# Patient Record
Sex: FEMALE | Race: BLACK OR AFRICAN AMERICAN | Employment: OTHER | URBAN - METROPOLITAN AREA
[De-identification: names, ages, dates, MRNs, and addresses within clinical notes are randomized per-mention and may not be internally consistent; named-entity substitution may affect disease eponyms.]

---

## 2017-04-07 ENCOUNTER — APPOINTMENT (OUTPATIENT)
Dept: GENERAL RADIOLOGY | Age: 72
End: 2017-04-07
Attending: PHYSICIAN ASSISTANT
Payer: MEDICARE

## 2017-04-07 ENCOUNTER — APPOINTMENT (OUTPATIENT)
Dept: CT IMAGING | Age: 72
End: 2017-04-07
Attending: PHYSICIAN ASSISTANT
Payer: MEDICARE

## 2017-04-07 ENCOUNTER — HOSPITAL ENCOUNTER (EMERGENCY)
Age: 72
Discharge: HOME | End: 2017-04-07
Attending: EMERGENCY MEDICINE
Payer: MEDICARE

## 2017-04-07 VITALS
BODY MASS INDEX: 29.99 KG/M2 | TEMPERATURE: 98.7 F | DIASTOLIC BLOOD PRESSURE: 97 MMHG | SYSTOLIC BLOOD PRESSURE: 186 MMHG | RESPIRATION RATE: 16 BRPM | OXYGEN SATURATION: 100 % | WEIGHT: 180 LBS | HEART RATE: 73 BPM | HEIGHT: 65 IN

## 2017-04-07 DIAGNOSIS — K59.00 CONSTIPATION, UNSPECIFIED CONSTIPATION TYPE: Primary | ICD-10-CM

## 2017-04-07 LAB
ALBUMIN SERPL BCP-MCNC: 3.9 G/DL (ref 3.4–5)
ALBUMIN/GLOB SERPL: 1 {RATIO} (ref 0.8–1.7)
ALP SERPL-CCNC: 153 U/L (ref 45–117)
ALT SERPL-CCNC: 16 U/L (ref 13–56)
ANION GAP BLD CALC-SCNC: 10 MMOL/L (ref 3–18)
APPEARANCE UR: CLEAR
AST SERPL W P-5'-P-CCNC: 55 U/L (ref 15–37)
BACTERIA URNS QL MICRO: ABNORMAL /HPF
BASOPHILS # BLD AUTO: 0 K/UL (ref 0–0.06)
BASOPHILS # BLD: 1 % (ref 0–2)
BILIRUB SERPL-MCNC: 0.5 MG/DL (ref 0.2–1)
BILIRUB UR QL: NEGATIVE
BUN SERPL-MCNC: 13 MG/DL (ref 7–18)
BUN/CREAT SERPL: 16 (ref 12–20)
CALCIUM SERPL-MCNC: 9.6 MG/DL (ref 8.5–10.1)
CHLORIDE SERPL-SCNC: 101 MMOL/L (ref 100–108)
CO2 SERPL-SCNC: 25 MMOL/L (ref 21–32)
COLOR UR: YELLOW
CREAT SERPL-MCNC: 0.81 MG/DL (ref 0.6–1.3)
DIFFERENTIAL METHOD BLD: ABNORMAL
EOSINOPHIL # BLD: 0 K/UL (ref 0–0.4)
EOSINOPHIL NFR BLD: 0 % (ref 0–5)
EPITH CASTS URNS QL MICRO: ABNORMAL /LPF (ref 0–5)
ERYTHROCYTE [DISTWIDTH] IN BLOOD BY AUTOMATED COUNT: 14 % (ref 11.6–14.5)
GLOBULIN SER CALC-MCNC: 4.1 G/DL (ref 2–4)
GLUCOSE SERPL-MCNC: 144 MG/DL (ref 74–99)
GLUCOSE UR STRIP.AUTO-MCNC: NEGATIVE MG/DL
HCT VFR BLD AUTO: 35.2 % (ref 35–45)
HGB BLD-MCNC: 11.7 G/DL (ref 12–16)
HGB UR QL STRIP: NEGATIVE
HYALINE CASTS URNS QL MICRO: ABNORMAL /LPF (ref 0–2)
KETONES UR QL STRIP.AUTO: NEGATIVE MG/DL
LEUKOCYTE ESTERASE UR QL STRIP.AUTO: ABNORMAL
LIPASE SERPL-CCNC: 29 U/L (ref 73–393)
LYMPHOCYTES # BLD AUTO: 21 % (ref 21–52)
LYMPHOCYTES # BLD: 1.8 K/UL (ref 0.9–3.6)
MCH RBC QN AUTO: 28.1 PG (ref 24–34)
MCHC RBC AUTO-ENTMCNC: 33.2 G/DL (ref 31–37)
MCV RBC AUTO: 84.6 FL (ref 74–97)
MONOCYTES # BLD: 0.5 K/UL (ref 0.05–1.2)
MONOCYTES NFR BLD AUTO: 6 % (ref 3–10)
MUCOUS THREADS URNS QL MICRO: ABNORMAL /LPF
NEUTS SEG # BLD: 6.2 K/UL (ref 1.8–8)
NEUTS SEG NFR BLD AUTO: 72 % (ref 40–73)
NITRITE UR QL STRIP.AUTO: NEGATIVE
PH UR STRIP: 5.5 [PH] (ref 5–8)
PLATELET # BLD AUTO: 240 K/UL (ref 135–420)
PMV BLD AUTO: 11.8 FL (ref 9.2–11.8)
POTASSIUM SERPL-SCNC: 5.2 MMOL/L (ref 3.5–5.5)
PROT SERPL-MCNC: 8 G/DL (ref 6.4–8.2)
PROT UR STRIP-MCNC: NEGATIVE MG/DL
RBC # BLD AUTO: 4.16 M/UL (ref 4.2–5.3)
RBC #/AREA URNS HPF: ABNORMAL /HPF (ref 0–5)
SODIUM SERPL-SCNC: 136 MMOL/L (ref 136–145)
SP GR UR REFRACTOMETRY: 1.02 (ref 1–1.03)
UROBILINOGEN UR QL STRIP.AUTO: 0.2 EU/DL (ref 0.2–1)
WBC # BLD AUTO: 8.6 K/UL (ref 4.6–13.2)
WBC URNS QL MICRO: ABNORMAL /HPF (ref 0–4)

## 2017-04-07 PROCEDURE — 74176 CT ABD & PELVIS W/O CONTRAST: CPT

## 2017-04-07 PROCEDURE — 83690 ASSAY OF LIPASE: CPT | Performed by: PHYSICIAN ASSISTANT

## 2017-04-07 PROCEDURE — 85025 COMPLETE CBC W/AUTO DIFF WBC: CPT | Performed by: PHYSICIAN ASSISTANT

## 2017-04-07 PROCEDURE — 81001 URINALYSIS AUTO W/SCOPE: CPT | Performed by: PHYSICIAN ASSISTANT

## 2017-04-07 PROCEDURE — 74011250637 HC RX REV CODE- 250/637: Performed by: PHYSICIAN ASSISTANT

## 2017-04-07 PROCEDURE — 99284 EMERGENCY DEPT VISIT MOD MDM: CPT

## 2017-04-07 PROCEDURE — 74020 XR ABD FLAT/ ERECT: CPT

## 2017-04-07 PROCEDURE — 80053 COMPREHEN METABOLIC PANEL: CPT | Performed by: PHYSICIAN ASSISTANT

## 2017-04-07 RX ORDER — DIPHENHYDRAMINE HCL 25 MG
25 CAPSULE ORAL
Status: COMPLETED | OUTPATIENT
Start: 2017-04-07 | End: 2017-04-07

## 2017-04-07 RX ORDER — ADHESIVE BANDAGE
30 BANDAGE TOPICAL DAILY
Qty: 118 ML | Refills: 0 | Status: SHIPPED | OUTPATIENT
Start: 2017-04-07 | End: 2018-07-09

## 2017-04-07 RX ADMIN — DIPHENHYDRAMINE HYDROCHLORIDE 25 MG: 25 CAPSULE ORAL at 21:08

## 2017-04-07 NOTE — ED NOTES
Assumed care of patient from Calli Obrien PA-C at shift change 1800. Patient does not complain of abdominal pain but has generalized tenderness to palpation, worse at the lower quadrants. KUB does not show evidence of obstruction but due to history CT was ordered (dry- allergy to dye). She is passing gas today. 2134: No signs of obstruction on CT or KUB. Will treat constipation with milk of mag, f/u with PCP or return if no improvement. Discussed treatment plan, return precautions, symptomatic relief, and expected time to improvement. All questions answered. Patient is stable for discharge and outpatient management.       Micheal Narvaez PA-C

## 2017-04-07 NOTE — ED TRIAGE NOTES
Per EMS, pt has been constipated the past 3 days. Usually takes oxycodone and colace at home but is reportedly out of both.

## 2017-04-07 NOTE — ED PROVIDER NOTES
Patient is a 74 y.o. female presenting with constipation. The history is provided by the patient. Constipation    Associated symptoms include constipation. Dinah Nina is a 66 y.o. female presents with c/o constipation for the past three days. States that she is concerned that she has another obstruction. States feels like she has a bitter taste in her throat that it is coming up. States that she has nausea but no vomiting. States that's he is passing gas. Has eaten some fruit today. Denies fever. Past Medical History:   Diagnosis Date    Anxiety     Depression     Diabetes ([address]     Diverticulosis     Dysphagia     Early satiety     Fatty liver     Gastric ulcer     Gastroparesis     Heart disease     Hypercholesteremia     Hypertension     Scoliosis     Stenosis of lumbosacral spine     Tardive dyskinesia        Past Surgical History:   Procedure Laterality Date    HX APPENDECTOMY      HX BREAST LUMPECTOMY      HX CHOLECYSTECTOMY  1980    HX COLECTOMY  2012    HX HERNIA REPAIR      HX HYSTERECTOMY  1970    HX KNEE ARTHROSCOPY      HX KNEE REPLACEMENT  1991    left knee    HX LUMBAR FUSION      x 3         Family History:   Problem Relation Age of Onset    Heart Disease Mother     Diabetes Mother     Heart Attack Mother     Cancer Father      lung CA    Cancer Brother        Social History     Social History    Marital status:      Spouse name: N/A    Number of children: N/A    Years of education: N/A     Occupational History    Not on file. Social History Main Topics    Smoking status: Never Smoker    Smokeless tobacco: Never Used    Alcohol use No    Drug use: No    Sexual activity: No     Other Topics Concern    Not on file     Social History Narrative         ALLERGIES: Codeine; Iodine; Morphine; Pcn [penicillins]; and Sulfa (sulfonamide antibiotics)    Review of Systems   Gastrointestinal: Positive for constipation.    Constitutional:  Denies malaise, fever, chills. Head:  Denies injury. Face:  Denies injury or pain. ENMT:  Denies sore throat. Neck:  Denies injury or pain. Chest:  Denies injury. Cardiac:  Denies chest pain or palpitations. Respiratory:  Denies cough, wheezing, difficulty breathing, shortness of breath. GI/ABD:  Constipation , pain Denies injury, distention, nausea, vomiting, diarrhea. :  Denies injury, pain, dysuria or urgency. Back:  Denies injury or pain. Pelvis:  Denies injury or pain. Extremity/MS:  Denies injury or pain. Neuro:  Denies headache, LOC, dizziness, neurologic symptoms/deficits/paresthesias. Skin: Denies injury, rash, itching or skin changes. Vitals:    04/07/17 1630 04/07/17 1635   BP: 180/74 180/74   Pulse:  73   Resp:  16   Temp:  98.7 °F (37.1 °C)   SpO2:  98%   Weight:  81.6 kg (180 lb)   Height:  5' 5\" (1.651 m)            Physical Exam   Nursing note and vitals reviewed. CONSTITUTIONAL: Alert, in no apparent distress; well-developed; well-nourished. HEAD:  Normocephalic, atraumatic. EYES: PERRL; EOM's intact. ENTM: Nose: No rhinorrhea; Throat: mucous membranes moist. Posterior pharynx-normal.  Neck:  No JVD, supple without lymphadenopathy. RESP: Chest clear, equal breath sounds. CV: S1 and S2 WNL; No murmurs, gallops or rubs. GI: Abdomen soft and minimal generalized  Tenderness,+BS, NG, NR . No masses or organomegaly. UPPER EXT:  Normal inspection. LOWER EXT: Normal inspection. NEURO: strength 5/5 and sym, sensation intact. SKIN: No rashes; Normal for age and stage. PSYCH:  Alert and oriented, normal affect.        MDM  Number of Diagnoses or Management Options  Diagnosis management comments: DDx: gastroenteritis, GERD, hernia, hepatitis, pancreatitis, gallbladder etiology, constipation, adhesions, UTI, pyelo, kidney stones, STD,  Xbcm-Ncfb-Egctdw syndrome, preg, ectopic, ovarian cyst, ovarian torsion, tubo-ovarian abscess, appendicitis, diverticulitis, SBO, GI bleed, mesenteric ischemia, AAA, cardiac etiology, musculoskeletal pain/spasm, malignancy         Amount and/or Complexity of Data Reviewed  Clinical lab tests: ordered and reviewed  Tests in the radiology section of CPT®: ordered and reviewed  Tests in the medicine section of CPT®: ordered and reviewed  Independent visualization of images, tracings, or specimens: yes      ED Course   Consulted with    concerning patient Alicia Marques, standard discussion of reason for visit, HPI, ROS, PE, and current results available. Report was given at this time and pt was turned over to the provider above, who will assume care of pt at this time and disposition. FERNANDA Foley       Procedures    Vitals:  Patient Vitals for the past 12 hrs:   Temp Pulse Resp BP SpO2   04/07/17 1635 98.7 °F (37.1 °C) 73 16 180/74 98 %   04/07/17 1630 - - - 180/74 -         Medications ordered:   Medications - No data to display      Lab findings:  No results found for this or any previous visit (from the past 12 hour(s)). EKG interpretation by ED Physician:      X-Ray, CT or other radiology findings or impressions:  XR ABD FLAT/ ERECT    (Results Pending)       Progress notes, Consult notes or additional Procedure notes:       Disposition:  Diagnosis: No diagnosis found. Disposition:   5:47 PM  Pt reevaluated at this time and is resting comfortably in NAD. Discussed results and findings, as well as, diagnosis and plan for discharge. Pt verbalizes understanding and agreement with plan. All questions addressed at this time. Follow-up Information     None           Patient's Medications   Start Taking    No medications on file   Continue Taking    AMLODIPINE (NORVASC) 10 MG TABLET    Take 10 mg by mouth daily. CHOLECALCIFEROL (VITAMIN D3) 1,000 UNIT CAP    Take 1,000 Units by mouth daily. CYANOCOBALAMIN (VITAMIN B-12) 1,000 MCG TABLET    Take 1,000 mcg by mouth daily.     DIAZEPAM (VALIUM) 2 MG TABLET    Take 1 Tab by mouth every six (6) hours as needed. Max Daily Amount: 8 mg. DICYCLOMINE (BENTYL) 20 MG TABLET    Take 20 mg by mouth as needed. DICYCLOMINE (BENTYL) 20 MG TABLET    Take 20 mg by mouth two (2) times a day. BID bowel camping    DIPHENHYDRAMINE (BENADRYL) 50 MG TABLET    Take 50 mg by mouth two (2) times daily as needed for Itching. DOCUSATE SODIUM (COLACE) 100 MG CAPSULE    Take 100 mg by mouth two (2) times a day. FUROSEMIDE (LASIX) 40 MG TABLET    Take 40 mg by mouth daily. GABAPENTIN (NEURONTIN) 100 MG CAPSULE    Take 300 mg by mouth three (3) times daily. GLIMEPIRIDE (AMARYL) 4 MG TABLET    Take  by mouth daily. HYDROCODONE-ACETAMINOPHEN (NORCO)  MG TABLET    Take 1 Tab by mouth every four (4) hours as needed. Max Daily Amount: 6 Tabs. HYDROCODONE-ACETAMINOPHEN (NORCO) 5-325 MG PER TABLET    Take 1 Tab by mouth every six (6) hours as needed. Max Daily Amount: 4 Tabs. HYDROCODONE-ACETAMINOPHEN (NORCO) 7.5-325 MG PER TABLET    Take 1 Tab by mouth every six (6) hours as needed for Pain. IBUPROFEN (MOTRIN) 200 MG TABLET    Take 400 mg by mouth every eight (8) hours as needed for Pain. LOSARTAN (COZAAR) 100 MG TABLET    Take 100 mg by mouth daily. METFORMIN (GLUCOPHAGE) 500 MG TABLET    Take 500 mg by mouth daily (with breakfast). METOPROLOL SUCCINATE (TOPROL-XL) 50 MG XL TABLET    Take 50 mg by mouth daily. NITROFURANTOIN, MACROCRYSTAL-MONOHYDRATE, (MACROBID) 100 MG CAPSULE    Take 100 mg by mouth two (2) times a day. OMEPRAZOLE (PRILOSEC) 40 MG CAPSULE    Take 40 mg by mouth daily. OXYCODONE-ACETAMINOPHEN (PERCOCET 10)  MG PER TABLET    Take 1 Tab by mouth every six (6) hours as needed for Pain. PHENYLEPH-MIN OIL-PETROLATUM (PREPARATION H) 0.25-14-74.9 % RECTAL OINTMENT    by PeriANAL route four (4) times daily as needed for Pain. POLYETHYLENE GLYCOL (MIRALAX) 17 GRAM PACKET    Take 17 g by mouth daily.     POTASSIUM CHLORIDE (K-DUR, KLOR-CON) 20 MEQ TABLET    Take 2 Tabs by mouth two (2) times a day. POTASSIUM CHLORIDE (KLOR-CON M10) 10 MEQ TABLET    Take 10 mEq by mouth daily. take 1 tablet by mouth every day as needed take with furosemide    PROMETHAZINE (PHENERGAN) 25 MG TABLET    Take 25 mg by mouth two (2) times daily as needed for Nausea. SUCRALFATE (CARAFATE) 1 GRAM TABLET    Take 1 g by mouth four (4) times daily. VENLAFAXINE-SR (EFFEXOR XR) 150 MG CAPSULE    Take 150 mg by mouth daily. ZOLPIDEM (AMBIEN) 10 MG TABLET    Take 10 mg by mouth nightly.    These Medications have changed    No medications on file   Stop Taking    No medications on file

## 2017-04-08 NOTE — DISCHARGE INSTRUCTIONS
To prevent constipation, eat a diet high in fiber and drink lots of water. Take a daily stool softener such as Colace or Dulcolax. Narcotics and other pain medications can worsen the severity of the constipation, so avoid these medications if possible. Return to ED for any severe abdominal pain, blood in stool, profuse vomiting, or if no bowel movement after 24 hours of prescribed treatments. Constipation: Care Instructions  Your Care Instructions  Constipation means that you have a hard time passing stools (bowel movements). People pass stools from 3 times a day to once every 3 days. What is normal for you may be different. Constipation may occur with pain in the rectum and cramping. The pain may get worse when you try to pass stools. Sometimes there are small amounts of bright red blood on toilet paper or the surface of stools. This is because of enlarged veins near the rectum (hemorrhoids). A few changes in your diet and lifestyle may help you avoid ongoing constipation. Your doctor may also prescribe medicine to help loosen your stool. Some medicines can cause constipation. These include pain medicines and antidepressants. Tell your doctor about all the medicines you take. Your doctor may want to make a medicine change to ease your symptoms. Follow-up care is a key part of your treatment and safety. Be sure to make and go to all appointments, and call your doctor if you are having problems. It's also a good idea to know your test results and keep a list of the medicines you take. How can you care for yourself at home? · Drink plenty of fluids, enough so that your urine is light yellow or clear like water. If you have kidney, heart, or liver disease and have to limit fluids, talk with your doctor before you increase the amount of fluids you drink. · Include high-fiber foods in your diet each day. These include fruits, vegetables, beans, and whole grains.   · Get at least 30 minutes of exercise on most days of the week. Walking is a good choice. You also may want to do other activities, such as running, swimming, cycling, or playing tennis or team sports. · Take a fiber supplement, such as Citrucel or Metamucil, every day. Read and follow all instructions on the label. · Schedule time each day for a bowel movement. A daily routine may help. Take your time having your bowel movement. · Support your feet with a small step stool when you sit on the toilet. This helps flex your hips and places your pelvis in a squatting position. · Your doctor may recommend an over-the-counter laxative to relieve your constipation. Examples are Milk of Magnesia and MiraLax. Read and follow all instructions on the label. Do not use laxatives on a long-term basis. When should you call for help? Call your doctor now or seek immediate medical care if:  · You have new or worse belly pain. · You have new or worse nausea or vomiting. · You have blood in your stools. Watch closely for changes in your health, and be sure to contact your doctor if:  · Your constipation is getting worse. · You do not get better as expected. Where can you learn more? Go to http://shamika-chinyere.info/. Enter 21 [phone] in the search box to learn more about \"Constipation: Care Instructions. \"  Current as of: May 27, 2016  Content Version: 11.2  © 4080-0838 [employer]. Care instructions adapted under license by [employer] (which disclaims liability or warranty for this information). If you have questions about a medical condition or this instruction, always ask your healthcare professional. [place] [address] any warranty or liability for your use of this information.

## 2017-04-20 ENCOUNTER — APPOINTMENT (OUTPATIENT)
Dept: CT IMAGING | Age: 72
End: 2017-04-20
Attending: EMERGENCY MEDICINE
Payer: MEDICARE

## 2017-04-20 ENCOUNTER — HOSPITAL ENCOUNTER (EMERGENCY)
Age: 72
Discharge: HOME | End: 2017-04-20
Attending: EMERGENCY MEDICINE
Payer: MEDICARE

## 2017-04-20 VITALS
HEART RATE: 58 BPM | DIASTOLIC BLOOD PRESSURE: 72 MMHG | OXYGEN SATURATION: 97 % | SYSTOLIC BLOOD PRESSURE: 171 MMHG | RESPIRATION RATE: 16 BRPM | TEMPERATURE: 98.6 F
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
## 2017-04-20 DIAGNOSIS — S05.12XA: Primary | ICD-10-CM

## 2017-04-20 DIAGNOSIS — S09.90XA HEAD INJURY, INITIAL ENCOUNTER: ICD-10-CM

## 2017-04-20 LAB — TROPONIN I BLD-MCNC: <0.04 NG/ML (ref 0–0.08)

## 2017-04-20 PROCEDURE — 84484 ASSAY OF TROPONIN QUANT: CPT

## 2017-04-20 PROCEDURE — 70450 CT HEAD/BRAIN W/O DYE: CPT

## 2017-04-20 PROCEDURE — 72125 CT NECK SPINE W/O DYE: CPT

## 2017-04-20 PROCEDURE — 93005 ELECTROCARDIOGRAM TRACING: CPT

## 2017-04-20 PROCEDURE — 99284 EMERGENCY DEPT VISIT MOD MDM: CPT

## 2017-04-20 RX ORDER — BACITRACIN 500 UNIT/G
1 PACKET (EA) TOPICAL ONCE
Status: DISCONTINUED | OUTPATIENT
Start: 2017-04-20 | End: 2017-04-20 | Stop reason: HOSPADM

## 2017-04-20 NOTE — ED PROVIDER NOTES
HPI Comments: 11:13 AM Dinah Nina is a 74 y.o. female with h/o HTN, DM, and heart disease who presents to ED accompanied by her daughter complaining of a headache following a fall that occurred 2 hours ago. The patient states her foot got tripped up and she fell, hitting concrete. She denies any LOC following the fall, but her daughter states she is excessively sleepy. The patient states \"since last night my head feels out of whack\". She also complains of neck pain, feeling hot and diaphoretic yesterday, and left sided weakness for 2-3 days. The patient denies taking Aspirin or Plavix. She denies alcohol and tobacco use. No other concerns or symptoms at this time. PCP: Robinson Knowles MD      The history is provided by the patient and a relative. Past Medical History:   Diagnosis Date    Anxiety     Depression     Diabetes ([facility] [address]     Diverticulosis     Dysphagia     Early satiety     Fatty liver     Gastric ulcer     Gastroparesis     Heart disease     Hypercholesteremia     Hypertension     Scoliosis     Stenosis of lumbosacral spine     Tardive dyskinesia        Past Surgical History:   Procedure Laterality Date    HX APPENDECTOMY      HX BREAST LUMPECTOMY      HX CHOLECYSTECTOMY  1980    HX COLECTOMY  2012    HX HERNIA REPAIR      HX HYSTERECTOMY  1970    HX KNEE ARTHROSCOPY      HX KNEE REPLACEMENT  1991    left knee    HX LUMBAR FUSION      x 3         Family History:   Problem Relation Age of Onset    Heart Disease Mother     Diabetes Mother     Heart Attack Mother     Cancer Father      lung CA    Cancer Brother        Social History     Social History    Marital status:      Spouse name: N/A    Number of children: N/A    Years of education: N/A     Occupational History    Not on file.      Social History Main Topics    Smoking status: Never Smoker    Smokeless tobacco: Never Used    Alcohol use No    Drug use: No    Sexual activity: No     Other Topics Concern    Not on file     Social History Narrative         ALLERGIES: Codeine; Iodine; Morphine; Pcn [penicillins]; and Sulfa (sulfonamide antibiotics)    Review of Systems   Constitutional: Positive for diaphoresis and fatigue. Negative for fever. HENT: Negative for congestion. Respiratory: Negative for cough and shortness of breath. Cardiovascular: Negative for chest pain. Gastrointestinal: Negative for abdominal pain and vomiting. Musculoskeletal: Positive for neck pain. Negative for back pain. Skin: Negative for rash. Neurological: Positive for weakness and headaches. Negative for light-headedness. All other systems reviewed and are negative. Vitals:    04/20/17 1059   BP: 171/72   Pulse: (!) 58   Resp: 16   Temp: 98.6 °F (37 °C)   SpO2: 97%            Physical Exam   Constitutional: She is oriented to person, place, and time. She appears well-developed and well-nourished. No distress. HENT:   Head: Normocephalic. Head is with contusion (left orbit). Mouth/Throat: Oropharynx is clear and moist.   Eyes: Conjunctivae and EOM are normal. Pupils are equal, round, and reactive to light. No scleral icterus. Neck: Normal range of motion. Neck supple. Cardiovascular: Normal rate, regular rhythm and normal heart sounds. No murmur heard. Pulmonary/Chest: Effort normal and breath sounds normal. No respiratory distress. Abdominal: Soft. Bowel sounds are normal. She exhibits no distension. There is no tenderness. Musculoskeletal: She exhibits no edema. Lymphadenopathy:     She has no cervical adenopathy. Neurological: She is alert and oriented to person, place, and time. Coordination normal. GCS eye subscore is 4. GCS verbal subscore is 5. GCS motor subscore is 6. Skin: Skin is warm and dry. No rash noted. Psychiatric: She has a normal mood and affect. Her behavior is normal.   Nursing note and vitals reviewed.        MDM  Number of Diagnoses or Management Options  Contusion of orbit, left, initial encounter:   Fall, initial encounter:   Head injury, initial encounter:   Diagnosis management comments: Mechanical fall hitting L orbit/zygoma no LOC    NEC trauma above clavicle and age    Ekg: sinus baltazar rate 53 no acute st changes     Ct reviewed will dc home            Amount and/or Complexity of Data Reviewed  Clinical lab tests: ordered and reviewed  Tests in the radiology section of CPT®: ordered and reviewed  Independent visualization of images, tracings, or specimens: yes    Risk of Complications, Morbidity, and/or Mortality  Presenting problems: high  Diagnostic procedures: moderate  Management options: moderate      ED Course       Procedures      Vitals:  Patient Vitals for the past 12 hrs:   Temp Pulse Resp BP SpO2   04/20/17 1059 98.6 °F (37 °C) (!) 58 16 171/72 97 %   SpO2 reviewed and within normal limits. Medications ordered:   Medications   bacitracin 500 unit/gram packet 1 Packet (not administered)         Lab findings:  Recent Results (from the past 12 hour(s))   POC TROPONIN-I    Collection Time: 04/20/17 11:30 AM   Result Value Ref Range    Troponin-I (POC) <0.04 0.00 - 0.08 ng/mL   EKG, 12 LEAD, INITIAL    Collection Time: 04/20/17 11:33 AM   Result Value Ref Range    Ventricular Rate 53 BPM    Atrial Rate 53 BPM    P-R Interval 202 ms    QRS Duration 70 ms    Q-T Interval 420 ms    QTC Calculation (Bezet) 394 ms    Calculated P Axis 40 degrees    Calculated R Axis 4 degrees    Calculated T Axis 25 degrees    Diagnosis       Sinus bradycardia  Possible Left atrial enlargement  Septal infarct , age undetermined  Abnormal ECG  When compared with ECG of 02-SEP-2016 10:00,  Septal infarct is now present         EKG interpretation by ED Physician:       X-Ray, CT or other radiology findings or impressions:  CT SPINE CERV WO CONT   Final Result   Impression:     1. No acute fracture or subluxation.    2. Multilevel degenerative changes and associated stenoses, as described. Interpreted by Radiologist.   Orbie Rolf CONT   Final Result   IMPRESSION:     Moderate left periorbital soft tissue contusion. No acute intracranial  abnormality related to recent fall. Interpreted by Radiologist.       Orders:  Orders Placed This Encounter    CT HEAD WO CONT     Standing Status:   Standing     Number of Occurrences:   1     Order Specific Question:   Transport     Answer:   Stretcher [5]     Order Specific Question:   Is Patient Allergic to Contrast Dye? Answer:   No    CT SPINE CERV WO CONT     Standing Status:   Standing     Number of Occurrences:   1     Order Specific Question:   Transport     Answer:   Stretcher [5]     Order Specific Question:   Is Patient Allergic to Contrast Dye? Answer:   No    POC TROPONIN-I     Standing Status:   Standing     Number of Occurrences:   1    POC TROPONIN-I     Standing Status:   Standing     Number of Occurrences:   1    EKG, 12 LEAD, INITIAL     Standing Status:   Standing     Number of Occurrences:   1     Order Specific Question:   Reason for Exam:     Answer:   Pain    bacitracin 500 unit/gram packet 1 Packet       Reevaluation, Progress notes, Consult notes, or additional Procedure notes:       Disposition:  Diagnosis:   1. Contusion of orbit, left, initial encounter    2. Fall, initial encounter    3. Head injury, initial encounter        Disposition:     Follow-up Information     Follow up With Details Comments Contact formerly [facility] EMERGENCY DEPT  As needed, If symptoms worsen [address] [place] [address]    Jeff Yoo MD Schedule an appointment as soon as possible for a visit call for ED follow up appointment  [facility]  [phone]             Patient's Medications   Start Taking    No medications on file   Continue Taking    AMLODIPINE (NORVASC) 10 MG TABLET    Take 10 mg by mouth daily.     CHOLECALCIFEROL (VITAMIN D3) 1,000 UNIT CAP    Take 1,000 Units by mouth daily.    CYANOCOBALAMIN (VITAMIN B-12) 1,000 MCG TABLET    Take 1,000 mcg by mouth daily. DIAZEPAM (VALIUM) 2 MG TABLET    Take 1 Tab by mouth every six (6) hours as needed. Max Daily Amount: 8 mg. DICYCLOMINE (BENTYL) 20 MG TABLET    Take 20 mg by mouth as needed. DICYCLOMINE (BENTYL) 20 MG TABLET    Take 20 mg by mouth two (2) times a day. BID bowel camping    DIPHENHYDRAMINE (BENADRYL) 50 MG TABLET    Take 50 mg by mouth two (2) times daily as needed for Itching. DOCUSATE SODIUM (COLACE) 100 MG CAPSULE    Take 100 mg by mouth two (2) times a day. FUROSEMIDE (LASIX) 40 MG TABLET    Take 40 mg by mouth daily. GABAPENTIN (NEURONTIN) 100 MG CAPSULE    Take 300 mg by mouth three (3) times daily. GLIMEPIRIDE (AMARYL) 4 MG TABLET    Take  by mouth daily. HYDROCODONE-ACETAMINOPHEN (NORCO)  MG TABLET    Take 1 Tab by mouth every four (4) hours as needed. Max Daily Amount: 6 Tabs. HYDROCODONE-ACETAMINOPHEN (NORCO) 5-325 MG PER TABLET    Take 1 Tab by mouth every six (6) hours as needed. Max Daily Amount: 4 Tabs. HYDROCODONE-ACETAMINOPHEN (NORCO) 7.5-325 MG PER TABLET    Take 1 Tab by mouth every six (6) hours as needed for Pain. IBUPROFEN (MOTRIN) 200 MG TABLET    Take 400 mg by mouth every eight (8) hours as needed for Pain. LOSARTAN (COZAAR) 100 MG TABLET    Take 100 mg by mouth daily. MAGNESIUM HYDROXIDE (MILK OF MAGNESIA) 400 MG/5 ML SUSPENSION    Take 30 mL by mouth daily. METFORMIN (GLUCOPHAGE) 500 MG TABLET    Take 500 mg by mouth daily (with breakfast). METOPROLOL SUCCINATE (TOPROL-XL) 50 MG XL TABLET    Take 50 mg by mouth daily. NITROFURANTOIN, MACROCRYSTAL-MONOHYDRATE, (MACROBID) 100 MG CAPSULE    Take 100 mg by mouth two (2) times a day. OMEPRAZOLE (PRILOSEC) 40 MG CAPSULE    Take 40 mg by mouth daily. OXYCODONE-ACETAMINOPHEN (PERCOCET 10)  MG PER TABLET    Take 1 Tab by mouth every six (6) hours as needed for Pain.     Symone Dia OIL-PETROLATUM (PREPARATION H) 0.25-14-74.9 % RECTAL OINTMENT    by PeriANAL route four (4) times daily as needed for Pain. POLYETHYLENE GLYCOL (MIRALAX) 17 GRAM PACKET    Take 17 g by mouth daily. POTASSIUM CHLORIDE (K-DUR, KLOR-CON) 20 MEQ TABLET    Take 2 Tabs by mouth two (2) times a day. POTASSIUM CHLORIDE (KLOR-CON M10) 10 MEQ TABLET    Take 10 mEq by mouth daily. take 1 tablet by mouth every day as needed take with furosemide    PROMETHAZINE (PHENERGAN) 25 MG TABLET    Take 25 mg by mouth two (2) times daily as needed for Nausea. SUCRALFATE (CARAFATE) 1 GRAM TABLET    Take 1 g by mouth four (4) times daily. VENLAFAXINE-SR (EFFEXOR XR) 150 MG CAPSULE    Take 150 mg by mouth daily. ZOLPIDEM (AMBIEN) 10 MG TABLET    Take 10 mg by mouth nightly. These Medications have changed    No medications on file   Stop Taking    No medications on file         [id number] [address] for and in the presence of Dagoberto Castaneda MD (04/20/17)  Signed by: Kmi Zuluaga, April 20, 2017 at 1:38 PM     Physician Attestation  I personally performed the services described in this documentation, reviewed and edited the documentation which was dictated to the scribe in my presence, and it accurately records my words and actions.     Dagoberto Castaneda MD (04/20/17)

## 2017-04-20 NOTE — DISCHARGE INSTRUCTIONS
Learning About a Closed Head Injury  What is a closed head injury? A closed head injury happens when your head gets hit hard. The strong force of the blow causes your brain to shake in your skull. This movement can cause the brain to bruise, swell, or tear. Sometimes nerves or blood vessels also get damaged. This can cause bleeding in or around the brain. A concussion is a type of closed head injury. What are the symptoms? If you have a mild concussion, you may have a mild headache or feel \"not quite right. \" These symptoms are common. They usually go away over a few days to 4 weeks. But sometimes after a concussion, you feel like you can't function as well as before the injury. And you have new symptoms. This is called postconcussive syndrome. You may:  · Find it harder to solve problems, think, concentrate, or remember. · Have headaches. · Have changes in your sleep patterns, such as not being able to sleep or sleeping all the time. · Have changes in your personality. · Not be interested in your usual activities. · Feel angry or anxious without a clear reason. · Lose your sense of taste or smell. · Be dizzy, lightheaded, or unsteady. It may be hard to stand or walk. How is a closed head injury treated? Any person who may have a concussion needs to see a doctor. Some people have to stay in the hospital to be watched. Others can go home safely. If you go home, follow your doctor's instructions. He or she will tell you if you need someone to watch you closely for the next 24 hours or longer. Rest is the best treatment. Get plenty of sleep at night. And try to rest during the day. · Avoid activities that are physically or mentally demanding. These include housework, exercise, and schoolwork. And don't play video games, send text messages, or use the computer. You may need to change your school or work schedule to be able to avoid these activities.   · Ask your doctor when it's okay to drive, ride a bike, or operate machinery. · Take an over-the-counter pain medicine, such as acetaminophen (Tylenol), ibuprofen (Advil, Motrin), or naproxen (Aleve). Be safe with medicines. Read and follow all instructions on the label. · Check with your doctor before you use any other medicines for pain. · Do not drink alcohol or use illegal drugs. They can slow recovery. They can also increase your risk of getting a second head injury. Follow-up care is a key part of your treatment and safety. Be sure to make and go to all appointments, and call your doctor if you are having problems. It's also a good idea to know your test results and keep a list of the medicines you take. Where can you learn more? Go to http://shamika-chinyere.info/. Enter E235 in the search box to learn more about \"Learning About a Closed Head Injury. \"  Current as of: October 14, 2016  Content Version: 11.2  © 1734-4597 [employer]. Care instructions adapted under license by [employer] (which disclaims liability or warranty for this information). If you have questions about a medical condition or this instruction, always ask your healthcare professional. [address] any warranty or liability for your use of this information. Preventing Falls: Care Instructions  Your Care Instructions  Getting around your home safely can be a challenge if you have injuries or health problems that make it easy for you to fall. Loose rugs and furniture in walkways are among the dangers for many older people who have problems walking or who have poor eyesight. People who have conditions such as arthritis, osteoporosis, or dementia also have to be careful not to fall. You can make your home safer with a few simple measures. Follow-up care is a key part of your treatment and safety. Be sure to make and go to all appointments, and call your doctor if you are having problems.  It's also a good idea to know your test results and keep a list of the medicines you take. How can you care for yourself at home? Taking care of yourself  · You may get dizzy if you do not drink enough water. To prevent dehydration, drink plenty of fluids, enough so that your urine is light yellow or clear like water. Choose water and other caffeine-free clear liquids. If you have kidney, heart, or liver disease and have to limit fluids, talk with your doctor before you increase the amount of fluids you drink. · Exercise regularly to improve your strength, muscle tone, and balance. Walk if you can. Swimming may be a good choice if you cannot walk easily. · Have your vision and hearing checked each year or any time you notice a change. If you have trouble seeing and hearing, you might not be able to avoid objects and could lose your balance. · Know the side effects of the medicines you take. Ask your doctor or pharmacist whether the medicines you take can affect your balance. Sleeping pills or sedatives can affect your balance. · Limit the amount of alcohol you drink. Alcohol can impair your balance and other senses. · Ask your doctor whether calluses or corns on your feet need to be removed. If you wear loose-fitting shoes because of calluses or corns, you can lose your balance and fall. · Talk to your doctor if you have numbness in your feet. Preventing falls at home  · Remove raised doorway thresholds, throw rugs, and clutter. Repair loose carpet or raised areas in the floor. · Move furniture and electrical cords to keep them out of walking paths. · Use nonskid floor wax, and wipe up spills right away, especially on ceramic tile floors. · If you use a walker or cane, put rubber tips on it. If you use crutches, clean the bottoms of them regularly with an abrasive pad, such as steel wool. · Keep your house well lit, especially [place], and outside walkways. Use night-lights in areas such as hallways and bathrooms. Add extra light switches or use remote switches (such as switches that go on or off when you clap your hands) to make it easier to turn lights on if you have to get up during the night. · Install sturdy handrails on stairways. · Move items in your cabinets so that the things you use a lot are on the lower shelves (about waist level). · Keep a cordless phone and a flashlight with new batteries by your bed. If possible, put a phone in each of the main rooms of your house, or carry a cell phone in case you fall and cannot reach a phone. Or, you can wear a device around your neck or wrist. You push a button that sends a signal for help. · Wear low-heeled shoes that fit well and give your feet good support. Use footwear with nonskid soles. Check the heels and soles of your shoes for wear. Repair or replace worn heels or soles. · Do not wear socks without shoes on wood floors. · Walk on the grass when the sidewalks are slippery. If you live in an area that gets snow and ice in the winter, sprinkle salt on slippery steps and sidewalks. Preventing falls in the bath  · Install grab bars and nonskid mats inside and outside your shower or tub and near the toilet and sinks. · Use shower chairs and bath benches. · Use a hand-held shower head that will allow you to sit while showering. · Get into a tub or shower by putting the weaker leg in first. Get out of a tub or shower with your strong side first.  · Repair loose toilet seats and consider installing a raised toilet seat to make getting on and off the toilet easier. · Keep your bathroom door unlocked while you are in the shower. Where can you learn more? Go to http://shamika-chinyere.info/. Enter 0476 [id number] 69 [id number] in the search box to learn more about \"Preventing Falls: Care Instructions. \"  Current as of: August 4, 2016  Content Version: 11.2  © 5602-7386 [employer].  Care instructions adapted under license by [employer] (which disclaims liability or warranty for this information). If you have questions about a medical condition or this instruction, always ask your healthcare professional. [address] any warranty or liability for your use of this information.

## 2017-04-21 ENCOUNTER — HOSPITAL ENCOUNTER (EMERGENCY)
Age: 72
Discharge: HOME | End: 2017-04-21
Attending: EMERGENCY MEDICINE
Payer: MEDICARE

## 2017-04-21 VITALS
HEART RATE: 80 BPM | TEMPERATURE: 97.9 F | RESPIRATION RATE: 16 BRPM | OXYGEN SATURATION: 100 % | DIASTOLIC BLOOD PRESSURE: 82 MMHG | SYSTOLIC BLOOD PRESSURE: 176 MMHG

## 2017-04-21 DIAGNOSIS — I15.9 SECONDARY HYPERTENSION: ICD-10-CM

## 2017-04-21 DIAGNOSIS — Z51.89 ENCOUNTER FOR WOUND RE-CHECK: Primary | ICD-10-CM

## 2017-04-21 DIAGNOSIS — H57.89 PERIORBITAL SWELLING: ICD-10-CM

## 2017-04-21 LAB
APPEARANCE UR: CLEAR
ATRIAL RATE: 53 BPM
BILIRUB UR QL: NEGATIVE
CALCULATED P AXIS, ECG09: 40 DEGREES
CALCULATED R AXIS, ECG10: 4 DEGREES
CALCULATED T AXIS, ECG11: 25 DEGREES
COLOR UR: YELLOW
DIAGNOSIS, 93000: NORMAL
GLUCOSE UR STRIP.AUTO-MCNC: NEGATIVE MG/DL
HGB UR QL STRIP: NEGATIVE
KETONES UR QL STRIP.AUTO: NEGATIVE MG/DL
LEUKOCYTE ESTERASE UR QL STRIP.AUTO: NEGATIVE
NITRITE UR QL STRIP.AUTO: NEGATIVE
P-R INTERVAL, ECG05: 202 MS
PH UR STRIP: 6.5 [PH] (ref 5–8)
PROT UR STRIP-MCNC: NEGATIVE MG/DL
Q-T INTERVAL, ECG07: 420 MS
QRS DURATION, ECG06: 70 MS
QTC CALCULATION (BEZET), ECG08: 394 MS
SP GR UR REFRACTOMETRY: 1.01 (ref 1–1.03)
UROBILINOGEN UR QL STRIP.AUTO: 0.2 EU/DL (ref 0.2–1)
VENTRICULAR RATE, ECG03: 53 BPM

## 2017-04-21 PROCEDURE — 99284 EMERGENCY DEPT VISIT MOD MDM: CPT

## 2017-04-21 PROCEDURE — 77030005563 HC CATH URETH INT MMGH -A

## 2017-04-21 PROCEDURE — 81003 URINALYSIS AUTO W/O SCOPE: CPT | Performed by: EMERGENCY MEDICINE

## 2017-04-21 PROCEDURE — 51701 INSERT BLADDER CATHETER: CPT

## 2017-04-21 PROCEDURE — 74011250637 HC RX REV CODE- 250/637: Performed by: EMERGENCY MEDICINE

## 2017-04-21 RX ORDER — LORAZEPAM 1 MG/1
1 TABLET ORAL
Status: DISCONTINUED | OUTPATIENT
Start: 2017-04-21 | End: 2017-04-21 | Stop reason: HOSPADM

## 2017-04-21 RX ORDER — HYDROCODONE BITARTRATE AND ACETAMINOPHEN 7.5; 325 MG/1; MG/1
1 TABLET ORAL
Status: COMPLETED | OUTPATIENT
Start: 2017-04-21 | End: 2017-04-21

## 2017-04-21 RX ADMIN — HYDROCODONE BITARTRATE AND ACETAMINOPHEN 1 TABLET: 7.5; 325 TABLET ORAL at 10:55

## 2017-04-21 NOTE — ED PROVIDER NOTES
HPI Comments: 10:38 AM Sascha Barlow is a 74 y.o. female with a history of HTN, DM,  who presents to the ED for evaluation of HA. The patient was seen here yesterday for a fall, had  CTs, and had no bleeding or fractures. Patient presents today for continued pain and because she states, \" I just don't know what to do,\" regarding her ability to take care of herself. She states that she has an aid come from 3:00 PM to 6:00 PM, and from 10:00 PM to 6:00 AM, so she is lacking help in the morning. She notes that she took some percocet today at 8:30 AM.             The history is provided by the patient. Past Medical History:   Diagnosis Date    Anxiety     Depression     Diabetes ([facility] [address]     Diverticulosis     Dysphagia     Early satiety     Fatty liver     Gastric ulcer     Gastroparesis     Heart disease     Hypercholesteremia     Hypertension     Scoliosis     Stenosis of lumbosacral spine     Tardive dyskinesia        Past Surgical History:   Procedure Laterality Date    HX APPENDECTOMY      HX BREAST LUMPECTOMY      HX CHOLECYSTECTOMY  1980    HX COLECTOMY  2012    HX HERNIA REPAIR      HX HYSTERECTOMY  1970    HX KNEE ARTHROSCOPY      HX KNEE REPLACEMENT  1991    left knee    HX LUMBAR FUSION      x 3         Family History:   Problem Relation Age of Onset    Heart Disease Mother     Diabetes Mother     Heart Attack Mother     Cancer Father      lung CA    Cancer Brother        Social History     Social History    Marital status:      Spouse name: N/A    Number of children: N/A    Years of education: N/A     Occupational History    Not on file. Social History Main Topics    Smoking status: Never Smoker    Smokeless tobacco: Never Used    Alcohol use No    Drug use: No    Sexual activity: No     Other Topics Concern    Not on file     Social History Narrative         ALLERGIES: Codeine;  Iodine; Morphine; Pcn [penicillins]; and Sulfa (sulfonamide antibiotics)    Review of Systems   Constitutional: Negative. Eyes: Negative. Respiratory: Negative. Cardiovascular: Negative. Gastrointestinal: Negative. Endocrine: Negative. Genitourinary: Negative. Musculoskeletal: Negative. Skin: Negative. Allergic/Immunologic: Negative. Neurological: Positive for headaches. Hematological: Negative. Psychiatric/Behavioral: Negative. All other systems reviewed and are negative. Vitals:    04/21/17 1100 04/21/17 1200 04/21/17 1208 04/21/17 1300   BP: 187/79 194/85  176/82   Pulse:    80   Resp:    16   Temp:       SpO2: 98% 100% 100% 100%   98% on RA, indicating adequate oxygenation. I have counseled the patient regarding their blood pressure, which was elevated today. I have instructed the patient to follow up with a primary care provider regarding this issue, and discussed the risks of prolonged, untreated high blood pressure, including heart attack, stroke, disability, and death. The patient has verbalized understanding. Physical Exam   Constitutional: She is oriented to person, place, and time. She appears well-developed and well-nourished. Patient is tearful. HENT:   Head: Normocephalic and atraumatic. Eyes: Conjunctivae and EOM are normal. Pupils are equal, round, and reactive to light. There is some swelling and ecchymosis surrounding the L eye. Neck: Normal range of motion. Neck supple. Cardiovascular: Normal rate, regular rhythm, normal heart sounds and intact distal pulses. Pulmonary/Chest: Effort normal and breath sounds normal.   Abdominal: Soft. Bowel sounds are normal.   Musculoskeletal: Normal range of motion. Trace pre-tibial swelling. Neurological: She is alert and oriented to person, place, and time. Skin: Skin is warm and dry. Psychiatric: Her behavior is normal. Judgment and thought content normal.   Nursing note and vitals reviewed.        MDM  ED Course Procedures          Lab findings:  Recent Results (from the past 12 hour(s))   URINALYSIS W/ RFLX MICROSCOPIC    Collection Time: 04/21/17 11:30 AM   Result Value Ref Range    Color YELLOW      Appearance CLEAR      Specific gravity 1.013 1.005 - 1.030      pH (UA) 6.5 5.0 - 8.0      Protein NEGATIVE  NEG mg/dL    Glucose NEGATIVE  NEG mg/dL    Ketone NEGATIVE  NEG mg/dL    Bilirubin NEGATIVE  NEG      Blood NEGATIVE  NEG      Urobilinogen 0.2 0.2 - 1.0 EU/dL    Nitrites NEGATIVE  NEG      Leukocyte Esterase NEGATIVE  NEG           Progress notes, Consult notes, additional Procedure notes, and/or Re-evaluation:   11:15 AM  has seen the patient. See her note. 11:51 AM I've rechecked the patient. I updated the patient on all current results, as well as our current treatment plan. The patient verbalized understanding and agreement. All questions were answered at this time. 11:51 AM I have assessed the patient, who will be discharged in stable condition. I've given the patient precautions to return to the emergency room if there are any new or worsening conditions. I've also instructed the patient to follow up regarding their visit today. Patient has verbalized understanding and agreement with treatment plan, plan to discharge, and follow-up. All questions were answered at this time. Disposition:  Diagnosis:   1. Encounter for wound re-check    2. Periorbital swelling    3. Secondary hypertension          Disposition: Discharge      Scribe Attestation:   Jeffery Silva acting as a scribe for and in the presence of Dr. Danitza Davis MD     Signed by: Kim Claudio, April 21, 2017 at 11:52 AM     Provider Attestation:   I personally performed the services described in the documentation, reviewed the documentation, as recorded by the scribe in my presence, and it accurately and completely records my words and actions.      Reviewed and signed by:  Dr. Daintza Davis MD

## 2017-04-21 NOTE — ED NOTES
MD went and spoke to pt informing her urine specimen had no signs of infection and pt was going to be discharged home. Pt then became tearful. RN explained results to pt and reason for discharge. Unable to calm pt down.

## 2017-04-21 NOTE — PROGRESS NOTES
Met with pt at bedside accompanied by General Champion, Partha Kern. Pt states that she is afraid of being alone at home. Pt informed me that she receives [address] via [facility]. Pt also receives meals on wheels and [employer] services. Pt has a rollator and a scooter in her residence. Pt resides alone at [facility] and states that her dtr comes over to assist her, however she \"said some mean things\" to her dtr yesterday and now she does not think her dtr will provide companionship. Pt also informed me that she has a medic alert bracelet that is not functioning. She states that the company can not replace her medic alert until Monday, 4/24/17. Confirmed with Director of Nursing, Fabiola with [facility] that pt receives personal care 7 days/ week, 10PM-6AM. Pt also states that \"someone comes in from [facility] 3PM-6PM\". I advised pt that I can assist her with long term care placement today. I notified the patient that her monthly income will go to whichever facility she is in. Pt declined stating that she has been in a facility before and does not want to go back to one. Pt expressed to me concerns of having a UTI, as per patient she gets agitated and says mean things to her dtr when ever she has a bladder infection. Discussed the above with pt's nurse, Marvie Dakins and Dr. Tangela Jauregui.           Heidi Riggins RN, BSN, [facility] Outcomes Manager  Thursdays & Fridays   [phone] (phone)  [phone] (pager)

## 2017-04-21 NOTE — ED TRIAGE NOTES
Pt arrived via rescue from home stating she fell yesterday and was seen and d/c from ER. Pt has no new complaints, but states she needs someone at home with her and worried to be by herself.

## 2017-04-21 NOTE — ED NOTES
Pt trying to climb off of stretcher. RN assisted pt back on stretcher. Attempted to calm pt down and helped pt reposition on stretcher.

## 2017-07-05 ENCOUNTER — APPOINTMENT (OUTPATIENT)
Dept: GENERAL RADIOLOGY | Age: 72
End: 2017-07-05
Attending: EMERGENCY MEDICINE
Payer: MEDICARE

## 2017-07-05 ENCOUNTER — APPOINTMENT (OUTPATIENT)
Dept: CT IMAGING | Age: 72
End: 2017-07-05
Attending: EMERGENCY MEDICINE
Payer: MEDICARE

## 2017-07-05 ENCOUNTER — HOSPITAL ENCOUNTER (EMERGENCY)
Age: 72
Discharge: HOME | End: 2017-07-05
Attending: EMERGENCY MEDICINE
Payer: MEDICARE

## 2017-07-05 VITALS
WEIGHT: 180 LBS | SYSTOLIC BLOOD PRESSURE: 139 MMHG | HEART RATE: 62 BPM | BODY MASS INDEX: 29.99 KG/M2 | HEIGHT: 65 IN | RESPIRATION RATE: 12 BRPM | TEMPERATURE: 98.3 F | DIASTOLIC BLOOD PRESSURE: 69 MMHG | OXYGEN SATURATION: 100 %

## 2017-07-05 DIAGNOSIS — R32 URINARY INCONTINENCE, UNSPECIFIED TYPE: ICD-10-CM

## 2017-07-05 DIAGNOSIS — R53.83 FATIGUE, UNSPECIFIED TYPE: Primary | ICD-10-CM

## 2017-07-05 LAB
ALBUMIN SERPL BCP-MCNC: 3.2 G/DL (ref 3.4–5)
ALBUMIN/GLOB SERPL: 0.8 {RATIO} (ref 0.8–1.7)
ALP SERPL-CCNC: 268 U/L (ref 45–117)
ALT SERPL-CCNC: 78 U/L (ref 13–56)
AMPHET UR QL SCN: NEGATIVE
ANION GAP BLD CALC-SCNC: 5 MMOL/L (ref 3–18)
APPEARANCE UR: CLEAR
AST SERPL W P-5'-P-CCNC: 87 U/L (ref 15–37)
ATRIAL RATE: 58 BPM
BARBITURATES UR QL SCN: NEGATIVE
BASOPHILS # BLD AUTO: 0 K/UL (ref 0–0.06)
BASOPHILS # BLD: 0 % (ref 0–2)
BENZODIAZ UR QL: POSITIVE
BILIRUB DIRECT SERPL-MCNC: 0.2 MG/DL (ref 0–0.2)
BILIRUB SERPL-MCNC: 0.5 MG/DL (ref 0.2–1)
BILIRUB UR QL: NEGATIVE
BUN SERPL-MCNC: 12 MG/DL (ref 7–18)
BUN/CREAT SERPL: 17 (ref 12–20)
CALCIUM SERPL-MCNC: 8.6 MG/DL (ref 8.5–10.1)
CALCULATED P AXIS, ECG09: 46 DEGREES
CALCULATED R AXIS, ECG10: 9 DEGREES
CALCULATED T AXIS, ECG11: 58 DEGREES
CANNABINOIDS UR QL SCN: NEGATIVE
CHLORIDE SERPL-SCNC: 106 MMOL/L (ref 100–108)
CK MB CFR SERPL CALC: NORMAL % (ref 0–4)
CK MB CFR SERPL CALC: NORMAL % (ref 0–4)
CK MB SERPL-MCNC: <1 NG/ML (ref 5–25)
CK MB SERPL-MCNC: <1 NG/ML (ref 5–25)
CK SERPL-CCNC: 106 U/L (ref 26–192)
CK SERPL-CCNC: 93 U/L (ref 26–192)
CO2 SERPL-SCNC: 30 MMOL/L (ref 21–32)
COCAINE UR QL SCN: NEGATIVE
COLOR UR: YELLOW
CREAT SERPL-MCNC: 0.7 MG/DL (ref 0.6–1.3)
DIAGNOSIS, 93000: NORMAL
DIFFERENTIAL METHOD BLD: ABNORMAL
EOSINOPHIL # BLD: 0.1 K/UL (ref 0–0.4)
EOSINOPHIL NFR BLD: 2 % (ref 0–5)
ERYTHROCYTE [DISTWIDTH] IN BLOOD BY AUTOMATED COUNT: 13.3 % (ref 11.6–14.5)
GLOBULIN SER CALC-MCNC: 3.8 G/DL (ref 2–4)
GLUCOSE SERPL-MCNC: 141 MG/DL (ref 74–99)
GLUCOSE UR STRIP.AUTO-MCNC: NEGATIVE MG/DL
HCT VFR BLD AUTO: 35.3 % (ref 35–45)
HDSCOM,HDSCOM: ABNORMAL
HGB BLD-MCNC: 11.6 G/DL (ref 12–16)
HGB UR QL STRIP: NEGATIVE
KETONES UR QL STRIP.AUTO: NEGATIVE MG/DL
LEUKOCYTE ESTERASE UR QL STRIP.AUTO: NEGATIVE
LYMPHOCYTES # BLD AUTO: 28 % (ref 21–52)
LYMPHOCYTES # BLD: 2.3 K/UL (ref 0.9–3.6)
MCH RBC QN AUTO: 29.3 PG (ref 24–34)
MCHC RBC AUTO-ENTMCNC: 32.9 G/DL (ref 31–37)
MCV RBC AUTO: 89.1 FL (ref 74–97)
METHADONE UR QL: NEGATIVE
MONOCYTES # BLD: 0.6 K/UL (ref 0.05–1.2)
MONOCYTES NFR BLD AUTO: 8 % (ref 3–10)
NEUTS SEG # BLD: 4.9 K/UL (ref 1.8–8)
NEUTS SEG NFR BLD AUTO: 62 % (ref 40–73)
NITRITE UR QL STRIP.AUTO: NEGATIVE
OPIATES UR QL: NEGATIVE
P-R INTERVAL, ECG05: 218 MS
PCP UR QL: NEGATIVE
PH UR STRIP: 7 [PH] (ref 5–8)
PLATELET # BLD AUTO: 240 K/UL (ref 135–420)
PMV BLD AUTO: 10.9 FL (ref 9.2–11.8)
POTASSIUM SERPL-SCNC: 3.8 MMOL/L (ref 3.5–5.5)
PROT SERPL-MCNC: 7 G/DL (ref 6.4–8.2)
PROT UR STRIP-MCNC: NEGATIVE MG/DL
Q-T INTERVAL, ECG07: 432 MS
QRS DURATION, ECG06: 72 MS
QTC CALCULATION (BEZET), ECG08: 424 MS
RBC # BLD AUTO: 3.96 M/UL (ref 4.2–5.3)
SODIUM SERPL-SCNC: 141 MMOL/L (ref 136–145)
SP GR UR REFRACTOMETRY: 1.01 (ref 1–1.03)
TROPONIN I SERPL-MCNC: <0.02 NG/ML (ref 0–0.04)
TROPONIN I SERPL-MCNC: <0.02 NG/ML (ref 0–0.04)
UROBILINOGEN UR QL STRIP.AUTO: 0.2 EU/DL (ref 0.2–1)
VENTRICULAR RATE, ECG03: 58 BPM
WBC # BLD AUTO: 8 K/UL (ref 4.6–13.2)

## 2017-07-05 PROCEDURE — 77030011943

## 2017-07-05 PROCEDURE — 51701 INSERT BLADDER CATHETER: CPT

## 2017-07-05 PROCEDURE — 85025 COMPLETE CBC W/AUTO DIFF WBC: CPT | Performed by: EMERGENCY MEDICINE

## 2017-07-05 PROCEDURE — 80048 BASIC METABOLIC PNL TOTAL CA: CPT | Performed by: EMERGENCY MEDICINE

## 2017-07-05 PROCEDURE — 80307 DRUG TEST PRSMV CHEM ANLYZR: CPT | Performed by: EMERGENCY MEDICINE

## 2017-07-05 PROCEDURE — 82550 ASSAY OF CK (CPK): CPT | Performed by: EMERGENCY MEDICINE

## 2017-07-05 PROCEDURE — 93005 ELECTROCARDIOGRAM TRACING: CPT

## 2017-07-05 PROCEDURE — 99285 EMERGENCY DEPT VISIT HI MDM: CPT

## 2017-07-05 PROCEDURE — 80076 HEPATIC FUNCTION PANEL: CPT | Performed by: EMERGENCY MEDICINE

## 2017-07-05 PROCEDURE — 71010 XR CHEST PORT: CPT

## 2017-07-05 PROCEDURE — 87086 URINE CULTURE/COLONY COUNT: CPT | Performed by: EMERGENCY MEDICINE

## 2017-07-05 PROCEDURE — 70450 CT HEAD/BRAIN W/O DYE: CPT

## 2017-07-05 PROCEDURE — 81003 URINALYSIS AUTO W/O SCOPE: CPT | Performed by: EMERGENCY MEDICINE

## 2017-07-05 NOTE — ED NOTES
Per EMS, \"shes had a caregiver all night but the nurse that came today to give her her medications told her to call 911 due to her being lethargic. \"

## 2017-07-05 NOTE — ED PROVIDER NOTES
Patient is a 74 y.o. female presenting with lethargy. The history is provided by the patient and the EMS personnel. Lethargy     caregiver from [facility] is new to patient and was surprised by pt's apparent lethargy today. Caregiver called regular nurse who called EMS as pt typically is strong, up and ambulatory. Pt reports weakness x 2-3d as well as increased urinary frequency. Denies n/v/d, fever, cough, rash, abd pain, CP, SOB. Wears Fentanyl patch for chronic neck and back pain w/h/o spinal stenosis. Denies ETOH, tobacco, illicits. Past Medical History:   Diagnosis Date    Anxiety     Depression     Diabetes ([address]     Diverticulosis     Dysphagia     Early satiety     Fatty liver     Gastric ulcer     Gastroparesis     Heart disease     Hypercholesteremia     Hypertension     Scoliosis     Stenosis of lumbosacral spine     Tardive dyskinesia        Past Surgical History:   Procedure Laterality Date    HX APPENDECTOMY      HX BREAST LUMPECTOMY      HX CHOLECYSTECTOMY  1980    HX COLECTOMY  2012    HX HERNIA REPAIR      HX HYSTERECTOMY  1970    HX KNEE ARTHROSCOPY      HX KNEE REPLACEMENT  1991    left knee    HX LUMBAR FUSION      x 3         Family History:   Problem Relation Age of Onset    Heart Disease Mother     Diabetes Mother     Heart Attack Mother     Cancer Father      lung CA    Cancer Brother        Social History     Social History    Marital status:      Spouse name: N/A    Number of children: N/A    Years of education: N/A     Occupational History    Not on file. Social History Main Topics    Smoking status: Never Smoker    Smokeless tobacco: Never Used    Alcohol use No    Drug use: No    Sexual activity: No     Other Topics Concern    Not on file     Social History Narrative         ALLERGIES: Codeine;  Iodine; Morphine; Pcn [penicillins]; and Sulfa (sulfonamide antibiotics)    Review of Systems   Genitourinary: Positive for flank pain. Vitals:    07/05/17 1230 07/05/17 1245 07/05/17 1300 07/05/17 1315   BP: 137/61 143/71 139/69    Pulse: 62 64 61 65   Resp: 12 14 13 16   Temp:       SpO2: 97% 100% 100% 99%   Weight:       Height:                Physical Exam     MDM  Number of Diagnoses or Management Options  Diagnosis management comments: Differential: UTI; cystitis; TIA; CVA; angina; NSTEMI; cardiomyopathy; electrolyte abnormalities; drug overdose      Caregiver now present and she thinks that perhaps pt took an extra dose of pain meds as pt appeared more sluggish to her this morning but now after hours here, feels pt is at her baseline. Pt ambulated w/two person assist (which caregiver says is typical). Will re-check CE and ifn ormal, d/c home.        Amount and/or Complexity of Data Reviewed  Clinical lab tests: ordered and reviewed  Tests in the radiology section of CPT®: ordered and reviewed      ED Course       Procedures    Recent Results (from the past 12 hour(s))   EKG, 12 LEAD, INITIAL    Collection Time: 07/05/17 10:22 AM   Result Value Ref Range    Ventricular Rate 58 BPM    Atrial Rate 58 BPM    P-R Interval 218 ms    QRS Duration 72 ms    Q-T Interval 432 ms    QTC Calculation (Bezet) 424 ms    Calculated P Axis 46 degrees    Calculated R Axis 9 degrees    Calculated T Axis 58 degrees    Diagnosis       Sinus bradycardia with 1st degree AV block  Nonspecific ST and T wave abnormality  Abnormal ECG  When compared with ECG of 20-APR-2017 11:33,  Criteria for Septal infarct are no longer present  Confirmed by Carlos Jimenez ([id number]) on 7/5/2017 11:55:59 AM     CBC WITH AUTOMATED DIFF    Collection Time: 07/05/17 11:15 AM   Result Value Ref Range    WBC 8.0 4.6 - 13.2 K/uL    RBC 3.96 (L) 4.20 - 5.30 M/uL    HGB 11.6 (L) 12.0 - 16.0 g/dL    HCT 35.3 35.0 - 45.0 %    MCV 89.1 74.0 - 97.0 FL    MCH 29.3 24.0 - 34.0 PG    MCHC 32.9 31.0 - 37.0 g/dL    RDW 13.3 11.6 - 14.5 %    PLATELET 564 344 - 097 K/uL    MPV 10.9 9.2 - 11.8 FL    NEUTROPHILS 62 40 - 73 %    LYMPHOCYTES 28 21 - 52 %    MONOCYTES 8 3 - 10 %    EOSINOPHILS 2 0 - 5 %    BASOPHILS 0 0 - 2 %    ABS. NEUTROPHILS 4.9 1.8 - 8.0 K/UL    ABS. LYMPHOCYTES 2.3 0.9 - 3.6 K/UL    ABS. MONOCYTES 0.6 0.05 - 1.2 K/UL    ABS. EOSINOPHILS 0.1 0.0 - 0.4 K/UL    ABS. BASOPHILS 0.0 0.0 - 0.06 K/UL    DF AUTOMATED     METABOLIC PANEL, BASIC    Collection Time: 07/05/17 11:15 AM   Result Value Ref Range    Sodium 141 136 - 145 mmol/L    Potassium 3.8 3.5 - 5.5 mmol/L    Chloride 106 100 - 108 mmol/L    CO2 30 21 - 32 mmol/L    Anion gap 5 3.0 - 18 mmol/L    Glucose 141 (H) 74 - 99 mg/dL    BUN 12 7.0 - 18 MG/DL    Creatinine 0.70 0.6 - 1.3 MG/DL    BUN/Creatinine ratio 17 12 - 20      GFR est AA >60 >60 ml/min/1.73m2    GFR est non-AA >60 >60 ml/min/1.73m2    Calcium 8.6 8.5 - 10.1 MG/DL   HEPATIC FUNCTION PANEL    Collection Time: 07/05/17 11:15 AM   Result Value Ref Range    Protein, total 7.0 6.4 - 8.2 g/dL    Albumin 3.2 (L) 3.4 - 5.0 g/dL    Globulin 3.8 2.0 - 4.0 g/dL    A-G Ratio 0.8 0.8 - 1.7      Bilirubin, total 0.5 0.2 - 1.0 MG/DL    Bilirubin, direct 0.2 0.0 - 0.2 MG/DL    Alk.  phosphatase 268 (H) 45 - 117 U/L    AST (SGOT) 87 (H) 15 - 37 U/L    ALT (SGPT) 78 (H) 13 - 56 U/L   CARDIAC PANEL,(CK, CKMB & TROPONIN)    Collection Time: 07/05/17 11:15 AM   Result Value Ref Range     26 - 192 U/L    CK - MB <1.0 <3.6 ng/ml    CK-MB Index  0.0 - 4.0 %     CALCULATION NOT PERFORMED WHEN RESULT IS BELOW LINEAR LIMIT    Troponin-I, Qt. <0.02 0.0 - 0.045 NG/ML   URINALYSIS W/ RFLX MICROSCOPIC    Collection Time: 07/05/17 11:50 AM   Result Value Ref Range    Color YELLOW      Appearance CLEAR      Specific gravity 1.007 1.005 - 1.030      pH (UA) 7.0 5.0 - 8.0      Protein NEGATIVE  NEG mg/dL    Glucose NEGATIVE  NEG mg/dL    Ketone NEGATIVE  NEG mg/dL    Bilirubin NEGATIVE  NEG      Blood NEGATIVE  NEG      Urobilinogen 0.2 0.2 - 1.0 EU/dL    Nitrites NEGATIVE  NEG Leukocyte Esterase NEGATIVE  NEG     DRUG SCREEN, URINE    Collection Time: 07/05/17 11:50 AM   Result Value Ref Range    BENZODIAZEPINE POSITIVE (A) NEG      BARBITURATES NEGATIVE  NEG      THC (TH-CANNABINOL) NEGATIVE  NEG      OPIATES NEGATIVE  NEG      PCP(PHENCYCLIDINE) NEGATIVE  NEG      COCAINE NEGATIVE  NEG      AMPHETAMINE NEGATIVE  NEG      METHADONE NEGATIVE       HDSCOM (NOTE)    CARDIAC PANEL,(CK, CKMB & TROPONIN)    Collection Time: 07/05/17  1:30 PM   Result Value Ref Range    CK 93 26 - 192 U/L    CK - MB <1.0 <3.6 ng/ml    CK-MB Index  0.0 - 4.0 %     CALCULATION NOT PERFORMED WHEN RESULT IS BELOW LINEAR LIMIT    Troponin-I, Qt. <0.02 0.0 - 0.045 NG/ML     3:14 PM  Diagnosis:   1. Fatigue, unspecified type    2. Urinary incontinence, unspecified type          Disposition: home    Follow-up Information     Follow up With Details Comments Juan Redding MD Schedule an appointment as soon as possible for a visit in 1 day  [address] EMERGENCY DEPT  If symptoms worsen return immediately [address] [place] [address]    Tyler Bergeron MD Schedule an appointment as soon as possible for a visit in 1 day  [address] [address] [phone]            Patient's Medications   Start Taking    No medications on file   Continue Taking    AMLODIPINE (NORVASC) 10 MG TABLET    Take 10 mg by mouth daily. CHOLECALCIFEROL (VITAMIN D3) 1,000 UNIT CAP    Take 1,000 Units by mouth daily. CYANOCOBALAMIN (VITAMIN B-12) 1,000 MCG TABLET    Take 1,000 mcg by mouth daily. DIAZEPAM (VALIUM) 2 MG TABLET    Take 1 Tab by mouth every six (6) hours as needed. Max Daily Amount: 8 mg. DICYCLOMINE (BENTYL) 20 MG TABLET    Take 20 mg by mouth as needed. DICYCLOMINE (BENTYL) 20 MG TABLET    Take 20 mg by mouth two (2) times a day.  BID bowel camping    DIPHENHYDRAMINE (BENADRYL) 50 MG TABLET    Take 50 mg by mouth two (2) times daily as needed for Itching. DOCUSATE SODIUM (COLACE) 100 MG CAPSULE    Take 100 mg by mouth two (2) times a day. FUROSEMIDE (LASIX) 40 MG TABLET    Take 40 mg by mouth daily. GABAPENTIN (NEURONTIN) 100 MG CAPSULE    Take 300 mg by mouth three (3) times daily. GLIMEPIRIDE (AMARYL) 4 MG TABLET    Take  by mouth daily. HYDROCODONE-ACETAMINOPHEN (NORCO)  MG TABLET    Take 1 Tab by mouth every four (4) hours as needed. Max Daily Amount: 6 Tabs. HYDROCODONE-ACETAMINOPHEN (NORCO) 5-325 MG PER TABLET    Take 1 Tab by mouth every six (6) hours as needed. Max Daily Amount: 4 Tabs. HYDROCODONE-ACETAMINOPHEN (NORCO) 7.5-325 MG PER TABLET    Take 1 Tab by mouth every six (6) hours as needed for Pain. IBUPROFEN (MOTRIN) 200 MG TABLET    Take 400 mg by mouth every eight (8) hours as needed for Pain. LOSARTAN (COZAAR) 100 MG TABLET    Take 100 mg by mouth daily. MAGNESIUM HYDROXIDE (MILK OF MAGNESIA) 400 MG/5 ML SUSPENSION    Take 30 mL by mouth daily. METFORMIN (GLUCOPHAGE) 500 MG TABLET    Take 500 mg by mouth daily (with breakfast). METOPROLOL SUCCINATE (TOPROL-XL) 50 MG XL TABLET    Take 50 mg by mouth daily. NITROFURANTOIN, MACROCRYSTAL-MONOHYDRATE, (MACROBID) 100 MG CAPSULE    Take 100 mg by mouth two (2) times a day. OMEPRAZOLE (PRILOSEC) 40 MG CAPSULE    Take 40 mg by mouth daily. OXYCODONE-ACETAMINOPHEN (PERCOCET 10)  MG PER TABLET    Take 1 Tab by mouth every six (6) hours as needed for Pain. PHENYLEPH-MIN OIL-PETROLATUM (PREPARATION H) 0.25-14-74.9 % RECTAL OINTMENT    by PeriANAL route four (4) times daily as needed for Pain. POLYETHYLENE GLYCOL (MIRALAX) 17 GRAM PACKET    Take 17 g by mouth daily. POTASSIUM CHLORIDE (K-DUR, KLOR-CON) 20 MEQ TABLET    Take 2 Tabs by mouth two (2) times a day. POTASSIUM CHLORIDE (KLOR-CON M10) 10 MEQ TABLET    Take 10 mEq by mouth daily.  take 1 tablet by mouth every day as needed take with furosemide    PROMETHAZINE (PHENERGAN) 25 MG TABLET    Take 25 mg by mouth two (2) times daily as needed for Nausea. SUCRALFATE (CARAFATE) 1 GRAM TABLET    Take 1 g by mouth four (4) times daily. VENLAFAXINE-SR (EFFEXOR XR) 150 MG CAPSULE    Take 150 mg by mouth daily. ZOLPIDEM (AMBIEN) 10 MG TABLET    Take 10 mg by mouth nightly.    These Medications have changed    No medications on file   Stop Taking    No medications on file

## 2017-07-05 NOTE — ED NOTES
I have reviewed discharge instructions with the patient. The patient verbalized understanding. Patient armband removed and given to patient to take home. Patient was informed of the privacy risks if armband lost or stolen.   Pt wheeled in wheelchair to lobby in NAD

## 2017-07-05 NOTE — ED NOTES
Ambulated patient, visitor at bedside states she is walking as she does at home. Pt normally walks with rolling walker.

## 2017-07-07 LAB
BACTERIA SPEC CULT: NORMAL
SERVICE CMNT-IMP: NORMAL

## 2017-08-10 ENCOUNTER — HOSPITAL ENCOUNTER (EMERGENCY)
Age: 72
Discharge: HOME | End: 2017-08-10
Attending: EMERGENCY MEDICINE
Payer: MEDICARE

## 2017-08-10 VITALS
TEMPERATURE: 98 F | SYSTOLIC BLOOD PRESSURE: 169 MMHG | RESPIRATION RATE: 16 BRPM | DIASTOLIC BLOOD PRESSURE: 90 MMHG | WEIGHT: 186 LBS | BODY MASS INDEX: 30.99 KG/M2 | HEART RATE: 67 BPM | HEIGHT: 65 IN | OXYGEN SATURATION: 100 %
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
## 2017-08-10 PROCEDURE — 74011250637 HC RX REV CODE- 250/637: Performed by: EMERGENCY MEDICINE

## 2017-08-10 PROCEDURE — 99283 EMERGENCY DEPT VISIT LOW MDM: CPT

## 2017-08-10 RX ORDER — DEXAMETHASONE SODIUM PHOSPHATE 4 MG/ML
10 INJECTION, SOLUTION INTRA-ARTICULAR; INTRALESIONAL; INTRAMUSCULAR; INTRAVENOUS; SOFT TISSUE
Status: COMPLETED | OUTPATIENT
Start: 2017-08-10 | End: 2017-08-10

## 2017-08-10 RX ADMIN — DEXAMETHASONE SODIUM PHOSPHATE 10 MG: 4 INJECTION, SOLUTION INTRAMUSCULAR; INTRAVENOUS at 19:09

## 2017-08-10 NOTE — DISCHARGE INSTRUCTIONS
SPECIFIC PATIENT INSTRUCTIONS FROM THE PHYSICIAN WHO TREATED YOU IN THE ER TODAY:  1. Return if worsening pain, redness, warmth, discharge. 2. Use over the counter benadryl topical cream for itching. 3. Follow up with your doctor in 2-4 days for reevaluation if symptoms are getting worse. MyChart Activation    Thank you for requesting access to [facility]. Please follow the instructions below to securely access and download your online medical record. [facility] allows you to send messages to your doctor, view your test results, renew your prescriptions, schedule appointments, and more. How Do I Sign Up? 1. In your internet browser, go to [email or web address]. 2. Click on the First Time User? Click Here link in the Sign In box. You will see the New Member Sign Up page. 3. Enter your [facility] Access Code exactly as it appears below. You will not need to use this code after youve completed the sign-up process. If you do not sign up before the expiration date, you must request a new code. [facility] Access Code: [email or web address]  Expires: 2017  6:57 PM (This is the date your [facility] access code will )    4. Enter the last four digits of your Social Security Number (xxxx) and Date of Birth (mm/dd/yyyy) as indicated and click Submit. You will be taken to the next sign-up page. 5. Create a [facility] ID. This will be your [facility] login ID and cannot be changed, so think of one that is secure and easy to remember. 6. Create a [facility] password. You can change your password at any time. 7. Enter your Password Reset Question and Answer. This can be used at a later time if you forget your password. 8. Enter your e-mail address. You will receive e-mail notification when new information is available in [address] 9. Click Sign Up. You can now view and download portions of your medical record.   10. Click the Download Summary menu link to download a portable copy of your medical information. Additional Information    If you have questions, please visit the Frequently Asked Questions section of the [employer] website at [email or web address] Remember, [facility] is NOT to be used for urgent needs. For medical emergencies, dial 911.

## 2017-08-10 NOTE — ED PROVIDER NOTES
[address] EMERGENCY DEPT      6:56 PM  The patient is a 74 y.o. female who presents with an insect bite on her right arm after being bit by a spider yesterday. She complains of pruritis at the site of her insect bite and admits to having very minor pain, 1/10, in that location. Pt states that her sx worsened last night and that she took a benadryl for sx relief. She states that benadryl intake alleviated her sx to some extent. No other complaints. Nursing nurses regarding the HPI and triage nursing notes were reviewed. Current Facility-Administered Medications   Medication Dose Route Frequency    dexamethasone (DECADRON) 4 mg/mL injection 10 mg  10 mg Oral NOW     Current Outpatient Prescriptions   Medication Sig    magnesium hydroxide (MILK OF MAGNESIA) 400 mg/5 mL suspension Take 30 mL by mouth daily.  ibuprofen (MOTRIN) 200 mg tablet Take 400 mg by mouth every eight (8) hours as needed for Pain.  nitrofurantoin, macrocrystal-monohydrate, (MACROBID) 100 mg capsule Take 100 mg by mouth two (2) times a day.  metFORMIN (GLUCOPHAGE) 500 mg tablet Take 500 mg by mouth daily (with breakfast).  diphenhydrAMINE (BENADRYL) 50 mg tablet Take 50 mg by mouth two (2) times daily as needed for Itching.  oxyCODONE-acetaminophen (PERCOCET 10)  mg per tablet Take 1 Tab by mouth every six (6) hours as needed for Pain.  gabapentin (NEURONTIN) 100 mg capsule Take 300 mg by mouth three (3) times daily.  furosemide (LASIX) 40 mg tablet Take 40 mg by mouth daily.  potassium chloride (KLOR-CON M10) 10 mEq tablet Take 10 mEq by mouth daily. take 1 tablet by mouth every day as needed take with furosemide    dicyclomine (BENTYL) 20 mg tablet Take 20 mg by mouth two (2) times a day. BID bowel camping    HYDROcodone-acetaminophen (NORCO) 7.5-325 mg per tablet Take 1 Tab by mouth every six (6) hours as needed for Pain.     diazepam (VALIUM) 2 mg tablet Take 1 Tab by mouth every six (6) hours as needed. Max Daily Amount: 8 mg.  HYDROcodone-acetaminophen (NORCO)  mg tablet Take 1 Tab by mouth every four (4) hours as needed. Max Daily Amount: 6 Tabs. (Patient taking differently: Take 1 Tab by mouth every four (4) hours as needed. Pt taking Norco 7.5 mg-325 mg 1 q 6 hours prn)    dicyclomine (BENTYL) 20 mg tablet Take 20 mg by mouth as needed.  metoprolol succinate (TOPROL-XL) 50 mg XL tablet Take 50 mg by mouth daily.  omeprazole (PRILOSEC) 40 mg capsule Take 40 mg by mouth daily.  promethazine (PHENERGAN) 25 mg tablet Take 25 mg by mouth two (2) times daily as needed for Nausea.  polyethylene glycol (MIRALAX) 17 gram packet Take 17 g by mouth daily.  cholecalciferol (VITAMIN D3) 1,000 unit cap Take 1,000 Units by mouth daily.  glimepiride (AMARYL) 4 mg tablet Take  by mouth daily.  losartan (COZAAR) 100 mg tablet Take 100 mg by mouth daily.  sucralfate (CARAFATE) 1 gram tablet Take 1 g by mouth four (4) times daily.  zolpidem (AMBIEN) 10 mg tablet Take 10 mg by mouth nightly.  venlafaxine-SR (EFFEXOR XR) 150 mg capsule Take 150 mg by mouth daily.  cyanocobalamin (VITAMIN B-12) 1,000 mcg tablet Take 1,000 mcg by mouth daily.  HYDROcodone-acetaminophen (NORCO) 5-325 mg per tablet Take 1 Tab by mouth every six (6) hours as needed. Max Daily Amount: 4 Tabs. (Patient taking differently: Take 1 Tab by mouth every six (6) hours as needed. Pt has Norco 7.5 mg-325 mg 1 q 6 hours prn in the home)    phenyleph-min oil-petrolatum (PREPARATION H) 0.25-14-74.9 % rectal ointment by PeriANAL route four (4) times daily as needed for Pain.  potassium chloride (K-DUR, KLOR-CON) 20 mEq tablet Take 2 Tabs by mouth two (2) times a day.  amLODIPine (NORVASC) 10 mg tablet Take 10 mg by mouth daily.  docusate sodium (COLACE) 100 mg capsule Take 100 mg by mouth two (2) times a day.        Past Medical History:   Diagnosis Date    Anxiety     Depression     [facility]  Diverticulosis     Dysphagia     Early satiety     Fatty liver     Gastric ulcer     Gastroparesis     Heart disease     Hypercholesteremia     Hypertension     Scoliosis     Stenosis of lumbosacral spine     Tardive dyskinesia        Past Surgical History:   Procedure Laterality Date    HX APPENDECTOMY      HX BREAST LUMPECTOMY      HX CHOLECYSTECTOMY  1980    HX COLECTOMY  2012    HX HERNIA REPAIR      HX HYSTERECTOMY  1970    HX KNEE ARTHROSCOPY      HX KNEE REPLACEMENT  1991    left knee    HX LUMBAR FUSION      x 3       Family History   Problem Relation Age of Onset    Heart Disease Mother     Diabetes Mother     Heart Attack Mother     Cancer Father      lung CA    Cancer Brother        Social History     Social History    Marital status:      Spouse name: N/A    Number of children: N/A    Years of education: N/A     Occupational History    Not on file. Social History Main Topics    Smoking status: Never Smoker    Smokeless tobacco: Never Used    Alcohol use No    Drug use: No    Sexual activity: No     Other Topics Concern    Not on file     Social History Narrative       Allergies   Allergen Reactions    Codeine Rash and Itching    Iodine Hives and Rash    Morphine Hives    Pcn [Penicillins] Other (comments)     Causes \"Heart swell\"    Sulfa (Sulfonamide Antibiotics) Nausea Only       Patient's primary care provider (as noted in EPIC):  Cheryl Wray MD    REVIEW OF SYSTEMS:    Constitutional:  Negative for diaphoresis. Eyes:  Negative for diploplia. HENT:  Negative for congestion. Respiratory:  Negative for stridor. Cardiovascular:  Negative for palpitations. Gastrointestinal:  Negative for diarrhea. Genitourinary:  Negative for flank pain. Musculoskeletal:  Negative for back pain. Skin:  Positive for erythema on RUE   Neurological:  Negative for weakness. Psychiatric:  Negative for hallucinations.     There were no vitals taken for this visit. PHYSICAL EXAM:    CONSTITUTIONAL:  Alert, in no apparent distress;  well developed;  well nourished. HEAD:  Normocephalic, atraumatic. EYES:  EOMI. Non-icteric sclera. Normal conjunctiva. ENTM:  Nose:  no rhinorrhea. Throat:  no erythema or exudate, mucous membranes moist.  NECK:  No JVD. Supple  RESPIRATORY:  Chest clear, equal breath sounds, good air movement. CARDIOVASCULAR:  Regular rate and rhythm. No murmurs, rubs, or gallops. GI:  Normal bowel sounds, abdomen soft and non-tender. No rebound or guarding. BACK:  Non-tender. UPPER EXT:  Normal inspection. LOWER EXT:  No edema, no calf tenderness. Distal pulses intact. NEURO:  Moves all four extremities, and grossly normal motor exam.  SKIN:  No rashes;  Normal for age. PSYCH:  Alert and normal affect. Site of insect bite:  Right mid anterior forearm has a circular area of mild redness c/w insect bite. No erythema. No cellulitis. No fluctuance. There is no fluctuance nor abscess. No cellulitis. DIFFERENTIAL DIAGNOSES/ MEDICAL DECISION MAKING:  Urticaria, viral rash, contact dermatitis, bacterial infection, fungal/ candidal rash, or symptom of underlying disease, connective tissue disorder, vasculitis (to include ITP and TTP), versus other etiologies or a combination of the above. Abnormal lab results from this emergency department encounter:  Labs Reviewed - No data to display    Lab values for this patient within approximately the last 12 hours:  No results found for this or any previous visit (from the past 12 hour(s)). Radiologist and cardiologist interpretations if available at time of this note:  No results found.     Medication(s) ordered for patient during this emergency visit encounter:  Medications   dexamethasone (DECADRON) 4 mg/mL injection 10 mg (not administered)       IMPRESSION AND MEDICAL DECISION MAKING:  Based upon the patient's presentation with noted HPI and PE, along with the work up done in the emergency department, I believe that the patient is noted insect bite. DIAGNOSIS:  1. Insect bite, right forearm. SPECIFIC PATIENT INSTRUCTIONS FROM THE PHYSICIAN WHO TREATED YOU IN THE ER TODAY:  1. Return if worsening pain, redness, warmth, discharge. 2. Use over the counter benadryl topical cream for itching. 3. Follow up with your doctor in 2-4 days for reevaluation if symptoms are getting worse. Amy Bragg M.D. Provider Attestation:  If a scribe was utilized in generation of this patient record, I personally performed the services described in the documentation, reviewed the documentation, as recorded by the scribe in my presence, and it accurately records the patient's history of presenting illness, review of systems, patient physical examination, and procedures performed by me as the attending physician. Amy Bragg M.D. [facility] Board Certified Emergency Physician  8/10/2017.  6:53 PM  Scribe Attestation      Keara Acevedo acting as a scribe for and in the presence of Niranjan Gibson MD      August 10, 2017 at 7:05 PM       Provider Attestation:      I personally performed the services described in the documentation, reviewed the documentation, as recorded by the scribe in my presence, and it accurately and completely records my words and actions.  August 10, 2017 at 7:05 PM - Niranjan Gibson MD

## 2017-11-05 ENCOUNTER — HOSPITAL ENCOUNTER (EMERGENCY)
Age: 72
Discharge: HOME | End: 2017-11-05
Attending: EMERGENCY MEDICINE
Payer: MEDICARE

## 2017-11-05 VITALS
DIASTOLIC BLOOD PRESSURE: 76 MMHG | OXYGEN SATURATION: 100 % | TEMPERATURE: 98.3 F | SYSTOLIC BLOOD PRESSURE: 165 MMHG | HEART RATE: 87 BPM | RESPIRATION RATE: 19 BRPM

## 2017-11-05 DIAGNOSIS — B35.6 TINEA CRURIS: ICD-10-CM

## 2017-11-05 DIAGNOSIS — R03.0 ELEVATED BLOOD PRESSURE READING: ICD-10-CM

## 2017-11-05 DIAGNOSIS — F41.9 ANXIOUSNESS: Primary | ICD-10-CM

## 2017-11-05 LAB
ALBUMIN SERPL-MCNC: 3.5 G/DL (ref 3.4–5)
ALBUMIN/GLOB SERPL: 1 {RATIO} (ref 0.8–1.7)
ALP SERPL-CCNC: 159 U/L (ref 45–117)
ALT SERPL-CCNC: 18 U/L (ref 13–56)
ANION GAP SERPL CALC-SCNC: 12 MMOL/L (ref 3–18)
APPEARANCE UR: CLEAR
AST SERPL-CCNC: 14 U/L (ref 15–37)
BACTERIA URNS QL MICRO: NEGATIVE /HPF
BASOPHILS # BLD: 0 K/UL (ref 0–0.06)
BASOPHILS NFR BLD: 0 % (ref 0–2)
BILIRUB SERPL-MCNC: 0.4 MG/DL (ref 0.2–1)
BILIRUB UR QL: NEGATIVE
BUN SERPL-MCNC: 12 MG/DL (ref 7–18)
BUN/CREAT SERPL: 16 (ref 12–20)
CALCIUM SERPL-MCNC: 9.6 MG/DL (ref 8.5–10.1)
CHLORIDE SERPL-SCNC: 107 MMOL/L (ref 100–108)
CO2 SERPL-SCNC: 25 MMOL/L (ref 21–32)
COLOR UR: YELLOW
CREAT SERPL-MCNC: 0.75 MG/DL (ref 0.6–1.3)
DIFFERENTIAL METHOD BLD: ABNORMAL
EOSINOPHIL # BLD: 0.1 K/UL (ref 0–0.4)
EOSINOPHIL NFR BLD: 1 % (ref 0–5)
EPITH CASTS URNS QL MICRO: NORMAL /LPF (ref 0–5)
ERYTHROCYTE [DISTWIDTH] IN BLOOD BY AUTOMATED COUNT: 12.9 % (ref 11.6–14.5)
GLOBULIN SER CALC-MCNC: 3.6 G/DL (ref 2–4)
GLUCOSE SERPL-MCNC: 158 MG/DL (ref 74–99)
GLUCOSE UR STRIP.AUTO-MCNC: NEGATIVE MG/DL
HCT VFR BLD AUTO: 30.4 % (ref 35–45)
HGB BLD-MCNC: 9.9 G/DL (ref 12–16)
HGB UR QL STRIP: NEGATIVE
KETONES UR QL STRIP.AUTO: ABNORMAL MG/DL
LEUKOCYTE ESTERASE UR QL STRIP.AUTO: NEGATIVE
LIPASE SERPL-CCNC: 54 U/L (ref 73–393)
LYMPHOCYTES # BLD: 2.6 K/UL (ref 0.9–3.6)
LYMPHOCYTES NFR BLD: 27 % (ref 21–52)
MCH RBC QN AUTO: 29.3 PG (ref 24–34)
MCHC RBC AUTO-ENTMCNC: 32.6 G/DL (ref 31–37)
MCV RBC AUTO: 89.9 FL (ref 74–97)
MONOCYTES # BLD: 0.8 K/UL (ref 0.05–1.2)
MONOCYTES NFR BLD: 9 % (ref 3–10)
NEUTS SEG # BLD: 6 K/UL (ref 1.8–8)
NEUTS SEG NFR BLD: 63 % (ref 40–73)
NITRITE UR QL STRIP.AUTO: NEGATIVE
PH UR STRIP: 5.5 [PH] (ref 5–8)
PLATELET # BLD AUTO: 307 K/UL (ref 135–420)
PMV BLD AUTO: 11.3 FL (ref 9.2–11.8)
POTASSIUM SERPL-SCNC: 3.8 MMOL/L (ref 3.5–5.5)
PROT SERPL-MCNC: 7.1 G/DL (ref 6.4–8.2)
PROT UR STRIP-MCNC: ABNORMAL MG/DL
RBC # BLD AUTO: 3.38 M/UL (ref 4.2–5.3)
RBC #/AREA URNS HPF: 0 /HPF (ref 0–5)
SODIUM SERPL-SCNC: 144 MMOL/L (ref 136–145)
SP GR UR REFRACTOMETRY: 1.02 (ref 1–1.03)
UROBILINOGEN UR QL STRIP.AUTO: 1 EU/DL (ref 0.2–1)
WBC # BLD AUTO: 9.6 K/UL (ref 4.6–13.2)
WBC URNS QL MICRO: NORMAL /HPF (ref 0–4)

## 2017-11-05 PROCEDURE — 81001 URINALYSIS AUTO W/SCOPE: CPT | Performed by: PHYSICIAN ASSISTANT

## 2017-11-05 PROCEDURE — 85025 COMPLETE CBC W/AUTO DIFF WBC: CPT | Performed by: PHYSICIAN ASSISTANT

## 2017-11-05 PROCEDURE — 74011250636 HC RX REV CODE- 250/636: Performed by: PHYSICIAN ASSISTANT

## 2017-11-05 PROCEDURE — 96374 THER/PROPH/DIAG INJ IV PUSH: CPT

## 2017-11-05 PROCEDURE — 80053 COMPREHEN METABOLIC PANEL: CPT | Performed by: PHYSICIAN ASSISTANT

## 2017-11-05 PROCEDURE — 83690 ASSAY OF LIPASE: CPT | Performed by: PHYSICIAN ASSISTANT

## 2017-11-05 PROCEDURE — 77030005563 HC CATH URETH INT MMGH -A

## 2017-11-05 PROCEDURE — 87086 URINE CULTURE/COLONY COUNT: CPT | Performed by: PHYSICIAN ASSISTANT

## 2017-11-05 PROCEDURE — 51701 INSERT BLADDER CATHETER: CPT

## 2017-11-05 PROCEDURE — 99284 EMERGENCY DEPT VISIT MOD MDM: CPT

## 2017-11-05 RX ORDER — LORAZEPAM 2 MG/ML
0.5 INJECTION INTRAMUSCULAR
Status: COMPLETED | OUTPATIENT
Start: 2017-11-05 | End: 2017-11-05

## 2017-11-05 RX ORDER — CHLORPHENIRAMINE MALEATE 4 MG
TABLET ORAL 2 TIMES DAILY
Qty: 1 TUBE | Refills: 0 | Status: SHIPPED | OUTPATIENT
Start: 2017-11-05 | End: 2017-11-20

## 2017-11-05 RX ORDER — ALPRAZOLAM 1 MG/1
1 TABLET ORAL
Qty: 12 TAB | Refills: 0 | Status: SHIPPED | OUTPATIENT
Start: 2017-11-05 | End: 2017-12-06

## 2017-11-05 RX ADMIN — LORAZEPAM 0.5 MG: 2 INJECTION, SOLUTION INTRAMUSCULAR; INTRAVENOUS at 18:03

## 2017-11-05 NOTE — ED TRIAGE NOTES
EMS states patient called because [facility] would not bring out her medications. Patient has not had her medications today.

## 2017-11-05 NOTE — ED PROVIDER NOTES
Patient is a 74 y.o. female presenting with anxiety. The history is provided by the patient. Anxiety      Carter Francois is a 74 y.o. female with history of Anxiety, diabetes, HTN, UTI, multiple other complaints presents with c/o vaginal itching for the past two days, and not having her medication for her anxiety and UTI.  has been out of her medication for the past two days.  has had a lot of stress the past two days with er daughter and can't take any more.  broke down crying at home today. Denies suicidal or Homicidal thoughts.  could not get her medication from [facility] today. Denies fever or n/v.    Past Medical History:   Diagnosis Date    Anxiety     Depression     Diabetes ([facility] [address]     Diverticulosis     Dysphagia     Early satiety     Fatty liver     Gastric ulcer     Gastroparesis     Heart disease     Hypercholesteremia     Hypertension     Scoliosis     Stenosis of lumbosacral spine     Tardive dyskinesia        Past Surgical History:   Procedure Laterality Date    HX APPENDECTOMY      HX BREAST LUMPECTOMY      HX CHOLECYSTECTOMY  1980    HX COLECTOMY  2012    HX HERNIA REPAIR      HX HYSTERECTOMY  1970    HX KNEE ARTHROSCOPY      HX KNEE REPLACEMENT  1991    left knee    HX LUMBAR FUSION      x 3         Family History:   Problem Relation Age of Onset    Heart Disease Mother     Diabetes Mother     Heart Attack Mother     Cancer Father      lung CA    Cancer Brother        Social History     Social History    Marital status:      Spouse name: N/A    Number of children: N/A    Years of education: N/A     Occupational History    Not on file. Social History Main Topics    Smoking status: Never Smoker    Smokeless tobacco: Never Used    Alcohol use No    Drug use: No    Sexual activity: No     Other Topics Concern    Not on file     Social History Narrative         ALLERGIES: Codeine;  Iodine; Morphine; Pcn [penicillins]; and Sulfa (sulfonamide antibiotics)    Review of Systems  Constitutional:  Denies malaise, fever, chills. Head:  Denies injury. Face:  Denies injury or pain. ENMT:  Denies sore throat. Neck:  Denies injury or pain. Chest:  Denies injury. Cardiac:  Denies chest pain or palpitations. Respiratory:  Denies cough, wheezing, difficulty breathing, shortness of breath. GI/ABD:  Denies injury, pain, distention, nausea, vomiting, diarrhea. :  Vaginal itching, burning with urination Denies injury, pain, dysuria or urgency. Back:  Denies injury or pain. Pelvis:  Denies injury or pain. Extremity/MS:  Denies injury or pain. Neuro:  Denies headache, LOC, dizziness, neurologic symptoms/deficits/paresthesias. Skin: Denies injury, rash, itching or skin changes. There were no vitals filed for this visit. Physical Exam   Nursing note and vitals reviewed. CONSTITUTIONAL: Alert, anxious; well-developed; well-nourished. HEAD:  Normocephalic, atraumatic. EYES: PERRL; EOM's intact. ENTM: Nose: No rhinorrhea; Throat: mucous membranes moist. Posterior pharynx-normal.  Neck:  No JVD, supple without lymphadenopathy. RESP: Chest clear, equal breath sounds. CV: S1 and S2 WNL; No murmurs, gallops or rubs. GI: Abdomen soft and non-tender. No masses or organomegaly. : Groin with annular erythematous lesion, labia minimal erythema, no discharge  UPPER EXT:  Normal inspection. LOWER EXT: Bilateral 1+ pitting edema, good pedal pulse, good cap refill  NEURO: strength 5/5 and sym, sensation intact. SKIN: No rashes; Normal for age and stage. PSYCH:  Alert and oriented, normal affect. MDM  Number of Diagnoses or Management Options  Anxiousness:   Elevated blood pressure reading:   Tinea cruris:   Diagnosis management comments: PROGRESS NOTE:    There is no evidece of advancing UTI. Will dispo to home for f/u with  PCP.    Dennis Vitale NP  8:06 PM           Amount and/or Complexity of Data Reviewed  Clinical lab tests: ordered and reviewed    Risk of Complications, Morbidity, and/or Mortality  Presenting problems: low  Diagnostic procedures: low  Management options: low    Patient Progress  Patient progress: stable    ED Course     Consulted with DAIANA Moon  concerning patient Leticia Arias, standard discussion of reason for visit, HPI, ROS, PE, and current results available. Report was given at this time and pt was turned over to the provider above, who will assume care of pt at this time and disposition. FERNANDA Gu     Procedures    PROGRESS NOTE:  Assumed care of the Pt, pending UA results. Santiago Chavis has advised the Pt has tinea cruris, and will need ointment at time of DC. Will dispo accordingly. Maura Minaya NP  8:05 PM    PROGRESS NOTE:    One or more blood pressure readings were noted elevated during the Pt's presentation in the emergency department this date. This abnormal reading has been cited in the Pt's diagnosis, and they have been encouraged to follow up with their primary care physician, or referred to a consultant for further evaluation and treatment. Maura Minaya NP   8:07 PM    Diagnosis:   1. Anxiousness    2. Tinea cruris    3. Elevated blood pressure reading          Disposition:   Discharged to Home. Follow-up Information     Follow up With Details Comments Contact Nishant Cedillo MD Call in the morning to arrange a follow up appointment. [facility]  [phone]            Patient's Medications   Start Taking    ALPRAZOLAM (XANAX) 1 MG TABLET    Take 1 Tab by mouth every eight (8) hours as needed for Anxiety. Max Daily Amount: 3 mg. CLOTRIMAZOLE (LOTRIMIN) 1 % TOPICAL CREAM    Apply  to affected area two (2) times a day for 15 days. Apply twice a day for 2   Continue Taking    CHOLECALCIFEROL (VITAMIN D3) 1,000 UNIT CAP    Take 1,000 Units by mouth daily.     CYANOCOBALAMIN (VITAMIN B-12) 1,000 MCG TABLET    Take 1,000 mcg by mouth daily. DIAZEPAM (VALIUM) 2 MG TABLET    Take 1 Tab by mouth every six (6) hours as needed. Max Daily Amount: 8 mg. DICYCLOMINE (BENTYL) 20 MG TABLET    Take 20 mg by mouth as needed. DICYCLOMINE (BENTYL) 20 MG TABLET    Take 20 mg by mouth two (2) times a day. BID bowel camping    DIPHENHYDRAMINE (BENADRYL) 50 MG TABLET    Take 50 mg by mouth two (2) times daily as needed for Itching. DOCUSATE SODIUM (COLACE) 100 MG CAPSULE    Take 100 mg by mouth two (2) times a day. FUROSEMIDE (LASIX) 40 MG TABLET    Take 40 mg by mouth daily. GABAPENTIN (NEURONTIN) 100 MG CAPSULE    Take 300 mg by mouth three (3) times daily. GLIMEPIRIDE (AMARYL) 4 MG TABLET    Take  by mouth daily. IBUPROFEN (MOTRIN) 200 MG TABLET    Take 400 mg by mouth every eight (8) hours as needed for Pain. LOSARTAN (COZAAR) 100 MG TABLET    Take 100 mg by mouth daily. MAGNESIUM HYDROXIDE (MILK OF MAGNESIA) 400 MG/5 ML SUSPENSION    Take 30 mL by mouth daily. METOPROLOL SUCCINATE (TOPROL-XL) 50 MG XL TABLET    Take 50 mg by mouth daily. OMEPRAZOLE (PRILOSEC) 40 MG CAPSULE    Take 40 mg by mouth daily. OXYCODONE-ACETAMINOPHEN (PERCOCET 10)  MG PER TABLET    Take 1 Tab by mouth every six (6) hours as needed for Pain. PHENYLEPH-MIN OIL-PETROLATUM (PREPARATION H) 0.25-14-74.9 % RECTAL OINTMENT    by PeriANAL route four (4) times daily as needed for Pain. POLYETHYLENE GLYCOL (MIRALAX) 17 GRAM PACKET    Take 17 g by mouth daily. POTASSIUM CHLORIDE (K-DUR, KLOR-CON) 20 MEQ TABLET    Take 2 Tabs by mouth two (2) times a day. POTASSIUM CHLORIDE (KLOR-CON M10) 10 MEQ TABLET    Take 10 mEq by mouth daily. take 1 tablet by mouth every day as needed take with furosemide    PROMETHAZINE (PHENERGAN) 25 MG TABLET    Take 25 mg by mouth two (2) times daily as needed for Nausea. SUCRALFATE (CARAFATE) 1 GRAM TABLET    Take 1 g by mouth four (4) times daily.     VENLAFAXINE-SR (EFFEXOR XR) 150 MG CAPSULE    Take 150 mg by mouth daily. ZOLPIDEM (AMBIEN) 10 MG TABLET    Take 10 mg by mouth nightly.    These Medications have changed    No medications on file   Stop Taking    No medications on file

## 2017-11-06 NOTE — ED NOTES
Pt assisted into clean dry brief and clothes then to wheelchair. Pt assisted to waiting room with walker and belongings to await medicaid cab. I have reviewed discharge instructions with the patient. The patient verbalized understanding.   Patient armband removed and shredded

## 2017-11-06 NOTE — ED NOTES
Patient still moaning and asking for pain medications. Generalized pain.   Informed Conchita Garber NP.

## 2017-11-06 NOTE — DISCHARGE INSTRUCTIONS
Anxiety Disorder: Care Instructions  Your Care Instructions    Anxiety is a normal reaction to stress. Difficult situations can cause you to have symptoms such as sweaty palms and a nervous feeling. In an anxiety disorder, the symptoms are far more severe. Constant worry, muscle tension, trouble sleeping, nausea and diarrhea, and other symptoms can make normal daily activities difficult or impossible. These symptoms may occur for no reason, and they can affect your work, school, or social life. Medicines, counseling, and self-care can all help. Follow-up care is a key part of your treatment and safety. Be sure to make and go to all appointments, and call your doctor if you are having problems. It's also a good idea to know your test results and keep a list of the medicines you take. How can you care for yourself at home? · Take medicines exactly as directed. Call your doctor if you think you are having a problem with your medicine. · Go to your counseling sessions and follow-up appointments. · Recognize and accept your anxiety. Then, when you are in a situation that makes you anxious, say to yourself, \"This is not an emergency. I feel uncomfortable, but I am not in danger. I can keep going even if I feel anxious. \"  · Be kind to your body:  ¨ Relieve tension with exercise or a massage. ¨ Get enough rest.  ¨ Avoid alcohol, caffeine, nicotine, and illegal drugs. They can increase your anxiety level and cause sleep problems. ¨ Learn and do relaxation techniques. See below for more about these techniques. · Engage your mind. Get out and do something you enjoy. Go to a funny movie, or take a walk or hike. Plan your day. Having too much or too little to do can make you anxious. · Keep a record of your symptoms. Discuss your fears with a good friend or family member, or join a support group for people with similar problems. Talking to others sometimes relieves stress.   · Get involved in social groups, or volunteer to help others. Being alone sometimes makes things seem worse than they are. · Get at least 30 minutes of exercise on most days of the week to relieve stress. Walking is a good choice. You also may want to do other activities, such as running, swimming, cycling, or playing tennis or team sports. Relaxation techniques  Do relaxation exercises 10 to 20 minutes a day. You can play soothing, relaxing music while you do them, if you wish. · Tell others in your house that you are going to do your relaxation exercises. Ask them not to disturb you. · Find a comfortable place, away from all distractions and noise. · Lie down on your back, or sit with your back straight. · Focus on your breathing. Make it slow and steady. · Breathe in through your nose. Breathe out through either your nose or mouth. · Breathe deeply, filling up the area between your navel and your rib cage. Breathe so that your belly goes up and down. · Do not hold your breath. · Breathe like this for 5 to 10 minutes. Notice the feeling of calmness throughout your whole body. As you continue to breathe slowly and deeply, relax by doing the following for another 5 to 10 minutes:  · Tighten and relax each muscle group in your body. You can begin at your toes and work your way up to your head. · Imagine your muscle groups relaxing and becoming heavy. · Empty your mind of all thoughts. · Let yourself relax more and more deeply. · Become aware of the state of calmness that surrounds you. · When your relaxation time is over, you can bring yourself back to alertness by moving your fingers and toes and then your hands and feet and then stretching and moving your entire body. Sometimes people fall asleep during relaxation, but they usually wake up shortly afterward. · Always give yourself time to return to full alertness before you drive a car or do anything that might cause an accident if you are not fully alert.  Never play a relaxation tape while you drive a car. When should you call for help? Call 911 anytime you think you may need emergency care. For example, call if:  ? · You feel you cannot stop from hurting yourself or someone else. ? Keep the numbers for these national suicide hotlines: 7-718-988-TALK (4-194.262.8433) and 4-816-EWDSTBB (0-355.321.4443). If you or someone you know talks about suicide or feeling hopeless, get help right away. ? Watch closely for changes in your health, and be sure to contact your doctor if:  ? · You have anxiety or fear that affects your life. ? · You have symptoms of anxiety that are new or different from those you had before. Where can you learn more? Go to http://shamika-chinyere.info/. Enter P754 in the search box to learn more about \"Anxiety Disorder: Care Instructions. \"  Current as of: May 12, 2017  Content Version: 11.4  © 1771-8615 [employer]. Care instructions adapted under license by [employer] (which disclaims liability or warranty for this information). If you have questions about a medical condition or this instruction, always ask your healthcare professional. [address] any warranty or liability for your use of this information. Elevated Blood Pressure: Care Instructions  Your Care Instructions    Blood pressure is a measure of how hard the blood pushes against the walls of your arteries. It's normal for blood pressure to go up and down throughout the day. But if it stays up over time, you have high blood pressure. Two numbers tell you your blood pressure. The first number is the systolic pressure. It shows how hard the blood pushes when your heart is pumping. The second number is the diastolic pressure. It shows how hard the blood pushes between heartbeats, when your heart is relaxed and filling with blood. An ideal blood pressure in adults is less than 120/80 (say \"120 over 80\").  High blood pressure is 140/90 or higher. You have high blood pressure if your top number is 140 or higher or your bottom number is 90 or higher, or both. The main test for high blood pressure is simple, fast, and painless. To diagnose high blood pressure, your doctor will test your blood pressure at different times. After testing your blood pressure, your doctor may ask you to test it again when you are home. If you are diagnosed with high blood pressure, you can work with your doctor to make a long-term plan to manage it. Follow-up care is a key part of your treatment and safety. Be sure to make and go to all appointments, and call your doctor if you are having problems. It's also a good idea to know your test results and keep a list of the medicines you take. How can you care for yourself at home? · Do not smoke. Smoking increases your risk for heart attack and stroke. If you need help quitting, talk to your doctor about stop-smoking programs and medicines. These can increase your chances of quitting for good. · Stay at a healthy weight. · Try to limit how much sodium you eat to less than 2,300 milligrams (mg) a day. Your doctor may ask you to try to eat less than 1,500 mg a day. · Be physically active. Get at least 30 minutes of exercise on most days of the week. Walking is a good choice. You also may want to do other activities, such as running, swimming, cycling, or playing tennis or team sports. · Avoid or limit alcohol. Talk to your doctor about whether you can drink any alcohol. · Eat plenty of fruits, vegetables, and low-fat dairy products. Eat less saturated and total fats. · Learn how to check your blood pressure at home. When should you call for help? Call your doctor now or seek immediate medical care if:  ? · Your blood pressure is much higher than normal (such as 180/110 or higher). ? · You think high blood pressure is causing symptoms such as:  ¨ Severe headache. ¨ Blurry vision. ? Watch closely for changes in your health, and be sure to contact your doctor if:  ? · You do not get better as expected. Where can you learn more? Go to http://shamika-chinyree.info/. Enter G857 in the search box to learn more about \"Elevated Blood Pressure: Care Instructions. \"  Current as of: 2016  Content Version: 11.4  © 2657-9860 [employer]. Care instructions adapted under license by [employer] (which disclaims liability or warranty for this information). If you have questions about a medical condition or this instruction, always ask your healthcare professional. [address] any warranty or liability for your use of this information. [employer] Activation    Thank you for requesting access to [employer]. Please follow the instructions below to securely access and download your online medical record. [employer] allows you to send messages to your doctor, view your test results, renew your prescriptions, schedule appointments, and more. How Do I Sign Up? 1. In your internet browser, go to [email or web address]  2. Click on the First Time User? Click Here link in the Sign In box. You will be redirect to the New Member Sign Up page. 3. Enter your [employer] Access Code exactly as it appears below. You will not need to use this code after youve completed the sign-up process. If you do not sign up before the expiration date, you must request a new code. [employer] Access Code: [email or web address]  Expires: 2017  5:57 PM (This is the date your [employer] access code will )    4. Enter the last four digits of your Social Security Number (xxxx) and Date of Birth (mm/dd/yyyy) as indicated and click Submit. You will be taken to the next sign-up page. 5. Create a [employer] ID. This will be your [employer] login ID and cannot be changed, so think of one that is secure and easy to remember. 6. Create a [employer] password. You can change your password at any time.   7. Enter your Password Reset Question and Answer. This can be used at a later time if you forget your password. 8. Enter your e-mail address. You will receive e-mail notification when new information is available in [address] 9. Click Sign Up. You can now view and download portions of your medical record. 10. Click the Download Summary menu link to download a portable copy of your medical information. Additional Information    If you have questions, please visit the Frequently Asked Questions section of the [employer] website at [email or web address] Remember, [employer] is NOT to be used for urgent needs. For medical emergencies, dial 911. Follow up with your primary care provider by calling in the morning for an appointment.

## 2017-11-07 LAB
BACTERIA SPEC CULT: NORMAL
SERVICE CMNT-IMP: NORMAL

## 2017-12-06 PROBLEM — R39.15 URGENCY OF URINATION: Status: ACTIVE | Noted: 2017-12-06

## 2018-02-13 ENCOUNTER — HOSPITAL ENCOUNTER (EMERGENCY)
Age: 73
Discharge: HOME | End: 2018-02-13
Attending: EMERGENCY MEDICINE
Payer: MEDICARE

## 2018-02-13 VITALS
SYSTOLIC BLOOD PRESSURE: 164 MMHG | HEIGHT: 65 IN | RESPIRATION RATE: 19 BRPM | HEART RATE: 66 BPM | OXYGEN SATURATION: 100 % | DIASTOLIC BLOOD PRESSURE: 83 MMHG | WEIGHT: 190 LBS | BODY MASS INDEX: 31.65 KG/M2 | TEMPERATURE: 98.3 F

## 2018-02-13 DIAGNOSIS — R60.9 PERIPHERAL EDEMA: ICD-10-CM

## 2018-02-13 DIAGNOSIS — N39.0 URINARY TRACT INFECTION WITHOUT HEMATURIA, SITE UNSPECIFIED: Primary | ICD-10-CM

## 2018-02-13 LAB
ALBUMIN SERPL-MCNC: 3.3 G/DL (ref 3.4–5)
ALBUMIN/GLOB SERPL: 0.8 {RATIO} (ref 0.8–1.7)
ALP SERPL-CCNC: 168 U/L (ref 45–117)
ALT SERPL-CCNC: 13 U/L (ref 13–56)
ANION GAP SERPL CALC-SCNC: 11 MMOL/L (ref 3–18)
APPEARANCE UR: ABNORMAL
AST SERPL-CCNC: 15 U/L (ref 15–37)
BACTERIA URNS QL MICRO: ABNORMAL /HPF
BASOPHILS # BLD: 0 K/UL (ref 0–0.06)
BASOPHILS NFR BLD: 0 % (ref 0–2)
BILIRUB SERPL-MCNC: 0.3 MG/DL (ref 0.2–1)
BILIRUB UR QL: NEGATIVE
BNP SERPL-MCNC: 760 PG/ML (ref 0–900)
BUN SERPL-MCNC: 9 MG/DL (ref 7–18)
BUN/CREAT SERPL: 10 (ref 12–20)
CALCIUM SERPL-MCNC: 8.7 MG/DL (ref 8.5–10.1)
CHLORIDE SERPL-SCNC: 105 MMOL/L (ref 100–108)
CK MB CFR SERPL CALC: NORMAL % (ref 0–4)
CK MB SERPL-MCNC: <1 NG/ML (ref 5–25)
CK SERPL-CCNC: 59 U/L (ref 26–192)
CO2 SERPL-SCNC: 26 MMOL/L (ref 21–32)
COLOR UR: YELLOW
CREAT SERPL-MCNC: 0.86 MG/DL (ref 0.6–1.3)
DIFFERENTIAL METHOD BLD: ABNORMAL
EOSINOPHIL # BLD: 0.1 K/UL (ref 0–0.4)
EOSINOPHIL NFR BLD: 1 % (ref 0–5)
EPITH CASTS URNS QL MICRO: ABNORMAL /LPF (ref 0–5)
ERYTHROCYTE [DISTWIDTH] IN BLOOD BY AUTOMATED COUNT: 13.1 % (ref 11.6–14.5)
GLOBULIN SER CALC-MCNC: 3.9 G/DL (ref 2–4)
GLUCOSE SERPL-MCNC: 240 MG/DL (ref 74–99)
GLUCOSE UR STRIP.AUTO-MCNC: NEGATIVE MG/DL
HCT VFR BLD AUTO: 32.2 % (ref 35–45)
HGB BLD-MCNC: 10.4 G/DL (ref 12–16)
HGB UR QL STRIP: ABNORMAL
KETONES UR QL STRIP.AUTO: NEGATIVE MG/DL
LEUKOCYTE ESTERASE UR QL STRIP.AUTO: ABNORMAL
LYMPHOCYTES # BLD: 2.1 K/UL (ref 0.9–3.6)
LYMPHOCYTES NFR BLD: 26 % (ref 21–52)
MCH RBC QN AUTO: 28.6 PG (ref 24–34)
MCHC RBC AUTO-ENTMCNC: 32.3 G/DL (ref 31–37)
MCV RBC AUTO: 88.5 FL (ref 74–97)
MONOCYTES # BLD: 0.7 K/UL (ref 0.05–1.2)
MONOCYTES NFR BLD: 9 % (ref 3–10)
NEUTS SEG # BLD: 5 K/UL (ref 1.8–8)
NEUTS SEG NFR BLD: 64 % (ref 40–73)
NITRITE UR QL STRIP.AUTO: NEGATIVE
PH UR STRIP: 6 [PH] (ref 5–8)
PLATELET # BLD AUTO: 251 K/UL (ref 135–420)
PMV BLD AUTO: 11.6 FL (ref 9.2–11.8)
POTASSIUM SERPL-SCNC: 3.6 MMOL/L (ref 3.5–5.5)
PROT SERPL-MCNC: 7.2 G/DL (ref 6.4–8.2)
PROT UR STRIP-MCNC: NEGATIVE MG/DL
RBC # BLD AUTO: 3.64 M/UL (ref 4.2–5.3)
RBC #/AREA URNS HPF: ABNORMAL /HPF (ref 0–5)
SODIUM SERPL-SCNC: 142 MMOL/L (ref 136–145)
SP GR UR REFRACTOMETRY: 1.01 (ref 1–1.03)
TROPONIN I SERPL-MCNC: <0.02 NG/ML (ref 0–0.04)
UROBILINOGEN UR QL STRIP.AUTO: 0.2 EU/DL (ref 0.2–1)
WBC # BLD AUTO: 8 K/UL (ref 4.6–13.2)
WBC URNS QL MICRO: ABNORMAL /HPF (ref 0–4)

## 2018-02-13 PROCEDURE — 82550 ASSAY OF CK (CPK): CPT | Performed by: EMERGENCY MEDICINE

## 2018-02-13 PROCEDURE — 74011250637 HC RX REV CODE- 250/637: Performed by: EMERGENCY MEDICINE

## 2018-02-13 PROCEDURE — 99284 EMERGENCY DEPT VISIT MOD MDM: CPT

## 2018-02-13 PROCEDURE — 87077 CULTURE AEROBIC IDENTIFY: CPT | Performed by: EMERGENCY MEDICINE

## 2018-02-13 PROCEDURE — 93005 ELECTROCARDIOGRAM TRACING: CPT

## 2018-02-13 PROCEDURE — 87186 SC STD MICRODIL/AGAR DIL: CPT | Performed by: EMERGENCY MEDICINE

## 2018-02-13 PROCEDURE — 83880 ASSAY OF NATRIURETIC PEPTIDE: CPT | Performed by: EMERGENCY MEDICINE

## 2018-02-13 PROCEDURE — 81001 URINALYSIS AUTO W/SCOPE: CPT | Performed by: EMERGENCY MEDICINE

## 2018-02-13 PROCEDURE — 85025 COMPLETE CBC W/AUTO DIFF WBC: CPT | Performed by: EMERGENCY MEDICINE

## 2018-02-13 PROCEDURE — 96374 THER/PROPH/DIAG INJ IV PUSH: CPT

## 2018-02-13 PROCEDURE — 87086 URINE CULTURE/COLONY COUNT: CPT | Performed by: EMERGENCY MEDICINE

## 2018-02-13 PROCEDURE — 80053 COMPREHEN METABOLIC PANEL: CPT | Performed by: EMERGENCY MEDICINE

## 2018-02-13 PROCEDURE — 93970 EXTREMITY STUDY: CPT

## 2018-02-13 PROCEDURE — 74011250636 HC RX REV CODE- 250/636: Performed by: EMERGENCY MEDICINE

## 2018-02-13 RX ORDER — GLIMEPIRIDE 1 MG/1
4 TABLET ORAL 2 TIMES DAILY
COMMUNITY
End: 2020-02-06

## 2018-02-13 RX ORDER — FUROSEMIDE 10 MG/ML
40 INJECTION INTRAMUSCULAR; INTRAVENOUS
Status: COMPLETED | OUTPATIENT
Start: 2018-02-13 | End: 2018-02-13

## 2018-02-13 RX ORDER — LEVOTHYROXINE SODIUM 50 UG/1
50 TABLET ORAL
COMMUNITY
End: 2020-02-06

## 2018-02-13 RX ORDER — DICYCLOMINE HYDROCHLORIDE 20 MG/1
20 TABLET ORAL EVERY 6 HOURS
COMMUNITY
End: 2018-03-24

## 2018-02-13 RX ORDER — FENTANYL 25 UG/1
1 PATCH TRANSDERMAL
COMMUNITY
End: 2020-02-06

## 2018-02-13 RX ORDER — NITROFURANTOIN 25; 75 MG/1; MG/1
100 CAPSULE ORAL 2 TIMES DAILY
Qty: 20 CAP | Refills: 0 | Status: SHIPPED | OUTPATIENT
Start: 2018-02-13 | End: 2018-02-23

## 2018-02-13 RX ORDER — NITROFURANTOIN MACROCRYSTALS 50 MG/1
100 CAPSULE ORAL
Status: DISCONTINUED | OUTPATIENT
Start: 2018-02-13 | End: 2018-02-13 | Stop reason: CLARIF

## 2018-02-13 RX ORDER — CYCLOBENZAPRINE HCL 10 MG
10 TABLET ORAL
COMMUNITY
End: 2018-03-24

## 2018-02-13 RX ORDER — CHLORPHENIRAMINE MALEATE 4 MG
TABLET ORAL 2 TIMES DAILY
COMMUNITY
End: 2018-07-09

## 2018-02-13 RX ORDER — HYDROCODONE BITARTRATE AND ACETAMINOPHEN 10; 325 MG/1; MG/1
1 TABLET ORAL
COMMUNITY
End: 2018-02-21

## 2018-02-13 RX ORDER — NITROFURANTOIN 25; 75 MG/1; MG/1
100 CAPSULE ORAL ONCE
Status: COMPLETED | OUTPATIENT
Start: 2018-02-13 | End: 2018-02-13

## 2018-02-13 RX ORDER — MIRTAZAPINE 15 MG/1
15 TABLET, FILM COATED ORAL
COMMUNITY
End: 2020-02-24

## 2018-02-13 RX ADMIN — NITROFURANTOIN (MONOHYDRATE/MACROCRYSTALS) 100 MG: 75; 25 CAPSULE ORAL at 18:02

## 2018-02-13 RX ADMIN — FUROSEMIDE 40 MG: 10 INJECTION, SOLUTION INTRAMUSCULAR; INTRAVENOUS at 15:57

## 2018-02-13 NOTE — ED PROVIDER NOTES
EMERGENCY DEPARTMENT HISTORY AND PHYSICAL EXAM    3:39 PM      Date: 2/13/2018  Patient Name: Rere Grigsby    History of Presenting Illness     Chief Complaint   Patient presents with    Ankle swelling    Hand Swelling         History Provided By: Patient    Chief Complaint: Swelling  Duration: Couple Weeks  Timing:  Progressive  Location: Hands, Arms, and Legs  Quality: N/A  Severity: Moderate  Modifying Factors: No relief from lasix  Associated Symptoms: dysuria, abd distension      Additional History (Context): 3:40 PM Rere Grigsby is a 74 y.o. female with h/o HTN, DM, Scoliosis, Diverticulosis who presents to ED complaining of progressive moderate swelling in the arms, hands, and legs associated with dysuria onset a couple weeks ago. Patient states that her swelling started I her hands then her feet and legs began to swell. States she has had excess fluid before but not like this. Patients  is at bedside and states that her office handles her medication. She saw her PCP a couple weeks ago for the swelling and was instructed to double up on her lasix for 2 days then proceed as normal. She had no relief of sx from the course of action. States that there is mild dysuria after urination and that she has abd discomfort as it feels full. She still has BMs. No other concerns or symptoms at this time. PCP: Asif Mckeon MD      Current Outpatient Prescriptions   Medication Sig Dispense Refill    mirtazapine (REMERON) 15 mg tablet Take 15 mg by mouth nightly.  glimepiride (AMARYL) 1 mg tablet Take 1 mg by mouth Daily (before breakfast).  levothyroxine (SYNTHROID) 50 mcg tablet Take 50 mcg by mouth Daily (before breakfast).  dicyclomine (BENTYL) 20 mg tablet Take 20 mg by mouth every six (6) hours.  cyclobenzaprine (FLEXERIL) 10 mg tablet Take 10 mg by mouth three (3) times daily as needed for Muscle Spasm(s).       HYDROcodone-acetaminophen (NORCO)  mg tablet Take 1 Tab by mouth.      fentaNYL (DURAGESIC) 25 mcg/hr PATCH 1 Patch by TransDERmal route every seventy-two (72) hours.  clotrimazole (LOTRIMIN) 1 % topical cream Apply  to affected area two (2) times a day.  insulin detemir (LEVEMIR) 100 unit/mL injection 5 Units by SubCUTAneous route nightly.  amLODIPine (NORVASC) 10 mg tablet 10 mg.      ziprasidone (GEODON) 20 mg capsule 20 mg.      magnesium hydroxide (MILK OF MAGNESIA) 400 mg/5 mL suspension Take 30 mL by mouth daily. 118 mL 0    ibuprofen (MOTRIN) 200 mg tablet Take 400 mg by mouth every eight (8) hours as needed for Pain.  diphenhydrAMINE (BENADRYL) 50 mg tablet Take 50 mg by mouth two (2) times daily as needed for Itching.  oxyCODONE-acetaminophen (PERCOCET 10)  mg per tablet Take 1 Tab by mouth every six (6) hours as needed for Pain.  gabapentin (NEURONTIN) 100 mg capsule Take 300 mg by mouth three (3) times daily.  furosemide (LASIX) 40 mg tablet Take 40 mg by mouth daily.  potassium chloride (KLOR-CON M10) 10 mEq tablet Take 10 mEq by mouth daily. take 1 tablet by mouth every day as needed take with furosemide      diazepam (VALIUM) 2 mg tablet Take 1 Tab by mouth every six (6) hours as needed. Max Daily Amount: 8 mg. 50 Tab none    metoprolol succinate (TOPROL-XL) 50 mg XL tablet Take 50 mg by mouth daily.  omeprazole (PRILOSEC) 40 mg capsule Take 40 mg by mouth daily.  promethazine (PHENERGAN) 25 mg tablet Take 25 mg by mouth two (2) times daily as needed for Nausea.  polyethylene glycol (MIRALAX) 17 gram packet Take 17 g by mouth daily.  cholecalciferol (VITAMIN D3) 1,000 unit cap Take 1,000 Units by mouth daily.  losartan (COZAAR) 100 mg tablet Take 100 mg by mouth daily.  sucralfate (CARAFATE) 1 gram tablet Take 1 g by mouth four (4) times daily.  venlafaxine-SR (EFFEXOR XR) 150 mg capsule Take 150 mg by mouth daily.       cyanocobalamin (VITAMIN B-12) 1,000 mcg tablet Take 1,000 mcg by mouth daily.  phenyleph-min oil-petrolatum (PREPARATION H) 0.25-14-74.9 % rectal ointment by PeriANAL route four (4) times daily as needed for Pain. 1 Tube 0    potassium chloride (K-DUR, KLOR-CON) 20 mEq tablet Take 2 Tabs by mouth two (2) times a day. 30 Tab 0    docusate sodium (COLACE) 100 mg capsule Take 100 mg by mouth two (2) times a day. Past History     Past Medical History:  Past Medical History:   Diagnosis Date    Anxiety     Depression     Diabetes ([address]     Diverticulosis     Dysphagia     Early satiety     Fatty liver     Gastric ulcer     Gastroparesis     Heart disease     Hypercholesteremia     Hypertension     Scoliosis     Stenosis of lumbosacral spine     Tardive dyskinesia        Past Surgical History:  Past Surgical History:   Procedure Laterality Date    HX APPENDECTOMY      HX BREAST LUMPECTOMY      HX CHOLECYSTECTOMY  1980    HX COLECTOMY  2012    HX HERNIA REPAIR      HX HYSTERECTOMY  1970    HX KNEE ARTHROSCOPY      HX KNEE REPLACEMENT  1991    left knee    HX LUMBAR FUSION      x 3       Family History:  Family History   Problem Relation Age of Onset    Heart Disease Mother     Diabetes Mother     Heart Attack Mother     Cancer Father      lung CA    Cancer Brother        Social History:  Social History   Substance Use Topics    Smoking status: Never Smoker    Smokeless tobacco: Never Used    Alcohol use No       Allergies: Allergies   Allergen Reactions    Codeine Rash and Itching    Iodine Hives and Rash    Morphine Hives    Pcn [Penicillins] Other (comments)     Causes \"Heart swell\"    Sulfa (Sulfonamide Antibiotics) Nausea Only         Review of Systems       Review of Systems   Constitutional: Negative for activity change, fatigue and fever. HENT: Negative for congestion and rhinorrhea. Eyes: Negative for visual disturbance. Respiratory: Negative for shortness of breath.     Cardiovascular: Positive for leg swelling. Negative for chest pain and palpitations. Gastrointestinal: Positive for abdominal distention. Negative for abdominal pain, diarrhea, nausea and vomiting. Genitourinary: Positive for dysuria. Negative for hematuria. Musculoskeletal: Negative for back pain. Edema hands and arms   Skin: Negative for rash. Neurological: Negative for dizziness, weakness and light-headedness. Psychiatric/Behavioral: Negative for agitation. All other systems reviewed and are negative. Physical Exam     Visit Vitals    /83 (BP 1 Location: Right arm, BP Patient Position: At rest)    Pulse 81    Temp 98.3 °F (36.8 °C)    Resp 15    Ht 5' 5\" (1.651 m)    Wt 86.2 kg (190 lb)    SpO2 100%    BMI 31.62 kg/m2         Physical Exam   Constitutional: She appears well-developed and well-nourished. No distress. HENT:   Head: Normocephalic and atraumatic. Right Ear: External ear normal.   Left Ear: External ear normal.   Nose: Nose normal.   Mouth/Throat: Oropharynx is clear and moist.   Eyes: Conjunctivae and EOM are normal. Pupils are equal, round, and reactive to light. No scleral icterus. Neck: Normal range of motion. Neck supple. JVD present. No tracheal deviation present. No thyromegaly present. Cardiovascular: Normal rate and regular rhythm. Exam reveals no friction rub. No murmur heard. Pulmonary/Chest: Effort normal and breath sounds normal. No stridor. She exhibits no tenderness. Abdominal: Soft. Bowel sounds are normal. She exhibits no distension and no mass. There is no tenderness. There is no rebound and no guarding. No hernia. No edema in abd wall   Musculoskeletal: Normal range of motion. She exhibits edema. She exhibits no tenderness. Pitting edema BLE   Lymphadenopathy:     She has no cervical adenopathy. Neurological: She is alert. No cranial nerve deficit. Coordination normal.   Skin: Skin is warm and dry. Psychiatric: She has a normal mood and affect.  Her behavior is normal. Judgment and thought content normal.   Nursing note and vitals reviewed. Diagnostic Study Results     Labs -  Recent Results (from the past 12 hour(s))   CBC WITH AUTOMATED DIFF    Collection Time: 02/13/18  3:48 PM   Result Value Ref Range    WBC 8.0 4.6 - 13.2 K/uL    RBC 3.64 (L) 4.20 - 5.30 M/uL    HGB 10.4 (L) 12.0 - 16.0 g/dL    HCT 32.2 (L) 35.0 - 45.0 %    MCV 88.5 74.0 - 97.0 FL    MCH 28.6 24.0 - 34.0 PG    MCHC 32.3 31.0 - 37.0 g/dL    RDW 13.1 11.6 - 14.5 %    PLATELET 320 802 - 199 K/uL    MPV 11.6 9.2 - 11.8 FL    NEUTROPHILS 64 40 - 73 %    LYMPHOCYTES 26 21 - 52 %    MONOCYTES 9 3 - 10 %    EOSINOPHILS 1 0 - 5 %    BASOPHILS 0 0 - 2 %    ABS. NEUTROPHILS 5.0 1.8 - 8.0 K/UL    ABS. LYMPHOCYTES 2.1 0.9 - 3.6 K/UL    ABS. MONOCYTES 0.7 0.05 - 1.2 K/UL    ABS. EOSINOPHILS 0.1 0.0 - 0.4 K/UL    ABS. BASOPHILS 0.0 0.0 - 0.06 K/UL    DF AUTOMATED     METABOLIC PANEL, COMPREHENSIVE    Collection Time: 02/13/18  3:48 PM   Result Value Ref Range    Sodium 142 136 - 145 mmol/L    Potassium 3.6 3.5 - 5.5 mmol/L    Chloride 105 100 - 108 mmol/L    CO2 26 21 - 32 mmol/L    Anion gap 11 3.0 - 18 mmol/L    Glucose 240 (H) 74 - 99 mg/dL    BUN 9 7.0 - 18 MG/DL    Creatinine 0.86 0.6 - 1.3 MG/DL    BUN/Creatinine ratio 10 (L) 12 - 20      GFR est AA >60 >60 ml/min/1.73m2    GFR est non-AA >60 >60 ml/min/1.73m2    Calcium 8.7 8.5 - 10.1 MG/DL    Bilirubin, total 0.3 0.2 - 1.0 MG/DL    ALT (SGPT) 13 13 - 56 U/L    AST (SGOT) 15 15 - 37 U/L    Alk.  phosphatase 168 (H) 45 - 117 U/L    Protein, total 7.2 6.4 - 8.2 g/dL    Albumin 3.3 (L) 3.4 - 5.0 g/dL    Globulin 3.9 2.0 - 4.0 g/dL    A-G Ratio 0.8 0.8 - 1.7     NT-PRO BNP    Collection Time: 02/13/18  3:48 PM   Result Value Ref Range    NT pro- 0 - 900 PG/ML   CARDIAC PANEL,(CK, CKMB & TROPONIN)    Collection Time: 02/13/18  3:48 PM   Result Value Ref Range    CK 59 26 - 192 U/L    CK - MB <1.0 <3.6 ng/ml    CK-MB Index  0.0 - 4.0 % CALCULATION NOT PERFORMED WHEN RESULT IS BELOW LINEAR LIMIT    Troponin-I, Qt. <0.02 0.0 - 0.045 NG/ML   EKG, 12 LEAD, INITIAL    Collection Time: 02/13/18  3:58 PM   Result Value Ref Range    Ventricular Rate 72 BPM    Atrial Rate 72 BPM    P-R Interval 170 ms    QRS Duration 68 ms    Q-T Interval 392 ms    QTC Calculation (Bezet) 429 ms    Calculated P Axis 28 degrees    Calculated R Axis 14 degrees    Calculated T Axis 105 degrees    Diagnosis       Normal sinus rhythm  ST & T wave abnormality, consider lateral ischemia  Abnormal ECG  When compared with ECG of 05-JUL-2017 10:22,  [place] interval has decreased  Inverted T waves have replaced nonspecific T wave abnormality in Lateral   leads     URINALYSIS W/ RFLX MICROSCOPIC    Collection Time: 02/13/18  4:00 PM   Result Value Ref Range    Color YELLOW      Appearance CLOUDY      Specific gravity 1.009 1.005 - 1.030      pH (UA) 6.0 5.0 - 8.0      Protein NEGATIVE  NEG mg/dL    Glucose NEGATIVE  NEG mg/dL    Ketone NEGATIVE  NEG mg/dL    Bilirubin NEGATIVE  NEG      Blood TRACE (A) NEG      Urobilinogen 0.2 0.2 - 1.0 EU/dL    Nitrites NEGATIVE  NEG      Leukocyte Esterase LARGE (A) NEG     URINE MICROSCOPIC ONLY    Collection Time: 02/13/18  4:00 PM   Result Value Ref Range    WBC TOO NUMEROUS TO COUNT 0 - 4 /hpf    RBC 0 to 2 0 - 5 /hpf    Epithelial cells FEW 0 - 5 /lpf    Bacteria 3+ (A) NEG /hpf       Radiologic Studies -   DUPLEX LOWER EXT VENOUS BILAT      Right leg :  1. Deep vein(s) visualized include the common femoral, proximal  femoral, mid femoral, distal femoral, popliteal(above knee),  popliteal(fossa), popliteal(below knee), posterior tibial and peroneal   veins. 2. No evidence of deep venous thrombosis detected in the veins  visualized. 3. No evidence of superficial thrombosis detected. Left leg :  1.  Deep vein(s) visualized include the common femoral, proximal  femoral, mid femoral, distal femoral, popliteal(above knee),  popliteal(fossa), popliteal(below knee), posterior tibial and peroneal   veins. 2. No evidence of deep venous thrombosis detected in the veins  visualized. 3. No evidence of superficial thrombosis detected.            Medical Decision Making   I am the first provider for this patient. I reviewed the vital signs, available nursing notes, past medical history, past surgical history, family history and social history. Vital Signs-Reviewed the patient's vital signs. Pulse Oximetry Analysis -  100% on room air , Normal    EKG: Interpreted by the EP. Time Interpreted: 16:04   Rate: 72   Rhythm: Normal Sinus Rhythm    Interpretation: Twave inversion in lead v2, v3, v4. Nml intervals and axis    Records Reviewed: Nursing Notes and Old Medical Records (Time of Review: 3:39 PM)    Provider Notes (Medical Decision Making):   Patient presents with bilateral lower and upper extremity edema and abd sweling; sx for past few weeks. Simialr in past. She is on lasix. Check total protein, liver function, UA for urinary sx per patient. R/o cardaic disease and CHF.   6:20 PM Consult: I discussed care with Dr. Josy Sanders (PCP). It was a standard discussion including patient history, chief complaint, available diagnostic results, and predicted treatment course. Agrees with tx plan and Macrobid as patient has multiple allergies. States that she will follow up with patient. Diagnosis     Clinical Impression:   1. Urinary tract infection without hematuria, site unspecified    2. Peripheral edema        Disposition: DC    Follow-up Information     None           Patient's Medications   Start Taking    No medications on file   Continue Taking    AMLODIPINE (NORVASC) 10 MG TABLET    10 mg. CHOLECALCIFEROL (VITAMIN D3) 1,000 UNIT CAP    Take 1,000 Units by mouth daily. CLOTRIMAZOLE (LOTRIMIN) 1 % TOPICAL CREAM    Apply  to affected area two (2) times a day. CYANOCOBALAMIN (VITAMIN B-12) 1,000 MCG TABLET    Take 1,000 mcg by mouth daily. CYCLOBENZAPRINE (FLEXERIL) 10 MG TABLET    Take 10 mg by mouth three (3) times daily as needed for Muscle Spasm(s). DIAZEPAM (VALIUM) 2 MG TABLET    Take 1 Tab by mouth every six (6) hours as needed. Max Daily Amount: 8 mg. DICYCLOMINE (BENTYL) 20 MG TABLET    Take 20 mg by mouth every six (6) hours. DIPHENHYDRAMINE (BENADRYL) 50 MG TABLET    Take 50 mg by mouth two (2) times daily as needed for Itching. DOCUSATE SODIUM (COLACE) 100 MG CAPSULE    Take 100 mg by mouth two (2) times a day. FENTANYL (DURAGESIC) 25 MCG/HR PATCH    1 Patch by TransDERmal route every seventy-two (72) hours. FUROSEMIDE (LASIX) 40 MG TABLET    Take 40 mg by mouth daily. GABAPENTIN (NEURONTIN) 100 MG CAPSULE    Take 300 mg by mouth three (3) times daily. GLIMEPIRIDE (AMARYL) 1 MG TABLET    Take 1 mg by mouth Daily (before breakfast). HYDROCODONE-ACETAMINOPHEN (NORCO)  MG TABLET    Take 1 Tab by mouth. IBUPROFEN (MOTRIN) 200 MG TABLET    Take 400 mg by mouth every eight (8) hours as needed for Pain. INSULIN DETEMIR (LEVEMIR) 100 UNIT/ML INJECTION    5 Units by SubCUTAneous route nightly. LEVOTHYROXINE (SYNTHROID) 50 MCG TABLET    Take 50 mcg by mouth Daily (before breakfast). LOSARTAN (COZAAR) 100 MG TABLET    Take 100 mg by mouth daily. MAGNESIUM HYDROXIDE (MILK OF MAGNESIA) 400 MG/5 ML SUSPENSION    Take 30 mL by mouth daily. METOPROLOL SUCCINATE (TOPROL-XL) 50 MG XL TABLET    Take 50 mg by mouth daily. MIRTAZAPINE (REMERON) 15 MG TABLET    Take 15 mg by mouth nightly. OMEPRAZOLE (PRILOSEC) 40 MG CAPSULE    Take 40 mg by mouth daily. OXYCODONE-ACETAMINOPHEN (PERCOCET 10)  MG PER TABLET    Take 1 Tab by mouth every six (6) hours as needed for Pain. PHENYLEPH-MIN OIL-PETROLATUM (PREPARATION H) 0.25-14-74.9 % RECTAL OINTMENT    by PeriANAL route four (4) times daily as needed for Pain. POLYETHYLENE GLYCOL (MIRALAX) 17 GRAM PACKET    Take 17 g by mouth daily. POTASSIUM CHLORIDE (K-DUR, KLOR-CON) 20 MEQ TABLET    Take 2 Tabs by mouth two (2) times a day. POTASSIUM CHLORIDE (KLOR-CON M10) 10 MEQ TABLET    Take 10 mEq by mouth daily. take 1 tablet by mouth every day as needed take with furosemide    PROMETHAZINE (PHENERGAN) 25 MG TABLET    Take 25 mg by mouth two (2) times daily as needed for Nausea. SUCRALFATE (CARAFATE) 1 GRAM TABLET    Take 1 g by mouth four (4) times daily. VENLAFAXINE-SR (EFFEXOR XR) 150 MG CAPSULE    Take 150 mg by mouth daily. ZIPRASIDONE (GEODON) 20 MG CAPSULE    20 mg. These Medications have changed    No medications on file   Stop Taking    No medications on file     _______________________________    Attestations:  Deshaun Dodd acting as a scribe for and in the presence of Zoe Rodriguez MD      February 13, 2018 at 3:39 PM       Provider Attestation:      I personally performed the services described in the documentation, reviewed the documentation, as recorded by the scribe in my presence, and it accurately and completely records my words and actions.  February 13, 2018 at 3:39 PM - Zoe Rodriguez MD    _______________________________

## 2018-02-13 NOTE — ED TRIAGE NOTES
Tried to get into PCP. Unable to. Swelling of all extremities for weeks. Has lasix. Lower abdomen hurts when voiding.

## 2018-02-13 NOTE — PROCEDURES
[facility]/[address]  *** FINAL REPORT ***    Name: Denisa Crow  MRN: [id number]    Outpatient  : 10 Apr 1945  HIS Order #: [id number]  [id number] [facility] Visit #: [id number]  Date: 2018    TYPE OF TEST: Peripheral Venous Testing    REASON FOR TEST  Pain in limb, Limb swelling    Right Leg:-  Deep venous thrombosis:           No  Superficial venous thrombosis:    No  Deep venous insufficiency:        Not examined  Superficial venous insufficiency: Not examined    Left Leg:-  Deep venous thrombosis:           No  Superficial venous thrombosis:    No  Deep venous insufficiency:        Not examined  Superficial venous insufficiency: Not examined      INTERPRETATION/FINDINGS  Duplex images were obtained using 2-D gray scale, color flow, and  spectral Doppler analysis. Right leg :  1. Deep vein(s) visualized include the common femoral, proximal  femoral, mid femoral, distal femoral, popliteal(above knee),  popliteal(fossa), popliteal(below knee), posterior tibial and peroneal   veins. 2. No evidence of deep venous thrombosis detected in the veins  visualized. 3. No evidence of superficial thrombosis detected. Left leg :  1. Deep vein(s) visualized include the common femoral, proximal  femoral, mid femoral, distal femoral, popliteal(above knee),  popliteal(fossa), popliteal(below knee), posterior tibial and peroneal   veins. 2. No evidence of deep venous thrombosis detected in the veins  visualized. 3. No evidence of superficial thrombosis detected. ADDITIONAL COMMENTS  No change since previous examination done on 16    I have personally reviewed the data relevant to the interpretation of  this  study. TECHNOLOGIST: Evert Brewer.  Gisela Pablo  Signed: 2018 06:03 PM    PHYSICIAN: Kacie Young MD  Signed: 2018 07:24 AM

## 2018-02-14 LAB
ATRIAL RATE: 72 BPM
CALCULATED P AXIS, ECG09: 28 DEGREES
CALCULATED R AXIS, ECG10: 14 DEGREES
CALCULATED T AXIS, ECG11: 105 DEGREES
DIAGNOSIS, 93000: NORMAL
P-R INTERVAL, ECG05: 170 MS
Q-T INTERVAL, ECG07: 392 MS
QRS DURATION, ECG06: 68 MS
QTC CALCULATION (BEZET), ECG08: 429 MS
VENTRICULAR RATE, ECG03: 72 BPM

## 2018-02-15 LAB
BACTERIA SPEC CULT: ABNORMAL
SERVICE CMNT-IMP: ABNORMAL

## 2018-02-21 PROBLEM — N31.9 NEUROGENIC BLADDER: Status: ACTIVE | Noted: 2018-02-21

## 2018-03-02 ENCOUNTER — APPOINTMENT (OUTPATIENT)
Dept: CT IMAGING | Age: 73
End: 2018-03-02
Attending: EMERGENCY MEDICINE
Payer: MEDICARE

## 2018-03-02 ENCOUNTER — HOSPITAL ENCOUNTER (EMERGENCY)
Age: 73
Discharge: HOME | End: 2018-03-02
Attending: EMERGENCY MEDICINE
Payer: MEDICARE

## 2018-03-02 ENCOUNTER — APPOINTMENT (OUTPATIENT)
Dept: GENERAL RADIOLOGY | Age: 73
End: 2018-03-02
Attending: EMERGENCY MEDICINE
Payer: MEDICARE

## 2018-03-02 VITALS
HEART RATE: 78 BPM | RESPIRATION RATE: 18 BRPM | HEIGHT: 65 IN | DIASTOLIC BLOOD PRESSURE: 49 MMHG | WEIGHT: 200 LBS | OXYGEN SATURATION: 98 % | BODY MASS INDEX: 33.32 KG/M2 | TEMPERATURE: 98.5 F | SYSTOLIC BLOOD PRESSURE: 123 MMHG

## 2018-03-02 DIAGNOSIS — N39.0 URINARY TRACT INFECTION WITHOUT HEMATURIA, SITE UNSPECIFIED: Primary | ICD-10-CM
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
## 2018-03-02 LAB
ALBUMIN SERPL-MCNC: 3.1 G/DL (ref 3.4–5)
ALBUMIN/GLOB SERPL: 0.9 {RATIO} (ref 0.8–1.7)
ALP SERPL-CCNC: 149 U/L (ref 45–117)
ALT SERPL-CCNC: 12 U/L (ref 13–56)
ANION GAP SERPL CALC-SCNC: 6 MMOL/L (ref 3–18)
APPEARANCE UR: CLEAR
AST SERPL-CCNC: 15 U/L (ref 15–37)
BACTERIA URNS QL MICRO: ABNORMAL /HPF
BASOPHILS # BLD: 0 K/UL (ref 0–0.06)
BASOPHILS NFR BLD: 0 % (ref 0–2)
BILIRUB SERPL-MCNC: 0.4 MG/DL (ref 0.2–1)
BILIRUB UR QL: NEGATIVE
BUN SERPL-MCNC: 9 MG/DL (ref 7–18)
BUN/CREAT SERPL: 11 (ref 12–20)
CALCIUM SERPL-MCNC: 8.4 MG/DL (ref 8.5–10.1)
CHLORIDE SERPL-SCNC: 108 MMOL/L (ref 100–108)
CO2 SERPL-SCNC: 28 MMOL/L (ref 21–32)
COLOR UR: YELLOW
CREAT SERPL-MCNC: 0.84 MG/DL (ref 0.6–1.3)
DIFFERENTIAL METHOD BLD: ABNORMAL
EOSINOPHIL # BLD: 0.3 K/UL (ref 0–0.4)
EOSINOPHIL NFR BLD: 5 % (ref 0–5)
EPITH CASTS URNS QL MICRO: ABNORMAL /LPF (ref 0–5)
ERYTHROCYTE [DISTWIDTH] IN BLOOD BY AUTOMATED COUNT: 13.1 % (ref 11.6–14.5)
GLOBULIN SER CALC-MCNC: 3.4 G/DL (ref 2–4)
GLUCOSE SERPL-MCNC: 201 MG/DL (ref 74–99)
GLUCOSE UR STRIP.AUTO-MCNC: NEGATIVE MG/DL
HCT VFR BLD AUTO: 32.1 % (ref 35–45)
HGB BLD-MCNC: 10.2 G/DL (ref 12–16)
HGB UR QL STRIP: NEGATIVE
KETONES UR QL STRIP.AUTO: NEGATIVE MG/DL
LEUKOCYTE ESTERASE UR QL STRIP.AUTO: ABNORMAL
LYMPHOCYTES # BLD: 2.7 K/UL (ref 0.9–3.6)
LYMPHOCYTES NFR BLD: 40 % (ref 21–52)
MCH RBC QN AUTO: 28.8 PG (ref 24–34)
MCHC RBC AUTO-ENTMCNC: 31.8 G/DL (ref 31–37)
MCV RBC AUTO: 90.7 FL (ref 74–97)
MONOCYTES # BLD: 0.6 K/UL (ref 0.05–1.2)
MONOCYTES NFR BLD: 9 % (ref 3–10)
NEUTS SEG # BLD: 3.2 K/UL (ref 1.8–8)
NEUTS SEG NFR BLD: 46 % (ref 40–73)
NITRITE UR QL STRIP.AUTO: NEGATIVE
PH UR STRIP: 6 [PH] (ref 5–8)
PLATELET # BLD AUTO: 261 K/UL (ref 135–420)
PMV BLD AUTO: 12 FL (ref 9.2–11.8)
POTASSIUM SERPL-SCNC: 3.6 MMOL/L (ref 3.5–5.5)
PROT SERPL-MCNC: 6.5 G/DL (ref 6.4–8.2)
PROT UR STRIP-MCNC: NEGATIVE MG/DL
RBC # BLD AUTO: 3.54 M/UL (ref 4.2–5.3)
RBC #/AREA URNS HPF: 0 /HPF (ref 0–5)
SODIUM SERPL-SCNC: 142 MMOL/L (ref 136–145)
SP GR UR REFRACTOMETRY: 1.01 (ref 1–1.03)
TROPONIN I SERPL-MCNC: <0.02 NG/ML (ref 0–0.04)
UROBILINOGEN UR QL STRIP.AUTO: 0.2 EU/DL (ref 0.2–1)
WBC # BLD AUTO: 6.8 K/UL (ref 4.6–13.2)
WBC URNS QL MICRO: ABNORMAL /HPF (ref 0–4)

## 2018-03-02 PROCEDURE — 85025 COMPLETE CBC W/AUTO DIFF WBC: CPT | Performed by: EMERGENCY MEDICINE

## 2018-03-02 PROCEDURE — 80053 COMPREHEN METABOLIC PANEL: CPT | Performed by: EMERGENCY MEDICINE

## 2018-03-02 PROCEDURE — 81001 URINALYSIS AUTO W/SCOPE: CPT | Performed by: EMERGENCY MEDICINE

## 2018-03-02 PROCEDURE — 99284 EMERGENCY DEPT VISIT MOD MDM: CPT

## 2018-03-02 PROCEDURE — 70450 CT HEAD/BRAIN W/O DYE: CPT

## 2018-03-02 PROCEDURE — 84484 ASSAY OF TROPONIN QUANT: CPT | Performed by: EMERGENCY MEDICINE

## 2018-03-02 PROCEDURE — 93005 ELECTROCARDIOGRAM TRACING: CPT

## 2018-03-02 PROCEDURE — 71045 X-RAY EXAM CHEST 1 VIEW: CPT

## 2018-03-02 RX ORDER — NITROFURANTOIN 25; 75 MG/1; MG/1
100 CAPSULE ORAL 2 TIMES DAILY
Qty: 6 CAP | Refills: 0 | Status: SHIPPED | OUTPATIENT
Start: 2018-03-02 | End: 2018-03-05

## 2018-03-02 NOTE — ED PROVIDER NOTES
EMERGENCY DEPARTMENT HISTORY AND PHYSICAL EXAM    10:40 AM      Date: 3/2/2018  Patient Name: Noah Blanchard    History of Presenting Illness     Chief Complaint   Patient presents with    Fall         History Provided By: Patient    Chief Complaint: Headache  Duration:  4 days  Timing:  Gradual worsening  Location: Right side  Quality: Ache  Severity: 4/10  Modifying Factors: None  Associated Symptoms: Fall      Additional History (Context): 10:47 AM Noah Blanchard is a 74 y.o. female with h/o HTN, DM, Scoliosis, Hypercholesteremia, and CAD who presents to ED complaining of gradually worsening right sided 4/10 HA onset 4 days ago. Patient states that she fell 4 days ago and hit her head on her dresser. She has been falling everyday since, stating that she felt dizzy and was seeing double. She has not been dizzy today. No other concerns or symptoms at this time. PCP: Kat Fitzpatrick MD      Current Outpatient Prescriptions   Medication Sig Dispense Refill    nitrofurantoin, macrocrystal-monohydrate, (MACROBID) 100 mg capsule Take 1 Cap by mouth two (2) times a day for 3 days. 6 Cap 0    mirtazapine (REMERON) 15 mg tablet Take 15 mg by mouth nightly.  glimepiride (AMARYL) 1 mg tablet Take 1 mg by mouth Daily (before breakfast).  levothyroxine (SYNTHROID) 50 mcg tablet Take 50 mcg by mouth Daily (before breakfast).  dicyclomine (BENTYL) 20 mg tablet Take 20 mg by mouth every six (6) hours.  cyclobenzaprine (FLEXERIL) 10 mg tablet Take 10 mg by mouth three (3) times daily as needed for Muscle Spasm(s).  fentaNYL (DURAGESIC) 25 mcg/hr PATCH 1 Patch by TransDERmal route every seventy-two (72) hours.  clotrimazole (LOTRIMIN) 1 % topical cream Apply  to affected area two (2) times a day.  insulin detemir (LEVEMIR) 100 unit/mL injection 5 Units by SubCUTAneous route nightly.       amLODIPine (NORVASC) 10 mg tablet 10 mg.      ziprasidone (GEODON) 20 mg capsule 20 mg.      magnesium hydroxide (MILK OF MAGNESIA) 400 mg/5 mL suspension Take 30 mL by mouth daily. 118 mL 0    ibuprofen (MOTRIN) 200 mg tablet Take 400 mg by mouth every eight (8) hours as needed for Pain.  diphenhydrAMINE (BENADRYL) 50 mg tablet Take 50 mg by mouth two (2) times daily as needed for Itching.  oxyCODONE-acetaminophen (PERCOCET 10)  mg per tablet Take 1 Tab by mouth every six (6) hours as needed for Pain.  gabapentin (NEURONTIN) 100 mg capsule Take 300 mg by mouth three (3) times daily.  furosemide (LASIX) 40 mg tablet Take 40 mg by mouth daily.  potassium chloride (KLOR-CON M10) 10 mEq tablet Take 10 mEq by mouth daily. take 1 tablet by mouth every day as needed take with furosemide      diazepam (VALIUM) 2 mg tablet Take 1 Tab by mouth every six (6) hours as needed. Max Daily Amount: 8 mg. 50 Tab none    metoprolol succinate (TOPROL-XL) 50 mg XL tablet Take 50 mg by mouth daily.  omeprazole (PRILOSEC) 40 mg capsule Take 40 mg by mouth daily.  promethazine (PHENERGAN) 25 mg tablet Take 25 mg by mouth two (2) times daily as needed for Nausea.  polyethylene glycol (MIRALAX) 17 gram packet Take 17 g by mouth daily.  cholecalciferol (VITAMIN D3) 1,000 unit cap Take 1,000 Units by mouth daily.  losartan (COZAAR) 100 mg tablet Take 100 mg by mouth daily.  sucralfate (CARAFATE) 1 gram tablet Take 1 g by mouth four (4) times daily.  venlafaxine-SR (EFFEXOR XR) 150 mg capsule Take 150 mg by mouth daily.  cyanocobalamin (VITAMIN B-12) 1,000 mcg tablet Take 1,000 mcg by mouth daily.  phenyleph-min oil-petrolatum (PREPARATION H) 0.25-14-74.9 % rectal ointment by PeriANAL route four (4) times daily as needed for Pain. 1 Tube 0    potassium chloride (K-DUR, KLOR-CON) 20 mEq tablet Take 2 Tabs by mouth two (2) times a day. 30 Tab 0    docusate sodium (COLACE) 100 mg capsule Take 100 mg by mouth two (2) times a day.          Past History     Past Medical History:  Past Medical History:   Diagnosis Date    Anxiety     Depression     Diabetes ([facility] [address]     Diverticulosis     Dysphagia     Early satiety     Fatty liver     Gastric ulcer     Gastroparesis     Heart disease     Hypercholesteremia     Hypertension     Neurogenic bladder     Scoliosis     Stenosis of lumbosacral spine     Tardive dyskinesia     Urgency of urination        Past Surgical History:  Past Surgical History:   Procedure Laterality Date    HX APPENDECTOMY      HX BREAST LUMPECTOMY      HX CHOLECYSTECTOMY  1980    HX COLECTOMY  2012    HX HERNIA REPAIR      HX HYSTERECTOMY  1970    HX KNEE ARTHROSCOPY      HX KNEE REPLACEMENT  1991    left knee    HX LUMBAR FUSION      x 3       Family History:  Family History   Problem Relation Age of Onset    Heart Disease Mother     Diabetes Mother     Heart Attack Mother     Cancer Father      lung CA    Cancer Brother     Cancer Sister        Social History:  Social History   Substance Use Topics    Smoking status: Never Smoker    Smokeless tobacco: Never Used    Alcohol use No       Allergies: Allergies   Allergen Reactions    Codeine Rash and Itching    Iodine Hives and Rash    Morphine Hives    Pcn [Penicillins] Other (comments)     Causes \"Heart swell\"    Sulfa (Sulfonamide Antibiotics) Nausea Only         Review of Systems     Review of Systems   Constitutional: Negative for diaphoresis and fever. HENT: Negative for congestion and sore throat. Eyes: Negative for pain and itching. Respiratory: Negative for cough and shortness of breath. Cardiovascular: Negative for chest pain and palpitations. Gastrointestinal: Negative for abdominal pain and diarrhea. Endocrine: Negative for polydipsia and polyuria. Genitourinary: Negative for dysuria and hematuria. Musculoskeletal: Negative for arthralgias and myalgias. Skin: Negative for rash and wound. Neurological: Positive for headaches.  Negative for seizures and syncope. Hematological: Does not bruise/bleed easily. Psychiatric/Behavioral: Negative for agitation and hallucinations. Physical Exam     Visit Vitals    /58    Pulse 73    Temp 98.5 °F (36.9 °C)    Resp 18    Ht 5' 5\" (1.651 m)    Wt 90.7 kg (200 lb)    SpO2 100%    BMI 33.28 kg/m2       Physical Exam   Constitutional: She appears well-developed and well-nourished. HENT:   Head: Normocephalic and atraumatic. Tenderness to right temple. No sign of trauma. Eyes: Conjunctivae are normal. No scleral icterus. Neck: Normal range of motion. Neck supple. No JVD present. Cardiovascular: Normal rate, regular rhythm and normal heart sounds. 4 intact extremity pulses   Pulmonary/Chest: Effort normal and breath sounds normal.   Abdominal: Soft. She exhibits no mass. There is no tenderness. Musculoskeletal: Normal range of motion. Lymphadenopathy:     She has no cervical adenopathy. Neurological: She is alert. She has normal strength. No cranial nerve deficit or sensory deficit. Skin: Skin is warm and dry. Nursing note and vitals reviewed.         Diagnostic Study Results     Labs -  Recent Results (from the past 12 hour(s))   EKG, 12 LEAD, INITIAL    Collection Time: 03/02/18 10:09 AM   Result Value Ref Range    Ventricular Rate 78 BPM    Atrial Rate 78 BPM    P-R Interval 208 ms    QRS Duration 74 ms    Q-T Interval 384 ms    QTC Calculation (Bezet) 437 ms    Calculated P Axis 59 degrees    Calculated R Axis 15 degrees    Calculated T Axis 44 degrees    Diagnosis       Normal sinus rhythm  Nonspecific T wave abnormality  Abnormal ECG  When compared with ECG of 13-FEB-2018 15:58,  No significant change was found     CBC WITH AUTOMATED DIFF    Collection Time: 03/02/18 10:32 AM   Result Value Ref Range    WBC 6.8 4.6 - 13.2 K/uL    RBC 3.54 (L) 4.20 - 5.30 M/uL    HGB 10.2 (L) 12.0 - 16.0 g/dL    HCT 32.1 (L) 35.0 - 45.0 %    MCV 90.7 74.0 - 97.0 FL    MCH 28.8 24.0 - 34.0 PG MCHC 31.8 31.0 - 37.0 g/dL    RDW 13.1 11.6 - 14.5 %    PLATELET 882 873 - 285 K/uL    MPV 12.0 (H) 9.2 - 11.8 FL    NEUTROPHILS 46 40 - 73 %    LYMPHOCYTES 40 21 - 52 %    MONOCYTES 9 3 - 10 %    EOSINOPHILS 5 0 - 5 %    BASOPHILS 0 0 - 2 %    ABS. NEUTROPHILS 3.2 1.8 - 8.0 K/UL    ABS. LYMPHOCYTES 2.7 0.9 - 3.6 K/UL    ABS. MONOCYTES 0.6 0.05 - 1.2 K/UL    ABS. EOSINOPHILS 0.3 0.0 - 0.4 K/UL    ABS. BASOPHILS 0.0 0.0 - 0.06 K/UL    DF AUTOMATED     METABOLIC PANEL, COMPREHENSIVE    Collection Time: 03/02/18 10:32 AM   Result Value Ref Range    Sodium 142 136 - 145 mmol/L    Potassium 3.6 3.5 - 5.5 mmol/L    Chloride 108 100 - 108 mmol/L    CO2 28 21 - 32 mmol/L    Anion gap 6 3.0 - 18 mmol/L    Glucose 201 (H) 74 - 99 mg/dL    BUN 9 7.0 - 18 MG/DL    Creatinine 0.84 0.6 - 1.3 MG/DL    BUN/Creatinine ratio 11 (L) 12 - 20      GFR est AA >60 >60 ml/min/1.73m2    GFR est non-AA >60 >60 ml/min/1.73m2    Calcium 8.4 (L) 8.5 - 10.1 MG/DL    Bilirubin, total 0.4 0.2 - 1.0 MG/DL    ALT (SGPT) 12 (L) 13 - 56 U/L    AST (SGOT) 15 15 - 37 U/L    Alk.  phosphatase 149 (H) 45 - 117 U/L    Protein, total 6.5 6.4 - 8.2 g/dL    Albumin 3.1 (L) 3.4 - 5.0 g/dL    Globulin 3.4 2.0 - 4.0 g/dL    A-G Ratio 0.9 0.8 - 1.7     TROPONIN I    Collection Time: 03/02/18 10:32 AM   Result Value Ref Range    Troponin-I, Qt. <0.02 0.0 - 0.045 NG/ML   URINALYSIS W/ RFLX MICROSCOPIC    Collection Time: 03/02/18 11:24 AM   Result Value Ref Range    Color YELLOW      Appearance CLEAR      Specific gravity 1.011 1.005 - 1.030      pH (UA) 6.0 5.0 - 8.0      Protein NEGATIVE  NEG mg/dL    Glucose NEGATIVE  NEG mg/dL    Ketone NEGATIVE  NEG mg/dL    Bilirubin NEGATIVE  NEG      Blood NEGATIVE  NEG      Urobilinogen 0.2 0.2 - 1.0 EU/dL    Nitrites NEGATIVE  NEG      Leukocyte Esterase MODERATE (A) NEG     URINE MICROSCOPIC ONLY    Collection Time: 03/02/18 11:24 AM   Result Value Ref Range    WBC 11 to 20 0 - 4 /hpf    RBC 0 0 - 5 /hpf    Epithelial cells 1+ 0 - 5 /lpf    Bacteria 4+ (A) NEG /hpf       Radiologic Studies -   CT HEAD WO CONT     IMPRESSION  IMPRESSION:        1. No acute intracranial abnormality. Stable exam. Of note, noncontrast head CT  can be normal in the context of early acute stroke. 2. Mild periventricular white matter hypoattenuation; unchanged and nonspecific,  likely reflective of chronic ischemic microvascular change. XR CHEST PORT     IMPRESSION  Impression:  Rotated projection of the chest without superimposed acute radiographic  abnormality. Medical Decision Making   Initial Medical Decision Making and DDx:  Dehydration, near syncope, peripheral or central vertigo, not vertiginous now so don't suspect stroke at this time. ED Course: Progress Notes, Reevaluation, and Consults:  12:43 PM Patient has UTI. Will give RX for Macrobid. No intracranial injury, electrolyte abnormality. Renal failure, or anemia. Vertigo has not reoccurred. Do not suspect stroke. FU with PCP. Patient instructed to return for emergencies. Questions answered and she is happy with plan. I am the first provider for this patient. I reviewed the vital signs, available nursing notes, past medical history, past surgical history, family history and social history. Vital Signs-Reviewed the patient's vital signs. Pulse Oximetry Analysis - 100% in room air    EKG: Interpreted by the EP. Time Interpreted: 10:09AM   Rate: 78 bpm   Rhythm: Sinus Rhythm   Interpretation: Non specific T wave abnormality. No acute process. Records Reviewed: Nursing Notes and Old Medical Records (Time of Review: 10:40 AM)    Diagnosis     Clinical Impression:   1. Urinary tract infection without hematuria, site unspecified    2.  Fall, initial encounter        Disposition: DC    Follow-up Information     Follow up With Details Shandra Knight MD In 2 days  [facility]  [phone]             Patient's Medications Start Taking    NITROFURANTOIN, MACROCRYSTAL-MONOHYDRATE, (MACROBID) 100 MG CAPSULE    Take 1 Cap by mouth two (2) times a day for 3 days. Continue Taking    AMLODIPINE (NORVASC) 10 MG TABLET    10 mg. CHOLECALCIFEROL (VITAMIN D3) 1,000 UNIT CAP    Take 1,000 Units by mouth daily. CLOTRIMAZOLE (LOTRIMIN) 1 % TOPICAL CREAM    Apply  to affected area two (2) times a day. CYANOCOBALAMIN (VITAMIN B-12) 1,000 MCG TABLET    Take 1,000 mcg by mouth daily. CYCLOBENZAPRINE (FLEXERIL) 10 MG TABLET    Take 10 mg by mouth three (3) times daily as needed for Muscle Spasm(s). DIAZEPAM (VALIUM) 2 MG TABLET    Take 1 Tab by mouth every six (6) hours as needed. Max Daily Amount: 8 mg. DICYCLOMINE (BENTYL) 20 MG TABLET    Take 20 mg by mouth every six (6) hours. DIPHENHYDRAMINE (BENADRYL) 50 MG TABLET    Take 50 mg by mouth two (2) times daily as needed for Itching. DOCUSATE SODIUM (COLACE) 100 MG CAPSULE    Take 100 mg by mouth two (2) times a day. FENTANYL (DURAGESIC) 25 MCG/HR PATCH    1 Patch by TransDERmal route every seventy-two (72) hours. FUROSEMIDE (LASIX) 40 MG TABLET    Take 40 mg by mouth daily. GABAPENTIN (NEURONTIN) 100 MG CAPSULE    Take 300 mg by mouth three (3) times daily. GLIMEPIRIDE (AMARYL) 1 MG TABLET    Take 1 mg by mouth Daily (before breakfast). IBUPROFEN (MOTRIN) 200 MG TABLET    Take 400 mg by mouth every eight (8) hours as needed for Pain. INSULIN DETEMIR (LEVEMIR) 100 UNIT/ML INJECTION    5 Units by SubCUTAneous route nightly. LEVOTHYROXINE (SYNTHROID) 50 MCG TABLET    Take 50 mcg by mouth Daily (before breakfast). LOSARTAN (COZAAR) 100 MG TABLET    Take 100 mg by mouth daily. MAGNESIUM HYDROXIDE (MILK OF MAGNESIA) 400 MG/5 ML SUSPENSION    Take 30 mL by mouth daily. METOPROLOL SUCCINATE (TOPROL-XL) 50 MG XL TABLET    Take 50 mg by mouth daily. MIRTAZAPINE (REMERON) 15 MG TABLET    Take 15 mg by mouth nightly.     OMEPRAZOLE (PRILOSEC) 40 MG CAPSULE    Take 40 mg by mouth daily. OXYCODONE-ACETAMINOPHEN (PERCOCET 10)  MG PER TABLET    Take 1 Tab by mouth every six (6) hours as needed for Pain. PHENYLEPH-MIN OIL-PETROLATUM (PREPARATION H) 0.25-14-74.9 % RECTAL OINTMENT    by PeriANAL route four (4) times daily as needed for Pain. POLYETHYLENE GLYCOL (MIRALAX) 17 GRAM PACKET    Take 17 g by mouth daily. POTASSIUM CHLORIDE (K-DUR, KLOR-CON) 20 MEQ TABLET    Take 2 Tabs by mouth two (2) times a day. POTASSIUM CHLORIDE (KLOR-CON M10) 10 MEQ TABLET    Take 10 mEq by mouth daily. take 1 tablet by mouth every day as needed take with furosemide    PROMETHAZINE (PHENERGAN) 25 MG TABLET    Take 25 mg by mouth two (2) times daily as needed for Nausea. SUCRALFATE (CARAFATE) 1 GRAM TABLET    Take 1 g by mouth four (4) times daily. VENLAFAXINE-SR (EFFEXOR XR) 150 MG CAPSULE    Take 150 mg by mouth daily. ZIPRASIDONE (GEODON) 20 MG CAPSULE    20 mg. These Medications have changed    No medications on file   Stop Taking    No medications on file     _______________________________    Attestations:  Deshaun Dodd acting as a scribe for and in the presence of Juan Son MD      March 02, 2018 at 12:45 PM       Provider Attestation:      I personally performed the services described in the documentation, reviewed the documentation, as recorded by the scribe in my presence, and it accurately and completely records my words and actions.  March 02, 2018 at 12:45 PM - Juan Son MD    _______________________________

## 2018-03-02 NOTE — DISCHARGE INSTRUCTIONS

## 2018-03-02 NOTE — ED TRIAGE NOTES
Per home RN, per ems, pt reportedly falilng every day for the past several days, and home RN wants \"her head checked. \" pt c/o head ache.

## 2018-03-02 NOTE — ED NOTES
Pt ambulated approx 8-10 ft, with assist x2 and utilizing wheelchair as a walker, md updated, transport being [facility]. d

## 2018-03-02 NOTE — ED NOTES
[facility] contacted for transport home. Cayden Harris, patient's  contacted to discuss plan of care. Tariq Farmer will follow up with patient after transported home and fill prescriptions as instructed. To follow up with pmd and return to the ED for worsening of symptoms.

## 2018-03-03 LAB
ATRIAL RATE: 78 BPM
CALCULATED P AXIS, ECG09: 59 DEGREES
CALCULATED R AXIS, ECG10: 15 DEGREES
CALCULATED T AXIS, ECG11: 44 DEGREES
DIAGNOSIS, 93000: NORMAL
P-R INTERVAL, ECG05: 208 MS
Q-T INTERVAL, ECG07: 384 MS
QRS DURATION, ECG06: 74 MS
QTC CALCULATION (BEZET), ECG08: 437 MS
VENTRICULAR RATE, ECG03: 78 BPM

## 2018-03-21 ENCOUNTER — HOSPITAL ENCOUNTER (INPATIENT)
Age: 73
LOS: 3 days | Discharge: NURSING, REHAB OR LONG-TERM CARE | DRG: 069 | End: 2018-03-24
Attending: EMERGENCY MEDICINE | Admitting: HOSPITALIST
Payer: MEDICARE

## 2018-03-21 ENCOUNTER — APPOINTMENT (OUTPATIENT)
Dept: CT IMAGING | Age: 73
DRG: 069 | End: 2018-03-21
Attending: EMERGENCY MEDICINE
Payer: MEDICARE

## 2018-03-21 ENCOUNTER — APPOINTMENT (OUTPATIENT)
Dept: GENERAL RADIOLOGY | Age: 73
DRG: 069 | End: 2018-03-21
Attending: EMERGENCY MEDICINE
Payer: MEDICARE

## 2018-03-21 DIAGNOSIS — R29.898 WEAKNESS OF LEFT LOWER EXTREMITY: ICD-10-CM

## 2018-03-21 DIAGNOSIS — R47.81 SLURRED SPEECH: Primary | ICD-10-CM

## 2018-03-21 DIAGNOSIS — I63.9 ACUTE CVA (CEREBROVASCULAR ACCIDENT) (HCC): ICD-10-CM

## 2018-03-21 DIAGNOSIS — R29.6 UNWITNESSED FALL: ICD-10-CM

## 2018-03-21 PROBLEM — Z86.73 HISTORY OF STROKE: Status: ACTIVE | Noted: 2018-03-21

## 2018-03-21 LAB
ABO + RH BLD: NORMAL
ALBUMIN SERPL-MCNC: 3.4 G/DL (ref 3.4–5)
ALBUMIN/GLOB SERPL: 0.9 {RATIO} (ref 0.8–1.7)
ALP SERPL-CCNC: 125 U/L (ref 45–117)
ALT SERPL-CCNC: 12 U/L (ref 13–56)
AMPHET UR QL SCN: NEGATIVE
ANION GAP SERPL CALC-SCNC: 5 MMOL/L (ref 3–18)
APPEARANCE UR: CLEAR
ASPIRIN TEST, ASPIRN: 532 ARU (ref 620–672)
AST SERPL-CCNC: 12 U/L (ref 15–37)
BACTERIA URNS QL MICRO: NEGATIVE /HPF
BARBITURATES UR QL SCN: NEGATIVE
BENZODIAZ UR QL: POSITIVE
BILIRUB SERPL-MCNC: 0.3 MG/DL (ref 0.2–1)
BILIRUB UR QL: NEGATIVE
BLOOD GROUP ANTIBODIES SERPL: NORMAL
BUN SERPL-MCNC: 18 MG/DL (ref 7–18)
BUN/CREAT SERPL: 20 (ref 12–20)
CALCIUM SERPL-MCNC: 9 MG/DL (ref 8.5–10.1)
CANNABINOIDS UR QL SCN: NEGATIVE
CHLORIDE SERPL-SCNC: 108 MMOL/L (ref 100–108)
CHOLEST SERPL-MCNC: 223 MG/DL
CO2 SERPL-SCNC: 29 MMOL/L (ref 21–32)
COCAINE UR QL SCN: NEGATIVE
COLOR UR: YELLOW
CREAT SERPL-MCNC: 0.89 MG/DL (ref 0.6–1.3)
EPITH CASTS URNS QL MICRO: NORMAL /LPF (ref 0–5)
ERYTHROCYTE [DISTWIDTH] IN BLOOD BY AUTOMATED COUNT: 12.7 % (ref 11.6–14.5)
ERYTHROCYTE [SEDIMENTATION RATE] IN BLOOD: 44 MM/HR (ref 0–30)
EST. AVERAGE GLUCOSE BLD GHB EST-MCNC: 194 MG/DL
FIBRINOGEN PPP-MCNC: 484 MG/DL (ref 210–451)
GLOBULIN SER CALC-MCNC: 3.6 G/DL (ref 2–4)
GLUCOSE BLD STRIP.AUTO-MCNC: 196 MG/DL (ref 70–110)
GLUCOSE BLD STRIP.AUTO-MCNC: 297 MG/DL (ref 70–110)
GLUCOSE BLD STRIP.AUTO-MCNC: 310 MG/DL (ref 70–110)
GLUCOSE SERPL-MCNC: 215 MG/DL (ref 74–99)
GLUCOSE UR STRIP.AUTO-MCNC: NEGATIVE MG/DL
HBA1C MFR BLD: 8.4 % (ref 4.2–5.6)
HCT VFR BLD AUTO: 34.3 % (ref 35–45)
HDLC SERPL-MCNC: 72 MG/DL (ref 40–60)
HDLC SERPL: 3.1 {RATIO} (ref 0–5)
HDSCOM,HDSCOM: ABNORMAL
HGB BLD-MCNC: 10.9 G/DL (ref 12–16)
HGB UR QL STRIP: ABNORMAL
INR PPP: 1 (ref 0.8–1.2)
KETONES UR QL STRIP.AUTO: NEGATIVE MG/DL
LDLC SERPL CALC-MCNC: 136.4 MG/DL (ref 0–100)
LEUKOCYTE ESTERASE UR QL STRIP.AUTO: NEGATIVE
LIPID PROFILE,FLP: ABNORMAL
MCH RBC QN AUTO: 28.7 PG (ref 24–34)
MCHC RBC AUTO-ENTMCNC: 31.8 G/DL (ref 31–37)
MCV RBC AUTO: 90.3 FL (ref 74–97)
METHADONE UR QL: NEGATIVE
NITRITE UR QL STRIP.AUTO: NEGATIVE
OPIATES UR QL: NEGATIVE
PCP UR QL: NEGATIVE
PH UR STRIP: 6.5 [PH] (ref 5–8)
PLATELET # BLD AUTO: 246 K/UL (ref 135–420)
PMV BLD AUTO: 12 FL (ref 9.2–11.8)
POTASSIUM SERPL-SCNC: 4 MMOL/L (ref 3.5–5.5)
PROT SERPL-MCNC: 7 G/DL (ref 6.4–8.2)
PROT UR STRIP-MCNC: NEGATIVE MG/DL
PROTHROMBIN TIME: 12.9 SEC (ref 11.5–15.2)
RBC # BLD AUTO: 3.8 M/UL (ref 4.2–5.3)
RBC #/AREA URNS HPF: NORMAL /HPF (ref 0–5)
SODIUM SERPL-SCNC: 142 MMOL/L (ref 136–145)
SP GR UR REFRACTOMETRY: 1.02 (ref 1–1.03)
SPECIMEN EXP DATE BLD: NORMAL
T3FREE SERPL-MCNC: 2 PG/ML (ref 2.3–4.2)
T4 FREE SERPL-MCNC: 1.1 NG/DL (ref 0.7–1.5)
THROMBIN TIME: 16.6 SECS (ref 13.8–18.2)
TRIGL SERPL-MCNC: 73 MG/DL (ref ?–150)
TSH SERPL DL<=0.05 MIU/L-ACNC: 0.53 UIU/ML (ref 0.36–3.74)
UROBILINOGEN UR QL STRIP.AUTO: 0.2 EU/DL (ref 0.2–1)
VLDLC SERPL CALC-MCNC: 14.6 MG/DL
WBC # BLD AUTO: 8.9 K/UL (ref 4.6–13.2)
WBC URNS QL MICRO: NORMAL /HPF (ref 0–4)

## 2018-03-21 PROCEDURE — 74011250637 HC RX REV CODE- 250/637: Performed by: NURSE PRACTITIONER

## 2018-03-21 PROCEDURE — 81001 URINALYSIS AUTO W/SCOPE: CPT

## 2018-03-21 PROCEDURE — 74011636637 HC RX REV CODE- 636/637: Performed by: NURSE PRACTITIONER

## 2018-03-21 PROCEDURE — 82962 GLUCOSE BLOOD TEST: CPT

## 2018-03-21 PROCEDURE — 84439 ASSAY OF FREE THYROXINE: CPT | Performed by: NURSE PRACTITIONER

## 2018-03-21 PROCEDURE — 74011636637 HC RX REV CODE- 636/637: Performed by: HOSPITALIST

## 2018-03-21 PROCEDURE — 80053 COMPREHEN METABOLIC PANEL: CPT

## 2018-03-21 PROCEDURE — 74011000258 HC RX REV CODE- 258: Performed by: EMERGENCY MEDICINE

## 2018-03-21 PROCEDURE — 74011250636 HC RX REV CODE- 250/636: Performed by: EMERGENCY MEDICINE

## 2018-03-21 PROCEDURE — 74011250636 HC RX REV CODE- 250/636: Performed by: NURSE PRACTITIONER

## 2018-03-21 PROCEDURE — 70498 CT ANGIOGRAPHY NECK: CPT

## 2018-03-21 PROCEDURE — 96375 TX/PRO/DX INJ NEW DRUG ADDON: CPT

## 2018-03-21 PROCEDURE — 86901 BLOOD TYPING SEROLOGIC RH(D): CPT

## 2018-03-21 PROCEDURE — 71045 X-RAY EXAM CHEST 1 VIEW: CPT

## 2018-03-21 PROCEDURE — 83036 HEMOGLOBIN GLYCOSYLATED A1C: CPT | Performed by: NURSE PRACTITIONER

## 2018-03-21 PROCEDURE — 74011000258 HC RX REV CODE- 258: Performed by: NURSE PRACTITIONER

## 2018-03-21 PROCEDURE — 65660000000 HC RM CCU STEPDOWN

## 2018-03-21 PROCEDURE — 96374 THER/PROPH/DIAG INJ IV PUSH: CPT

## 2018-03-21 PROCEDURE — 99285 EMERGENCY DEPT VISIT HI MDM: CPT

## 2018-03-21 PROCEDURE — 74011000250 HC RX REV CODE- 250: Performed by: NURSE PRACTITIONER

## 2018-03-21 PROCEDURE — 84481 FREE ASSAY (FT-3): CPT | Performed by: NURSE PRACTITIONER

## 2018-03-21 PROCEDURE — 74011636320 HC RX REV CODE- 636/320: Performed by: EMERGENCY MEDICINE

## 2018-03-21 PROCEDURE — 80061 LIPID PANEL: CPT | Performed by: NURSE PRACTITIONER

## 2018-03-21 PROCEDURE — 80307 DRUG TEST PRSMV CHEM ANLYZR: CPT

## 2018-03-21 PROCEDURE — 36415 COLL VENOUS BLD VENIPUNCTURE: CPT | Performed by: NURSE PRACTITIONER

## 2018-03-21 PROCEDURE — 86141 C-REACTIVE PROTEIN HS: CPT | Performed by: NURSE PRACTITIONER

## 2018-03-21 PROCEDURE — 85652 RBC SED RATE AUTOMATED: CPT | Performed by: NURSE PRACTITIONER

## 2018-03-21 PROCEDURE — 84443 ASSAY THYROID STIM HORMONE: CPT | Performed by: NURSE PRACTITIONER

## 2018-03-21 PROCEDURE — 85670 THROMBIN TIME PLASMA: CPT

## 2018-03-21 PROCEDURE — 70450 CT HEAD/BRAIN W/O DYE: CPT

## 2018-03-21 PROCEDURE — 93005 ELECTROCARDIOGRAM TRACING: CPT

## 2018-03-21 PROCEDURE — 85576 BLOOD PLATELET AGGREGATION: CPT

## 2018-03-21 PROCEDURE — 85610 PROTHROMBIN TIME: CPT

## 2018-03-21 PROCEDURE — 85384 FIBRINOGEN ACTIVITY: CPT

## 2018-03-21 PROCEDURE — 74011250637 HC RX REV CODE- 250/637: Performed by: EMERGENCY MEDICINE

## 2018-03-21 PROCEDURE — 85027 COMPLETE CBC AUTOMATED: CPT

## 2018-03-21 RX ORDER — SODIUM CHLORIDE 9 MG/ML
100 INJECTION, SOLUTION INTRAVENOUS ONCE
Status: COMPLETED | OUTPATIENT
Start: 2018-03-21 | End: 2018-03-22

## 2018-03-21 RX ORDER — ASPIRIN 300 MG/1
300 SUPPOSITORY RECTAL ONCE
Status: COMPLETED | OUTPATIENT
Start: 2018-03-21 | End: 2018-03-21

## 2018-03-21 RX ORDER — ONDANSETRON 2 MG/ML
2 INJECTION INTRAMUSCULAR; INTRAVENOUS
Status: DISCONTINUED | OUTPATIENT
Start: 2018-03-21 | End: 2018-03-24 | Stop reason: HOSPADM

## 2018-03-21 RX ORDER — MIRTAZAPINE 15 MG/1
15 TABLET, FILM COATED ORAL
Status: DISCONTINUED | OUTPATIENT
Start: 2018-03-21 | End: 2018-03-24 | Stop reason: HOSPADM

## 2018-03-21 RX ORDER — ASPIRIN 325 MG
325 TABLET ORAL DAILY
Status: DISCONTINUED | OUTPATIENT
Start: 2018-03-22 | End: 2018-03-24 | Stop reason: HOSPADM

## 2018-03-21 RX ORDER — ALBUTEROL SULFATE 0.83 MG/ML
2.5 SOLUTION RESPIRATORY (INHALATION)
Status: DISCONTINUED | OUTPATIENT
Start: 2018-03-21 | End: 2018-03-24 | Stop reason: HOSPADM

## 2018-03-21 RX ORDER — VENLAFAXINE HYDROCHLORIDE 75 MG/1
150 CAPSULE, EXTENDED RELEASE ORAL DAILY
Status: DISCONTINUED | OUTPATIENT
Start: 2018-03-22 | End: 2018-03-24 | Stop reason: HOSPADM

## 2018-03-21 RX ORDER — SODIUM CHLORIDE 9 MG/ML
100 INJECTION, SOLUTION INTRAVENOUS ONCE
Status: DISPENSED | OUTPATIENT
Start: 2018-03-21 | End: 2018-03-22

## 2018-03-21 RX ORDER — INSULIN GLARGINE 100 [IU]/ML
10 INJECTION, SOLUTION SUBCUTANEOUS
Status: DISCONTINUED | OUTPATIENT
Start: 2018-03-21 | End: 2018-03-22

## 2018-03-21 RX ORDER — INSULIN LISPRO 100 [IU]/ML
INJECTION, SOLUTION INTRAVENOUS; SUBCUTANEOUS
Status: DISCONTINUED | OUTPATIENT
Start: 2018-03-21 | End: 2018-03-22

## 2018-03-21 RX ORDER — OXYCODONE AND ACETAMINOPHEN 10; 325 MG/1; MG/1
1 TABLET ORAL
Status: DISCONTINUED | OUTPATIENT
Start: 2018-03-21 | End: 2018-03-24 | Stop reason: HOSPADM

## 2018-03-21 RX ORDER — DIPHENHYDRAMINE HYDROCHLORIDE 50 MG/ML
50 INJECTION, SOLUTION INTRAMUSCULAR; INTRAVENOUS ONCE
Status: COMPLETED | OUTPATIENT
Start: 2018-03-21 | End: 2018-03-21

## 2018-03-21 RX ORDER — ACETAMINOPHEN 650 MG/1
650 SUPPOSITORY RECTAL
Status: DISCONTINUED | OUTPATIENT
Start: 2018-03-21 | End: 2018-03-24 | Stop reason: HOSPADM

## 2018-03-21 RX ORDER — HEPARIN SODIUM 5000 [USP'U]/ML
5000 INJECTION, SOLUTION INTRAVENOUS; SUBCUTANEOUS EVERY 8 HOURS
Status: DISCONTINUED | OUTPATIENT
Start: 2018-03-21 | End: 2018-03-24 | Stop reason: HOSPADM

## 2018-03-21 RX ORDER — ACETAMINOPHEN 325 MG/1
650 TABLET ORAL
Status: DISCONTINUED | OUTPATIENT
Start: 2018-03-21 | End: 2018-03-24 | Stop reason: HOSPADM

## 2018-03-21 RX ORDER — ATORVASTATIN CALCIUM 40 MG/1
80 TABLET, FILM COATED ORAL
Status: DISCONTINUED | OUTPATIENT
Start: 2018-03-21 | End: 2018-03-24 | Stop reason: HOSPADM

## 2018-03-21 RX ORDER — INSULIN LISPRO 100 [IU]/ML
INJECTION, SOLUTION INTRAVENOUS; SUBCUTANEOUS EVERY 6 HOURS
Status: DISCONTINUED | OUTPATIENT
Start: 2018-03-21 | End: 2018-03-21

## 2018-03-21 RX ORDER — DEXTROSE MONOHYDRATE 25 G/50ML
25-50 INJECTION, SOLUTION INTRAVENOUS AS NEEDED
Status: DISCONTINUED | OUTPATIENT
Start: 2018-03-21 | End: 2018-03-24 | Stop reason: HOSPADM

## 2018-03-21 RX ORDER — AMOXICILLIN 250 MG
2 CAPSULE ORAL
Status: DISCONTINUED | OUTPATIENT
Start: 2018-03-21 | End: 2018-03-24 | Stop reason: HOSPADM

## 2018-03-21 RX ORDER — MAGNESIUM SULFATE 100 %
4 CRYSTALS MISCELLANEOUS AS NEEDED
Status: DISCONTINUED | OUTPATIENT
Start: 2018-03-21 | End: 2018-03-22 | Stop reason: SDUPTHER

## 2018-03-21 RX ADMIN — ATORVASTATIN CALCIUM 80 MG: 40 TABLET, FILM COATED ORAL at 17:53

## 2018-03-21 RX ADMIN — SODIUM CHLORIDE 99 ML/HR: 900 INJECTION, SOLUTION INTRAVENOUS at 13:10

## 2018-03-21 RX ADMIN — OXYCODONE HYDROCHLORIDE AND ACETAMINOPHEN 1 TABLET: 10; 325 TABLET ORAL at 17:53

## 2018-03-21 RX ADMIN — INSULIN LISPRO 4 UNITS: 100 INJECTION, SOLUTION INTRAVENOUS; SUBCUTANEOUS at 22:19

## 2018-03-21 RX ADMIN — METHYLPREDNISOLONE SODIUM SUCCINATE 40 MG: 40 INJECTION, POWDER, FOR SOLUTION INTRAMUSCULAR; INTRAVENOUS at 12:45

## 2018-03-21 RX ADMIN — HEPARIN SODIUM 5000 UNITS: 5000 INJECTION, SOLUTION INTRAVENOUS; SUBCUTANEOUS at 17:57

## 2018-03-21 RX ADMIN — DIPHENHYDRAMINE HYDROCHLORIDE 50 MG: 50 INJECTION, SOLUTION INTRAMUSCULAR; INTRAVENOUS at 12:45

## 2018-03-21 RX ADMIN — DOCUSATE SODIUM AND SENNOSIDES 2 TABLET: 8.6; 5 TABLET, FILM COATED ORAL at 22:19

## 2018-03-21 RX ADMIN — INSULIN LISPRO 6 UNITS: 100 INJECTION, SOLUTION INTRAVENOUS; SUBCUTANEOUS at 17:53

## 2018-03-21 RX ADMIN — FOLIC ACID: 5 INJECTION, SOLUTION INTRAMUSCULAR; INTRAVENOUS; SUBCUTANEOUS at 19:20

## 2018-03-21 RX ADMIN — INSULIN GLARGINE 10 UNITS: 100 INJECTION, SOLUTION SUBCUTANEOUS at 22:19

## 2018-03-21 RX ADMIN — MIRTAZAPINE 15 MG: 15 TABLET, FILM COATED ORAL at 22:19

## 2018-03-21 RX ADMIN — IOPAMIDOL 68 ML: 755 INJECTION, SOLUTION INTRAVENOUS at 13:10

## 2018-03-21 RX ADMIN — ASPIRIN 300 MG: 300 SUPPOSITORY RECTAL at 14:03

## 2018-03-21 NOTE — ED PROVIDER NOTES
EMERGENCY DEPARTMENT HISTORY AND PHYSICAL EXAM    11:00 AM      Date: 3/21/2018  Patient Name: Wilton Lugo    History of Presenting Illness     Chief Complaint   Patient presents with    Dysarthria    Extremity Weakness         History Provided By: EMS, caregiver; hx limited by pt's condition: dysarthria, residual deficit     Chief Complaint: Dysarthria   Duration: 3 Hours  Timing:  Acute  Location: Mouth  Quality: N/A  Severity: N/A  Modifying Factors: N/A  Associated Symptoms: N/A      Additional History (Context): Wilton Lugo is a 74 y.o. female with diabetes, hypertension, hyperlipidemia and stroke who presents per EMS with dysarthria. Pt has baseline slurred speech since last CVA, per home health nurse. Earlier this morning pt fell then took a nap, after which her slurred speech increased per cargeiver, together with a \"burning\" sensation on L side of face. Last normal 0800. Caregiver arrived at 1000, when pt told her she didn't feel well and was talking differently. Caregiver is unsure of the extent of pt's residual deficit from CVA but notes she ambulates with a walker. PCP: Melina Cohen MD    Current Facility-Administered Medications   Medication Dose Route Frequency Provider Last Rate Last Dose    0.9% sodium chloride infusion 100 mL  100 mL IntraVENous ONCE Devin Reg Giles MD         Current Outpatient Prescriptions   Medication Sig Dispense Refill    ciprofloxacin HCl (CIPRO) 500 mg tablet Take  by mouth two (2) times a day.  mirtazapine (REMERON) 15 mg tablet Take 15 mg by mouth nightly.  glimepiride (AMARYL) 1 mg tablet Take 1 mg by mouth Daily (before breakfast).  levothyroxine (SYNTHROID) 50 mcg tablet Take 50 mcg by mouth Daily (before breakfast).  dicyclomine (BENTYL) 20 mg tablet Take 20 mg by mouth every six (6) hours.  cyclobenzaprine (FLEXERIL) 10 mg tablet Take 10 mg by mouth three (3) times daily as needed for Muscle Spasm(s).       fentaNYL (DURAGESIC) 25 mcg/hr PATCH 1 Patch by TransDERmal route every seventy-two (72) hours.  clotrimazole (LOTRIMIN) 1 % topical cream Apply  to affected area two (2) times a day.  insulin detemir (LEVEMIR) 100 unit/mL injection 5 Units by SubCUTAneous route nightly.  amLODIPine (NORVASC) 10 mg tablet 10 mg.      ziprasidone (GEODON) 20 mg capsule 20 mg.      magnesium hydroxide (MILK OF MAGNESIA) 400 mg/5 mL suspension Take 30 mL by mouth daily. 118 mL 0    ibuprofen (MOTRIN) 200 mg tablet Take 400 mg by mouth every eight (8) hours as needed for Pain.  diphenhydrAMINE (BENADRYL) 50 mg tablet Take 50 mg by mouth two (2) times daily as needed for Itching.  oxyCODONE-acetaminophen (PERCOCET 10)  mg per tablet Take 1 Tab by mouth every six (6) hours as needed for Pain.  gabapentin (NEURONTIN) 100 mg capsule Take 300 mg by mouth three (3) times daily.  furosemide (LASIX) 40 mg tablet Take 40 mg by mouth daily.  potassium chloride (KLOR-CON M10) 10 mEq tablet Take 10 mEq by mouth daily. take 1 tablet by mouth every day as needed take with furosemide      diazepam (VALIUM) 2 mg tablet Take 1 Tab by mouth every six (6) hours as needed. Max Daily Amount: 8 mg. 50 Tab none    metoprolol succinate (TOPROL-XL) 50 mg XL tablet Take 50 mg by mouth daily.  omeprazole (PRILOSEC) 40 mg capsule Take 40 mg by mouth daily.  promethazine (PHENERGAN) 25 mg tablet Take 25 mg by mouth two (2) times daily as needed for Nausea.  polyethylene glycol (MIRALAX) 17 gram packet Take 17 g by mouth daily.  cholecalciferol (VITAMIN D3) 1,000 unit cap Take 1,000 Units by mouth daily.  losartan (COZAAR) 100 mg tablet Take 100 mg by mouth daily.  sucralfate (CARAFATE) 1 gram tablet Take 1 g by mouth four (4) times daily.  venlafaxine-SR (EFFEXOR XR) 150 mg capsule Take 150 mg by mouth daily.  cyanocobalamin (VITAMIN B-12) 1,000 mcg tablet Take 1,000 mcg by mouth daily.       phenyleph-min oil-petrolatum (PREPARATION H) 0.25-14-74.9 % rectal ointment by PeriANAL route four (4) times daily as needed for Pain. 1 Tube 0    potassium chloride (K-DUR, KLOR-CON) 20 mEq tablet Take 2 Tabs by mouth two (2) times a day. 30 Tab 0    docusate sodium (COLACE) 100 mg capsule Take 100 mg by mouth two (2) times a day. Past History     Past Medical History:  Past Medical History:   Diagnosis Date    Anxiety     Depression     Diabetes ([address]     Diverticulosis     Dysphagia     Early satiety     Fatty liver     Gastric ulcer     Gastroparesis     Heart disease     Hypercholesteremia     Hypertension     Neurogenic bladder     Scoliosis     Stenosis of lumbosacral spine     Tardive dyskinesia     Urgency of urination        Past Surgical History:  Past Surgical History:   Procedure Laterality Date    HX APPENDECTOMY      HX BREAST LUMPECTOMY      HX CHOLECYSTECTOMY  1980    HX COLECTOMY  2012    HX HERNIA REPAIR      HX HYSTERECTOMY  1970    HX KNEE ARTHROSCOPY      HX KNEE REPLACEMENT  1991    left knee    HX LUMBAR FUSION      x 3       Family History:  Family History   Problem Relation Age of Onset    Heart Disease Mother     Diabetes Mother     Heart Attack Mother     Cancer Father      lung CA    Cancer Brother     Cancer Sister        Social History:  Social History   Substance Use Topics    Smoking status: Never Smoker    Smokeless tobacco: Never Used    Alcohol use No       Allergies: Allergies   Allergen Reactions    Codeine Rash and Itching    Iodine Hives and Rash    Morphine Hives    Pcn [Penicillins] Other (comments)     Causes \"Heart swell\"    Sulfa (Sulfonamide Antibiotics) Nausea Only         Review of Systems       Review of Systems   Constitutional: Negative for fever. Respiratory: Negative for cough and shortness of breath. Cardiovascular: Negative for chest pain. Gastrointestinal: Negative for abdominal pain.    Genitourinary: Negative for dysuria. Musculoskeletal: Positive for gait problem (chronic). Negative for neck pain. Neurological: Positive for speech difficulty and weakness. All other systems reviewed and are negative. Limited by pt's condition: dysarthria       Physical Exam     Visit Vitals    /73    Pulse 66    Temp 97.7 °F (36.5 °C)    Resp 24    Ht 5' 5\" (1.651 m)    Wt 90.7 kg (200 lb)    SpO2 97%    BMI 33.28 kg/m2         Physical Exam  General Exam: Patient is well developed and well nourished in no distress. Patient does not appear acutely ill or toxic. Pt elderly and frail. Eye Exam: Lids and conjunctiva are normal. PERRL, EOMI.   ENT Exam: The general head and facial exam is normal.  The neck is supple without meningeal signs. No significant adenopathy. Edentulous. Pulmonary Exam: No respiratory distress. The respiratory rate is normal.  No stridor. The breath sounds are equal bilaterally. There are no wheezes, rales, or rhonchi noted. Cardiac Exam: The cardiac rate and rhythm are normal.  No significant murmurs, rubs, or gallops. The peripheral pulses are normal.  Abdominal Exam: Abdomen is soft and non-distended. No pulsatile masses. There is no local tenderness. There is no rebound or guarding noted. Skin and Soft Tissue: The skin is warm and dry, without significant abnormality. Good color. Musculoskeletal Exam: There is no clubbing or cyanosis. There is no peripheral edema. The musculoskeletal exam of the lower extremities is normal without significant local tenderness or spasm. Neurologic: Pt is alert and appropriate. Abnormal speech, numbness L lower face, remainder of CN 2-12 intact. No pronator drift with UE, weakness to LLE, gait untested  Psychiatric: Normal adult with appropriate demeanor and interpersonal interaction. Is oriented to person, place, and time.          Diagnostic Study Results     Labs -  Recent Results (from the past 12 hour(s))   GLUCOSE, POC Collection Time: 03/21/18 11:24 AM   Result Value Ref Range    Glucose (POC) 196 (H) 70 - 110 mg/dL   CBC W/O DIFF    Collection Time: 03/21/18 11:30 AM   Result Value Ref Range    WBC 8.9 4.6 - 13.2 K/uL    RBC 3.80 (L) 4.20 - 5.30 M/uL    HGB 10.9 (L) 12.0 - 16.0 g/dL    HCT 34.3 (L) 35.0 - 45.0 %    MCV 90.3 74.0 - 97.0 FL    MCH 28.7 24.0 - 34.0 PG    MCHC 31.8 31.0 - 37.0 g/dL    RDW 12.7 11.6 - 14.5 %    PLATELET 000 748 - 988 K/uL    MPV 12.0 (H) 9.2 - 78.7 FL   METABOLIC PANEL, COMPREHENSIVE    Collection Time: 03/21/18 11:30 AM   Result Value Ref Range    Sodium 142 136 - 145 mmol/L    Potassium 4.0 3.5 - 5.5 mmol/L    Chloride 108 100 - 108 mmol/L    CO2 29 21 - 32 mmol/L    Anion gap 5 3.0 - 18 mmol/L    Glucose 215 (H) 74 - 99 mg/dL    BUN 18 7.0 - 18 MG/DL    Creatinine 0.89 0.6 - 1.3 MG/DL    BUN/Creatinine ratio 20 12 - 20      GFR est AA >60 >60 ml/min/1.73m2    GFR est non-AA >60 >60 ml/min/1.73m2    Calcium 9.0 8.5 - 10.1 MG/DL    Bilirubin, total 0.3 0.2 - 1.0 MG/DL    ALT (SGPT) 12 (L) 13 - 56 U/L    AST (SGOT) 12 (L) 15 - 37 U/L    Alk.  phosphatase 125 (H) 45 - 117 U/L    Protein, total 7.0 6.4 - 8.2 g/dL    Albumin 3.4 3.4 - 5.0 g/dL    Globulin 3.6 2.0 - 4.0 g/dL    A-G Ratio 0.9 0.8 - 1.7     PROTHROMBIN TIME + INR    Collection Time: 03/21/18 11:30 AM   Result Value Ref Range    Prothrombin time 12.9 11.5 - 15.2 sec    INR 1.0 0.8 - 1.2     THROMBIN TIME    Collection Time: 03/21/18 11:30 AM   Result Value Ref Range    Thrombin time 16.6 13.8 - 18.2 SECS   FIBRINOGEN    Collection Time: 03/21/18 11:30 AM   Result Value Ref Range    Fibrinogen 484 (H) 210 - 451 mg/dL   TYPE & SCREEN    Collection Time: 03/21/18 11:30 AM   Result Value Ref Range    Crossmatch Expiration 03/24/2018     ABO/Rh(D) Florent[place] POSITIVE     Antibody screen NEG    ASPIRIN TEST    Collection Time: 03/21/18 11:30 AM   Result Value Ref Range    Aspirin test 532 (L) 620 - 672 ARU   EKG, 12 LEAD, INITIAL    Collection Time: 03/21/18 11:52 AM   Result Value Ref Range    Ventricular Rate 65 BPM    Atrial Rate 65 BPM    P-R Interval 166 ms    QRS Duration 72 ms    Q-T Interval 440 ms    QTC Calculation (Bezet) 457 ms    Calculated P Axis 39 degrees    Calculated R Axis 11 degrees    Calculated T Axis 35 degrees    Diagnosis       Normal sinus rhythm  T wave abnormality, consider anterior ischemia  Abnormal ECG  When compared with ECG of 02-MAR-2018 10:09,  Nonspecific T wave abnormality, improved in Inferior leads     URINALYSIS W/ RFLX MICROSCOPIC    Collection Time: 03/21/18  1:45 PM   Result Value Ref Range    Color YELLOW      Appearance CLEAR      Specific gravity 1.023 1.005 - 1.030      pH (UA) 6.5 5.0 - 8.0      Protein NEGATIVE  NEG mg/dL    Glucose NEGATIVE  NEG mg/dL    Ketone NEGATIVE  NEG mg/dL    Bilirubin NEGATIVE  NEG      Blood TRACE (A) NEG      Urobilinogen 0.2 0.2 - 1.0 EU/dL    Nitrites NEGATIVE  NEG      Leukocyte Esterase NEGATIVE  NEG     DRUG SCREEN, URINE    Collection Time: 03/21/18  1:45 PM   Result Value Ref Range    BENZODIAZEPINES POSITIVE (A) NEG      BARBITURATES NEGATIVE  NEG      THC (TH-CANNABINOL) NEGATIVE  NEG      OPIATES NEGATIVE  NEG      PCP(PHENCYCLIDINE) NEGATIVE  NEG      COCAINE NEGATIVE  NEG      AMPHETAMINES NEGATIVE  NEG      METHADONE NEGATIVE  NEG      HDSCOM (NOTE)    URINE MICROSCOPIC ONLY    Collection Time: 03/21/18  1:45 PM   Result Value Ref Range    WBC 0 to 3 0 - 4 /hpf    RBC 2 to 4 0 - 5 /hpf    Epithelial cells FEW 0 - 5 /lpf    Bacteria NEGATIVE  NEG /hpf       Radiologic Studies -   CT HEAD WO CONT   Final Result   As read by RAD:    IMPRESSION:     Considerably motion degraded study.     1. No acute intracranial hemorrhage, mass effect, midline shift, or herniation. No definite CT evidence of acute cortical infarct is seen.   Please note that  noncontrast head CT may be normal in early acute infarct.      2. Stable, mild nonspecific white matter disease likely representing chronic  small vessel changes. CTA HEAD NECK W CONT   Final Result   IMPRESSION:      1. No significant carotid or vertebral stenosis. 2. Short segments of incidental FMD involving bilateral cervical ICAs. 3. Suggestion of segmental multifocal areas of stenosis involving left sylvian   fissure M3 branch, with overall diffuse somewhat artifactual appearing cerebral   arteries as above. No definite area of thromboembolism. 4. Several 3 mm apical nodules nonspecific. In absence of any known primary   neoplasm, follow-up recommended according to guidelines below. Fleischner Society (Chest Radiology) Pulmonary Nodule Guidelines:      1. Low smoking or other exposure risk:      < or =4 mm: No follow up necessary   2. High smoking or other exposure risk:      < or =4 mm: Follow up CT at 12 m; if stable, no further follow up. XR CHEST PORT   Final Result   As read by RAD:    IMPRESSION:   Mildly underexpanded lungs without superimposed acute radiographic abnormality           Medical Decision Making   I am the first provider for this patient. I reviewed the vital signs, available nursing notes, past medical history, past surgical history, family history and social history. Vital Signs-Reviewed the patient's vital signs. Pulse Oximetry Analysis -  100% on room air (Interpretation) nonhypoxic     Cardiac Monitor:  Rate: 80    EKG: Interpreted by the EP. Time Interpreted: 1152   Rate: 65   Rhythm: Normal Sinus Rhythm    Interpretation: no STEMI, motion artifact affects interpretation       Records Reviewed: Nursing Notes (Time of Review: 11:00 AM)    ED Course: Progress Notes, Reevaluation, and Consults:    1121: Consult:  Discussed care with Dr. Shahana Woo (Teleneurology). Standard discussion; including history of patients chief complaint, available diagnostic results, and treatment course. Will evaluate the pt.     1145: Discussed with Dr. Shahana Woo; pt declined tPa.  She does recommend urgent vessel imaging with CTA or MRI and admission. Also recommends aspirin. 1415: Consult:  Discussed care with Dr. Bryanna Sun (Hospitalist). Standard discussion; including history of patients chief complaint, available diagnostic results, and treatment course. Accepts pt for admission. Provider Notes (Medical Decision Making): Pt with previous stroke with worsening of slurred speech and fall this morning. Pt and home health aid are poor historians regarding her baseline. Code S initiated. Tele neuro felt pt is not a TPA candidate. No large vessel occlusion on CTAs. Pt to be admitted for further CVA workup and management. Core Measures:     Code S Level 1 called at 1050, upgraded to level 2. Critical Care Time:     CRITICAL CARE:  11:57  I have spent 40 minutes of critical care time involved in lab review, consultations with specialist, family decision-making, and documentation. During this entire length of time I was immediately available to the patient. Critical Care: The reason for providing this level of medical care for this critically ill patient was due a critical illness that impaired one or more vital organ systems such that there was a high probability of imminent or life threatening deterioration in the patients condition. This care involved high complexity decision making to assess, manipulate, and support vital system functions, to treat this degreee vital organ system failure and to prevent further life threatening deterioration of the patients condition. For Hospitalized Patients:    1. Hospitalization Decision Time:  The decision to hospitalize the patient was made by Dr. Cedrick Hernandez and Dr. López at 6535 on 4/65/5731    2. Aspirin: Aspirin was given    Diagnosis     Clinical Impression:   1. Slurred speech    2. Acute CVA (cerebrovascular accident) ([facility] [address]    3. Weakness of left lower extremity    4.  Unwitnessed fall        Disposition: Admit      Patient's Medications   Start Taking No medications on file   Continue Taking    AMLODIPINE (NORVASC) 10 MG TABLET    10 mg. CHOLECALCIFEROL (VITAMIN D3) 1,000 UNIT CAP    Take 1,000 Units by mouth daily. CIPROFLOXACIN HCL (CIPRO) 500 MG TABLET    Take  by mouth two (2) times a day. CLOTRIMAZOLE (LOTRIMIN) 1 % TOPICAL CREAM    Apply  to affected area two (2) times a day. CYANOCOBALAMIN (VITAMIN B-12) 1,000 MCG TABLET    Take 1,000 mcg by mouth daily. CYCLOBENZAPRINE (FLEXERIL) 10 MG TABLET    Take 10 mg by mouth three (3) times daily as needed for Muscle Spasm(s). DIAZEPAM (VALIUM) 2 MG TABLET    Take 1 Tab by mouth every six (6) hours as needed. Max Daily Amount: 8 mg. DICYCLOMINE (BENTYL) 20 MG TABLET    Take 20 mg by mouth every six (6) hours. DIPHENHYDRAMINE (BENADRYL) 50 MG TABLET    Take 50 mg by mouth two (2) times daily as needed for Itching. DOCUSATE SODIUM (COLACE) 100 MG CAPSULE    Take 100 mg by mouth two (2) times a day. FENTANYL (DURAGESIC) 25 MCG/HR PATCH    1 Patch by TransDERmal route every seventy-two (72) hours. FUROSEMIDE (LASIX) 40 MG TABLET    Take 40 mg by mouth daily. GABAPENTIN (NEURONTIN) 100 MG CAPSULE    Take 300 mg by mouth three (3) times daily. GLIMEPIRIDE (AMARYL) 1 MG TABLET    Take 1 mg by mouth Daily (before breakfast). IBUPROFEN (MOTRIN) 200 MG TABLET    Take 400 mg by mouth every eight (8) hours as needed for Pain. INSULIN DETEMIR (LEVEMIR) 100 UNIT/ML INJECTION    5 Units by SubCUTAneous route nightly. LEVOTHYROXINE (SYNTHROID) 50 MCG TABLET    Take 50 mcg by mouth Daily (before breakfast). LOSARTAN (COZAAR) 100 MG TABLET    Take 100 mg by mouth daily. MAGNESIUM HYDROXIDE (MILK OF MAGNESIA) 400 MG/5 ML SUSPENSION    Take 30 mL by mouth daily. METOPROLOL SUCCINATE (TOPROL-XL) 50 MG XL TABLET    Take 50 mg by mouth daily. MIRTAZAPINE (REMERON) 15 MG TABLET    Take 15 mg by mouth nightly. OMEPRAZOLE (PRILOSEC) 40 MG CAPSULE    Take 40 mg by mouth daily. OXYCODONE-ACETAMINOPHEN (PERCOCET 10)  MG PER TABLET    Take 1 Tab by mouth every six (6) hours as needed for Pain. PHENYLEPH-MIN OIL-PETROLATUM (PREPARATION H) 0.25-14-74.9 % RECTAL OINTMENT    by PeriANAL route four (4) times daily as needed for Pain. POLYETHYLENE GLYCOL (MIRALAX) 17 GRAM PACKET    Take 17 g by mouth daily. POTASSIUM CHLORIDE (K-DUR, KLOR-CON) 20 MEQ TABLET    Take 2 Tabs by mouth two (2) times a day. POTASSIUM CHLORIDE (KLOR-CON M10) 10 MEQ TABLET    Take 10 mEq by mouth daily. take 1 tablet by mouth every day as needed take with furosemide    PROMETHAZINE (PHENERGAN) 25 MG TABLET    Take 25 mg by mouth two (2) times daily as needed for Nausea. SUCRALFATE (CARAFATE) 1 GRAM TABLET    Take 1 g by mouth four (4) times daily. VENLAFAXINE-SR (EFFEXOR XR) 150 MG CAPSULE    Take 150 mg by mouth daily. ZIPRASIDONE (GEODON) 20 MG CAPSULE    20 mg. These Medications have changed    No medications on file   Stop Taking    No medications on file     _______________________________    Attestations:  [address] [place] [address] acting as a scribe for and in the presence of Zenaida Zepeda MD      March 21, 2018 at 2:52 PM       Provider Attestation:      I personally performed the services described in the documentation, reviewed the documentation, as recorded by the scribe in my presence, and it accurately and completely records my words and actions.  March 21, 2018 at 2:52 PM - Zenaida Zepeda MD    _______________________________

## 2018-03-21 NOTE — IP AVS SNAPSHOT
Bri veronica 
 
 
 [address] [place] [address] 
[phone] Patient: Bear Colbert MRN: [id number] CLV:[id number] A check miguel angel indicates which time of day the medication should be taken. My Medications START taking these medications Instructions Each Dose to Equal  
 Morning Noon Evening Bedtime  
 aspirin 325 mg tablet Commonly known as:  ASPIRIN Your last dose was: Your next dose is: Take 1 Tab by mouth daily. 325 mg  
    
   
   
   
  
 atorvastatin 80 mg tablet Commonly known as:  LIPITOR Your last dose was: Your next dose is: Take 1 Tab by mouth nightly. 80 mg  
    
   
   
   
  
 folic acid 1 mg tablet Commonly known as:  Google Your last dose was: Your next dose is: Take 1 Tab by mouth daily. 1 mg Thiamine Mononitrate 100 mg tablet Commonly known as:  B-1 Your last dose was: Your next dose is: Take 1 Tab by mouth daily. 100 mg CONTINUE taking these medications Instructions Each Dose to Equal  
 Morning Noon Evening Bedtime  
 amLODIPine 10 mg tablet Commonly known as:  Donna Roland Your last dose was: Your next dose is:    
   
   
 10 mg.  
 10 mg  
    
   
   
   
  
 CARAFATE 1 gram tablet Generic drug:  sucralfate Your last dose was: Your next dose is: Take 1 g by mouth four (4) times daily. 1 g  
    
   
   
   
  
 clotrimazole 1 % topical cream  
Commonly known as:  Irma Cantor Your last dose was: Your next dose is:    
   
   
 Apply  to affected area two (2) times a day. COLACE 100 mg capsule Generic drug:  docusate sodium Your last dose was: Your next dose is: Take 100 mg by mouth two (2) times a day.   
 100 mg  
    
   
   
   
  
 EFFEXOR  mg capsule Generic drug:  venlafaxine-SR Your last dose was: Your next dose is: Take 150 mg by mouth daily. 150 mg  
    
   
   
   
  
 fentaNYL 25 mcg/hr PATCH Commonly known as:  Andres Garza Your last dose was: Your next dose is:    
   
   
 1 Patch by TransDERmal route every seventy-two (72) hours. 1 Patch  
    
   
   
   
  
 gabapentin 100 mg capsule Commonly known as:  NEURONTIN Your last dose was: Your next dose is: Take 300 mg by mouth three (3) times daily. 300 mg  
    
   
   
   
  
 glimepiride 1 mg tablet Commonly known as:  AMARYL Your last dose was: Your next dose is: Take 1 mg by mouth Daily (before breakfast). 1 mg  
    
   
   
   
  
 ibuprofen 200 mg tablet Commonly known as:  MOTRIN Your last dose was: Your next dose is: Take 400 mg by mouth every eight (8) hours as needed for Pain. 400 mg LEVEMIR U-100 INSULIN 100 unit/mL injection Generic drug:  insulin detemir U-100 Your last dose was: Your next dose is:    
   
   
 5 Units by SubCUTAneous route nightly. 5 Units  
    
   
   
   
  
 levothyroxine 50 mcg tablet Commonly known as:  SYNTHROID Your last dose was: Your next dose is: Take 50 mcg by mouth Daily (before breakfast). 50 mcg  
    
   
   
   
  
 losartan 100 mg tablet Commonly known as:  COZAAR Your last dose was: Your next dose is: Take 100 mg by mouth daily. 100 mg  
    
   
   
   
  
 magnesium hydroxide 400 mg/5 mL suspension Commonly known as:  MILK OF MAGNESIA Your last dose was: Your next dose is: Take 30 mL by mouth daily. 30 mL  
    
   
   
   
  
 metoprolol succinate 50 mg XL tablet Commonly known as:  TOPROL-XL Your last dose was: Your next dose is: Take 50 mg by mouth daily. 50 mg MIRALAX 17 gram packet Generic drug:  polyethylene glycol Your last dose was: Your next dose is: Take 17 g by mouth daily. 17 g  
    
   
   
   
  
 mirtazapine 15 mg tablet Commonly known as:  Vidhya Altman Your last dose was: Your next dose is: Take 15 mg by mouth nightly. 15 mg  
    
   
   
   
  
 omeprazole 40 mg capsule Commonly known as:  PRILOSEC Your last dose was: Your next dose is: Take 40 mg by mouth daily. 40 mg  
    
   
   
   
  
 oxyCODONE-acetaminophen  mg per tablet Commonly known as:  PERCOCET 10 Your last dose was: Your next dose is: Take 1 Tab by mouth every six (6) hours as needed for Pain. 1 Tab  
    
   
   
   
  
 phenyleph-min oil-petrolatum 0.25-14-74.9 % rectal ointment Commonly known as:  PREPARATION H Your last dose was: Your next dose is:    
   
   
 by PeriANAL route four (4) times daily as needed for Pain. VITAMIN B-12 1,000 mcg tablet Generic drug:  cyanocobalamin Your last dose was: Your next dose is: Take 1,000 mcg by mouth daily. 1000 mcg VITAMIN D3 1,000 unit Cap Generic drug:  cholecalciferol Your last dose was: Your next dose is: Take 1,000 Units by mouth daily. 1000 Units  
    
   
   
   
  
 ziprasidone 20 mg capsule Commonly known as:  Ashton Canavan Your last dose was: Your next dose is:    
   
   
 20 mg.  
 20 mg  
    
   
   
   
  
  
STOP taking these medications CIPRO 500 mg tablet Generic drug:  ciprofloxacin HCl  
   
  
 cyclobenzaprine 10 mg tablet Commonly known as:  FLEXERIL  
   
  
 diazePAM 2 mg tablet Commonly known as:  VALIUM  
   
  
 dicyclomine 20 mg tablet Commonly known as:  BENTYL  
   
  
 diphenhydrAMINE 50 mg tablet Commonly known as:  BENADRYL  
   
  
 furosemide 40 mg tablet Commonly known as:  LASIX KLOR-CON M10 10 mEq tablet Generic drug:  potassium chloride  
   
  
 potassium chloride 20 mEq tablet Commonly known as:  K-DUR, KLOR-CON  
   
  
 promethazine 25 mg tablet Commonly known as:  PHENERGAN Where to Get Your Medications Information on where to get these meds will be given to you by the nurse or doctor. ! Ask your nurse or doctor about these medications  
  aspirin 325 mg tablet  
 atorvastatin 80 mg tablet  
 folic acid 1 mg tablet Thiamine Mononitrate 100 mg tablet

## 2018-03-21 NOTE — H&P
GENERAL GENERIC H&P/CONSULT    Noah Blanchard is a 74 y.o. female with past medical hx of diabetes, hypertension, hyperlipidemia and stroke (residual defecits slurred speech) who presents per EMS with dysarthria. Per Home health nurse patient apparently fell this morning and then took a nap. Caregiver noted increased slurred speech from baseline with complaints of burning sensation to left side of face. Patient was last seen normal at 8am. Hx is limited 2/2 patient dysarthria and caregive unable to give good details on patient baseline. However notes patient does walk with a walker. Upon arrival Code S was called. Patient denies any sob, cp, n/v, fever, chill, blurred vision or any other acute complaitns at this time. Patient notes symptoms though are improving overall. Past Medical History:   Diagnosis Date    Anxiety     Depression     Diabetes ([facility]     Diverticulosis     Dysphagia     Early satiety     Fatty liver     Gastric ulcer     Gastroparesis     Heart disease     Hypercholesteremia     Hypertension     Neurogenic bladder     Scoliosis     Stenosis of lumbosacral spine     Tardive dyskinesia     Urgency of urination       Past Surgical History:   Procedure Laterality Date    HX APPENDECTOMY      HX BREAST LUMPECTOMY      HX CHOLECYSTECTOMY  1980    HX COLECTOMY  2012    HX HERNIA REPAIR      HX HYSTERECTOMY  1970    HX KNEE ARTHROSCOPY      HX KNEE REPLACEMENT  1991    left knee    HX LUMBAR FUSION      x 3      Prior to Admission medications    Medication Sig Start Date End Date Taking? Authorizing Provider   ciprofloxacin HCl (CIPRO) 500 mg tablet Take  by mouth two (2) times a day. Historical Provider   mirtazapine (REMERON) 15 mg tablet Take 15 mg by mouth nightly. Richard Mitchell MD   glimepiride (AMARYL) 1 mg tablet Take 1 mg by mouth Daily (before breakfast). Richard Mitchell MD   levothyroxine (SYNTHROID) 50 mcg tablet Take 50 mcg by mouth Daily (before breakfast). Richard Mitchell MD   dicyclomine (BENTYL) 20 mg tablet Take 20 mg by mouth every six (6) hours. Richard Mitchell MD   cyclobenzaprine (FLEXERIL) 10 mg tablet Take 10 mg by mouth three (3) times daily as needed for Muscle Spasm(s). Richard Mitchell MD   fentaNYL (DURAGESIC) 25 mcg/hr PATCH 1 Patch by TransDERmal route every seventy-two (72) hours. Richard Mitchell MD   clotrimazole (LOTRIMIN) 1 % topical cream Apply  to affected area two (2) times a day. Richard Mitchell MD   insulin detemir (LEVEMIR) 100 unit/mL injection 5 Units by SubCUTAneous route nightly. Richard Mitchell MD   amLODIPine (NORVASC) 10 mg tablet 10 mg. Historical Provider   ziprasidone (GEODON) 20 mg capsule 20 mg. Historical Provider   magnesium hydroxide (MILK OF MAGNESIA) 400 mg/5 mL suspension Take 30 mL by mouth daily. 4/7/17   Micheal Narvaez PA-C   ibuprofen (MOTRIN) 200 mg tablet Take 400 mg by mouth every eight (8) hours as needed for Pain. Historical Provider   diphenhydrAMINE (BENADRYL) 50 mg tablet Take 50 mg by mouth two (2) times daily as needed for Itching. Historical Provider   oxyCODONE-acetaminophen (PERCOCET 10)  mg per tablet Take 1 Tab by mouth every six (6) hours as needed for Pain. Historical Provider   gabapentin (NEURONTIN) 100 mg capsule Take 300 mg by mouth three (3) times daily. Historical Provider   furosemide (LASIX) 40 mg tablet Take 40 mg by mouth daily. 11/9/16   Nnamdi Navarro MD   potassium chloride (KLOR-CON M10) 10 mEq tablet Take 10 mEq by mouth daily. take 1 tablet by mouth every day as needed take with furosemide 11/9/16   Nnamdi Navarro MD   diazepam (VALIUM) 2 mg tablet Take 1 Tab by mouth every six (6) hours as needed. Max Daily Amount: 8 mg. 9/23/16   Galina Bojorquez MD   metoprolol succinate (TOPROL-XL) 50 mg XL tablet Take 50 mg by mouth daily. Historical Provider   omeprazole (PRILOSEC) 40 mg capsule Take 40 mg by mouth daily.     Historical Provider   promethazine (PHENERGAN) 25 mg tablet Take 25 mg by mouth two (2) times daily as needed for Nausea. Historical Provider   polyethylene glycol (MIRALAX) 17 gram packet Take 17 g by mouth daily. Historical Provider   cholecalciferol (VITAMIN D3) 1,000 unit cap Take 1,000 Units by mouth daily. Historical Provider   losartan (COZAAR) 100 mg tablet Take 100 mg by mouth daily. Historical Provider   sucralfate (CARAFATE) 1 gram tablet Take 1 g by mouth four (4) times daily. Historical Provider   venlafaxine-SR (EFFEXOR XR) 150 mg capsule Take 150 mg by mouth daily. Historical Provider   cyanocobalamin (VITAMIN B-12) 1,000 mcg tablet Take 1,000 mcg by mouth daily. Historical Provider   phenyleph-min oil-petrolatum (PREPARATION H) 0.25-14-74.9 % rectal ointment by PeriANAL route four (4) times daily as needed for Pain. 6/9/16   Yun Juarez MD   potassium chloride (K-DUR, KLOR-CON) 20 mEq tablet Take 2 Tabs by mouth two (2) times a day. 6/9/16   Yun Juarez MD   docusate sodium (COLACE) 100 mg capsule Take 100 mg by mouth two (2) times a day. Historical Provider     Allergies   Allergen Reactions    Codeine Rash and Itching    Iodine Hives and Rash    Morphine Hives    Pcn [Penicillins] Other (comments)     Causes \"Heart swell\"    Sulfa (Sulfonamide Antibiotics) Nausea Only      Social History   Substance Use Topics    Smoking status: Never Smoker    Smokeless tobacco: Never Used    Alcohol use No      Family History   Problem Relation Age of Onset    Heart Disease Mother     Diabetes Mother     Heart Attack Mother     Cancer Father      lung CA    Cancer Brother     Cancer Sister       Review of Systems   Unable to perform ROS: Other   Constitutional: Negative. HENT: Negative. Neurological: Positive for facial asymmetry and speech difficulty.        Objective:          Patient Vitals for the past 8 hrs:   BP Temp Pulse Resp SpO2 Height Weight   03/21/18 1450 167/58 - 69 16 97 % - -   03/21/18 1440 184/77 - 66 12 98 % - -   03/21/18 1430 183/76 - 67 14 97 % - -   03/21/18 1420 182/80 - 68 15 98 % - -   03/21/18 1410 180/73 - 66 24 97 % - -   03/21/18 1400 185/75 - 67 14 98 % - -   03/21/18 1350 187/81 - 71 22 100 % - -   03/21/18 1340 182/82 - 67 16 100 % - -   03/21/18 1330 (!) 170/136 - 69 18 98 % - -   03/21/18 1320 178/68 - 70 22 98 % - -   03/21/18 1310 161/67 - - - - - -   03/21/18 1250 182/78 - 66 - 99 % - -   03/21/18 1240 179/67 - 62 19 99 % - -   03/21/18 1230 180/64 - 68 20 100 % - -   03/21/18 1221 185/74 - 65 13 99 % - -   03/21/18 1210 167/71 - 67 19 99 % - -   03/21/18 1200 (!) 139/93 - 67 18 99 % - -   03/21/18 1140 183/81 - 77 21 99 % - -   03/21/18 1130 193/71 - 71 16 97 % - -   03/21/18 1115 - - 80 17 100 % - -   03/21/18 1112 155/73 97.7 °F (36.5 °C) - - - 5' 5\" (1.651 m) 90.7 kg (200 lb)     Physical Exam   Constitutional: She appears cachectic. No distress. HENT:   Head: Normocephalic. Eyes: Pupils are equal, round, and reactive to light. Neck: Normal range of motion. Cardiovascular: Normal rate. Pulmonary/Chest: Breath sounds normal.   Abdominal: Soft. Bowel sounds are normal.   Musculoskeletal:   LLE weakness     Neurological:   dysarthryria   Skin: Skin is warm and dry. She is not diaphoretic.         Labs:    Recent Results (from the past 24 hour(s))   GLUCOSE, POC    Collection Time: 03/21/18 11:24 AM   Result Value Ref Range    Glucose (POC) 196 (H) 70 - 110 mg/dL   CBC W/O DIFF    Collection Time: 03/21/18 11:30 AM   Result Value Ref Range    WBC 8.9 4.6 - 13.2 K/uL    RBC 3.80 (L) 4.20 - 5.30 M/uL    HGB 10.9 (L) 12.0 - 16.0 g/dL    HCT 34.3 (L) 35.0 - 45.0 %    MCV 90.3 74.0 - 97.0 FL    MCH 28.7 24.0 - 34.0 PG    MCHC 31.8 31.0 - 37.0 g/dL    RDW 12.7 11.6 - 14.5 %    PLATELET 135 208 - 108 K/uL    MPV 12.0 (H) 9.2 - 53.7 FL   METABOLIC PANEL, COMPREHENSIVE    Collection Time: 03/21/18 11:30 AM   Result Value Ref Range    Sodium 142 136 - 145 mmol/L    Potassium 4.0 3.5 - 5.5 mmol/L    Chloride 108 100 - 108 mmol/L    CO2 29 21 - 32 mmol/L    Anion gap 5 3.0 - 18 mmol/L    Glucose 215 (H) 74 - 99 mg/dL    BUN 18 7.0 - 18 MG/DL    Creatinine 0.89 0.6 - 1.3 MG/DL    BUN/Creatinine ratio 20 12 - 20      GFR est AA >60 >60 ml/min/1.73m2    GFR est non-AA >60 >60 ml/min/1.73m2    Calcium 9.0 8.5 - 10.1 MG/DL    Bilirubin, total 0.3 0.2 - 1.0 MG/DL    ALT (SGPT) 12 (L) 13 - 56 U/L    AST (SGOT) 12 (L) 15 - 37 U/L    Alk.  phosphatase 125 (H) 45 - 117 U/L    Protein, total 7.0 6.4 - 8.2 g/dL    Albumin 3.4 3.4 - 5.0 g/dL    Globulin 3.6 2.0 - 4.0 g/dL    A-G Ratio 0.9 0.8 - 1.7     PROTHROMBIN TIME + INR    Collection Time: 03/21/18 11:30 AM   Result Value Ref Range    Prothrombin time 12.9 11.5 - 15.2 sec    INR 1.0 0.8 - 1.2     THROMBIN TIME    Collection Time: 03/21/18 11:30 AM   Result Value Ref Range    Thrombin time 16.6 13.8 - 18.2 SECS   FIBRINOGEN    Collection Time: 03/21/18 11:30 AM   Result Value Ref Range    Fibrinogen 484 (H) 210 - 451 mg/dL   TYPE & SCREEN    Collection Time: 03/21/18 11:30 AM   Result Value Ref Range    Crossmatch Expiration 03/24/2018     ABO/Rh(D) [place] [place] POSITIVE     Antibody screen NEG    ASPIRIN TEST    Collection Time: 03/21/18 11:30 AM   Result Value Ref Range    Aspirin test 532 (L) 620 - 672 ARU   EKG, 12 LEAD, INITIAL    Collection Time: 03/21/18 11:52 AM   Result Value Ref Range    Ventricular Rate 65 BPM    Atrial Rate 65 BPM    P-R Interval 166 ms    QRS Duration 72 ms    Q-T Interval 440 ms    QTC Calculation (Bezet) 457 ms    Calculated P Axis 39 degrees    Calculated R Axis 11 degrees    Calculated T Axis 35 degrees    Diagnosis       Normal sinus rhythm  T wave abnormality, consider anterior ischemia  Abnormal ECG  When compared with ECG of 02-MAR-2018 10:09,  Nonspecific T wave abnormality, improved in Inferior leads     URINALYSIS W/ RFLX MICROSCOPIC    Collection Time: 03/21/18  1:45 PM   Result Value Ref Range    Color YELLOW      Appearance CLEAR Specific gravity 1.023 1.005 - 1.030      pH (UA) 6.5 5.0 - 8.0      Protein NEGATIVE  NEG mg/dL    Glucose NEGATIVE  NEG mg/dL    Ketone NEGATIVE  NEG mg/dL    Bilirubin NEGATIVE  NEG      Blood TRACE (A) NEG      Urobilinogen 0.2 0.2 - 1.0 EU/dL    Nitrites NEGATIVE  NEG      Leukocyte Esterase NEGATIVE  NEG     DRUG SCREEN, URINE    Collection Time: 03/21/18  1:45 PM   Result Value Ref Range    BENZODIAZEPINES POSITIVE (A) NEG      BARBITURATES NEGATIVE  NEG      THC (TH-CANNABINOL) NEGATIVE  NEG      OPIATES NEGATIVE  NEG      PCP(PHENCYCLIDINE) NEGATIVE  NEG      COCAINE NEGATIVE  NEG      AMPHETAMINES NEGATIVE  NEG      METHADONE NEGATIVE  NEG      HDSCOM (NOTE)    URINE MICROSCOPIC ONLY    Collection Time: 03/21/18  1:45 PM   Result Value Ref Range    WBC 0 to 3 0 - 4 /hpf    RBC 2 to 4 0 - 5 /hpf    Epithelial cells FEW 0 - 5 /lpf    Bacteria NEGATIVE  NEG /hpf           Assessment:  Principal Problem:    Stroke ([facility] (3/21/2018)    Active Problems:    HTN (hypertension) (4/8/2013)      DMII (diabetes mellitus, type 2) ([facility] (4/8/2013)      Depression ()      Anxiety ()      History of stroke (3/21/2018)        Plan:    Acute Metabolic Encephalopathy/CVA?    -not TPA candidate per Tele Neuro  -Stroke workup  -ECHO  -CT head negative  -CTA head/neck pending do not think MRI/MRA is warranted as tele desired either/or  -pt/ot  -speech  -Neuro consult  -permisstive HTN  -asa, statin  -neuro checks per protocol  -labs essential urnemarkble    DM II  -corrective insulin  -basal dose    Hx of Stroke  -noted residual defcits include slurred speech    Anxiety  -monitor    Essential HTN  -hold anti-htn for today    DVT prophylaxis  -scd/teds  -heparin sq    Signed:  Marli Vargas, DAIANA 3/21/2018

## 2018-03-21 NOTE — PROGRESS NOTES
Patient received to unit via stretcher from ED accompanied by RN to room 2107. Patient is alert and oriented x 4, with no signs of distress or discomfort. No complaints at this time. Patient oriented to room and call bell use for assistance. Patient voiced understanding. Bed locked and in lowest position with call bell in reach. Dual NIH completed. Nico Anthony with Dr. Janella Leventhal. Orders received for Cardiac Monitoring and Diabetic Diet for patient. RBV. 1930- Bedside and Verbal shift change report given to Raheem Kirkpatrick RN   (oncoming nurse) by Vonnie Rubinstein (offgoing nurse). Report included the following information SBAR, Kardex, Intake/Output, MAR and Recent Results. Dual NIH completed.

## 2018-03-21 NOTE — IP AVS SNAPSHOT
[address] [address] 
[phone] Patient: Jojo Lopes MRN: [id number] [facility]:2/69/[id number] About your hospitalization You were admitted on:  March 21, 2018 You last received care in the:  [address] NEURO [facility] You were discharged on:  March 24, 2018 Why you were hospitalized Your primary diagnosis was:  Tia (Transient Ischemic Attack) Your diagnoses also included:  Depression, Dmii (Diabetes Mellitus, Type 2) (Hcc), Htn (Hypertension), Anxiety, History Of Stroke Follow-up Information Follow up With Details Comments Contact Info Gary Medina MD   [address] [place] [address] 
[phone] Your Scheduled Appointments Wednesday March 28, 2018  4:30 PM EDT ULTRASOUND with [facility] ULTRASOUND [facility]. [address] ([facility]) [address] [place] [id number]  
[phone] Wednesday April 04, 2018 10:30 AM EDT PROCEDURE with Charol Leventhal, MD  
[facility] of [facility]. [address] [facility]) [address] [address] [phone] Discharge Orders None A check miguel angel indicates which time of day the medication should be taken. My Medications START taking these medications Instructions Each Dose to Equal  
 Morning Noon Evening Bedtime  
 aspirin 325 mg tablet Commonly known as:  ASPIRIN Your last dose was: Your next dose is: Take 1 Tab by mouth daily. 325 mg  
    
   
   
   
  
 atorvastatin 80 mg tablet Commonly known as:  LIPITOR Your last dose was: Your next dose is: Take 1 Tab by mouth nightly. 80 mg  
    
   
   
   
  
 folic acid 1 mg tablet Commonly known as:  Google Your last dose was: Your next dose is: Take 1 Tab by mouth daily. 1 mg Thiamine Mononitrate 100 mg tablet Commonly known as:  B-1 Your last dose was: Your next dose is: Take 1 Tab by mouth daily. 100 mg CONTINUE taking these medications Instructions Each Dose to Equal  
 Morning Noon Evening Bedtime  
 amLODIPine 10 mg tablet Commonly known as:  Saintclair Rickers Your last dose was: Your next dose is:    
   
   
 10 mg.  
 10 mg  
    
   
   
   
  
 CARAFATE 1 gram tablet Generic drug:  sucralfate Your last dose was: Your next dose is: Take 1 g by mouth four (4) times daily. 1 g  
    
   
   
   
  
 clotrimazole 1 % topical cream  
Commonly known as:  Jasmin Potash Your last dose was: Your next dose is:    
   
   
 Apply  to affected area two (2) times a day. COLACE 100 mg capsule Generic drug:  docusate sodium Your last dose was: Your next dose is: Take 100 mg by mouth two (2) times a day. 100 mg  
    
   
   
   
  
 EFFEXOR  mg capsule Generic drug:  venlafaxine-SR Your last dose was: Your next dose is: Take 150 mg by mouth daily. 150 mg  
    
   
   
   
  
 fentaNYL 25 mcg/hr PATCH Commonly known as:  Bend Sat Your last dose was: Your next dose is:    
   
   
 1 Patch by TransDERmal route every seventy-two (72) hours. 1 Patch  
    
   
   
   
  
 gabapentin 100 mg capsule Commonly known as:  NEURONTIN Your last dose was: Your next dose is: Take 300 mg by mouth three (3) times daily. 300 mg  
    
   
   
   
  
 glimepiride 1 mg tablet Commonly known as:  AMARYL Your last dose was: Your next dose is: Take 1 mg by mouth Daily (before breakfast). 1 mg  
    
   
   
   
  
 ibuprofen 200 mg tablet Commonly known as:  MOTRIN Your last dose was: Your next dose is: Take 400 mg by mouth every eight (8) hours as needed for Pain. 400 mg LEVEMIR U-100 INSULIN 100 unit/mL injection Generic drug:  insulin detemir U-100 Your last dose was: Your next dose is:    
   
   
 5 Units by SubCUTAneous route nightly. 5 Units  
    
   
   
   
  
 levothyroxine 50 mcg tablet Commonly known as:  SYNTHROID Your last dose was: Your next dose is: Take 50 mcg by mouth Daily (before breakfast). 50 mcg  
    
   
   
   
  
 losartan 100 mg tablet Commonly known as:  COZAAR Your last dose was: Your next dose is: Take 100 mg by mouth daily. 100 mg  
    
   
   
   
  
 magnesium hydroxide 400 mg/5 mL suspension Commonly known as:  MILK OF MAGNESIA Your last dose was: Your next dose is: Take 30 mL by mouth daily. 30 mL  
    
   
   
   
  
 metoprolol succinate 50 mg XL tablet Commonly known as:  TOPROL-XL Your last dose was: Your next dose is: Take 50 mg by mouth daily. 50 mg MIRALAX 17 gram packet Generic drug:  polyethylene glycol Your last dose was: Your next dose is: Take 17 g by mouth daily. 17 g  
    
   
   
   
  
 mirtazapine 15 mg tablet Commonly known as:  Shiv Drown Your last dose was: Your next dose is: Take 15 mg by mouth nightly. 15 mg  
    
   
   
   
  
 omeprazole 40 mg capsule Commonly known as:  PRILOSEC Your last dose was: Your next dose is: Take 40 mg by mouth daily. 40 mg  
    
   
   
   
  
 oxyCODONE-acetaminophen  mg per tablet Commonly known as:  PERCOCET 10 Your last dose was: Your next dose is: Take 1 Tab by mouth every six (6) hours as needed for Pain. 1 Tab phenyleph-min oil-petrolatum 0.25-14-74.9 % rectal ointment Commonly known as:  PREPARATION H Your last dose was: Your next dose is:    
   
   
 by PeriANAL route four (4) times daily as needed for Pain. VITAMIN B-12 1,000 mcg tablet Generic drug:  cyanocobalamin Your last dose was: Your next dose is: Take 1,000 mcg by mouth daily. 1000 mcg VITAMIN D3 1,000 unit Cap Generic drug:  cholecalciferol Your last dose was: Your next dose is: Take 1,000 Units by mouth daily. 1000 Units  
    
   
   
   
  
 ziprasidone 20 mg capsule Commonly known as:  Adrián Hines Your last dose was: Your next dose is:    
   
   
 20 mg.  
 20 mg  
    
   
   
   
  
  
STOP taking these medications CIPRO 500 mg tablet Generic drug:  ciprofloxacin HCl  
   
  
 cyclobenzaprine 10 mg tablet Commonly known as:  FLEXERIL  
   
  
 diazePAM 2 mg tablet Commonly known as:  VALIUM  
   
  
 dicyclomine 20 mg tablet Commonly known as:  BENTYL  
   
  
 diphenhydrAMINE 50 mg tablet Commonly known as:  BENADRYL  
   
  
 furosemide 40 mg tablet Commonly known as:  LASIX KLOR-CON M10 10 mEq tablet Generic drug:  potassium chloride  
   
  
 potassium chloride 20 mEq tablet Commonly known as:  K-DUR, KLOR-CON  
   
  
 promethazine 25 mg tablet Commonly known as:  PHENERGAN Where to Get Your Medications Information on where to get these meds will be given to you by the nurse or doctor. ! Ask your nurse or doctor about these medications  
  aspirin 325 mg tablet  
 atorvastatin 80 mg tablet  
 folic acid 1 mg tablet Thiamine Mononitrate 100 mg tablet Discharge Instructions Stroke: Care Instructions Your Care Instructions You have had a stroke.  This means that the blood flow to a part of your brain was blocked for some time, which damages the nerve cells in that part of the brain. The part of your body controlled by that part of your brain may not function properly now. The brain is an amazing organ that can heal itself to some degree. The stroke you had damaged part of your brain. But other parts of your brain may take over in some way for the damaged areas. You have already started this process. Your doctor will talk with you about what you can do to prevent another stroke. High blood pressure, high cholesterol, and diabetes are all risk factors for stroke. If you have any of these conditions, work with your doctor to make sure they are under control. Other risk factors for stroke include being overweight, smoking, and not getting regular exercise. Going home may be hard for you and your family. The more you can try to do for yourself, the better. Remember to take each day one at a time. Follow-up care is a key part of your treatment and safety. Be sure to make and go to all appointments, and call your doctor if you are having problems. It's also a good idea to know your test results and keep a list of the medicines you take. How can you care for yourself at home? ? · Enter a stroke rehabilitation (rehab) program, if your doctor recommends it. Physical, speech, and occupational therapies can help you manage bathing, dressing, eating, and other basics of daily living. ? · Do not drive until your doctor says it is okay. ? · It is normal to feel sad or depressed after a stroke. If these feelings last, talk to your doctor. ? · If you are having problems with urine leakage, go to the bathroom at regular times, including when you first wake up and at bedtime. Also, limit fluids after dinner. ? · If you are constipated, drink plenty of fluids, enough so that your urine is light yellow or clear like water.  If you have kidney, heart, or liver disease and have to limit fluids, talk with your doctor before you increase the amount of fluids you drink. Set up a regular time for using the toilet. If you continue to have constipation, your doctor may suggest using a bulking agent, such as Metamucil, or a stool softener, laxative, or enema. Medicines ? · Take your medicines exactly as prescribed. Call your doctor if you think you are having a problem with your medicine. You may be taking several medicines. ACE (angiotensin-converting enzyme) inhibitors, angiotensin II receptor blockers (ARBs), beta-blockers, diuretics (water pills), and calcium channel blockers control your blood pressure. Statins help lower cholesterol. Your doctor may also prescribe medicines for depression, pain, sleep problems, anxiety, or agitation. ? · If your doctor has given you a blood thinner to prevent another stroke, be sure you get instructions about how to take your medicine safely. Blood thinners can cause serious bleeding problems. ? · Do not take any over-the-counter medicines or herbal products without talking to your doctor first.  
? · If you take birth control pills or hormone therapy, talk to your doctor about whether they are right for you. ? For family members and caregivers ? · Make the home safe. Set up a room so that your loved one does not have to climb stairs. Be sure the bathroom is on the same floor. Move throw rugs and furniture that could cause falls. Make sure that the lighting is good. Put grab bars and seats in tubs and showers. ? · Find out what your loved one can do and what he or she needs help with. Try not to do things for your loved one that your loved one can do on his or her own. Help him or her learn and practice new skills. ? · Visit and talk with your loved one often. Try doing activities together that you both enjoy, such as playing cards or board games.  Keep in touch with your loved one's friends as much as you can. Encourage them to visit. ? · Take care of yourself. Do not try to do everything yourself. Ask other family members to help. Eat well, get enough rest, and take time to do things that you enjoy. Keep up with your own doctor visits, and make sure to take your medicines regularly. Get out of the house as much as you can. Join a local support group. Find out if you qualify for home health care visits to help with rehab or for adult day care. When should you call for help? Call 911 anytime you think you may need emergency care. For example, call if: 
? · You have signs of another stroke. These may include: 
¨ Sudden numbness, tingling, weakness, or loss of movement in your face, arm, or leg, especially on only one side of your body. ¨ Sudden vision changes. ¨ Sudden trouble speaking. ¨ Sudden confusion or trouble understanding simple statements. ¨ Sudden problems with walking or balance. ¨ A sudden, severe headache that is different from past headaches. Call 911 even if these symptoms go away in a few minutes. ?Call your doctor now or seek immediate medical care if: 
? · You have new symptoms that may be related to your stroke, such as falls or trouble swallowing. ? Watch closely for changes in your health, and be sure to contact your doctor if you have any problems. Where can you learn more? Go to http://shamika-chinyere.info/. Enter U572 in the search box to learn more about \"Stroke: Care Instructions. \" Current as of: March 20, 2017 Content Version: 11.4 © 7324-4867 [employer]. Care instructions adapted under license by [employer] (which disclaims liability or warranty for this information). If you have questions about a medical condition or this instruction, always ask your healthcare professional. [address] any warranty or liability for your use of this information. Learning About High Blood Pressure What is high blood pressure? Blood pressure is a measure of how hard the blood pushes against the walls of your arteries. It's normal for blood pressure to go up and down throughout the day, but if it stays up, you have high blood pressure. Another name for high blood pressure is hypertension. Two numbers tell you your blood pressure. The first number is the systolic pressure. It shows how hard the blood pushes when your heart is pumping. The second number is the diastolic pressure. It shows how hard the blood pushes between heartbeats, when your heart is relaxed and filling with blood. A blood pressure of less than 120/80 (say \"120 over 80\") is ideal for an adult. High blood pressure is 140/90 or higher. You have high blood pressure if your top number is 140 or higher or your bottom number is 90 or higher, or both. Many people fall into the category in between, called prehypertension. People with prehypertension need to make lifestyle changes to bring their blood pressure down and help prevent or delay high blood pressure. What happens when you have high blood pressure? · Blood flows through your arteries with too much force. Over time, this damages the walls of your arteries. But you can't feel it. High blood pressure usually doesn't cause symptoms. · Fat and calcium start to build up in your arteries. This buildup is called plaque. Plaque makes your arteries narrower and stiffer. Blood can't flow through them as easily. · This lack of good blood flow starts to damage some of the organs in your body. This can lead to problems such as coronary artery disease and heart attack, heart failure, stroke, kidney failure, and eye damage. How can you prevent high blood pressure? · Stay at a healthy weight. · Try to limit how much sodium you eat to less than 2,300 milligrams (mg) a day. If you limit your sodium to 1,500 mg a day, you can lower your blood pressure even more. ¨ Buy foods that are labeled \"unsalted,\" \"sodium-free,\" or \"low-sodium. \" Foods labeled \"reduced-sodium\" and \"light sodium\" may still have too much sodium. ¨ Flavor your food with garlic, lemon juice, onion, vinegar, herbs, and spices instead of salt. Do not use soy sauce, steak sauce, onion salt, garlic salt, mustard, or ketchup on your food. ¨ Use less salt (or none) when recipes call for it. You can often use half the salt a recipe calls for without losing flavor. · Be physically active. Get at least 30 minutes of exercise on most days of the week. Walking is a good choice. You also may want to do other activities, such as running, swimming, cycling, or playing tennis or team sports. · Limit alcohol to 2 drinks a day for men and 1 drink a day for women. · Eat plenty of fruits, vegetables, and low-fat dairy products. Eat less saturated and total fats. How is high blood pressure treated? · Your doctor will suggest making lifestyle changes. For example, your doctor may ask you to eat healthy foods, quit smoking, lose extra weight, and be more active. · If lifestyle changes don't help enough or your blood pressure is very high, you will have to take medicine every day. Follow-up care is a key part of your treatment and safety. Be sure to make and go to all appointments, and call your doctor if you are having problems. It's also a good idea to know your test results and keep a list of the medicines you take. Where can you learn more? Go to http://shamika-chinyere.info/. Enter P501 in the search box to learn more about \"Learning About High Blood Pressure. \" Current as of: September 21, 2016 Content Version: 11.4 © 7497-3635 [employer]. Care instructions adapted under license by [employer] (which disclaims liability or warranty for this information).  If you have questions about a medical condition or this instruction, always ask your healthcare professional. [address] [address] any warranty or liability for your use of this information. DISCHARGE SUMMARY from Nurse PATIENT INSTRUCTIONS: 
 
After general anesthesia or intravenous sedation, for 24 hours or while taking prescription Narcotics: · Limit your activities · Do not drive and operate hazardous machinery · Do not make important personal or business decisions · Do  not drink alcoholic beverages · If you have not urinated within 8 hours after discharge, please contact your surgeon on call. Report the following to your surgeon: 
· Excessive pain, swelling, redness or odor of or around the surgical area · Temperature over 100.5 · Nausea and vomiting lasting longer than 4 hours or if unable to take medications · Any signs of decreased circulation or nerve impairment to extremity: change in color, persistent  numbness, tingling, coldness or increase pain · Any questions What to do at Home: 
Recommended activity: Activity as tolerated, or as physician advised If you experience any of the following symptoms of a Stroke, Warning Signs of a Heart Attack and When to Call for Help as listed under discharge teachings , please follow up with your primary care physician or call 911. *  Please give a list of your current medications to your Primary Care Provider. *  Please update this list whenever your medications are discontinued, doses are 
    changed, or new medications (including over-the-counter products) are added. *  Please carry medication information at all times in case of emergency situations. These are general instructions for a healthy lifestyle: No smoking/ No tobacco products/ Avoid exposure to second hand smoke Surgeon General's Warning:  Quitting smoking now greatly reduces serious risk to your health. Obesity, smoking, and sedentary lifestyle greatly increases your risk for illness A healthy diet, regular physical exercise & weight monitoring are important for maintaining a healthy lifestyle You may be retaining fluid if you have a history of heart failure or if you experience any of the following symptoms:  Weight gain of 3 pounds or more overnight or 5 pounds in a week, increased swelling in our hands or feet or shortness of breath while lying flat in bed. Please call your doctor as soon as you notice any of these symptoms; do not wait until your next office visit. Recognize signs and symptoms of STROKE: 
 
F-face looks uneven A-arms unable to move or move unevenly S-speech slurred or non-existent T-time-call 911 as soon as signs and symptoms begin-DO NOT go Back to bed or wait to see if you get better-TIME IS BRAIN. Warning Signs of HEART ATTACK Call 911 if you have these symptoms: 
? Chest discomfort. Most heart attacks involve discomfort in the center of the chest that lasts more than a few minutes, or that goes away and comes back. It can feel like uncomfortable pressure, squeezing, fullness, or pain. ? Discomfort in other areas of the upper body. Symptoms can include pain or discomfort in one or both arms, the back, neck, jaw, or stomach. ? Shortness of breath with or without chest discomfort. ? Other signs may include breaking out in a cold sweat, nausea, or lightheadedness. Don't wait more than five minutes to call [address]! Fast action can save your life. Calling 911 is almost always the fastest way to get lifesaving treatment. Emergency Medical Services staff can begin treatment when they arrive  up to an hour sooner than if someone gets to the hospital by car. The discharge information has been reviewed with the patient. The patient verbalized understanding. Discharge medications reviewed with the patient and appropriate educational materials and side effects teaching were provided. ___________________________________________________________________________________________________________________________________ MyChart Announcement We are excited to announce that we are making your provider's discharge notes available to you in Colppy. You will see these notes when they are completed and signed by the physician that discharged you from your recent hospital stay. If you have any questions or concerns about any information you see in [facility], please call the Health Information Department where you were seen or reach out to your Primary Care Provider for more information about your plan of care. Introducing [facility]! Romina Horton introduces Colppy patient portal. Now you can access parts of your medical record, email your doctor's office, and request medication refills online. 1. In your internet browser, go to [email or web address] 2. Click on the First Time User? Click Here link in the Sign In box. You will see the New Member Sign Up page. 3. Enter your Tiltt Access Code exactly as it appears below. You will not need to use this code after youve completed the sign-up process. If you do not sign up before the expiration date, you must request a new code. · Colppy Access Code: [email or web address] Expires: 6/6/2018  5:09 PM 
 
4. Enter the last four digits of your Social Security Number (xxxx) and Date of Birth (mm/dd/yyyy) as indicated and click Submit. You will be taken to the next sign-up page. 5. Create a Tiltt ID. This will be your Colppy login ID and cannot be changed, so think of one that is secure and easy to remember. 6. Create a Tiltt password. You can change your password at any time. 7. Enter your Password Reset Question and Answer. This can be used at a later time if you forget your password. 8. Enter your e-mail address. You will receive e-mail notification when new information is available in [address] 9. Click Sign Up. You can now view and download portions of your medical record. 10. Click the Download Summary menu link to download a portable copy of your medical information. If you have questions, please visit the Frequently Asked Questions section of the [employer] website. Remember, [employer] is NOT to be used for urgent needs. For medical emergencies, dial 911. Now available from your iPhone and Android! Providers Seen During Your Hospitalization Provider Specialty Primary office phone Azalia Eli MD Emergency Medicine [phone] Farzaneh Selby MD Internal Medicine [phone] Mere Fleming MD Internal Medicine [phone] Adelia Gabriel DO Internal Medicine [phone] Your Primary Care Physician (PCP) Primary Care Physician Office Phone Office Fax Erasmo Fisher, [address] [place] [address] Street [phone] You are allergic to the following Allergen Reactions Codeine Rash Itching Iodine Hives Rash Morphine Hives Pcn (Penicillins) Other (comments) Causes \"Heart swell\" Sulfa (Sulfonamide Antibiotics) Nausea Only Recent Documentation Height Weight Breastfeeding? BMI OB Status Smoking Status 1.651 m 90.7 kg No 33.28 kg/m2 Hysterectomy Never Smoker Emergency Contacts Name Discharge Info Relation Home Work Mobile Abimbola Greer DISCHARGE CAREGIVER [3] Daughter [21] [phone] CarlosFritzkatherynjoshua DISCHARGE CAREGIVER [3] Daughter [21] [phone] Gianna Gonzalez  Child [2] [phone] Patient Belongings The following personal items are in your possession at time of discharge: 
  Dental Appliances: None  Visual Aid: None      Home Medications: None   Jewelry: Ring, With patient  Clothing: Pajamas    Other Valuables: Cell Phone, Purse, With patient, Money (comment)  Personal Items Sent to Safe: none Please provide this summary of care documentation to your next provider. Signatures-by signing, you are acknowledging that this After Visit Summary has been reviewed with you and you have received a copy. Patient Signature:  ____________________________________________________________ Date:  ____________________________________________________________  
  
Marlyse Leaks Provider Signature:  ____________________________________________________________ Date:  ____________________________________________________________

## 2018-03-21 NOTE — ED NOTES
Assumed care of Pt from [facility].  No questions or concerns after report

## 2018-03-21 NOTE — ED NOTES
Per EMS patient fell around 0800 this morning and called [phone] for assistance off the floor. Pt last normal time was around 0800. Pt called again because caregivers report increased slurred speech and patient reports \"burning\" to the left lower side of her face and trouble keeping the left eye open.

## 2018-03-22 ENCOUNTER — APPOINTMENT (OUTPATIENT)
Dept: MRI IMAGING | Age: 73
DRG: 069 | End: 2018-03-22
Attending: HOSPITALIST
Payer: MEDICARE

## 2018-03-22 LAB
ATRIAL RATE: 65 BPM
CALCULATED P AXIS, ECG09: 39 DEGREES
CALCULATED R AXIS, ECG10: 11 DEGREES
CALCULATED T AXIS, ECG11: 35 DEGREES
CRP SERPL HS-MCNC: 5.09 MG/L (ref 0–3)
DIAGNOSIS, 93000: NORMAL
GLUCOSE BLD STRIP.AUTO-MCNC: 183 MG/DL (ref 70–110)
GLUCOSE BLD STRIP.AUTO-MCNC: 204 MG/DL (ref 70–110)
GLUCOSE BLD STRIP.AUTO-MCNC: 205 MG/DL (ref 70–110)
GLUCOSE BLD STRIP.AUTO-MCNC: 237 MG/DL (ref 70–110)
P-R INTERVAL, ECG05: 166 MS
Q-T INTERVAL, ECG07: 440 MS
QRS DURATION, ECG06: 72 MS
QTC CALCULATION (BEZET), ECG08: 457 MS
VENTRICULAR RATE, ECG03: 65 BPM

## 2018-03-22 PROCEDURE — 92526 ORAL FUNCTION THERAPY: CPT

## 2018-03-22 PROCEDURE — 74011250636 HC RX REV CODE- 250/636: Performed by: NURSE PRACTITIONER

## 2018-03-22 PROCEDURE — 74011250637 HC RX REV CODE- 250/637: Performed by: NURSE PRACTITIONER

## 2018-03-22 PROCEDURE — 97162 PT EVAL MOD COMPLEX 30 MIN: CPT

## 2018-03-22 PROCEDURE — 74011636637 HC RX REV CODE- 636/637: Performed by: HOSPITALIST

## 2018-03-22 PROCEDURE — 82962 GLUCOSE BLOOD TEST: CPT

## 2018-03-22 PROCEDURE — 70551 MRI BRAIN STEM W/O DYE: CPT

## 2018-03-22 PROCEDURE — 65660000000 HC RM CCU STEPDOWN

## 2018-03-22 PROCEDURE — 92610 EVALUATE SWALLOWING FUNCTION: CPT

## 2018-03-22 PROCEDURE — 97530 THERAPEUTIC ACTIVITIES: CPT

## 2018-03-22 PROCEDURE — 74011250637 HC RX REV CODE- 250/637: Performed by: HOSPITALIST

## 2018-03-22 PROCEDURE — 77030032490 HC SLV COMPR SCD KNE COVD -B

## 2018-03-22 PROCEDURE — 93306 TTE W/DOPPLER COMPLETE: CPT

## 2018-03-22 RX ORDER — MAGNESIUM SULFATE 100 %
16 CRYSTALS MISCELLANEOUS AS NEEDED
Status: DISCONTINUED | OUTPATIENT
Start: 2018-03-22 | End: 2018-03-24 | Stop reason: HOSPADM

## 2018-03-22 RX ORDER — LORAZEPAM 1 MG/1
1 TABLET ORAL ONCE
Status: COMPLETED | OUTPATIENT
Start: 2018-03-22 | End: 2018-03-22

## 2018-03-22 RX ORDER — DEXTROSE MONOHYDRATE 25 G/50ML
25-50 INJECTION, SOLUTION INTRAVENOUS AS NEEDED
Status: DISCONTINUED | OUTPATIENT
Start: 2018-03-22 | End: 2018-03-22 | Stop reason: SDUPTHER

## 2018-03-22 RX ORDER — INSULIN GLARGINE 100 [IU]/ML
15 INJECTION, SOLUTION SUBCUTANEOUS
Status: DISCONTINUED | OUTPATIENT
Start: 2018-03-22 | End: 2018-03-24 | Stop reason: HOSPADM

## 2018-03-22 RX ORDER — INSULIN LISPRO 100 [IU]/ML
INJECTION, SOLUTION INTRAVENOUS; SUBCUTANEOUS
Status: DISCONTINUED | OUTPATIENT
Start: 2018-03-22 | End: 2018-03-24 | Stop reason: HOSPADM

## 2018-03-22 RX ORDER — LORAZEPAM 2 MG/ML
2 INJECTION INTRAMUSCULAR ONCE
Status: COMPLETED | OUTPATIENT
Start: 2018-03-22 | End: 2018-03-22

## 2018-03-22 RX ORDER — ASPIRIN 325 MG/1
100 TABLET, FILM COATED ORAL DAILY
Status: DISCONTINUED | OUTPATIENT
Start: 2018-03-22 | End: 2018-03-24 | Stop reason: HOSPADM

## 2018-03-22 RX ORDER — FOLIC ACID 1 MG/1
1 TABLET ORAL DAILY
Status: DISCONTINUED | OUTPATIENT
Start: 2018-03-22 | End: 2018-03-24 | Stop reason: HOSPADM

## 2018-03-22 RX ORDER — LORAZEPAM 1 MG/1
2 TABLET ORAL ONCE
Status: DISCONTINUED | OUTPATIENT
Start: 2018-03-22 | End: 2018-03-22

## 2018-03-22 RX ADMIN — INSULIN GLARGINE 15 UNITS: 100 INJECTION, SOLUTION SUBCUTANEOUS at 16:30

## 2018-03-22 RX ADMIN — ATORVASTATIN CALCIUM 80 MG: 40 TABLET, FILM COATED ORAL at 22:17

## 2018-03-22 RX ADMIN — OXYCODONE HYDROCHLORIDE AND ACETAMINOPHEN 1 TABLET: 10; 325 TABLET ORAL at 14:28

## 2018-03-22 RX ADMIN — INSULIN LISPRO 6 UNITS: 100 INJECTION, SOLUTION INTRAVENOUS; SUBCUTANEOUS at 22:17

## 2018-03-22 RX ADMIN — LORAZEPAM 1 MG: 1 TABLET ORAL at 10:04

## 2018-03-22 RX ADMIN — FOLIC ACID 1 MG: 1 TABLET ORAL at 11:55

## 2018-03-22 RX ADMIN — MIRTAZAPINE 15 MG: 15 TABLET, FILM COATED ORAL at 22:17

## 2018-03-22 RX ADMIN — INSULIN LISPRO 6 UNITS: 100 INJECTION, SOLUTION INTRAVENOUS; SUBCUTANEOUS at 09:46

## 2018-03-22 RX ADMIN — LORAZEPAM 2 MG: 2 INJECTION INTRAMUSCULAR; INTRAVENOUS at 17:09

## 2018-03-22 RX ADMIN — HEPARIN SODIUM 5000 UNITS: 5000 INJECTION, SOLUTION INTRAVENOUS; SUBCUTANEOUS at 18:55

## 2018-03-22 RX ADMIN — VENLAFAXINE HYDROCHLORIDE 150 MG: 75 CAPSULE, EXTENDED RELEASE ORAL at 09:44

## 2018-03-22 RX ADMIN — Medication 100 MG: at 11:55

## 2018-03-22 RX ADMIN — OXYCODONE HYDROCHLORIDE AND ACETAMINOPHEN 1 TABLET: 10; 325 TABLET ORAL at 03:38

## 2018-03-22 RX ADMIN — DOCUSATE SODIUM AND SENNOSIDES 2 TABLET: 8.6; 5 TABLET, FILM COATED ORAL at 22:17

## 2018-03-22 RX ADMIN — INSULIN LISPRO 6 UNITS: 100 INJECTION, SOLUTION INTRAVENOUS; SUBCUTANEOUS at 18:54

## 2018-03-22 RX ADMIN — ASPIRIN 325 MG ORAL TABLET 325 MG: 325 PILL ORAL at 09:44

## 2018-03-22 RX ADMIN — HEPARIN SODIUM 5000 UNITS: 5000 INJECTION, SOLUTION INTRAVENOUS; SUBCUTANEOUS at 01:30

## 2018-03-22 RX ADMIN — OXYCODONE HYDROCHLORIDE AND ACETAMINOPHEN 1 TABLET: 10; 325 TABLET ORAL at 22:18

## 2018-03-22 RX ADMIN — INSULIN LISPRO 3 UNITS: 100 INJECTION, SOLUTION INTRAVENOUS; SUBCUTANEOUS at 13:38

## 2018-03-22 RX ADMIN — HEPARIN SODIUM 5000 UNITS: 5000 INJECTION, SOLUTION INTRAVENOUS; SUBCUTANEOUS at 09:47

## 2018-03-22 NOTE — PROGRESS NOTES
OT order received and chart reviewed. Multiple attempts for OT evaluation. 1027: 1st attempt; pt off floor for MRI. 1137: 2nd attempt; harpist present. 1400: NP present    Will follow up.     Buddy Tilley MS OTR/L  Office Ext: [phone]  Pager: [phone]

## 2018-03-22 NOTE — PROGRESS NOTES
Problem: Dysphagia (Adult)  Goal: *Acute Goals and Plan of Care (Insert Text)  Recommendations:  Diet: soft/thin  Meds: one at a time  Aspiration Precautions  Oral Care TID    Goals:  Patient will:  1. Tolerate PO trials with 0 s/s overt distress in 4/5 trials  2. Utilize compensatory swallow strategies/maneuvers (decrease bite/sip, size/rate, alt. liq/sol) with min cues in 4/5 trials  3. Complete an objective swallow study (i.e., MBSS) to assess swallow integrity, r/o aspiration, and determine of safest LRD, min A      Outcome: Progressing Towards Goal    Speech LAnguage Pathology bedside swallow   evaluation & TREATMENT     Patient: Zachary Khan (1 y.o. female)  Date: 3/22/2018  Primary Diagnosis: Stroke [facility])        Precautions: aspiration  Skin  PLOF: lives alone; dtr nearby for assistance with daily activities. ASSESSMENT :  Based on the objective data described below, the patient presents with mild oropharyngeal dysphagia in the setting of stroke. Dtr endorsing regular diet at home. Pt A&Ox4; mild dysarthria noted. Oral-motor exam revealed pt edentulous; however, all other structures grossly intact for mastication and deglutition. Accepted SLP-fed thin liquids + straw without aspiration s/s. Pudding and cracker without aspiration; however, with with significantly delayed oral bolus prep and transit. Lingual residue noted after swallow. Decreased hyolaryngeal excursion to palpation; although airway protection fair. At this time, pt safest for soft diet/thin liquids. SLP will follow 1-2 visits for diet tolerance and advancement as indicated. Skilled therapy initiated with cues for increasing rotary manipulation and liquid wash to clear residue. Educated pt/dtr on aspiration precautions and importance of compensatory swallow techniques to decrease aspiration risk (decrease rate of intake & sip/bite size, upright @HOB for all po intake and ~30 minutes after po); verbalized comprehension.     Patient will benefit from skilled intervention to address the above impairments. Patients rehabilitation potential is considered to be Fair  Factors which may influence rehabilitation potential include:   []            None noted  []            Mental ability/status  [x]            Medical condition  []            Home/family situation and support systems  []            Safety awareness  []            Pain tolerance/management  []            Other:      PLAN :  Recommendations and Planned Interventions:   soft diet/thin liquids  Frequency/Duration: Patient will be followed by speech-language pathology 1-2 visits to address goals. Discharge Recommendations: To Be Determined     SUBJECTIVE:   Patient stated Samantha Slimmer.     OBJECTIVE:     Past Medical History:   Diagnosis Date    Anxiety     Depression     Diabetes ([facility] [address]     Diverticulosis     Dysphagia     Early satiety     Fatty liver     Gastric ulcer     Gastroparesis     Heart disease     Hypercholesteremia     Hypertension     Neurogenic bladder     Scoliosis     Stenosis of lumbosacral spine     Tardive dyskinesia     Urgency of urination      Past Surgical History:   Procedure Laterality Date    HX APPENDECTOMY      HX BREAST LUMPECTOMY      HX CHOLECYSTECTOMY  1980    HX COLECTOMY  2012    HX HERNIA REPAIR      HX HYSTERECTOMY  1970    HX KNEE ARTHROSCOPY      HX KNEE REPLACEMENT  1991    left knee    HX LUMBAR FUSION      x 3     Prior Level of Function/Home Situation: lives alone  Home Situation  Home Environment: Private residence  Wheelchair Ramp: Yes  One/Two Story Residence: One story  Living Alone: Yes  Support Systems: Home care staff, Family member(s)  Patient Expects to be Discharged to[de-identified] Private residence  Current DME Used/Available at Home: Walker, rolling, Wheelchair, Commode, bedside  Diet prior to admission: regular/thin  Current Diet:  Soft/thin   Cognitive and Communication Status:  Neurologic State: Alert  Orientation Level: Oriented X4  Cognition: Follows commands  Perception: Appears intact  Perseveration: No perseveration noted  Safety/Judgement: Fall prevention  Oral Assessment:  Oral Assessment  Labial: Decreased rate  Dentition: Edentulous  Oral Hygiene: fair  Lingual: Decreased rate  Velum: No impairment  Mandible: No impairment  P.O. Trials:  Patient Position: HOB 45  Vocal quality prior to P.O.: Low volume  Consistency Presented: Thin liquid;Pudding; Solid  How Presented: SLP-fed/presented;Straw;Successive swallows     Bolus Acceptance: No impairment  Bolus Formation/Control: Impaired  Type of Impairment: Delayed;Mastication  Propulsion: Delayed (# of seconds)  Oral Residue: Less than 10% of bolus; Lingual  Initiation of Swallow: Delayed (# of seconds)  Laryngeal Elevation: Functional  Aspiration Signs/Symptoms: None  Pharyngeal Phase Characteristics: Poor endurance  Effective Modifications: Small sips and bites  Cues for Modifications: Minimal-moderate       Oral Phase Severity: Mild  Pharyngeal Phase Severity : Mild    GCODESwallowing:  Swallow Current Status CJ= 20-39%   Swallow Goal Status CI= 1-19%    The severity rating is based on the following outcomes:  MIREILLE Noms Swallow Level 5    Clinical Judgement    PAIN:  Start of Eval/Tx: 0  End of Eval/Tx: 0     After treatment:   []            Patient left in no apparent distress sitting up in chair  [x]            Patient left in no apparent distress in bed  [x]            Call bell left within reach  [x]            Nursing notified  [x]            Family present  []            Caregiver present  []            Bed alarm activated    COMMUNICATION/EDUCATION:   [x]            Aspiration precautions; swallow safety; compensatory techniques. [x]            Patient/family have participated as able in goal setting and plan of care. [x]            Patient/family agree to work toward stated goals and plan of care.   []            Patient understands intent and goals of therapy; neutral about participation. []            Patient unable to participate in goal setting/plan of care; educ ongoing with interdisciplinary staff  []         Posted safety precautions in patient's room.     Thank you for this referral.  ROSSY Tian  Time Calculation: 20 mins  Evaluation Time: 12 minutes   Treatment Time: 8 minutes

## 2018-03-22 NOTE — PROGRESS NOTES
Assumed care of patient from Katina Baker RN (offgoing nurse). Patient is awake in bed, a&o x4, with no complaints at this time. Dual NIH completed. Bed locked and in lowest position with call bell in reach. Patient encouraged to call for assistance. 1115- Patient returned from MRI and was unable to complete d/t anxiety. Samantha Mcginnis NP paged to make aware. 1700- Received call from MRI they will send for patient. Will premedicate patient with Ativan 2 mg IV. 4908- Received report from Radiologist that patient had no acute stroke on MRI. Dr. Anais Sanchez paged. 1930- Bedside and Verbal shift change report given to Drew Encarnacion RN  (oncoming nurse) by Mo Aguilar (offgoing nurse). Report included the following information SBAR, Kardex, Intake/Output, MAR and Recent Results. 2025- Dr. Shaquille Gray paged to inform of MRI results. Returned page immediately and informed of results. No orders received. RBV.

## 2018-03-22 NOTE — PROGRESS NOTES
Patient has designated _____________dtr___________ to participate in his/her discharge plan and to receive any needed information.      Name: Brittani Conn  Address:  Phone number:[phone]

## 2018-03-22 NOTE — ROUTINE PROCESS
Plan:  To promote spirituality. Implementation:  Provided live bedside harp music, hymns per patient request.  Evaluation:  Patient smiling during music, states \"I knew them all\".

## 2018-03-22 NOTE — PROGRESS NOTES
[place]   Discharge Planning/ Assessment    Reasons for Intervention: Spoke with pt, lives alone. Has personal care from [employer]. has 4 hrs in am and 4 hrs in pm. dtr also assists. Demographics correct. pcp dr Augusto Knott. [facility] insurance. Gets help with adls from personal care. Has had hh in past but not now. Has been to [facility] for snf last yr. Agrees to snf there if needed. Posted in Gust. Plan snf.     High Risk Criteria  [x] Yes  []No   Physician Referral  [] Yes  []No        Date    Nursing Referral  [] Yes  []No        Date    Patient/Family Request  [] Yes  []No        Date       Resources:    Medicare  [x] Yes  []No   Medicaid  [x] Yes  []No   No Resources  [] Yes  []No   Private Insurance  [] Yes  []No    Name/Phone Number    Other  [] Yes  []No        (i.e. Workman's Comp)         Prior Services:    Prior Services  [x] Yes  []No   Home Health  [x] Yes  []No   [address]  [x] Yes  []No        Number of [address]  [] Yes  []No       Meals on Wheels  [] Yes  []No   Office on Aging  [] Yes  []No   Transportation Services  [x] Yes  []No   Nursing Home  [] Yes  []No        Nursing Home Name    [address]  [x] Yes  []No        [address] Name    Other       Information Source:      Information obtained from  [x] Patient  [] Parent   [] Guardian  [x] Child  [] Spouse   [] Significant Other/Partner   [] Friend      [] EMS    [] Nursing Home Chart          [] Other:   Chart Review  [x] Yes  []No     Family/Support System:    Patient lives with  [x] Alone    [] Spouse   [] Significant Other  [] Children  [] Caretaker   [] Parent  [] Sibling     [] Other       Other Support System:    Is the patient responsible for care of others  [] Yes  [x]No   Information of person caring for patient on  discharge    Managers financial affairs independently  [x] Yes  []No   If no, explain:      Status Prior to Admission:    Mental Status  [] Awake  [] Alert  [x] Oriented  [] Quiet/Calm [] Lethargic/Sedated   [] Disoriented  [] Restless/Anxious  [] Combative   Personal Care  [] Dependent  [] Independent Personal Care  [x] Requires Assistance   Meal Preparation Ability  [] Independent   [] Standby Assistance   [] Minimal Assistance   [] Moderate Assistance  [] Maximum Assistance     [x] Total Assistance   Chores  [] Independent with Chores   [] N/A Nursing Home Resident   [x] Requires Assistance   Bowel/Bladder  [] Continent  [] Catheter  [] Incontinent  [] Ostomy Self-Care    [] Urine Diversion Self-Care  [] Maximum Assistance     [] Total Assistance   Number of Persons needed for assistance    DME at home  [] [address], [place]  [] [address], Straight   [] Commode    [] Bathroom/Grab Bars  [] Hospital Bed  [] Nebulizer  [] Oxygen           [] Raised Toilet Seat  [] Shower Chair  [] Side Rails for Bed   [] Tub Transfer Bench   [] Palma Lacy  [] Ana Saab, Standard      [] Other:   Vendor      Treatment Presently Receiving:    Current Treatments  [] Chemotherapy  [] Dialysis  [] Insulin  [] IVAB [x] IVF   [x] O2  [] PCA   [] PT   [] RT   [] Tube Feedings   [] Wound Care     Psychosocial Evaluation:    Verbalized Knowledge of Disease Process  [x] Patient  [x]Family   Coping with Disease Process  [] Patient  []Family   Requires Further Counseling Coping with Disease Process  [] Patient  []Family     Identified Projected Needs:    Home Health Aid  [] Yes  []No   Transportation  [x] Yes  []No   Education  [] Yes  []No        Specific Education     Financial Counseling  [] Yes  []No   Inability to Care for Self/Will Require 24 hour care  [x] Yes  []No   Pain Management  [] Yes  []No   Home Infusion Therapy  [] Yes  []No   Oxygen Therapy  [] Yes  []No   DME  [] Yes  []No   Long Term Care Placement  [] Yes  []No   Rehab  [x] Yes  []No   Physical Therapy  [] Yes  []No   Needs Anticipated At This Time  [x] Yes  []No     Intra-Hospital Referral:    [facility]  [] Yes  [x]No     [] Yes  [x]No   Patient Representative  [] Yes  [x]No   Staff for Teaching Needs  [] Yes  [x]No   Specialty Teaching Needs     Diabetic Educator  [] Yes  [x]No   Referral for Diabetic Educator Needed  [] Yes  [x]No  If Yes, place order for Nutritionist or Diabetic Consult     Tentative Discharge Plan:    Home with No Services  [] Yes  []No   Home with [address]  [] Yes  []No        If Yes, specify type    Home Care Program  [] Yes  []No        If Yes, specify type    Meals on Wheels  [] Yes  []No   Office of Aging  [] Yes  []No   NHP  [] Yes  []No   Return to the Nursing Home  [] Yes  []No   Rehab Therapy  [] Yes  []No   Acute Rehab  [] Yes  []No   Subacute Rehab  [] Yes  []No   Private Care  [] Yes  []No   Substance Abuse Referral  [] Yes  []No   Transportation  [] Yes  []No   Chore Service  [] Yes  []No   Inpatient Hospice  [] Yes  []No   OP RT  [] Yes  [] No   OP Hemo  [] Yes  [] No   OP PT  [] Yes  []No   Support Group  [] Yes  []No   Reach to Recovery  [] Yes  []No   OP Oncology Clinic  [] Yes  []No   Clinic Appointment  [] Yes  []No   DME  [] Yes  []No   Comments    Name of D/C Planner or  Given to Patient or Family Desire sam rn cm   Phone Number [phone]        Extension    Date 3/22/18   Time    If you are discharged home, whom do you designate to participate in your discharge plan and receive any information needed?      Enter name of designee         Phone # of designee         Address of designee         Updated         Patient refused to designate any           individual

## 2018-03-22 NOTE — DIABETES MGMT
NUTRITIONAL ASSESSMENT GLYCEMIC CONTROL/ PLAN OF CARE     Shashi Brothers           74 y.o.           3/21/2018                 1. Slurred speech    2. Acute CVA (cerebrovascular accident) ([address]    3. Weakness of left lower extremity    4. Unwitnessed fall         INTERVENTIONS/PLAN:   1. Suggest increasing Levemir to 15 units/day  2. Will add No Added Sugar [place] Instant Breakfast TID (vanilla or stw)  3. Implement preferences  4. Monitor labs, po intake, weights. ASSESSMENT:   Nutritional Status: pt is 160% ideal wt; BMI (calculated): 33.3 kg/m2 (obese weight classification). Pt appears well nourished. Sub optimal po intake at this time per dtr. Nutrition Diagnoses:   Inadequate oral food and beverage intake due to poor appetite as evidenced by daughter reporting pt took <50% lunch 3/22/18. Altered nutrition related labs due to diabetes as evidenced by A1C of 8.4%. Difficulty swallowing due to dysphagia is setting of stroke as evidenced by SLP assessment-diet order including soft solids. Pt and dtr report pt has a personal care person who comes to pt's home daily for 4 hours in the AM and 4 hours later in the day. She has meals delivered by a new company that is [employer] [facility] [employer] Pt presented to the ED via EMS on 3/21/2018 with dysarthria and has history of CVA (residual deficits slurred speech), DM, HTN and HLD. Diabetes Management:   Pt with elevated BG readings and may benefit from increase of basal insulin. Pt states she checks her own BG at home and manages and takes her medications on her own.     Recent blood glucose:     3/22/18:  237, 183  3/21/18:   196, 297, 310 - pt received 20 units insulin (10 units Lantus and 10 units corrective lispro)  Within target range (non-ICU: <140; ICU<180): [] Yes   []  No    Current Insulin regimen:   Lantus 10 units/day  Corrective lispro very insulin resistant dosing ACHS  Home medication/insulin regimen: Amaryl 1 mg every breakfast  HbA1c:  8.4% - ave BG has been ~ 194 mg/dL over past 3 months. Adequate glycemic control PTA:  [x] Yes, considering pt's age and co-morbidities  [] No       SUBJECTIVE/OBJECTIVE:   Information obtained from: pt, dtr, chart review  Pt reports her appetite is \"not so good\". She denies N/V. States she is allergic to tomatoes but her dtr states she can eat them along with other tomato based products. Pt states her weight was stable PTA. Pt admitted with     Diet: consistent CHO, soft solids    No data found. Medications: [x]                Reviewed   Pertinent:  Lipitor 80 mg/d, Thiamine, Folvite    Most Recent POC Glucose:   Recent Labs      03/21/18   1130   GLU  215*         Labs:   Lab Results   Component Value Date/Time    Hemoglobin A1c 8.4 (H) 03/21/2018 04:41 PM     Lab Results   Component Value Date/Time    Sodium 142 03/21/2018 11:30 AM    Potassium 4.0 03/21/2018 11:30 AM    Chloride 108 03/21/2018 11:30 AM    CO2 29 03/21/2018 11:30 AM    Anion gap 5 03/21/2018 11:30 AM    Glucose 215 (H) 03/21/2018 11:30 AM    BUN 18 03/21/2018 11:30 AM    Creatinine 0.89 03/21/2018 11:30 AM    Calcium 9.0 03/21/2018 11:30 AM    Magnesium 1.8 09/16/2016 10:34 AM    Phosphorus 2.9 07/27/2011 04:26 AM    Albumin 3.4 03/21/2018 11:30 AM       Anthropometrics: IBW : 56.7 kg (125 lb), % IBW (Calculated): 160 %, BMI (calculated): 33.3  Wt Readings from Last 1 Encounters:   03/21/18 90.7 kg (200 lb)      Ht Readings from Last 1 Encounters:   03/21/18 5' 5\" (1.651 m)       Estimated Nutrition Needs:  1688 Kcals/day, Protein (g): 74 g Fluid (ml): 1700 ml  Based on:   [x]          Actual BW    []          ABW   []            Adjusted BW        Nutrition Interventions:  No Added Sugar [place] Instant Breakfast TID- vanilla or strawberry  Implement preferences  Goal:   Blood glucose will be within target range of  mg/dL by 3/225/18. Patient will consume >75% meals by 3/27/18.         Nutrition Monitoring and Evaluation      [x]     Monitor po intake on meal rounds  [x]     Continue inpatient monitoring and intervention  []     Other:      Nutrition Risk:  []   High     [x]  Moderate    []  Minimal/Uncompromised    Ho Tubbs RD, CDE   Office:  [address] [place] [address] Pager:  [phone]

## 2018-03-22 NOTE — PROGRESS NOTES
Problem: Falls - Risk of  Goal: *Absence of Falls  Document Liz Fall Risk and appropriate interventions in the flowsheet.    Outcome: Progressing Towards Goal  Fall Risk Interventions:            Medication Interventions: Evaluate medications/consider consulting pharmacy, Patient to call before getting OOB    Elimination Interventions: Call light in reach, Patient to call for help with toileting needs

## 2018-03-22 NOTE — PROGRESS NOTES
Assumed pt care from [facility]. Pt in bed awake resting family at bedside. Pt denies pain at the moment . Assessment completed, dual NIH done no changes noted. Plan of care for the shift explained pt verbalizes understanding. HOB elevated, bed low and in locked position call light within reach. Will continue with the care. 4283- Pt in bed sleeping, family at bedside sleeping no concerns voiced. 0730- Bedside and Verbal shift change report given to Alee Johnson RN (oncoming nurse) by Margo Bocanegra RN (offgoing nurse). Report included the following information SBAR, Kardex, Intake/Output, MAR and Recent Results.

## 2018-03-22 NOTE — PROGRESS NOTES
[facility]  Hospitalist Division        Inpatient Daily Progress Note    Daily progress Note    Patient: Sebastian Waite MRN: [id number]  CSN: [id number]    YOB: 1945  Age: 74 y.o. Sex: female    DOA: 3/21/2018 LOS:  LOS: 1 day                    Chief Complaint:  Stroke    Interval History: 73 y/o [place] American female with hx of CVA (residual deficits slurred speech), DM, HTN and HLD presented to the ED via EMS on 3/21/2018 with dysarthria. Pt's home health nurse stated pt fell earlier that morning and then took a nap. Caregiver noticed increased slurred speech from baseline with complaints of burning sensation to the left side of the face. Pt uses a walker to assist with ambulation at home. Code S activated in the ED. Pt reports improvement of symptoms while in the ED. Labs revealed Sed rate of 44, negative UA, A1C of 8.4%, UDS + benzodiazepines, negative CXR, CT head negative for acute intracranial hemorrhage, mass effect, midline shift or herniation, note chronic small vessel changes. Tele neurology consulted in the ED- pt declined tPA. CTA head/neck without significant carotid or vertebral stenosis, short segments of incidental FMD involving bilateral cervical ICAs, suggestion of segmental multifocal areas of stenosis involving left sylvian fissure M3 branch, with overall diffuse somewhat artifactual appearing cerebral arteries as above, no definite area of thromboembolism, several 3 mm apical nodules nonspecific.         Assessment/Plan:     Patient Active Problem List   Diagnosis Code    Small bowel obstruction K56.609    HTN (hypertension) I10    DMII (diabetes mellitus, type 2) (HCC) E11.9    UTI (lower urinary tract infection) N39.0    Depression F32.9    Scoliosis M41.9    Stenosis of lumbosacral spine M48.07    Gastroparesis K31.84    Gastric ulcer K25.9    Tardive dyskinesia G24.01    Hypercholesteremia E78.00    Diverticulosis K57.90    Early satiety R68.81    Dysphagia R13.10    Heart disease I51.9    Anxiety F41.9    Fatty liver K76.0    SBO (small bowel obstruction) K56.609    Acute abdominal pain R10.9    Sepsis (HCC) A41.9    Lumbar radiculopathy M54.16    Urgency of urination R39.15    Neurogenic bladder N31.9    Stroke (HCC) I63.9    History of stroke Z86.73       A/P:  Dysarthria / CVA?  - hx of CVA with slurred speech  - Code S activated in the ED, tele-neurology consulted- pt declined tPA  - CT head 3/21/2018 negative for acute intracranial hemorrhage, mass effect, midline shift or herniation, note chronic small vessel changes  - CTA head/neck 3/21/2018 without significant carotid or vertebral stenosis, short segments of incidental FMD involving bilateral cervical ICAs, suggestion of segmental multifocal areas of stenosis involving left sylvian fissure M3 branch, with overall diffuse somewhat artifactual appearing cerebral arteries as above, no definite area of thromboembolism, several 3 mm apical nodules nonspecific  - Neuro consult  - MRI brain pending- pt has hx of claustrophobia- received Ativan 1 mg but still unable to complete MRI  - Echo pending  - Neuro checks, NIH  - Folic Acid, Thiamine   -  mg daily, Lipitor 80 mg nightly  - PT/OT, speech    Hypertension  - permissible HTN for 24-48 hours  - monitor    Diabetes  - A1C 8.4% this admission  - monitor accuchecks, correctional SSI, lantus 10 units nightly    PT/OT following  - recommend rehab, pt already has rolling walker    DVT Prophylaxis  - Heparin subcutaneously TID  - SCD's BLE        MIRZA Harmon  [facility]  Pager:  [phone]  Office:  [phone]        Subjective:      No events overnight. Daughter at bedside. Pt feels that her speech is improving but not back to baseline. Denies headache, vision changes, weakness. Worked with PT and speech, OT eval pending.     Objective:      Visit Vitals    /79 (BP 1 Location: Left arm, BP Patient Position: At rest)    Pulse 69    Temp 98.2 °F (36.8 °C)    Resp 15    Ht 5' 5\" (1.651 m)    Wt 90.7 kg (200 lb)    SpO2 95%    Breastfeeding No    BMI 33.28 kg/m2           Physical Exam:  General appearance: alert, cooperative, no distress, appears stated age  Head: Normocephalic, without obvious abnormality, atraumatic  Lungs: clear to auscultation bilaterally  Heart: regular rate and rhythm, S1, S2 normal, no murmur, click, rub or gallop  Abdomen: soft, non tender, non distended. Normoactive bowel sounds.    Extremities: extremities normal, atraumatic, no cyanosis or edema  Skin: Skin color, texture, turgor normal. No rashes or lesions  Neurologic: hx of CVA, A/O x 3, follows commands  PSY: Mood and affect normal, appropriately behaved        Intake and Output:  Current Shift:     Last three shifts:       Recent Results (from the past 24 hour(s))   GLUCOSE, POC    Collection Time: 03/21/18 11:24 AM   Result Value Ref Range    Glucose (POC) 196 (H) 70 - 110 mg/dL   CBC W/O DIFF    Collection Time: 03/21/18 11:30 AM   Result Value Ref Range    WBC 8.9 4.6 - 13.2 K/uL    RBC 3.80 (L) 4.20 - 5.30 M/uL    HGB 10.9 (L) 12.0 - 16.0 g/dL    HCT 34.3 (L) 35.0 - 45.0 %    MCV 90.3 74.0 - 97.0 FL    MCH 28.7 24.0 - 34.0 PG    MCHC 31.8 31.0 - 37.0 g/dL    RDW 12.7 11.6 - 14.5 %    PLATELET 372 928 - 293 K/uL    MPV 12.0 (H) 9.2 - 97.5 FL   METABOLIC PANEL, COMPREHENSIVE    Collection Time: 03/21/18 11:30 AM   Result Value Ref Range    Sodium 142 136 - 145 mmol/L    Potassium 4.0 3.5 - 5.5 mmol/L    Chloride 108 100 - 108 mmol/L    CO2 29 21 - 32 mmol/L    Anion gap 5 3.0 - 18 mmol/L    Glucose 215 (H) 74 - 99 mg/dL    BUN 18 7.0 - 18 MG/DL    Creatinine 0.89 0.6 - 1.3 MG/DL    BUN/Creatinine ratio 20 12 - 20      GFR est AA >60 >60 ml/min/1.73m2    GFR est non-AA >60 >60 ml/min/1.73m2    Calcium 9.0 8.5 - 10.1 MG/DL    Bilirubin, total 0.3 0.2 - 1.0 MG/DL ALT (SGPT) 12 (L) 13 - 56 U/L    AST (SGOT) 12 (L) 15 - 37 U/L    Alk.  phosphatase 125 (H) 45 - 117 U/L    Protein, total 7.0 6.4 - 8.2 g/dL    Albumin 3.4 3.4 - 5.0 g/dL    Globulin 3.6 2.0 - 4.0 g/dL    A-G Ratio 0.9 0.8 - 1.7     PROTHROMBIN TIME + INR    Collection Time: 03/21/18 11:30 AM   Result Value Ref Range    Prothrombin time 12.9 11.5 - 15.2 sec    INR 1.0 0.8 - 1.2     THROMBIN TIME    Collection Time: 03/21/18 11:30 AM   Result Value Ref Range    Thrombin time 16.6 13.8 - 18.2 SECS   FIBRINOGEN    Collection Time: 03/21/18 11:30 AM   Result Value Ref Range    Fibrinogen 484 (H) 210 - 451 mg/dL   TYPE & SCREEN    Collection Time: 03/21/18 11:30 AM   Result Value Ref Range    Crossmatch Expiration 03/24/2018     ABO/Rh(D) Andria Hurl POSITIVE     Antibody screen NEG    ASPIRIN TEST    Collection Time: 03/21/18 11:30 AM   Result Value Ref Range    Aspirin test 532 (L) 620 - 672 ARU   EKG, 12 LEAD, INITIAL    Collection Time: 03/21/18 11:52 AM   Result Value Ref Range    Ventricular Rate 65 BPM    Atrial Rate 65 BPM    P-R Interval 166 ms    QRS Duration 72 ms    Q-T Interval 440 ms    QTC Calculation (Bezet) 457 ms    Calculated P Axis 39 degrees    Calculated R Axis 11 degrees    Calculated T Axis 35 degrees    Diagnosis       Normal sinus rhythm  T wave abnormality, consider anterior ischemia  Abnormal ECG  When compared with ECG of 02-MAR-2018 10:09,  Nonspecific T wave abnormality, improved in Inferior leads     URINALYSIS W/ RFLX MICROSCOPIC    Collection Time: 03/21/18  1:45 PM   Result Value Ref Range    Color YELLOW      Appearance CLEAR      Specific gravity 1.023 1.005 - 1.030      pH (UA) 6.5 5.0 - 8.0      Protein NEGATIVE  NEG mg/dL    Glucose NEGATIVE  NEG mg/dL    Ketone NEGATIVE  NEG mg/dL    Bilirubin NEGATIVE  NEG      Blood TRACE (A) NEG      Urobilinogen 0.2 0.2 - 1.0 EU/dL    Nitrites NEGATIVE  NEG      Leukocyte Esterase NEGATIVE  NEG     DRUG SCREEN, URINE    Collection Time: 03/21/18  1:45 PM   Result Value Ref Range    BENZODIAZEPINES POSITIVE (A) NEG      BARBITURATES NEGATIVE  NEG      THC (TH-CANNABINOL) NEGATIVE  NEG      OPIATES NEGATIVE  NEG      PCP(PHENCYCLIDINE) NEGATIVE  NEG      COCAINE NEGATIVE  NEG      AMPHETAMINES NEGATIVE  NEG      METHADONE NEGATIVE  NEG      HDSCOM (NOTE)    URINE MICROSCOPIC ONLY    Collection Time: 03/21/18  1:45 PM   Result Value Ref Range    WBC 0 to 3 0 - 4 /hpf    RBC 2 to 4 0 - 5 /hpf    Epithelial cells FEW 0 - 5 /lpf    Bacteria NEGATIVE  NEG /hpf   LIPID PANEL    Collection Time: 03/21/18  4:41 PM   Result Value Ref Range    LIPID PROFILE          Cholesterol, total 223 (H) <200 MG/DL    Triglyceride 73 <150 MG/DL    HDL Cholesterol 72 (H) 40 - 60 MG/DL    LDL, calculated 136.4 (H) 0 - 100 MG/DL    VLDL, calculated 14.6 MG/DL    CHOL/HDL Ratio 3.1 0 - 5.0     HEMOGLOBIN A1C WITH EAG    Collection Time: 03/21/18  4:41 PM   Result Value Ref Range    Hemoglobin A1c 8.4 (H) 4.2 - 5.6 %    Est. average glucose 194 mg/dL   SED RATE (ESR)    Collection Time: 03/21/18  4:41 PM   Result Value Ref Range    Sed rate, automated 44 (H) 0 - 30 mm/hr   CRP, HIGH SENSITIVITY    Collection Time: 03/21/18  4:41 PM   Result Value Ref Range    C-Reactive Protein, Cardiac 5.09 (H) 0.00 - 3.00 mg/L   TSH 3RD GENERATION    Collection Time: 03/21/18  4:41 PM   Result Value Ref Range    TSH 0.53 0.36 - 3.74 uIU/mL   T3, FREE    Collection Time: 03/21/18  4:41 PM   Result Value Ref Range    Triiodothyronine (T3), free 2.0 (L) 2.3 - 4.2 PG/ML   T4, FREE    Collection Time: 03/21/18  4:41 PM   Result Value Ref Range    T4, Free 1.1 0.7 - 1.5 NG/DL   GLUCOSE, POC    Collection Time: 03/21/18  5:13 PM   Result Value Ref Range    Glucose (POC) 297 (H) 70 - 110 mg/dL   GLUCOSE, POC    Collection Time: 03/21/18  9:42 PM   Result Value Ref Range    Glucose (POC) 310 (H) 70 - 110 mg/dL           Lab Results   Component Value Date/Time    Glucose 215 (H) 03/21/2018 11:30 AM    Glucose 201 (H) 03/02/2018 10:32 AM    Glucose 240 (H) 02/13/2018 03:48 PM    Glucose 158 (H) 11/05/2017 05:30 PM    Glucose 141 (H) 07/05/2017 11:15 AM        Imaging:  Ct Head Wo Cont    Result Date: 3/21/2018  CT Head History: Left-sided weakness, slurred speech, fall Comparison: CT head 3/2/2018 Technique: Multiple axial CT images of the head were obtained extending from the skull base to the vertex. Additional coronal and sagittal reformations were performed. One or more dose reduction techniques were used on this CT: automated exposure control, adjustment of the mAs and/or kVp according to patient's size, and iterative reconstruction techniques. The specific techniques utilized on this CT exam have been documented in the patient's electronic medical record. Findings: Motion artifact is present which considerably limits evaluation. The ventricles and sulci are normal in their size and configuration. A mild amount of hypodense white matter lesions are seen within the periventricular and subcortical white matter which are nonspecific, but likely represent chronic small vessel changes. There is no evidence of acute intracranial hemorrhage, mass effect, midline shift, or herniation. No definite CT evidence of acute cortical infarct is seen. The gray-white matter differentiation is otherwise within normal limits. The bilateral lenses are absent, likely due to prior cataract surgery. The visualized paranasal sinuses and mastoid air cells are clear. No fracture is seen. IMPRESSION: Considerably motion degraded study. 1.  No acute intracranial hemorrhage, mass effect, midline shift, or herniation. No definite CT evidence of acute cortical infarct is seen. Please note that noncontrast head CT may be normal in early acute infarct. 2. Stable, mild nonspecific white matter disease likely representing chronic small vessel changes.  CRITICAL RESULT:  These critical results on this CODE S patient were directly communicated to Dr. Hershall Seip at 11:13 AM 3/21/2018. Read back was performed. Xr Chest Port    Result Date: 3/21/2018  EXAM:  XR CHEST PORT INDICATION:   Suspected stroke. COMPARISON: Several prior exams, most recently March 2, 2018. FINDINGS: AP portable projection of the chest demonstrates mildly underexpanded lungs, without superimposed pneumonic consolidation, pneumothorax, or pleural effusion. Cardiac size and mediastinal contours are stable. Partial visualization of lower lumbar surgical instrumentation without acute osseous abnormality. IMPRESSION: Mildly underexpanded lungs without superimposed acute radiographic abnormality. Cta Head Neck W Cont    Addendum Date: 3/21/2018    Addendum: Addendum: Initial reading discussed with Dr. Hershall Seip by Dr. Lexi Mckee at 2:06 PM.    Result Date: 3/21/2018  History: Slurred speech, left lower facial pain, left side ptosis Correlation noncontrast CT head same day EXAM:  CT angiogram of the head and neck with intravenous contrast. TECHNIQUE:  Three-dimensional, maximum intensity projection, and curved planar reformations were post-processed at a dedicated outside facility ([employer]). Stenoses are reported with reference to the downstream lumen (\"NASCET\"). DOSE REDUCTION: One or more dose reduction techniques were used on this CT: automated exposure control, adjustment of the mAs and/or kVp according to patient's size, and iterative reconstruction techniques. The specific techniques utilized on this CT exam have been documented in the patient's electronic medical record. _______________ FINDINGS:      ARTERIAL BOLUS QUALITY:  Satisfactory. AORTIC ARCH:  Classic 3 vessel arch anatomy with no significant innominate or subclavian artery stenosis. Normal variant proximal subclavian origin of left side vertebral artery. RIGHT CAROTID ARTERY:  No significant common carotid or internal carotid artery stenosis, 0% diameter stenosis by NASCET criteria.  Segment of mildly deviated appearing mid cervical ICA without high-grade stenosis. LEFT CAROTID ARTERY:  No significant common carotid or internal carotid artery stenosis, 0% diameter stenosis proximal ICA by NASCET criteria. Short segment of mildly beaded appearance of mid cervical ICA without significant stenosis. VERTEBRAL ARTERIES:  Right vertebral dominant, no significant vertebral stenosis or occlusion. BASILAR AND CEREBRAL ARTERIES: No significant basilar stenosis. Patent anterior communicating artery and left posterior communicating artery. Diffuse likely artifactual multifocal pseudobeading appearance of peripheral cerebral vessels. There is more pronounced segment of multifocal stenosis involving M3 sylvian fissure branch of the superior division left M2 segment. No other definite high-grade cerebral artery stenosis. No filling defect or occlusion. No evidence of aneurysm or vascular malformation. Dural venous sinuses unremarkable. NON-ANGIOGRAPHIC FINDINGS: 3 mm diameter groundglass nodule anterior lateral right apex. Additional 3 mm subpleural nodule lateral left apex and very small questionable nodular density/scarring superior left apex. Mild biapical parenchymal and pleural scarring. Visualized superior mediastinum unremarkable. No mass or adenopathy soft tissues of neck. Degenerative cervical spondylosis with no high-grade canal stenosis or acute osseous finding. No evidence of intracranial mass or enhancing lesion or mass effect, no abnormal extra-axial fluid collection. _______________     IMPRESSION: 1. No significant carotid or vertebral stenosis. 2. Short segments of incidental FMD involving bilateral cervical ICAs. 3. Suggestion of segmental multifocal areas of stenosis involving left sylvian fissure M3 branch, with overall diffuse somewhat artifactual appearing cerebral arteries as above. No definite area of thromboembolism. 4. Several 3 mm apical nodules nonspecific.  In absence of any known primary neoplasm, follow-up recommended according to guidelines below. Fleischner Society (Chest Radiology) Pulmonary Nodule Guidelines: 1. Low smoking or other exposure risk:    < or =4 mm: No follow up necessary 2. High smoking or other exposure risk:    < or =4 mm: Follow up CT at 12 m; if stable, no further follow up.        Medication List Reviewed:  Current Facility-Administered Medications   Medication Dose Route Frequency    mirtazapine (REMERON) tablet 15 mg  15 mg Oral QHS    venlafaxine-SR (EFFEXOR-XR) capsule 150 mg  150 mg Oral DAILY    ondansetron (ZOFRAN) injection 2 mg  2 mg IntraVENous Q6H PRN    aspirin (ASPIRIN) tablet 325 mg  325 mg Oral DAILY    atorvastatin (LIPITOR) tablet 80 mg  80 mg Oral QHS    acetaminophen (TYLENOL) tablet 650 mg  650 mg Oral Q4H PRN    acetaminophen (TYLENOL) suppository 650 mg  650 mg Rectal Q4H PRN    senna-docusate (PERICOLACE) 8.6-50 mg per tablet 2 Tab  2 Tab Oral QHS    albuterol (PROVENTIL VENTOLIN) nebulizer solution 2.5 mg  2.5 mg Nebulization W4N PRN    folic acid (FOLVITE) 1 mg, thiamine (B-1) 100 mg in 0.9% sodium chloride 50 mL ivpb   IntraVENous ACD    glucose chewable tablet 16 g  4 Tab Oral PRN    glucagon (GLUCAGEN) injection 1 mg  1 mg IntraMUSCular PRN    dextrose (D50) infusion 12.5-25 g  25-50 mL IntraVENous PRN    heparin (porcine) injection 5,000 Units  5,000 Units SubCUTAneous Q8H    insulin glargine (LANTUS) injection 10 Units  10 Units SubCUTAneous QHS    [START ON 3/25/2018] levothyroxine (SYNTHROID) injection 25 mcg  25 mcg IntraVENous Q24H    oxyCODONE-acetaminophen (PERCOCET 10)  mg per tablet 1 Tab  1 Tab Oral Q8H PRN    insulin lispro (HUMALOG) injection   SubCUTAneous AC&HS

## 2018-03-22 NOTE — PROGRESS NOTES
Problem: Mobility Impaired (Adult and Pediatric)  Goal: *Acute Goals and Plan of Care (Insert Text)  Physical Therapy Goals  Initiated 3/22/2018 and to be accomplished within 7 day(s)  1. Patient will move from supine to sit and sit to supine  in bed with modified independence. 2.  Patient will transfer from bed to chair and chair to bed with modified independence using the least restrictive device. 3.  Patient will perform sit to stand with modified independence. 4.  Patient will ambulate with modified independence for 25 feet with the least restrictive device. Outcome: Progressing Towards Goal  physical Therapy EVALUATION    Patient: Luisana Mendes (89 y.o. female)  Date: 3/22/2018  Primary Diagnosis: Stroke [facility])  Precautions: Skin    ASSESSMENT :  Patient requires between moderate assistance and supervision/set-up for bed mobility, transfers and ambulation. Min A for supine to sit. Seated EOB with supervision for balance. Poor posture in sitting; patient attributes to scoliosis diagnosis. Sit to stand mod A to ww. Poor standing posture with trunk at approximately 90 degrees flexion. Min A for standing balance. Mod A for side steps x2ft with ww. Returned to seated for 1 minutes rest. Mod A for sit to stand and sidesteps x2ft with ww. Returned to supine in bed with mod A. HOB elelvated. Call bell in reach. Daughter present and RN Chantale Gibbs aware. Prior to admission, amb short distances (approximately 10ft) with ww. Uses wheelchair for all other mobility. Lives alone with home care staff and family support. Patient presents with deficits in:  Bed Mobility, Transfers, Gait, Strength, Balance and Stairs    Patient will benefit from skilled intervention to address the above impairments.   Patients rehabilitation potential is considered to be Fair  Factors which may influence rehabilitation potential include:   []         None noted  []         Mental ability/status  [x]         Medical condition  [x] Home/family situation and support systems  []         Safety awareness  []         Pain tolerance/management  []         Other:      PLAN :  Recommendations and Planned Interventions:  [x]           Bed Mobility Training             [x]    Neuromuscular Re-Education  [x]           Transfer Training                   []    Orthotic/Prosthetic Training  [x]           Gait Training                          []    Modalities  [x]           Therapeutic Exercises          []    Edema Management/Control  [x]           Therapeutic Activities            [x]    Patient and Family Training/Education  []           Other (comment):    Frequency/Duration: Patient will be followed by physical therapy 3-5 times a week to address goals. Discharge Recommendations: Rehab  Further Equipment Recommendations for Discharge: rolling walker; has     SUBJECTIVE:   Patient stated The heater is broke.     OBJECTIVE DATA SUMMARY:     Past Medical History:   Diagnosis Date    Anxiety     Depression     Diabetes ([facility] [address]     Diverticulosis     Dysphagia     Early satiety     Fatty liver     Gastric ulcer     Gastroparesis     Heart disease     Hypercholesteremia     Hypertension     Neurogenic bladder     Scoliosis     Stenosis of lumbosacral spine     Tardive dyskinesia     Urgency of urination      Past Surgical History:   Procedure Laterality Date    HX APPENDECTOMY      HX BREAST LUMPECTOMY      HX CHOLECYSTECTOMY  1980    HX COLECTOMY  2012    HX HERNIA REPAIR      HX HYSTERECTOMY  1970    HX KNEE ARTHROSCOPY      HX KNEE REPLACEMENT  1991    left knee    HX LUMBAR FUSION      x 3     Barriers to Learning/Limitations: None  Compensate with: visual, verbal, tactile, kinesthetic cues/model    G CODES:Mobility  Current  CL= 60-79%   Goal  CI= 1-19%.   The severity rating is based on the [employer] Balance Scale 1+/5    Eval Complexity: History: MEDIUM  Complexity : 1-2 comorbidities / personal factors will impact the outcome/ POC Exam:MEDIUM Complexity : 3 Standardized tests and measures addressing body structure, function, activity limitation and / or participation in recreation  Presentation: MEDIUM Complexity : Evolving with changing characteristics  Clinical Decision Making:Medium Complexity [facility] Standing Balance Scale 1+/5 Overall Complexity:MEDIUM    [employer] Balance Scale 1+/5  0: Pt performs 25% or less of standing activity (Max assist) CN, 100% impaired. 1: Pt supports self with upper extremities but requires therapist assistance. Pt performs 25-50% of effort (Mod assist) CM, 80% to <100% impaired. 1+: Pt supports self with upper extremities but requires therapist assistance. Pt performs >50% effort. (Min assist). CL, 60% to <80% impaired. 2: Pt supports self independently with both upper extremities (walker, crutches, parallel bars). CL, 60% to <80% impaired. 2+: Pt support self independently with 1 upper extremity (cane, crutch, 1 parallel bar). CK, 40% to <60% impaired. 3: Pt stands without upper extremity support for up to 30 seconds. CK, 40% to <60% impaired. 3+: Pt stands without upper extremity support for 30 seconds or greater. CJ, 20% to <40% impaired. 4: Pt independently moves and returns center of gravity 1-2 inches in one plane. CJ, 20% to <40% impaired. 4+: Pt independently moves and returns center of gravity 1-2 inches in multiple planes. CI, 1% to <20% impaired. 5: Pt independently moves and returns center of gravity in all planes greater than 2 inches. CH, 0% impaired.     Prior Level of Function/Home Situation: Amb with ww  Home Situation  Home Environment: Private residence  Wheelchair Ramp: Yes  One/Two Story Residence: One story  Living Alone: Yes  Support Systems: Home care staff, Family member(s)  Patient Expects to be Discharged to[de-identified] Private residence  Current DME Used/Available at Home: Walker, rolling, Wheelchair, Commode, bedside  Critical Behavior:  Neurologic State: Alert  Orientation Level: Oriented X4  Cognition: Follows commands  Safety/Judgement: Fall prevention  Psychosocial  Patient Behaviors: Cooperative  Strength:    Strength: Generally decreased, functional (LLE 3+/5, RLE 4/5)  Manual Muscle Testing (LE)         R     L    Hip Flexion:   4/5  3+/5    Knee EXT:   4/5  3+/5  Knee FLEX:   4/5  4/5      Ankle DF:   4/5 4/5  _________________________________________________   Tone & Sensation:   Tone: Normal  Sensation: Intact  Range Of Motion:  AROM: Generally decreased, functional (BLE)  Functional Mobility:  Bed Mobility:  Supine to Sit: Minimum assistance  Sit to Supine: Maximum assistance  Transfers:  Sit to Stand: Moderate assistance  Stand to Sit: Moderate assistance  Balance:   Sitting: Impaired  Sitting - Static: Good (unsupported)  Sitting - Dynamic: Fair (occasional)  Standing: Impaired  Standing - Static: Fair  Standing - Dynamic : Poor  Ambulation/Gait Training:  Distance (ft):  (2ftx2)  Assistive Device: Walker, rolling  Ambulation - Level of Assistance: Moderate assistance  Gait Description (WDL): Exceptions to WDL  Therapeutic Exercises:   Sit to stand x2  Pain:  Pre treatment pain level: 0  Post treatment pain level: 0  Pain Scale 1: Numeric (0 - 10)  Pain Intensity 1: 0  Pain Location 1: Leg  Pain Orientation 1: Anterior;Posterior  Pain Description 1: Aching  Pain Intervention(s) 1: Medication (see MAR)  Activity Tolerance:   Fair  Please refer to the flowsheet for vital signs taken during this treatment.   After treatment:   []         Patient left in no apparent distress sitting up in chair  [x]         Patient left in no apparent distress in bed  [x]         Call bell left within reach  [x]         Nursing notified  []         Caregiver present  []         Bed alarm activated    COMMUNICATION/EDUCATION:   [x]         Fall prevention education was provided and the patient/caregiver indicated understanding. [x]         Patient/family have participated as able in goal setting and plan of care. [x]         Patient/family agree to work toward stated goals and plan of care. []         Patient understands intent and goals of therapy, but is neutral about his/her participation. []         Patient is unable to participate in goal setting and plan of care. Patient educated on the role of physical therapy during the acute stay  and the importance of mobility. VU.       Thank you for this referral.  Jaqui Santizo, PT   Time Calculation: 18 mins

## 2018-03-22 NOTE — CONSULTS
Neurology Consult Note    Admit Date: 3/21/2018  Length of Stay: 1  Primary Care: Edwardo Rankin MD   Principle Problem: Stroke [facility])     Assessment:    AIS vs TIA    Plan:    MRI pending. Will need pre-medication with Ativan- ordered. Discussed with patient and she is amenable. Also discussed with Emely Eagles in MRI and he will give 30-60 minutes heads up for medication to be administered when able to bring patient back. .4- atorvastatin in place. HgA1C 8.4- lantus and lispro in place  CRP elevated at 5.09 with sed rate of 44    History: Ms. Erasmo Avendaño is 64yo AAF with a PMH of DM, dysphagia, hypercholestermia, HTN, tardive dyskinesia, anxiety/depression, and previous stroke who presented with dysarthria. History was also provided by daughter at bedside. She had baseline slurred speech from tardive dyskinesia but had worsened and also has burning sensation of left face. She was last normal yesterday at 0800. Her care giver noted after her nap the speech was harder to understand. She was a code s in the ED and seen by teleneurology who recommended alteplase. Patient declined. Patient has a home health nurse and uses a walker to ambulate at home. Care taker is available 9 hours during the day. She has a history of falls and per daughter falls 2-3 times a month. CT did not show anything acute. WMD present. CTA showed multifocal areas of stenosis involving left sylvian fissure M3 branch, with overall diffuse somewhat artifactual appearing cerebral arteries as above. No definite area of thromboembolism. MRI and echo pending. 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         Results reviewed:     CT Results (most recent):    Results from [place] encounter on 03/21/18   CTA HEAD NECK W CONT   Addendum Addendum: Addendum: Initial reading discussed with Dr. Jerardo Adhikari by Dr. María Jara at  2:06 PM. Facundo Hill MD 3/21/2018  2:42 PM             Narrative History: Slurred speech, left lower facial pain, left side ptosis    Correlation noncontrast CT head same day    EXAM:  CT angiogram of the head and neck with intravenous contrast.      TECHNIQUE:  Three-dimensional, maximum intensity projection, and curved planar  reformations were post-processed at a dedicated outside facility ([facility]). Stenoses are reported with reference to the downstream lumen  (\"NASCET\"). DOSE REDUCTION: One or more dose reduction techniques were used on this CT:  automated exposure control, adjustment of the mAs and/or kVp according to  patient's size, and iterative reconstruction techniques. The specific techniques  utilized on this CT exam have been documented in the patient's electronic  medical record.    _______________    FINDINGS:         ARTERIAL BOLUS QUALITY:  Satisfactory. AORTIC ARCH:  Classic 3 vessel arch anatomy with no significant innominate  or subclavian artery stenosis. Normal variant proximal subclavian origin of left  side vertebral artery. RIGHT CAROTID ARTERY:  No significant common carotid or internal carotid  artery stenosis, 0% diameter stenosis by NASCET criteria. Segment of mildly  deviated appearing mid cervical ICA without high-grade stenosis. LEFT CAROTID ARTERY:  No significant common carotid or internal carotid  artery stenosis, 0% diameter stenosis proximal ICA by NASCET criteria. Short  segment of mildly beaded appearance of mid cervical ICA without significant  stenosis. VERTEBRAL ARTERIES:  Right vertebral dominant, no significant vertebral  stenosis or occlusion. BASILAR AND CEREBRAL ARTERIES: No significant basilar stenosis. Patent  anterior communicating artery and left posterior communicating artery. Diffuse  likely artifactual multifocal pseudobeading appearance of peripheral cerebral  vessels.  There is more pronounced segment of multifocal stenosis involving M3  sylvian fissure branch of the superior division left M2 segment. No other  definite high-grade cerebral artery stenosis. No filling defect or occlusion. No  evidence of aneurysm or vascular malformation. Dural venous sinuses  unremarkable. NON-ANGIOGRAPHIC FINDINGS: 3 mm diameter groundglass nodule anterior  lateral right apex. Additional 3 mm subpleural nodule lateral left apex and very  small questionable nodular density/scarring superior left apex. Mild biapical  parenchymal and pleural scarring. Visualized superior mediastinum unremarkable. No mass or adenopathy soft tissues of neck. Degenerative cervical spondylosis  with no high-grade canal stenosis or acute osseous finding. No evidence of  intracranial mass or enhancing lesion or mass effect, no abnormal extra-axial  fluid collection. _______________         Impression IMPRESSION:        1. No significant carotid or vertebral stenosis. 2. Short segments of incidental FMD involving bilateral cervical ICAs. 3. Suggestion of segmental multifocal areas of stenosis involving left sylvian  fissure M3 branch, with overall diffuse somewhat artifactual appearing cerebral  arteries as above. No definite area of thromboembolism. 4. Several 3 mm apical nodules nonspecific. In absence of any known primary  neoplasm, follow-up recommended according to guidelines below. Fleischner Society (Chest Radiology) Pulmonary Nodule Guidelines:    1. Low smoking or other exposure risk:    < or =4 mm: No follow up necessary  2. High smoking or other exposure risk:    < or =4 mm: Follow up CT at 12 m; if stable, no further follow up.         MRI Results (most recent):    Results from [place] encounter on 07/25/11   MRI SPINE CERVICAL W AND W/O CONT   Narrative Ordering MD: Ita Peoples MD  Signed By: Anny Jennings MD  ** FINAL **  ---------------------------------------------------------------------  Procedure Date: 07/25/2011   Accession Number:  [id number]          Order No:   [id number]        Procedure:   MRV - C SPINE W/WO CONTRAST       CPT Code:   62900     Admit Diag:   FROM [facility] FOR MRI             Reason:   PAIN  INTERPRETATION:  MRI CERVICAL SPINE  CPT code: 58905  History: Increasing neck pain post injury one month ago; worse   overnight. Leukocytosis. Findings:  MRI of the cervical spine is performed by using standard   pre- and post-gadolinium protocol pulse sequences with axial images   from C2 through T1 . Contrast: Magnevist - 15 mL IV; unremarkable   administration. Comparison study is CT cervical spine 24 July 2011. The vertebral body alignment is grossly normal .  There is marked   narrowing at C4-C5 with evidence of Modic changes in the endplate   marrow of both adjacent vertebral bodies. Some destructive changes   seen but there is no increased signal intensity in the disc space. Remaining intervertebral discs are normal.   No other significant   bone marrow signal intensity abnormalities are seen . No cord   signal intensity abnormalities are seen and the visualized portion   of the posterior fossa is unremarkable . There is significant   enhancement of the left C3-C4 facet joint without significant fluid   accumulation. Similar findings are seen in the right at C4-C5 and to   a lesser extent C3-C4 right. There is significant enhancement in the   soft tissue surrounding C4-C5 on the right again without significant   fluid signal intensity. There is also enhancement of the epidural   soft tissues circumferentially at C4-C5 but extending anteriorly   above and below at least one level. There is no significant fluid   signal intensity accumulation. C2-3: Canal and foramina are patent. C3-4: There is subtle central disc protrusion and there is moderate   canal stenosis from posterior ligamentous hypertrophy. A thin   cerebrospinal fluid mantle is preserved around the cord.  Uncinate   hypertrophy may result in mild bilateral foraminal stenosis. C4-5: There is a central disc protrusion with impression on the   ventral thecal sac. There is mild canal stenosis. Uncinate   hypertrophy results in mild right foraminal stenosis. The left   foramen is patent. C5-6: There is a mild broad-based disc protrusion. Posterior   ligamentous hypertrophy results in moderate canal stenosis. A   cerebrospinal fluid mantle is preserved around the cord. Right   foramen is patent. Uncinate hypertrophy results in mild-to-moderate   left foraminal stenosis. C6-7: There is a mild broad-based disc protrusion. Posterior   ligamentous hypertrophy results in moderately severe canal stenosis   with AP diameter of 7 mm. Exit foramina are patent. C7-T1: Canal and foramina are patent. IMPRESSION[de-identified]   Inflammatory enhancement in the right C4-C5 facet joint involving   the adjacent soft tissues without significant fluid collection. Similar but less extensive changes are noted at C3-C4 on the left   and possibly subtly the right. These are associated with significant   and partially erosive changes on recent CT. In addition, there is   some abnormal enhancement in the epidural soft tissues at C4-C5,   circumferentially, and extending in the anterior epidural space   cephalad and caudally about one ; however, there is no associated   fluid signal intensity collection to suggest epidural abscess. These   findings suggests a significant erosive and possibly inflammatory   arthritis but without definite abscess/fluid collection for sampling   or drainage. The existing leukocytosis is worrisome and close   followup is suggested; however, other sources should also be   considered. Significant degenerative disc disease at C5-C6 with moderate canal   stenosis. Moderately severe canal stenosis may be present at C6-C7. Foraminal stenoses are noted several levels, as described.         Latest Hemoglobin A1C:  Lab Results   Component Value Date/Time Hemoglobin A1c 8.4 (H) 03/21/2018 04:41 PM       Latest Cardiology Procedure:  Results for orders placed or performed during the hospital encounter of 03/21/18   EKG, 12 LEAD, INITIAL   Result Value Ref Range    Ventricular Rate 65 BPM    Atrial Rate 65 BPM    P-R Interval 166 ms    QRS Duration 72 ms    Q-T Interval 440 ms    QTC Calculation (Bezet) 457 ms    Calculated P Axis 39 degrees    Calculated R Axis 11 degrees    Calculated T Axis 35 degrees    Diagnosis       Normal sinus rhythm  Non-specific ST/T wave changes  Abnormal ECG  When compared with ECG of 02-MAR-2018 10:09,  Nonspecific T wave abnormality, improved in Inferior leads  Confirmed by Gloria Amaya ([id number]) on 3/22/2018 9:49:47 AM         Important Labs:  Lab Results   Component Value Date/Time    Folate >24.0 (H) 02/19/2010 01:09 PM     Lab Results   Component Value Date/Time    Cholesterol, total 223 (H) 03/21/2018 04:41 PM    HDL Cholesterol 72 (H) 03/21/2018 04:41 PM    LDL, calculated 136.4 (H) 03/21/2018 04:41 PM    VLDL, calculated 14.6 03/21/2018 04:41 PM    Triglyceride 73 03/21/2018 04:41 PM    CHOL/HDL Ratio 3.1 03/21/2018 04:41 PM     Lab Results   Component Value Date/Time    TSH 0.53 03/21/2018 04:41 PM    Triiodothyronine (T3), free 2.0 (L) 03/21/2018 04:41 PM    T4, Free 1.1 03/21/2018 04:41 PM     No results found for: DS35, PHEN, PHENO, PHENT, DILF, DS39, PHENY, PTN, VALF2, VALAC, VALP, VALPR, DS6, CRBAM, CRBAMP, CARB2, XCRBAM  Discussed with: daughter    Allergies:    Allergies   Allergen Reactions    Codeine Rash and Itching    Iodine Hives and Rash    Morphine Hives    Pcn [Penicillins] Other (comments)     Causes \"Heart swell\"    Sulfa (Sulfonamide Antibiotics) Nausea Only      Review of Systems:    Y  N   Constitutional: [] [] recent weight change  [] [x] fever  [] [x] sleep difficulties   ENT/Mouth:  [] [x] hearing loss  [] [x] swallowing problems  [x] [] slurred speech   Cardiovascular:  [] [x] chest pain   [] [x] palpitations    Respiratory: [] [x] cough with swallow  [] [x] shortness of breath  [] [x] sleep apnea  [] [] intubated   Gastrointestinal: [] [x] abdominal pain  [] [x] nausea   Genitourinary: [x] [] frequent urination  [x] [] incontinence    Musculoskeletal:   [x] [] joint pain  [] [x] muscle pain   Integument:   [] [] rash/itching   Neurological:  [] [x] dizziness/vertigo  [] [] sedation  [] [x] confusion  [] [] agitation/combativeness  [] [x] loss of consciousness  [x] [] numbness/tingling sensation  [] [x] tremors  [x] [] weakness in limbs  [x] [] difficulty with balance  [] [x] frequent or recurring headaches  [x] [] memory loss   [] [] comatose  [] [x] seizures   Psychiatric:   [x] [] depression  [] [] hallucinations   Endocrine: [] [x] excessive thirst or urination   [x] [] heat or cold intolerance   Hematologic/Lymphatic: [] [] bleeding tendency  [] [] enlarged lymph nodes     PMH:   Past Medical History:   Diagnosis Date    Anxiety     Depression     Diabetes ([facility]     Diverticulosis     Dysphagia     Early satiety     Fatty liver     Gastric ulcer     Gastroparesis     Heart disease     Hypercholesteremia     Hypertension     Neurogenic bladder     Scoliosis     Stenosis of lumbosacral spine     Tardive dyskinesia     Urgency of urination         Problem List: Principal Problem:    Stroke ([facility] (3/21/2018)    Active Problems:    HTN (hypertension) (4/8/2013)      DMII (diabetes mellitus, type 2) ([facility] (4/8/2013)      Depression ()      Anxiety ()      History of stroke (3/21/2018)        FH:   Family History   Problem Relation Age of Onset    Heart Disease Mother     Diabetes Mother     Heart Attack Mother     Cancer Father      lung CA    Cancer Brother     Cancer Sister         SH:   Social History     Social History    Marital status:      Spouse name: N/A    Number of children: N/A    Years of education: N/A     Social History Main Topics    Smoking status: Never Smoker    Smokeless tobacco: Never Used    Alcohol use No    Drug use: No    Sexual activity: No     Other Topics Concern    None     Social History Narrative          Vital Signs:   Visit Vitals    /77 (BP 1 Location: Left arm, BP Patient Position: Sitting)    Pulse 80    Temp 98 °F (36.7 °C)    Resp 16    Ht 5' 5\" (1.651 m)    Wt 90.7 kg (200 lb)    SpO2 97%    Breastfeeding No    BMI 33.28 kg/m2      Neurological examination:    Appearance: In no acute distress, well developed, well nourished, cooperative   Cardiovascular: Heart is regular rate and rhythm, peripheral edema is absent. No carotid bruits heard   Mental status examination: Alert and oriented X 4. Slurred speech but language normal. Normal attention. Impaired memory and fund of knowledge. Recall 3/3.  Cranial Nerves:     I: smell Not tested   II: visual fields Visual fields were intact to confrontation  Eye movements were full and conjugate, saccades were accurate, pursuit movements were not smooth, and there was no nystagmus. II: pupils Equal, round, reactive to light   III,VII: ptosis none   III,IV,VI: extraocular muscles  Full ROM   V: facial light touch sensation  normal   V,VII: corneal reflex     VII: facial muscle function  normal   VIII: hearing    IX: soft palate elevation  Normal. The palate and tongue moved in the midline. IX,X: gag reflex present   XI: sternocleidomastoid strength 5/5   XII: tongue strength  Normal.          Motor exam: Station, gait:  deferred. Generalized weakness to BUE. RLE 4/5. LLE 3/5. Spacticity absent.  Sensory: Intact to light touch. Subjective burning to left jaw.  Coordination: Some dysmetria with FTN bilaterally. Normal rapid alternating movements.  Reflexes: Symmetric and 2+ in bilateral biceps, triceps, and brachioradialis. L Patellar 1+ and R 2+, ankle reflexes mute. Plantar reflexes are flexor on right and extensor on left.           Medications:      [x] REVIEWED  Current Facility-Administered Medications   Medication    insulin lispro (HUMALOG) injection    glucose chewable tablet 16 g    glucagon (GLUCAGEN) injection 1 mg    folic acid (FOLVITE) tablet 1 mg    Thiamine Mononitrate (B-1) tablet 100 mg    mirtazapine (REMERON) tablet 15 mg    venlafaxine-SR (EFFEXOR-XR) capsule 150 mg    ondansetron (ZOFRAN) injection 2 mg    aspirin (ASPIRIN) tablet 325 mg    atorvastatin (LIPITOR) tablet 80 mg    acetaminophen (TYLENOL) tablet 650 mg    acetaminophen (TYLENOL) suppository 650 mg    senna-docusate (PERICOLACE) 8.6-50 mg per tablet 2 Tab    albuterol (PROVENTIL VENTOLIN) nebulizer solution 2.5 mg    dextrose (D50) infusion 12.5-25 g    heparin (porcine) injection 5,000 Units    insulin glargine (LANTUS) injection 10 Units    [START ON 3/25/2018] levothyroxine (SYNTHROID) injection 25 mcg    oxyCODONE-acetaminophen (PERCOCET 10)  mg per tablet 1 Tab     Data:      [x] REVIEWED  Recent Results (from the past 24 hour(s))   GLUCOSE, POC    Collection Time: 03/21/18 11:24 AM   Result Value Ref Range    Glucose (POC) 196 (H) 70 - 110 mg/dL   CBC W/O DIFF    Collection Time: 03/21/18 11:30 AM   Result Value Ref Range    WBC 8.9 4.6 - 13.2 K/uL    RBC 3.80 (L) 4.20 - 5.30 M/uL    HGB 10.9 (L) 12.0 - 16.0 g/dL    HCT 34.3 (L) 35.0 - 45.0 %    MCV 90.3 74.0 - 97.0 FL    MCH 28.7 24.0 - 34.0 PG    MCHC 31.8 31.0 - 37.0 g/dL    RDW 12.7 11.6 - 14.5 %    PLATELET 760 894 - 506 K/uL    MPV 12.0 (H) 9.2 - 90.1 FL   METABOLIC PANEL, COMPREHENSIVE    Collection Time: 03/21/18 11:30 AM   Result Value Ref Range    Sodium 142 136 - 145 mmol/L    Potassium 4.0 3.5 - 5.5 mmol/L    Chloride 108 100 - 108 mmol/L    CO2 29 21 - 32 mmol/L    Anion gap 5 3.0 - 18 mmol/L    Glucose 215 (H) 74 - 99 mg/dL    BUN 18 7.0 - 18 MG/DL    Creatinine 0.89 0.6 - 1.3 MG/DL    BUN/Creatinine ratio 20 12 - 20      GFR est AA >60 >60 ml/min/1.73m2    GFR est non-AA >60 >60 ml/min/1.73m2    Calcium 9.0 8.5 - 10.1 MG/DL    Bilirubin, total 0.3 0.2 - 1.0 MG/DL    ALT (SGPT) 12 (L) 13 - 56 U/L    AST (SGOT) 12 (L) 15 - 37 U/L    Alk.  phosphatase 125 (H) 45 - 117 U/L    Protein, total 7.0 6.4 - 8.2 g/dL    Albumin 3.4 3.4 - 5.0 g/dL    Globulin 3.6 2.0 - 4.0 g/dL    A-G Ratio 0.9 0.8 - 1.7     PROTHROMBIN TIME + INR    Collection Time: 03/21/18 11:30 AM   Result Value Ref Range    Prothrombin time 12.9 11.5 - 15.2 sec    INR 1.0 0.8 - 1.2     THROMBIN TIME    Collection Time: 03/21/18 11:30 AM   Result Value Ref Range    Thrombin time 16.6 13.8 - 18.2 SECS   FIBRINOGEN    Collection Time: 03/21/18 11:30 AM   Result Value Ref Range    Fibrinogen 484 (H) 210 - 451 mg/dL   TYPE & SCREEN    Collection Time: 03/21/18 11:30 AM   Result Value Ref Range    Crossmatch Expiration 03/24/2018     ABO/Rh(D) Christina More POSITIVE     Antibody screen NEG    ASPIRIN TEST    Collection Time: 03/21/18 11:30 AM   Result Value Ref Range    Aspirin test 532 (L) 620 - 672 ARU   EKG, 12 LEAD, INITIAL    Collection Time: 03/21/18 11:52 AM   Result Value Ref Range    Ventricular Rate 65 BPM    Atrial Rate 65 BPM    P-R Interval 166 ms    QRS Duration 72 ms    Q-T Interval 440 ms    QTC Calculation (Bezet) 457 ms    Calculated P Axis 39 degrees    Calculated R Axis 11 degrees    Calculated T Axis 35 degrees    Diagnosis       Normal sinus rhythm  Non-specific ST/T wave changes  Abnormal ECG  When compared with ECG of 02-MAR-2018 10:09,  Nonspecific T wave abnormality, improved in Inferior leads  Confirmed by Sharmaine Amor ([id number]) on 3/22/2018 9:49:47 AM     URINALYSIS W/ RFLX MICROSCOPIC    Collection Time: 03/21/18  1:45 PM   Result Value Ref Range    Color YELLOW      Appearance CLEAR      Specific gravity 1.023 1.005 - 1.030      pH (UA) 6.5 5.0 - 8.0      Protein NEGATIVE  NEG mg/dL    Glucose NEGATIVE  NEG mg/dL    Ketone NEGATIVE  NEG mg/dL    Bilirubin NEGATIVE  NEG      Blood TRACE (A) NEG      Urobilinogen 0.2 0.2 - 1.0 EU/dL    Nitrites NEGATIVE  NEG      Leukocyte Esterase NEGATIVE  NEG     DRUG SCREEN, URINE    Collection Time: 03/21/18  1:45 PM   Result Value Ref Range    BENZODIAZEPINES POSITIVE (A) NEG      BARBITURATES NEGATIVE  NEG      THC (TH-CANNABINOL) NEGATIVE  NEG      OPIATES NEGATIVE  NEG      PCP(PHENCYCLIDINE) NEGATIVE  NEG      COCAINE NEGATIVE  NEG      AMPHETAMINES NEGATIVE  NEG      METHADONE NEGATIVE  NEG      HDSCOM (NOTE)    URINE MICROSCOPIC ONLY    Collection Time: 03/21/18  1:45 PM   Result Value Ref Range    WBC 0 to 3 0 - 4 /hpf    RBC 2 to 4 0 - 5 /hpf    Epithelial cells FEW 0 - 5 /lpf    Bacteria NEGATIVE  NEG /hpf   LIPID PANEL    Collection Time: 03/21/18  4:41 PM   Result Value Ref Range    LIPID PROFILE          Cholesterol, total 223 (H) <200 MG/DL    Triglyceride 73 <150 MG/DL    HDL Cholesterol 72 (H) 40 - 60 MG/DL    LDL, calculated 136.4 (H) 0 - 100 MG/DL    VLDL, calculated 14.6 MG/DL    CHOL/HDL Ratio 3.1 0 - 5.0     HEMOGLOBIN A1C WITH EAG    Collection Time: 03/21/18  4:41 PM   Result Value Ref Range    Hemoglobin A1c 8.4 (H) 4.2 - 5.6 %    Est. average glucose 194 mg/dL   SED RATE (ESR)    Collection Time: 03/21/18  4:41 PM   Result Value Ref Range    Sed rate, automated 44 (H) 0 - 30 mm/hr   CRP, HIGH SENSITIVITY    Collection Time: 03/21/18  4:41 PM   Result Value Ref Range    C-Reactive Protein, Cardiac 5.09 (H) 0.00 - 3.00 mg/L   TSH 3RD GENERATION    Collection Time: 03/21/18  4:41 PM   Result Value Ref Range    TSH 0.53 0.36 - 3.74 uIU/mL   T3, FREE    Collection Time: 03/21/18  4:41 PM   Result Value Ref Range    Triiodothyronine (T3), free 2.0 (L) 2.3 - 4.2 PG/ML   T4, FREE    Collection Time: 03/21/18  4:41 PM   Result Value Ref Range    T4, Free 1.1 0.7 - 1.5 NG/DL   GLUCOSE, POC    Collection Time: 03/21/18  5:13 PM   Result Value Ref Range    Glucose (POC) 297 (H) 70 - 110 mg/dL   GLUCOSE, POC    Collection Time: 03/21/18  9:42 PM   Result Value Ref Range    Glucose (POC) 310 (H) 70 - 110 mg/dL   GLUCOSE, POC    Collection Time: 03/22/18  7:37 AM   Result Value Ref Range    Glucose (POC) 237 (H) 70 - 110 mg/dL       Micheline Johnson NP

## 2018-03-22 NOTE — PROGRESS NOTES
Problem: Discharge Planning  Goal: *Discharge to safe environment  Outcome: Progressing Towards Goal  snf

## 2018-03-22 NOTE — ANCILLARY DISCHARGE INSTRUCTIONS
Patient and/or next of kin has been given the [facility] Important Message From Medicare About Your Rights\" letter and all questions were answered.

## 2018-03-23 PROBLEM — G45.9 TIA (TRANSIENT ISCHEMIC ATTACK): Status: ACTIVE | Noted: 2018-03-21

## 2018-03-23 LAB
ANION GAP SERPL CALC-SCNC: 9 MMOL/L (ref 3–18)
BUN SERPL-MCNC: 11 MG/DL (ref 7–18)
BUN/CREAT SERPL: 15 (ref 12–20)
CALCIUM SERPL-MCNC: 8.5 MG/DL (ref 8.5–10.1)
CHLORIDE SERPL-SCNC: 110 MMOL/L (ref 100–108)
CO2 SERPL-SCNC: 26 MMOL/L (ref 21–32)
CREAT SERPL-MCNC: 0.75 MG/DL (ref 0.6–1.3)
GLUCOSE BLD STRIP.AUTO-MCNC: 126 MG/DL (ref 70–110)
GLUCOSE BLD STRIP.AUTO-MCNC: 143 MG/DL (ref 70–110)
GLUCOSE BLD STRIP.AUTO-MCNC: 152 MG/DL (ref 70–110)
GLUCOSE BLD STRIP.AUTO-MCNC: 231 MG/DL (ref 70–110)
GLUCOSE SERPL-MCNC: 103 MG/DL (ref 74–99)
POTASSIUM SERPL-SCNC: 3.7 MMOL/L (ref 3.5–5.5)
SODIUM SERPL-SCNC: 145 MMOL/L (ref 136–145)

## 2018-03-23 PROCEDURE — 74011636637 HC RX REV CODE- 636/637: Performed by: HOSPITALIST

## 2018-03-23 PROCEDURE — 80048 BASIC METABOLIC PNL TOTAL CA: CPT | Performed by: NURSE PRACTITIONER

## 2018-03-23 PROCEDURE — 97167 OT EVAL HIGH COMPLEX 60 MIN: CPT

## 2018-03-23 PROCEDURE — 36415 COLL VENOUS BLD VENIPUNCTURE: CPT | Performed by: NURSE PRACTITIONER

## 2018-03-23 PROCEDURE — 74011250637 HC RX REV CODE- 250/637: Performed by: NURSE PRACTITIONER

## 2018-03-23 PROCEDURE — 97530 THERAPEUTIC ACTIVITIES: CPT

## 2018-03-23 PROCEDURE — 74011250636 HC RX REV CODE- 250/636: Performed by: NURSE PRACTITIONER

## 2018-03-23 PROCEDURE — 97535 SELF CARE MNGMENT TRAINING: CPT

## 2018-03-23 PROCEDURE — 92526 ORAL FUNCTION THERAPY: CPT

## 2018-03-23 PROCEDURE — 65660000000 HC RM CCU STEPDOWN

## 2018-03-23 PROCEDURE — 74011250637 HC RX REV CODE- 250/637: Performed by: HOSPITALIST

## 2018-03-23 PROCEDURE — 82962 GLUCOSE BLOOD TEST: CPT

## 2018-03-23 RX ORDER — ATORVASTATIN CALCIUM 80 MG/1
80 TABLET, FILM COATED ORAL
Qty: 30 TAB | Refills: 0 | Status: SHIPPED | OUTPATIENT
Start: 2018-03-23 | End: 2018-09-26

## 2018-03-23 RX ORDER — LEVOTHYROXINE SODIUM 50 UG/1
50 TABLET ORAL
Status: DISCONTINUED | OUTPATIENT
Start: 2018-03-24 | End: 2018-03-24 | Stop reason: HOSPADM

## 2018-03-23 RX ORDER — FOLIC ACID 1 MG/1
1 TABLET ORAL DAILY
Qty: 30 TAB | Refills: 0 | Status: SHIPPED | OUTPATIENT
Start: 2018-03-24 | End: 2018-07-09

## 2018-03-23 RX ORDER — ASPIRIN 325 MG/1
100 TABLET, FILM COATED ORAL DAILY
Qty: 30 TAB | Refills: 0 | Status: SHIPPED | OUTPATIENT
Start: 2018-03-24 | End: 2018-07-09

## 2018-03-23 RX ORDER — ASPIRIN 325 MG
325 TABLET ORAL DAILY
Qty: 30 TAB | Refills: 0 | Status: SHIPPED | OUTPATIENT
Start: 2018-03-24 | End: 2022-11-01 | Stop reason: DRUGHIGH

## 2018-03-23 RX ADMIN — INSULIN LISPRO 3 UNITS: 100 INJECTION, SOLUTION INTRAVENOUS; SUBCUTANEOUS at 17:49

## 2018-03-23 RX ADMIN — HEPARIN SODIUM 5000 UNITS: 5000 INJECTION, SOLUTION INTRAVENOUS; SUBCUTANEOUS at 11:07

## 2018-03-23 RX ADMIN — INSULIN LISPRO 3 UNITS: 100 INJECTION, SOLUTION INTRAVENOUS; SUBCUTANEOUS at 22:36

## 2018-03-23 RX ADMIN — ATORVASTATIN CALCIUM 80 MG: 40 TABLET, FILM COATED ORAL at 22:36

## 2018-03-23 RX ADMIN — INSULIN LISPRO 6 UNITS: 100 INJECTION, SOLUTION INTRAVENOUS; SUBCUTANEOUS at 13:08

## 2018-03-23 RX ADMIN — FOLIC ACID 1 MG: 1 TABLET ORAL at 11:06

## 2018-03-23 RX ADMIN — Medication 100 MG: at 11:06

## 2018-03-23 RX ADMIN — VENLAFAXINE HYDROCHLORIDE 150 MG: 75 CAPSULE, EXTENDED RELEASE ORAL at 11:06

## 2018-03-23 RX ADMIN — HEPARIN SODIUM 5000 UNITS: 5000 INJECTION, SOLUTION INTRAVENOUS; SUBCUTANEOUS at 22:33

## 2018-03-23 RX ADMIN — DOCUSATE SODIUM AND SENNOSIDES 2 TABLET: 8.6; 5 TABLET, FILM COATED ORAL at 22:36

## 2018-03-23 RX ADMIN — ASPIRIN 325 MG ORAL TABLET 325 MG: 325 PILL ORAL at 11:06

## 2018-03-23 RX ADMIN — MIRTAZAPINE 15 MG: 15 TABLET, FILM COATED ORAL at 22:36

## 2018-03-23 RX ADMIN — OXYCODONE HYDROCHLORIDE AND ACETAMINOPHEN 1 TABLET: 10; 325 TABLET ORAL at 15:26

## 2018-03-23 RX ADMIN — INSULIN GLARGINE 15 UNITS: 100 INJECTION, SOLUTION SUBCUTANEOUS at 17:51

## 2018-03-23 RX ADMIN — HEPARIN SODIUM 5000 UNITS: 5000 INJECTION, SOLUTION INTRAVENOUS; SUBCUTANEOUS at 01:15

## 2018-03-23 RX ADMIN — OXYCODONE HYDROCHLORIDE AND ACETAMINOPHEN 1 TABLET: 10; 325 TABLET ORAL at 23:45

## 2018-03-23 NOTE — PROGRESS NOTES
Problem: Self Care Deficits Care Plan (Adult)  Goal: *Acute Goals and Plan of Care (Insert Text)  Occupational Therapy Goals  Initiated 3/23/2018 within 7 day(s). 1.  Patient will perform grooming tasks at EOB with modified independence. 2.  Patient will perform upper/lower body bathing with minimal assistance and fair+ dynamic sitting balance. 3.  Patient will perform functional task in standing for 8 minutes with supervision for balance to increase activity tolerance for ADLs. 4.  Patient will perform toilet transfers with supervision/set-up. 5.  Patient will perform all aspects of toileting with supervision/set-up. 6.  Patient will participate in upper extremity therapeutic exercise/activities with supervision/set-up for 8 minutes to increase BUE strength for functional transfers & ADLs. 7.  Patient will utilize energy conservation techniques during functional activities with minimal verbal cues. Outcome: Progressing Towards Goal  Occupational Therapy EVALUATION    Patient: Juan Goldstein (75 y.o. female)  Date: 3/23/2018  Primary Diagnosis: Stroke [facility])        Precautions:   Fall  PLOF: Pt required assistance with basic self care tasks (from home care staff) and utilized RW for functional mobility PTA. ASSESSMENT :  Based on the objective data described below, the patient presents with impairments with regard to bed mobility, activity tolerance and independence in ADLs. Pt supine on arrival, agreeable to therapy. C/o back pain. Min A/additional time for bed mobility. Impaired sitting balance. Mod A for UB bathing & dressing at EOB with vc's for positioning to facilitate increased independence. Min A x2 from EOB to [facility]; pt voided with max/total A for pericare. Skilled instruction on upright sitting to increase balance & safety during all ADLs. Pt left at EOB with CNA & PT. Recommend SNF. Patient will benefit from skilled intervention to address the above impairments.   Patients rehabilitation potential is considered to be Good  Factors which may influence rehabilitation potential include:   []             None noted  []             Mental ability/status  [x]             Medical condition  []             Home/family situation and support systems  []             Safety awareness  []             Pain tolerance/management  []             Other:        PLAN :  Recommendations and Planned Interventions:  [x]               Self Care Training                  [x]        Therapeutic Activities  [x]               Functional Mobility Training    []        Cognitive Retraining  [x]               Therapeutic Exercises           [x]        Endurance Activities  [x]               Balance Training                   []        Neuromuscular Re-Education  []               Visual/Perceptual Training     [x]   Home Safety Training  [x]               Patient Education                 [x]        Family Training/Education  []               Other (comment):    Frequency/Duration: Patient will be followed by occupational therapy 3-5 times a week to address goals. Discharge Recommendations: [place]  Further Equipment Recommendations for Discharge: bedside commode     SUBJECTIVE:   Patient stated It's so cold in here.     OBJECTIVE DATA SUMMARY:     Past Medical History:   Diagnosis Date    Anxiety     Depression     Diabetes ([facility] [address]     Diverticulosis     Dysphagia     Early satiety     Fatty liver     Gastric ulcer     Gastroparesis     Heart disease     Hypercholesteremia     Hypertension     Neurogenic bladder     Scoliosis     Stenosis of lumbosacral spine     Tardive dyskinesia     Urgency of urination      Past Surgical History:   Procedure Laterality Date    HX APPENDECTOMY      HX BREAST LUMPECTOMY      HX CHOLECYSTECTOMY  1980    HX COLECTOMY  2012    HX HERNIA REPAIR      HX HYSTERECTOMY  1970    HX KNEE ARTHROSCOPY      HX KNEE REPLACEMENT  1991    left knee    HX LUMBAR FUSION x 3     Barriers to Learning/Limitations: None  Compensate with: visual, verbal, tactile, kinesthetic cues/model    GCODES:  Self Care  Current  CK= 40-59%   Goal  CI= 1-19%. The severity rating is based on the Other Functional Assessment, MMT, ROM    Eval Complexity: History: HIGH Complexity : Extensive review of history including physical, cognitive and psychosocial history ; Examination: HIGH Complexity : 5 or more performance deficits relating to physical, cognitive , or psychosocial skils that result in activity limitations and / or participation restrictions; Decision Making:HIGH Complexity : Patient presents with comorbidities that affect occupational performance. Signifigant modification of tasks or assistance (eg, physical or verbal) with assessment (s) is necessary to enable patient to complete evaluation     Prior Level of Function/Home Situation:  Pt required assistance with basic self care tasks (from home care staff) and utilized RW for functional mobility PTA. Home Situation  Home Environment: Private residence  Wheelchair Ramp: Yes  One/Two Story Residence: One story  Living Alone: Yes  Support Systems: Home care staff, Family member(s)  Patient Expects to be Discharged to[de-identified] Private residence  Current DME Used/Available at Home: Verita Roxi, rolling, Wheelchair, power, Shower chair  Tub or Shower Type: Shower (has shower chair)  [x]  Right hand dominant   []  Left hand dominant    Cognitive/Behavioral Status:  Neurologic State: Alert  Orientation Level: Oriented X4  Cognition: Follows commands  Safety/Judgement: Awareness of environment; Fall prevention     Skin: Intact (BUEs)  Edema: None noted (BUEs)    Vision/Perceptual:    Acuity: Able to read clock/calendar on wall without difficulty      Coordination:  Coordination: Within functional limits (BUEs)  Fine Motor Skills-Upper: Right Intact; Left Intact    Gross Motor Skills-Upper: Right Intact; Left Intact     Balance:  Sitting: Impaired  Sitting - Static: Good (unsupported)  Sitting - Dynamic: Good (unsupported)  Standing: Impaired  Standing - Static: Fair  Standing - Dynamic : Fair     Strength:  Strength: Generally decreased, functional (BUEs: 3+/5)    Tone & Sensation:  Tone: Normal (BUEs)  Sensation: Intact (BUEs)    Range of Motion:  AROM: Generally decreased, functional (BUEs: approx 1/2 shoulder flex)    Functional Mobility and Transfers for ADLs:  Bed Mobility:  Supine to Sit:  Min A/additional time  Sit to Supine:  Left at EOB with PT    Transfers:  Sit to Stand: Minimum assistance   Toilet Transfer : Minimum assistance;Assist x2; Additional time     ADL Assessment:  Feeding: Setup;Supervision  Oral Facial Hygiene/Grooming: Setup;Supervision  Bathing: Moderate assistance  Upper Body Dressing: Moderate assistance  Lower Body Dressing: Maximum assistance  Toileting: Maximum assistance    ADL Intervention:  Grooming  Grooming Assistance: Supervision/set up  Washing Face: Supervision/set-up     Upper Body Bathing  Bathing Assistance: Moderate assistance  Position Performed: Seated edge of bed  Cues: Physical assistance;Verbal cues provided     Lower Body Bathing  Bathing Assistance: Maximum assistance  Lower Body : Maximum assistance  Position Performed: Seated edge of bed     Upper Body Dressing Assistance  Dressing Assistance: Minimum assistance; Moderate assistance  Hospital Gown: Minimum  assistance; Moderate assistance  Cues: Physical assistance;Verbal cues provided     Toileting  Toileting Assistance: Maximum assistance  Bladder Hygiene: Maximum assistance  Bowel Hygiene: Maximum assistance  Clothing Management: Maximum assistance  Cues: Physical assistance for pants up;Verbal cues provided  Adaptive Equipment: Elevated seat     Mod A for UB bathing & dressing with vc's for appropriate positioning to facilitate increased independence and increase functional reaching.  Min A x2 from EOB to [facility]; pt voided with max/total A for pericare. Skilled instruction on upright sitting to increase balance & safety during all ADLs. Cognitive Retraining  Safety/Judgement: Awareness of environment; Fall prevention    Pain:  Pre treatment pain level:6/10 (back pain)  Post treatment pain level: 6/10 (back pain)  Pain Scale 1: Numeric (0 - 10)  Pain Intensity 1: 6  Pain Location 1: Back    Activity Tolerance:  Fair  Please refer to the flowsheet for vital signs taken during this treatment. After treatment:   [] Patient left in no apparent distress sitting up in chair  [x] Patient left in no apparent distress at EOB with PT  [x] Call bell left within reach  [x] Nursing notified  [] Caregiver present  [] Bed alarm activated    COMMUNICATION/EDUCATION: Pt educated on role of OT and POC; she verbalized understanding. [x] Home safety education was provided and the patient/caregiver indicated understanding. [x] Patient/family have participated as able in goal setting and plan of care. [x] Patient/family agree to work toward stated goals and plan of care. [] Patient understands intent and goals of therapy, but is neutral about his/her participation. [] Patient is unable to participate in goal setting and plan of care.     Thank you for this referral.  Trish Elder, MS OTR/L  Time Calculation: 23 mins

## 2018-03-23 NOTE — PROGRESS NOTES
2015: Assumed pt care from [facility]. Received pt resting in bed, pt is alert and oriented x 4. No signs of distress. Dual NIH with ELISA [place] done. Pt's daughter at bedside. Call bell within reach.    3-23-18    0750: Bedside and Verbal shift change report given to Chriss Curling and Tiffany Dutta (oncoming nurse) by Bassam Lora   (offgoing nurse). Report included the following information SBAR, Kardex, Intake/Output, MAR and Recent Results.

## 2018-03-23 NOTE — PROGRESS NOTES
[facility]  Hospitalist Division        Inpatient Daily Progress Note    Daily progress Note    Patient: Nasrin Wilkes MRN: [id number]  [facility]: [id number]    YOB: 1945  Age: 74 y.o. Sex: female    DOA: 3/21/2018 LOS:  LOS: 2 days                    Chief Complaint:  Stroke    Interval History: 73 y/o [place] American female with hx of CVA (residual deficits slurred speech), DM, HTN and HLD presented to the ED via EMS on 3/21/2018 with dysarthria. Pt's home health nurse stated pt fell earlier that morning and then took a nap. Caregiver noticed increased slurred speech from baseline with complaints of burning sensation to the left side of the face. Pt uses a walker to assist with ambulation at home. Code S activated in the ED. Pt reports improvement of symptoms while in the ED. Labs revealed Sed rate of 44, negative UA, A1C of 8.4%, UDS + benzodiazepines, negative CXR, CT head negative for acute intracranial hemorrhage, mass effect, midline shift or herniation, note chronic small vessel changes. Tele neurology consulted in the ED- pt declined tPA. CTA head/neck without significant carotid or vertebral stenosis, short segments of incidental FMD involving bilateral cervical ICAs, suggestion of segmental multifocal areas of stenosis involving left sylvian fissure M3 branch, with overall diffuse somewhat artifactual appearing cerebral arteries as above, no definite area of thromboembolism, several 3 mm apical nodules nonspecific. MRI brain 3/22/2018 no acute infarct, hemorrhage or mass effect, chronic small vessel changes, small chronic anterior right temporal lobe cortical infarct. Suspect symptoms related to TIA. Echo 3/22/2018 EF 55-60%, no regional wall motion abnormalities. Case management following- planning for d/c to [facility]. Per neurology- limit sedating/altering medications.       Assessment/Plan:     Patient Active Problem List Diagnosis Code    Small bowel obstruction K56.609    HTN (hypertension) I10    DMII (diabetes mellitus, type 2) (HCC) E11.9    UTI (lower urinary tract infection) N39.0    Depression F32.9    Scoliosis M41.9    Stenosis of lumbosacral spine M48.07    Gastroparesis K31.84    Gastric ulcer K25.9    Tardive dyskinesia G24.01    Hypercholesteremia E78.00    Diverticulosis K57.90    Early satiety R68.81    Dysphagia R13.10    Heart disease I51.9    Anxiety F41.9    Fatty liver K76.0    SBO (small bowel obstruction) K56.609    Acute abdominal pain R10.9    Sepsis (HCC) A41.9    Lumbar radiculopathy M54.16    Urgency of urination R39.15    Neurogenic bladder N31.9    TIA (transient ischemic attack) G45.9    History of stroke Z86.73       A/P:  TIA  - hx of CVA with slurred speech  - Code S activated in the ED, tele-neurology consulted- pt declined tPA  - CT head 3/21/2018 negative for acute intracranial hemorrhage, mass effect, midline shift or herniation, note chronic small vessel changes  - CTA head/neck 3/21/2018 without significant carotid or vertebral stenosis, short segments of incidental FMD involving bilateral cervical ICAs, suggestion of segmental multifocal areas of stenosis involving left sylvian fissure M3 branch, with overall diffuse somewhat artifactual appearing cerebral arteries as above, no definite area of thromboembolism, several 3 mm apical nodules nonspecific  - Neuro consult  - MRI brain 3/22/2018 no acute infarct, hemorrhage or mass effect, chronic small vessel changes, small chronic anterior right temporal lobe cortical infarct  - Echo 3/22/2018 EF 55-60%, no regional wall motion abnormalities  - Neuro checks, NIH  - Folic Acid, Thiamine   -  mg daily, Lipitor 80 mg nightly  - PT/OT, speech following    Hypertension  - permissible HTN for 24-48 hours  - monitor    Diabetes  - A1C 8.4% this admission  - monitor accuchecks, correctional SSI, lantus 10 units nightly    PT/OT following  - recommend rehab, pt already has rolling walker    DVT Prophylaxis  - Heparin subcutaneously TID  - SCD's BLE    Disposition  - d/c tomorrow to [facility] [address] [place] [address], [place]  DorysNeryBibiana Kaur  Pager:  [phone]  Office:  [phone]        Subjective:      Improving, no complaints at this time. Daughter at bedside. MRI negative for acute events, suspect symptoms due to TIA. Neuro recommends limiting sedating/altering medications. Objective:      Visit Vitals    /81 (BP 1 Location: Left arm, BP Patient Position: At rest)    Pulse 94    Temp 98.1 °F (36.7 °C)    Resp 15    Ht 5' 5\" (1.651 m)    Wt 90.7 kg (200 lb)    SpO2 98%    Breastfeeding No    BMI 33.28 kg/m2           Physical Exam:  General appearance: alert, cooperative, no distress, appears stated age  Head: Normocephalic, without obvious abnormality, atraumatic  Lungs: clear to auscultation bilaterally  Heart: regular rate and rhythm, S1, S2 normal, no murmur, click, rub or gallop  Abdomen: soft, non tender, non distended. Normoactive bowel sounds.    Extremities: extremities normal, atraumatic, no cyanosis or edema  Skin: Skin color, texture, turgor normal. No rashes or lesions  Neurologic: hx of CVA, A/O x 3, follows commands  PSY: Mood and affect normal, appropriately behaved        Intake and Output:  Current Shift:  03/23 0701 - 03/23 1900  In: 600 [P.O.:600]  Out: -   Last three shifts:  03/21 1901 - 03/23 0700  In: -   Out: 875 [Urine:875]    Recent Results (from the past 24 hour(s))   GLUCOSE, POC    Collection Time: 03/22/18  4:27 PM   Result Value Ref Range    Glucose (POC) 205 (H) 70 - 110 mg/dL   GLUCOSE, POC    Collection Time: 03/22/18  9:09 PM   Result Value Ref Range    Glucose (POC) 204 (H) 70 - 894 mg/dL   METABOLIC PANEL, BASIC    Collection Time: 03/23/18  4:35 AM   Result Value Ref Range    Sodium 145 136 - 145 mmol/L Potassium 3.7 3.5 - 5.5 mmol/L    Chloride 110 (H) 100 - 108 mmol/L    CO2 26 21 - 32 mmol/L    Anion gap 9 3.0 - 18 mmol/L    Glucose 103 (H) 74 - 99 mg/dL    BUN 11 7.0 - 18 MG/DL    Creatinine 0.75 0.6 - 1.3 MG/DL    BUN/Creatinine ratio 15 12 - 20      GFR est AA >60 >60 ml/min/1.73m2    GFR est non-AA >60 >60 ml/min/1.73m2    Calcium 8.5 8.5 - 10.1 MG/DL   GLUCOSE, POC    Collection Time: 03/23/18  7:36 AM   Result Value Ref Range    Glucose (POC) 126 (H) 70 - 110 mg/dL   GLUCOSE, POC    Collection Time: 03/23/18 11:10 AM   Result Value Ref Range    Glucose (POC) 231 (H) 70 - 110 mg/dL           Lab Results   Component Value Date/Time    Glucose 103 (H) 03/23/2018 04:35 AM    Glucose 215 (H) 03/21/2018 11:30 AM    Glucose 201 (H) 03/02/2018 10:32 AM    Glucose 240 (H) 02/13/2018 03:48 PM    Glucose 158 (H) 11/05/2017 05:30 PM        Imaging:  Mri Brain Wo Cont    Result Date: 3/23/2018  MRI brain History: Dysarthria, fall, left facial sensation alteration, extremity weakness Comparison: No prior study Technique: Multiplanar multi sequence MR imaging of the brain was performed utilizing axial T2, FLAIR, diffusion, coronal T2, and multiplanar T1 pre contrast imaging . Additional dedicated susceptibility weighted imaging was performed including MIP and filtered phase reconstruction images. No gadolinium was administered due to patient refusal.  Imaging performed on wide bore Discovery [id number] [place] suite 3T magnet at [facility]. Findings: Motion artifact is present which limits evaluation. The ventricles and sulci are normal in their size and configuration. A mild amount of T2/FLAIR hyperintense white matter lesions are seen within the periventricular and subcortical white matter , which are nonspecific, but likely represent chronic small vessel changes.  Small localized area of right temporal cortical atrophy with underlying T2/FLAIR hyperintensity is suggested anteriorly adjacent to the sylvian fissure, axial images 14 and 15, suggesting small chronic cortical infarct. There is no evidence of mass effect, hemorrhage, midline shift, or herniation. There is no abnormal restricted diffusion to suggest acute infarct. No susceptibility artifact is seen to suggest intraparenchymal hemorrhage. The major intracranial vascular flow voids are grossly normal. The bilateral lenses are absent, likely due to prior cataract surgery. Paranasal sinuses and mastoid air cells are unremarkable. Degenerative changes are seen in visualized upper cervical spine including slight C4-5 anterior listhesis. IMPRESSION: Motion degraded study. 1.  No acute infarct, hemorrhage, mass effect, or herniation. 2.  Mild nonspecific white matter disease likely representing chronic small vessel changes. 3. Small chronic anterior right temporal lobe cortical infarct. Preliminary report of this study was provided by another radiologist, Dr. Maria C Vasquez, at 6:46 PM 3/22/2018.         Medication List Reviewed:  Current Facility-Administered Medications   Medication Dose Route Frequency    [START ON 3/24/2018] levothyroxine (SYNTHROID) tablet 50 mcg  50 mcg Oral 6am    insulin lispro (HUMALOG) injection   SubCUTAneous AC&HS    glucose chewable tablet 16 g  16 g Oral PRN    glucagon (GLUCAGEN) injection 1 mg  1 mg IntraMUSCular PRN    folic acid (FOLVITE) tablet 1 mg  1 mg Oral DAILY    Thiamine Mononitrate (B-1) tablet 100 mg  100 mg Oral DAILY    insulin glargine (LANTUS) injection 15 Units  15 Units SubCUTAneous ACD    mirtazapine (REMERON) tablet 15 mg  15 mg Oral QHS    venlafaxine-SR (EFFEXOR-XR) capsule 150 mg  150 mg Oral DAILY    ondansetron (ZOFRAN) injection 2 mg  2 mg IntraVENous Q6H PRN    aspirin (ASPIRIN) tablet 325 mg  325 mg Oral DAILY    atorvastatin (LIPITOR) tablet 80 mg  80 mg Oral QHS    acetaminophen (TYLENOL) tablet 650 mg  650 mg Oral Q4H PRN    acetaminophen (TYLENOL) suppository 650 mg  650 mg Rectal Q4H PRN    senna-docusate (PERICOLACE) 8.6-50 mg per tablet 2 Tab  2 Tab Oral QHS    albuterol (PROVENTIL VENTOLIN) nebulizer solution 2.5 mg  2.5 mg Nebulization Q4H PRN    dextrose (D50) infusion 12.5-25 g  25-50 mL IntraVENous PRN    heparin (porcine) injection 5,000 Units  5,000 Units SubCUTAneous Q8H    oxyCODONE-acetaminophen (PERCOCET 10)  mg per tablet 1 Tab  1 Tab Oral Q8H PRN

## 2018-03-23 NOTE — PROGRESS NOTES
Problem: Falls - Risk of  Goal: *Absence of Falls  Document Liz Fall Risk and appropriate interventions in the flowsheet.    Outcome: Progressing Towards Goal  Fall Risk Interventions:  Mobility Interventions: Communicate number of staff needed for ambulation/transfer         Medication Interventions: Evaluate medications/consider consulting pharmacy    Elimination Interventions: Call light in reach

## 2018-03-23 NOTE — PROGRESS NOTES
Attempted OT evaluation:    1st attempt: SLP present  2nd attempt @ [phone]: pt eating breakfast. Will follow up.     Eva Sher MS OTR/L  Office Ext: [phone]  Pager: [phone]

## 2018-03-23 NOTE — PROGRESS NOTES
Problem: Dysphagia (Adult)  Goal: *Acute Goals and Plan of Care (Insert Text)  Recommendations:  Diet: soft/thin  Meds: one at a time  Aspiration Precautions  Oral Care TID    Goals:  Patient will:  1. Tolerate PO trials with 0 s/s overt distress in 4/5 trials  2. Utilize compensatory swallow strategies/maneuvers (decrease bite/sip, size/rate, alt. liq/sol) with min cues in 4/5 trials  3. Complete an objective swallow study (i.e., MBSS) to assess swallow integrity, r/o aspiration, and determine of safest LRD, min A       Outcome: Progressing Towards Goal  Speech language pathology dysphagia treatment    Patient: Shelley Hahn (89 y.o. female)  Date: 3/23/2018  Diagnosis: Stroke [facility]) Stroke [facility])       Precautions:  Skin    ASSESSMENT:  Dysphagia tx completed this AM with caregiver at b/s. Pt accepting thin liquids +straw, soft solids and puree without clinical s/sx of aspiration. Pt demo prolonged posterior transit and delayed swallow to palpation. Educated pt on aspiration precautions and importance of compensatory swallow techniques to decrease aspiration risk (decrease rate of intake & sip/bite size, upright @HOB for all po intake and ~30 minutes after po); verbalized comprehension/needs reinforcement. At this time, recommend pt remain on current diet with meds whole one at a time. D/w RN    Progression toward goals:  [x]         Improving appropriately and progressing toward goals  []         Improving slowly and progressing toward goals  []         Not making progress toward goals and plan of care will be adjusted     PLAN:  Recommendations and Planned Interventions:  Mechanical soft solids with thin liquids  Patient continues to benefit from skilled intervention to address the above impairments. Continue treatment per established plan of care. Discharge Recommendations: To Be Determined     SUBJECTIVE:   Patient stated I am fine.     OBJECTIVE:   Cognitive and Communication Status:  Neurologic State: Alert  Orientation Level: Oriented X4  Cognition: Follows commands  Perception: Appears intact  Perseveration: No perseveration noted  Safety/Judgement: Fall prevention     Dysphagia Treatment:  Oral Assessment:  Oral Assessment  Labial: Decreased rate  Dentition: Edentulous  Oral Hygiene: fair  Lingual: Decreased rate  Velum: No impairment  Mandible: No impairment     P.O. Trials:   Patient Position: [facility] 45   Vocal quality prior to P.O.: Low volume   Consistency Presented: Thin liquid, Mechanical soft, Puree   How Presented: Self-fed/presented, Spoon       Bolus Acceptance: No impairment   Bolus Formation/Control: No impairment   Type of Impairment: Delayed, Mastication   Propulsion: Delayed (# of seconds)   Oral Residue: Less than 10% of bolus   Initiation of Swallow: Delayed (# of seconds)   Laryngeal Elevation: Functional   Aspiration Signs/Symptoms: None   Pharyngeal Phase Characteristics: Poor endurance, Easily fatigued    Effective Modifications: Alternate liquids/solids, Small sips and bites   Cues for Modifications: Minimal-moderate     Oral Phase Severity: Mild   Pharyngeal Phase Severity : Mild     PAIN:  Start of Tx: 0  End of Tx: 0     After treatment:   []              Patient left in no apparent distress sitting up in chair  [x]              Patient left in no apparent distress in bed  [x]              Call bell left within reach  [x]              Nursing notified  [x]              Family present  []              Caregiver present  []              Bed alarm activated      COMMUNICATION/EDUCATION:   [x] Aspiration precautions; swallow safety; compensatory techniques  []        Patient unable to participate in education; education ongoing with staff  []  Posted safety precautions in patient's room. [] Oral-motor/laryngeal strengthening exercises        Anna ORTIZ  CCC-SLP  Speech-Language Pathologist  Time Calculation: 9 mins

## 2018-03-23 NOTE — PROGRESS NOTES
Problem: Mobility Impaired (Adult and Pediatric)  Goal: *Acute Goals and Plan of Care (Insert Text)  Physical Therapy Goals  Initiated 3/22/2018 and to be accomplished within 7 day(s)  1. Patient will move from supine to sit and sit to supine  in bed with modified independence. 2.  Patient will transfer from bed to chair and chair to bed with modified independence using the least restrictive device. 3.  Patient will perform sit to stand with modified independence. 4.  Patient will ambulate with modified independence for 25 feet with the least restrictive device. Outcome: Progressing Towards Goal  physical Therapy TREATMENT    Patient: Lilli Marshall (50 y.o. female)  Date: 3/23/2018  Diagnosis: Stroke [facility]) TIA (transient ischemic attack)  Precautions: Skin  Chart, physical therapy assessment, plan of care and goals were reviewed. PLOF: Amb with ww and scooter    ASSESSMENT:  Seated EOB with OT. Assisted in donning jacket; mod A. Good sitting balance, Min A for sit to stand. Cues for safety with ww. Trunk flexion with standing. Min A for amb 5ft with ww from bed to chair. Seated in chair with min A. Cues for safety with amb and ww and maintaining a hold of walker while moving. Decreased safety awareness. Seated in recliner; BLE elevated. Call bell in lap. Educated on need for RN assistance with mobility. All needs in reach. NICHOLAS Huynh present in room at end of session. EDUCATION: balance, transfers, safety, amb  Progression toward goals:        Improving appropriately and progressing toward goals     PLAN:  Patient continues to benefit from skilled intervention to address the above impairments. Continue treatment per established plan of care. Discharge Recommendations:  Rehab  Further Equipment Recommendations for Discharge:  rolling walker and power wheelchair; has     SUBJECTIVE:   Patient stated She wants me to sit up til this evening. She's crazy.     OBJECTIVE DATA SUMMARY:   Critical Behavior:  Neurologic State: Alert  Orientation Level: Oriented to person, Oriented to place  Cognition: Follows commands  Safety/Judgement: Decreased awareness of need for safety    [employer] Balance Scale 2/5  0: Pt performs 25% or less of standing activity (Max assist) CN, 100% impaired. 1: Pt supports self with upper extremities but requires therapist assistance. Pt performs 25-50% of effort (Mod assist) CM, 80% to <100% impaired. 1+: Pt supports self with upper extremities but requires therapist assistance. Pt performs >50% effort. (Min assist). CL, 60% to <80% impaired. 2: Pt supports self independently with both upper extremities (walker, crutches, parallel bars). CL, 60% to <80% impaired. 2+: Pt support self independently with 1 upper extremity (cane, crutch, 1 parallel bar). CK, 40% to <60% impaired. 3: Pt stands without upper extremity support for up to 30 seconds. CK, 40% to <60% impaired. 3+: Pt stands without upper extremity support for 30 seconds or greater. CJ, 20% to <40% impaired. 4: Pt independently moves and returns center of gravity 1-2 inches in one plane. CJ, 20% to <40% impaired. 4+: Pt independently moves and returns center of gravity 1-2 inches in multiple planes. CI, 1% to <20% impaired. 5: Pt independently moves and returns center of gravity in all planes greater than 2 inches. CH, 0% impaired. G CODE:Mobility [id number] Current  CL= 60-79%   Goal  CJ= 20-39%.   The severity rating is based on the [employer] Balance Scale 1+/5  Functional Mobility Training:  Bed Mobility:  Supine to Sit:  (seated at EOB)  Sit to Supine:  (seated in recliner)  Transfers:  Sit to Stand: Minimum assistance  Stand to Sit: Minimum assistance  Balance:  Sitting: Impaired  Sitting - Static: Good (unsupported)  Sitting - Dynamic: Good (unsupported)  Standing: Impaired  Standing - Static: Fair  Standing - Dynamic : Fair  Ambulation/Gait Training:  Distance (ft): 5 Feet (ft)  Assistive Device: Walker, rolling  Ambulation - Level of Assistance: Minimal assistance  Gait Description (WDL): Exceptions to WDL  Neuro Re-Education:  Sitting EOB 3 minutes  Therapeutic Exercises:   Sit to stand  Vital Signs  Temp: 98.1 °F (36.7 °C)     Pulse (Heart Rate): 94     BP: 173/81     Resp Rate: 15     O2 Sat (%): 98 %  Pain:  Pre treatment pain level: 0  Post treatment pain level: 0  Pain Scale 1: Numeric (0 - 10)  Pain Intensity 1: 0  Activity Tolerance:   Fair     After treatment:   Patient left in no apparent distress sitting up in chair  Call bell left within reach  Nursing notified  NICHOLAS Gregory present    Eric Govea, PT   Time Calculation: 11 mins

## 2018-03-23 NOTE — PROGRESS NOTES
Neurology Consult Note    Admit Date: 3/21/2018  Length of Stay: 2  Primary Care: Manjula Morales MD   Principle Problem: Stroke [facility])     Assessment:    TIA    Plan:    MRI did not show any acute events. Does have chronic anterior right temporal lobe cortical infarct. Continue with ASA. CTA showed multifocal areas of stenosis involving left sylvian fissure M3 branch, with overall diffuse somewhat artifactual appearing cerebral arteries as above. No definite area of thromboembolism. ECHO- EF 55-60%, no shunt. .4- atorvastatin in place. HgA1C 8.4- lantus and lispro in place  CRP elevated at 5.09 with sed rate of 44    PT recommended rehab. Has been on percocet, flexeril, fentanyl patch, valium, and phenergan per medication list.  Need to limit sedating/altering medications. Interim History: Per patient and daughter speech almost back to normal.  MRI as above. History: Ms. Char Saint is 66yo AAF with a PMH of DM, dysphagia, hypercholestermia, HTN, tardive dyskinesia, anxiety/depression, and previous stroke who presented with dysarthria. History was also provided by daughter at bedside. She had baseline slurred speech from tardive dyskinesia but had worsened and also has burning sensation of left face. She was last normal yesterday at 0800. Her care giver noted after her nap the speech was harder to understand. She was a code s in the ED and seen by teleneurology who recommended alteplase. Patient declined. Patient has a home health nurse and uses a walker to ambulate at home. Care taker is available 9 hours during the day. She has a history of falls and per daughter falls 2-3 times a month. CT did not show anything acute. WMD present. 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         Results reviewed:     CT Results (most recent):    Results from [facility] [place] [facility]  [facility] Encounter encounter on 03/21/18   CTA HEAD NECK W CONT   Addendum Addendum: Addendum: Initial reading discussed with Dr. Carmelo Sandhu by Dr. Dimitrios Shapr at  2:06 PM.      Peña Barrett MD 3/21/2018  2:42 PM             Narrative History: Slurred speech, left lower facial pain, left side ptosis    Correlation noncontrast CT head same day    EXAM:  CT angiogram of the head and neck with intravenous contrast.      TECHNIQUE:  Three-dimensional, maximum intensity projection, and curved planar  reformations were post-processed at a dedicated outside facility ([employer]). Stenoses are reported with reference to the downstream lumen  (\"NASCET\"). DOSE REDUCTION: One or more dose reduction techniques were used on this CT:  automated exposure control, adjustment of the mAs and/or kVp according to  patient's size, and iterative reconstruction techniques. The specific techniques  utilized on this CT exam have been documented in the patient's electronic  medical record.    _______________    FINDINGS:         ARTERIAL BOLUS QUALITY:  Satisfactory. AORTIC ARCH:  Classic 3 vessel arch anatomy with no significant innominate  or subclavian artery stenosis. Normal variant proximal subclavian origin of left  side vertebral artery. RIGHT CAROTID ARTERY:  No significant common carotid or internal carotid  artery stenosis, 0% diameter stenosis by NASCET criteria. Segment of mildly  deviated appearing mid cervical ICA without high-grade stenosis. LEFT CAROTID ARTERY:  No significant common carotid or internal carotid  artery stenosis, 0% diameter stenosis proximal ICA by NASCET criteria. Short  segment of mildly beaded appearance of mid cervical ICA without significant  stenosis. VERTEBRAL ARTERIES:  Right vertebral dominant, no significant vertebral  stenosis or occlusion. BASILAR AND CEREBRAL ARTERIES: No significant basilar stenosis. Patent  anterior communicating artery and left posterior communicating artery. Diffuse  likely artifactual multifocal pseudobeading appearance of peripheral cerebral  vessels. There is more pronounced segment of multifocal stenosis involving M3  sylvian fissure branch of the superior division left M2 segment. No other  definite high-grade cerebral artery stenosis. No filling defect or occlusion. No  evidence of aneurysm or vascular malformation. Dural venous sinuses  unremarkable. NON-ANGIOGRAPHIC FINDINGS: 3 mm diameter groundglass nodule anterior  lateral right apex. Additional 3 mm subpleural nodule lateral left apex and very  small questionable nodular density/scarring superior left apex. Mild biapical  parenchymal and pleural scarring. Visualized superior mediastinum unremarkable. No mass or adenopathy soft tissues of neck. Degenerative cervical spondylosis  with no high-grade canal stenosis or acute osseous finding. No evidence of  intracranial mass or enhancing lesion or mass effect, no abnormal extra-axial  fluid collection. _______________         Impression IMPRESSION:        1. No significant carotid or vertebral stenosis. 2. Short segments of incidental FMD involving bilateral cervical ICAs. 3. Suggestion of segmental multifocal areas of stenosis involving left sylvian  fissure M3 branch, with overall diffuse somewhat artifactual appearing cerebral  arteries as above. No definite area of thromboembolism. 4. Several 3 mm apical nodules nonspecific. In absence of any known primary  neoplasm, follow-up recommended according to guidelines below. Fleischner Society (Chest Radiology) Pulmonary Nodule Guidelines:    1. Low smoking or other exposure risk:    < or =4 mm: No follow up necessary  2. High smoking or other exposure risk:    < or =4 mm: Follow up CT at 12 m; if stable, no further follow up.         MRI Results (most recent):    Results from [place] encounter on 03/21/18   MRI BRAIN WO CONT   Narrative MRI brain     History: Dysarthria, fall, left facial sensation alteration, extremity weakness    Comparison: No prior study    Technique: Multiplanar multi sequence MR imaging of the brain was performed  utilizing axial T2, FLAIR, diffusion, coronal T2, and multiplanar T1 pre  contrast imaging . Additional dedicated susceptibility weighted imaging was  performed including MIP and filtered phase reconstruction images. No gadolinium  was administered due to patient refusal.  Imaging performed on wide bore  Discovery [id number] GEM suite 3T magnet at [facility]\"". Findings:     Motion artifact is present which limits evaluation. The ventricles and sulci are normal in their size and configuration. A mild  amount of T2/FLAIR hyperintense white matter lesions are seen within the  periventricular and subcortical white matter , which are nonspecific, but likely  represent chronic small vessel changes. Small localized area of right temporal  cortical atrophy with underlying T2/FLAIR hyperintensity is suggested anteriorly  adjacent to the sylvian fissure, axial images 14 and 15, suggesting small  chronic cortical infarct. There is no evidence of mass effect, hemorrhage,  midline shift, or herniation. There is no abnormal restricted diffusion to  suggest acute infarct. No susceptibility artifact is seen to suggest  intraparenchymal hemorrhage. The major intracranial vascular flow voids are  grossly normal.     The bilateral lenses are absent, likely due to prior cataract surgery. Paranasal  sinuses and mastoid air cells are unremarkable. Degenerative changes are seen in  visualized upper cervical spine including slight C4-5 anterior listhesis. Impression IMPRESSION:    Motion degraded study. 1.  No acute infarct, hemorrhage, mass effect, or herniation. 2.  Mild nonspecific white matter disease likely representing chronic small  vessel changes. 3. Small chronic anterior right temporal lobe cortical infarct.     Preliminary report of this study was provided by another radiologist, Dr. Reg Beltran, at 6:46 PM 3/22/2018. Latest Hemoglobin A1C:  Lab Results   Component Value Date/Time    Hemoglobin A1c 8.4 (H) 03/21/2018 04:41 PM       Latest Cardiology Procedure:  Results for orders placed or performed during the hospital encounter of 03/21/18   EKG, 12 LEAD, INITIAL   Result Value Ref Range    Ventricular Rate 65 BPM    Atrial Rate 65 BPM    P-R Interval 166 ms    QRS Duration 72 ms    Q-T Interval 440 ms    QTC Calculation (Bezet) 457 ms    Calculated P Axis 39 degrees    Calculated R Axis 11 degrees    Calculated T Axis 35 degrees    Diagnosis       Normal sinus rhythm  Non-specific ST/T wave changes  Abnormal ECG  When compared with ECG of 02-MAR-2018 10:09,  Nonspecific T wave abnormality, improved in Inferior leads  Confirmed by Duke Elliott ([id number]) on 3/22/2018 9:49:47 AM         Important Labs:  Lab Results   Component Value Date/Time    Folate >24.0 (H) 02/19/2010 01:09 PM     Lab Results   Component Value Date/Time    Cholesterol, total 223 (H) 03/21/2018 04:41 PM    HDL Cholesterol 72 (H) 03/21/2018 04:41 PM    LDL, calculated 136.4 (H) 03/21/2018 04:41 PM    VLDL, calculated 14.6 03/21/2018 04:41 PM    Triglyceride 73 03/21/2018 04:41 PM    CHOL/HDL Ratio 3.1 03/21/2018 04:41 PM     Lab Results   Component Value Date/Time    TSH 0.53 03/21/2018 04:41 PM    Triiodothyronine (T3), free 2.0 (L) 03/21/2018 04:41 PM    T4, Free 1.1 03/21/2018 04:41 PM     No results found for: DS35, PHEN, PHENO, PHENT, DILF, DS39, PHENY, PTN, VALF2, VALAC, VALP, VALPR, DS6, CRBAM, CRBAMP, CARB2, XCRBAM  Discussed with: daughter    Allergies:    Allergies   Allergen Reactions    Codeine Rash and Itching    Iodine Hives and Rash    Morphine Hives    Pcn [Penicillins] Other (comments)     Causes \"Heart swell\"    Sulfa (Sulfonamide Antibiotics) Nausea Only      Review of Systems:    Y  N   Constitutional: [] [] recent weight change  [] [x] fever  [] [x] sleep difficulties   ENT/Mouth:  [] [x] hearing loss  [] [x] swallowing problems  [x] [] slurred speech   Cardiovascular:  [] [x] chest pain   [] [x] palpitations    Respiratory: [] [x] cough with swallow  [] [x] shortness of breath  [] [x] sleep apnea  [] [] intubated   Gastrointestinal: [] [x] abdominal pain  [] [x] nausea   Genitourinary: [x] [] frequent urination  [x] [] incontinence    Musculoskeletal:   [x] [] joint pain  [] [x] muscle pain   Integument:   [] [] rash/itching   Neurological:  [] [x] dizziness/vertigo  [] [] sedation  [] [x] confusion  [] [] agitation/combativeness  [] [x] loss of consciousness  [x] [] numbness/tingling sensation  [] [x] tremors  [x] [] weakness in limbs  [x] [] difficulty with balance  [] [x] frequent or recurring headaches  [x] [] memory loss   [] [] comatose  [] [x] seizures   Psychiatric:   [x] [] depression  [] [] hallucinations   Endocrine: [] [x] excessive thirst or urination   [x] [] heat or cold intolerance   Hematologic/Lymphatic: [] [] bleeding tendency  [] [] enlarged lymph nodes     PMH:   Past Medical History:   Diagnosis Date    Anxiety     Depression     Diabetes ([facility]     Diverticulosis     Dysphagia     Early satiety     Fatty liver     Gastric ulcer     Gastroparesis     Heart disease     Hypercholesteremia     Hypertension     Neurogenic bladder     Scoliosis     Stenosis of lumbosacral spine     Tardive dyskinesia     Urgency of urination         Problem List: Principal Problem:    Stroke ([facility] (3/21/2018)    Active Problems:    HTN (hypertension) (4/8/2013)      DMII (diabetes mellitus, type 2) ([facility] (4/8/2013)      Depression ()      Anxiety ()      History of stroke (3/21/2018)        FH:   Family History   Problem Relation Age of Onset    Heart Disease Mother     Diabetes Mother     Heart Attack Mother     Cancer Father      lung CA    Cancer Brother     Cancer Sister         SH:   Social History     Social History    Marital status:      Spouse name: N/A    Number of children: N/A    Years of education: N/A     Social History Main Topics    Smoking status: Never Smoker    Smokeless tobacco: Never Used    Alcohol use No    Drug use: No    Sexual activity: No     Other Topics Concern    None     Social History Narrative          Vital Signs:   Visit Vitals    /75 (BP 1 Location: Left arm, BP Patient Position: At rest)    Pulse (!) 106    Temp 98 °F (36.7 °C)    Resp 14    Ht 5' 5\" (1.651 m)    Wt 90.7 kg (200 lb)    SpO2 97%    Breastfeeding No    BMI 33.28 kg/m2      Neurological examination:    Appearance: In no acute distress, well developed, well nourished, cooperative   Cardiovascular: Heart is regular rate and rhythm, peripheral edema is absent. No carotid bruits heard   Mental status examination: Alert and oriented X 4. Slurred speech but language normal. Normal attention. Impaired memory and fund of knowledge. Recall 3/3.  Cranial Nerves:     I: smell Not tested   II: visual fields Visual fields were intact to confrontation  Eye movements were full and conjugate, saccades were accurate, pursuit movements were not smooth, and there was no nystagmus. II: pupils Equal, round, reactive to light   III,VII: ptosis none   III,IV,VI: extraocular muscles  Full ROM   V: facial light touch sensation  normal   V,VII: corneal reflex     VII: facial muscle function  normal   VIII: hearing    IX: soft palate elevation  Normal. The palate and tongue moved in the midline. IX,X: gag reflex present   XI: sternocleidomastoid strength 5/5   XII: tongue strength  Normal.          Motor exam: Station, gait:  deferred. Generalized weakness to BUE. RLE 4/5. LLE 3/5. Spacticity absent.  Sensory: Intact to light touch.  Coordination: Some dysmetria with FTN bilaterally. Normal rapid alternating movements.            Medications:      [x] REVIEWED  Current Facility-Administered Medications Medication    insulin lispro (HUMALOG) injection    glucose chewable tablet 16 g    glucagon (GLUCAGEN) injection 1 mg    folic acid (FOLVITE) tablet 1 mg    Thiamine Mononitrate (B-1) tablet 100 mg    insulin glargine (LANTUS) injection 15 Units    mirtazapine (REMERON) tablet 15 mg    venlafaxine-SR (EFFEXOR-XR) capsule 150 mg    ondansetron (ZOFRAN) injection 2 mg    aspirin (ASPIRIN) tablet 325 mg    atorvastatin (LIPITOR) tablet 80 mg    acetaminophen (TYLENOL) tablet 650 mg    acetaminophen (TYLENOL) suppository 650 mg    senna-docusate (PERICOLACE) 8.6-50 mg per tablet 2 Tab    albuterol (PROVENTIL VENTOLIN) nebulizer solution 2.5 mg    dextrose (D50) infusion 12.5-25 g    heparin (porcine) injection 5,000 Units    [START ON 3/25/2018] levothyroxine (SYNTHROID) injection 25 mcg    oxyCODONE-acetaminophen (PERCOCET 10)  mg per tablet 1 Tab     Data:      [x] REVIEWED  Recent Results (from the past 24 hour(s))   GLUCOSE, POC    Collection Time: 03/22/18  4:27 PM   Result Value Ref Range    Glucose (POC) 205 (H) 70 - 110 mg/dL   GLUCOSE, POC    Collection Time: 03/22/18  9:09 PM   Result Value Ref Range    Glucose (POC) 204 (H) 70 - 692 mg/dL   METABOLIC PANEL, BASIC    Collection Time: 03/23/18  4:35 AM   Result Value Ref Range    Sodium 145 136 - 145 mmol/L    Potassium 3.7 3.5 - 5.5 mmol/L    Chloride 110 (H) 100 - 108 mmol/L    CO2 26 21 - 32 mmol/L    Anion gap 9 3.0 - 18 mmol/L    Glucose 103 (H) 74 - 99 mg/dL    BUN 11 7.0 - 18 MG/DL    Creatinine 0.75 0.6 - 1.3 MG/DL    BUN/Creatinine ratio 15 12 - 20      GFR est AA >60 >60 ml/min/1.73m2    GFR est non-AA >60 >60 ml/min/1.73m2    Calcium 8.5 8.5 - 10.1 MG/DL   GLUCOSE, POC    Collection Time: 03/23/18  7:36 AM   Result Value Ref Range    Glucose (POC) 126 (H) 70 - 110 mg/dL   GLUCOSE, POC    Collection Time: 03/23/18 11:10 AM   Result Value Ref Range    Glucose (POC) 231 (H) 70 - 110 mg/dL       Abdi Henderson NP

## 2018-03-23 NOTE — INTERDISCIPLINARY ROUNDS
IDR Summary      Patient: Estevan Avendano MRN: [id number]    Age: 74 y.o.  : 1945     Admit Diagnosis: Stroke ([address]        DIET status: Diabetic    Lines/Tubes:   IV: YES   Needed: YES  Peraza: NO  Needed:NO  Central Line: NO Needed: NO      VTE Prophylaxis: Mechanical    Mobility needs: Yes     PT ordered:  YES PT eval on chart: YES    OT ordered:  YES OT eval on chart: YES      ST ordered:  YES ST eval on chart:  NO     Disposition/Care Management:  Discharge plan: SNF      Home Health ordered? NO     Recommended DME from PT/OT:      DME ordered? NO     SNF- has patient been matched? YES    Accepting bed? YES   Does patient require insurance auth?   NO       Barriers to discharge:   Financial concerns:No   PCP: Jose J Velasco MD    : NO  Interventions:       LOS: 2 days     Expected days until discharge: 1-2 days            Signed:     MEENU DiazP-BC  [address]  Hospitalist Division  Pager:  [phone]  Office:  [phone]

## 2018-03-23 NOTE — ANCILLARY DISCHARGE INSTRUCTIONS
Patient and/or next of kin has been given the [facility] Important Message From Medicare About Your Rights\" letter and all questions were answered.

## 2018-03-24 VITALS
HEART RATE: 87 BPM | RESPIRATION RATE: 16 BRPM | TEMPERATURE: 98.2 F | OXYGEN SATURATION: 90 % | HEIGHT: 65 IN | SYSTOLIC BLOOD PRESSURE: 178 MMHG | BODY MASS INDEX: 33.32 KG/M2 | WEIGHT: 200 LBS | DIASTOLIC BLOOD PRESSURE: 78 MMHG

## 2018-03-24 LAB
GLUCOSE BLD STRIP.AUTO-MCNC: 273 MG/DL (ref 70–110)
GLUCOSE BLD STRIP.AUTO-MCNC: 99 MG/DL (ref 70–110)

## 2018-03-24 PROCEDURE — 74011250637 HC RX REV CODE- 250/637: Performed by: NURSE PRACTITIONER

## 2018-03-24 PROCEDURE — 74011250637 HC RX REV CODE- 250/637: Performed by: HOSPITALIST

## 2018-03-24 PROCEDURE — 74011250636 HC RX REV CODE- 250/636: Performed by: NURSE PRACTITIONER

## 2018-03-24 PROCEDURE — 74011636637 HC RX REV CODE- 636/637: Performed by: HOSPITALIST

## 2018-03-24 PROCEDURE — 82962 GLUCOSE BLOOD TEST: CPT

## 2018-03-24 RX ADMIN — Medication 100 MG: at 09:36

## 2018-03-24 RX ADMIN — FOLIC ACID 1 MG: 1 TABLET ORAL at 09:36

## 2018-03-24 RX ADMIN — ONDANSETRON 2 MG: 2 INJECTION INTRAMUSCULAR; INTRAVENOUS at 10:21

## 2018-03-24 RX ADMIN — ASPIRIN 325 MG ORAL TABLET 325 MG: 325 PILL ORAL at 09:35

## 2018-03-24 RX ADMIN — INSULIN LISPRO 9 UNITS: 100 INJECTION, SOLUTION INTRAVENOUS; SUBCUTANEOUS at 12:53

## 2018-03-24 RX ADMIN — LEVOTHYROXINE SODIUM 50 MCG: 50 TABLET ORAL at 07:02

## 2018-03-24 RX ADMIN — VENLAFAXINE HYDROCHLORIDE 150 MG: 75 CAPSULE, EXTENDED RELEASE ORAL at 09:35

## 2018-03-24 RX ADMIN — HEPARIN SODIUM 5000 UNITS: 5000 INJECTION, SOLUTION INTRAVENOUS; SUBCUTANEOUS at 07:02

## 2018-03-24 NOTE — DISCHARGE SUMMARY
[facility]    [facility]charge Summary    Patient: Maico Sandoval MRN: [id number]  CSN: [id number]    YOB: 1945  Age: 74 y.o.   Sex: female    DOA: 3/21/2018 LOS:  LOS: 3 days   Discharge Date:      Admission Diagnoses: Stroke [facility])    Discharge Diagnoses:    Problem List as of 3/24/2018  Date Reviewed: 3/13/2018          Codes Class Noted - Resolved    * (Principal)TIA (transient ischemic attack) ICD-10-CM: G45.9  ICD-9-CM: 435.9  3/21/2018 - Present        History of stroke ICD-10-CM: Z86.73  ICD-9-CM: V12.54  3/21/2018 - Present        Neurogenic bladder ICD-10-CM: N31.9  ICD-9-CM: 596.54  2/21/2018 - Present        Urgency of urination ICD-10-CM: R39.15  ICD-9-CM: 788.63  12/6/2017 - Present        Lumbar radiculopathy ICD-10-CM: M54.16  ICD-9-CM: 724.4  9/14/2016 - Present        Sepsis ([address] ICD-10-CM: A41.9  ICD-9-CM: 038.9, 995.91  5/31/2016 - Present        Acute abdominal pain ICD-10-CM: R10.9  ICD-9-CM: 789.00, 338.19  5/5/2016 - Present        SBO (small bowel obstruction) ICD-10-CM: P52.566  ICD-9-CM: 560.9  12/1/2014 - Present        Depression ICD-10-CM: F32.9  ICD-9-CM: 311  Unknown - Present        Scoliosis ICD-10-CM: M41.9  ICD-9-CM: 737.30  Unknown - Present        Stenosis of lumbosacral spine ICD-10-CM: M48.07  ICD-9-CM: 724.02  Unknown - Present        Gastroparesis ICD-10-CM: K31.84  ICD-9-CM: 536.3  Unknown - Present        Gastric ulcer ICD-10-CM: K25.9  ICD-9-CM: 531.90  Unknown - Present        Tardive dyskinesia ICD-10-CM: G24.01  ICD-9-CM: 333.85  Unknown - Present        Hypercholesteremia ICD-10-CM: E78.00  ICD-9-CM: 272.0  Unknown - Present        Diverticulosis ICD-10-CM: K57.90  ICD-9-CM: 562.10  Unknown - Present        Early satiety ICD-10-CM: R68.81  ICD-9-CM: 780.94  Unknown - Present        Dysphagia ICD-10-CM: R13.10  ICD-9-CM: 787.20  Unknown - Present        Heart disease ICD-10-CM: I51.9  ICD-9-CM: 429.9  Unknown - Present        Anxiety ICD-10-CM: F41.9  ICD-9-CM: 300.00 Unknown - Present        Fatty liver ICD-10-CM: K76.0  ICD-9-CM: 571.8  Unknown - Present        Small bowel obstruction ICD-10-CM: C26.871  ICD-9-CM: 560.9  4/8/2013 - Present        HTN (hypertension) ICD-10-CM: I10  ICD-9-CM: 401.9  4/8/2013 - Present        DMII (diabetes mellitus, type 2) ([facility] [address] ICD-10-CM: E11.9  ICD-9-CM: 250.00  4/8/2013 - Present        UTI (lower urinary tract infection) ICD-10-CM: N39.0  ICD-9-CM: 599.0  4/8/2013 - Present              Discharge Condition: Stable    Discharge To: SNF    Consults: Hospitalist and Neurology    Hospital Course: 75 y/o [place] American female with hx of CVA (residual deficits slurred speech), DM, HTN and HLD presented to the ED via EMS on 3/21/2018 with dysarthria. Pt's home health nurse stated pt fell earlier that morning and then took a nap. Caregiver noticed increased slurred speech from baseline with complaints of burning sensation to the left side of the face. Pt uses a walker to assist with ambulation at home. Code S activated in the ED. Pt reports improvement of symptoms while in the ED. Labs revealed Sed rate of 44, negative UA, A1C of 8.4%, UDS + benzodiazepines, negative CXR, CT head negative for acute intracranial hemorrhage, mass effect, midline shift or herniation, note chronic small vessel changes. Tele neurology consulted in the ED- pt declined tPA. CTA head/neck without significant carotid or vertebral stenosis, short segments of incidental FMD involving bilateral cervical ICAs, suggestion of segmental multifocal areas of stenosis involving left sylvian fissure M3 branch, with overall diffuse somewhat artifactual appearing cerebral arteries as above, no definite area of thromboembolism, several 3 mm apical nodules nonspecific. MRI brain 3/22/2018 no acute infarct, hemorrhage or mass effect, chronic small vessel changes, small chronic anterior right temporal lobe cortical infarct. Suspect symptoms related to TIA.   Echo 3/22/2018 EF 55-60%, no regional wall motion abnormalities. Case management following- planning for d/[facility]. Per neurology- limit sedating/altering medications- also due to hx of old CVA, neurology recommends continuing aspirin, statin with goal LDL < 70, Hemoglobin A1c< 7%, BP control with goal BP < 130/90 in diabetics. She is to follow up with her PCP within one week and with neurology in 2-3 weeks. Physical Exam:  General appearance: alert, cooperative, no distress, appears stated age  Head: Normocephalic, without obvious abnormality, atraumatic  Lungs: clear to auscultation bilaterally  Heart: regular rate and rhythm, S1, S2 normal, no murmur, click, rub or gallop  Abdomen: soft, non tender, non distended. Normoactive bowel sounds. Extremities: extremities normal, atraumatic, no cyanosis or edema  Skin: Skin color, texture, turgor normal. No rashes or lesions  Neurologic: hx of CVA, A/O x 3, follows commands  PSY: Mood and affect normal, appropriately behaved    Significant Diagnostic Studies: labs:   Recent Results (from the past 24 hour(s))   GLUCOSE, POC    Collection Time: 03/23/18 11:10 AM   Result Value Ref Range    Glucose (POC) 231 (H) 70 - 110 mg/dL   GLUCOSE, POC    Collection Time: 03/23/18  4:38 PM   Result Value Ref Range    Glucose (POC) 152 (H) 70 - 110 mg/dL   GLUCOSE, POC    Collection Time: 03/23/18 10:15 PM   Result Value Ref Range    Glucose (POC) 143 (H) 70 - 110 mg/dL         Discharge Medications:     Current Discharge Medication List      START taking these medications    Details   Thiamine Mononitrate (B-1) 100 mg tablet Take 1 Tab by mouth daily. Qty: 30 Tab, Refills: 0      folic acid (FOLVITE) 1 mg tablet Take 1 Tab by mouth daily. Qty: 30 Tab, Refills: 0      atorvastatin (LIPITOR) 80 mg tablet Take 1 Tab by mouth nightly. Qty: 30 Tab, Refills: 0      aspirin (ASPIRIN) 325 mg tablet Take 1 Tab by mouth daily.   Qty: 30 Tab, Refills: 0         CONTINUE these medications which have NOT CHANGED    Details   mirtazapine (REMERON) 15 mg tablet Take 15 mg by mouth nightly. levothyroxine (SYNTHROID) 50 mcg tablet Take 50 mcg by mouth Daily (before breakfast). insulin detemir (LEVEMIR) 100 unit/mL injection 5 Units by SubCUTAneous route nightly. amLODIPine (NORVASC) 10 mg tablet 10 mg.      magnesium hydroxide (MILK OF MAGNESIA) 400 mg/5 mL suspension Take 30 mL by mouth daily. Qty: 118 mL, Refills: 0      oxyCODONE-acetaminophen (PERCOCET 10)  mg per tablet Take 1 Tab by mouth every six (6) hours as needed for Pain.      gabapentin (NEURONTIN) 100 mg capsule Take 300 mg by mouth three (3) times daily. metoprolol succinate (TOPROL-XL) 50 mg XL tablet Take 50 mg by mouth daily. omeprazole (PRILOSEC) 40 mg capsule Take 40 mg by mouth daily. cholecalciferol (VITAMIN D3) 1,000 unit cap Take 1,000 Units by mouth daily. losartan (COZAAR) 100 mg tablet Take 100 mg by mouth daily. cyanocobalamin (VITAMIN B-12) 1,000 mcg tablet Take 1,000 mcg by mouth daily. glimepiride (AMARYL) 1 mg tablet Take 1 mg by mouth Daily (before breakfast). fentaNYL (DURAGESIC) 25 mcg/hr PATCH 1 Patch by TransDERmal route every seventy-two (72) hours. clotrimazole (LOTRIMIN) 1 % topical cream Apply  to affected area two (2) times a day. ziprasidone (GEODON) 20 mg capsule 20 mg.      ibuprofen (MOTRIN) 200 mg tablet Take 400 mg by mouth every eight (8) hours as needed for Pain.      polyethylene glycol (MIRALAX) 17 gram packet Take 17 g by mouth daily. sucralfate (CARAFATE) 1 gram tablet Take 1 g by mouth four (4) times daily. venlafaxine-SR (EFFEXOR XR) 150 mg capsule Take 150 mg by mouth daily. phenyleph-min oil-petrolatum (PREPARATION H) 0.25-14-74.9 % rectal ointment by PeriANAL route four (4) times daily as needed for Pain.   Qty: 1 Tube, Refills: 0      docusate sodium (COLACE) 100 mg capsule Take 100 mg by mouth two (2) times a day. STOP taking these medications       diphenhydrAMINE (BENADRYL) 50 mg tablet Comments:   Reason for Stopping:         furosemide (LASIX) 40 mg tablet Comments:   Reason for Stopping:         ciprofloxacin HCl (CIPRO) 500 mg tablet Comments:   Reason for Stopping:         dicyclomine (BENTYL) 20 mg tablet Comments:   Reason for Stopping:         cyclobenzaprine (FLEXERIL) 10 mg tablet Comments:   Reason for Stopping:         potassium chloride (KLOR-CON M10) 10 mEq tablet Comments:   Reason for Stopping:         diazepam (VALIUM) 2 mg tablet Comments:   Reason for Stopping:         promethazine (PHENERGAN) 25 mg tablet Comments:   Reason for Stopping:         potassium chloride (K-DUR, KLOR-CON) 20 mEq tablet Comments:   Reason for Stopping:               Activity: Activity as tolerated    Diet: Cardiac Diet and Diabetic Diet    Wound Care: None needed    Follow-up: Follow up with PCP within one week to discuss recent hospitalization. Follow up with neurology within 2-3 weeks.     Discharge time: > 35 mins  Melina Cheney NP  3/24/2018, 7:49 AM

## 2018-03-24 NOTE — PROGRESS NOTES
Assumed patient care. Received patient awake, alert, oriented X4. Patient denies any pain. Bed is locked and in lowest position and call bell is within reach. Not in acute distress.

## 2018-03-24 NOTE — PROGRESS NOTES
Problem: Falls - Risk of  Goal: *Absence of Falls  Document Liz Fall Risk and appropriate interventions in the flowsheet.    Outcome: Progressing Towards Goal  Fall Risk Interventions:  Mobility Interventions: Communicate number of staff needed for ambulation/transfer, Patient to call before getting OOB, Strengthening exercises (ROM-active/passive)         Medication Interventions: Evaluate medications/consider consulting pharmacy, Patient to call before getting OOB    Elimination Interventions: Call light in reach, Patient to call for help with toileting needs, Toileting schedule/hourly rounds

## 2018-03-24 NOTE — PROGRESS NOTES
Care Management Interventions  PCP Verified by CM: Yes  Palliative Care Criteria Met (RRAT>21 & CHF Dx)?: No  Mode of Transport at Discharge:  Other (see comment)  Transition of Care Consult (CM Consult): Discharge Planning  Discharge Durable Medical Equipment: No  Physical Therapy Consult: Yes  Occupational Therapy Consult: Yes  Speech Therapy Consult: Yes  Current Support Network: Lives Alone  Confirm Follow Up Transport: Family  Discharge Location  Discharge Placement: Skilled nursing facility

## 2018-03-24 NOTE — DISCHARGE INSTRUCTIONS
Stroke: Care Instructions  Your Care Instructions    You have had a stroke. This means that the blood flow to a part of your brain was blocked for some time, which damages the nerve cells in that part of the brain. The part of your body controlled by that part of your brain may not function properly now. The brain is an amazing organ that can heal itself to some degree. The stroke you had damaged part of your brain. But other parts of your brain may take over in some way for the damaged areas. You have already started this process. Your doctor will talk with you about what you can do to prevent another stroke. High blood pressure, high cholesterol, and diabetes are all risk factors for stroke. If you have any of these conditions, work with your doctor to make sure they are under control. Other risk factors for stroke include being overweight, smoking, and not getting regular exercise. Going home may be hard for you and your family. The more you can try to do for yourself, the better. Remember to take each day one at a time. Follow-up care is a key part of your treatment and safety. Be sure to make and go to all appointments, and call your doctor if you are having problems. It's also a good idea to know your test results and keep a list of the medicines you take. How can you care for yourself at home? ? · Enter a stroke rehabilitation (rehab) program, if your doctor recommends it. Physical, speech, and occupational therapies can help you manage bathing, dressing, eating, and other basics of daily living. ? · Do not drive until your doctor says it is okay. ? · It is normal to feel sad or depressed after a stroke. If these feelings last, talk to your doctor. ? · If you are having problems with urine leakage, go to the bathroom at regular times, including when you first wake up and at bedtime. Also, limit fluids after dinner.    ? · If you are constipated, drink plenty of fluids, enough so that your urine is light yellow or clear like water. If you have kidney, heart, or liver disease and have to limit fluids, talk with your doctor before you increase the amount of fluids you drink. Set up a regular time for using the toilet. If you continue to have constipation, your doctor may suggest using a bulking agent, such as Metamucil, or a stool softener, laxative, or enema. Medicines  ? · Take your medicines exactly as prescribed. Call your doctor if you think you are having a problem with your medicine. You may be taking several medicines. ACE (angiotensin-converting enzyme) inhibitors, angiotensin II receptor blockers (ARBs), beta-blockers, diuretics (water pills), and calcium channel blockers control your blood pressure. Statins help lower cholesterol. Your doctor may also prescribe medicines for depression, pain, sleep problems, anxiety, or agitation. ? · If your doctor has given you a blood thinner to prevent another stroke, be sure you get instructions about how to take your medicine safely. Blood thinners can cause serious bleeding problems. ? · Do not take any over-the-counter medicines or herbal products without talking to your doctor first.   ? · If you take birth control pills or hormone therapy, talk to your doctor about whether they are right for you. ? For family members and caregivers  ? · Make the home safe. Set up a room so that your loved one does not have to climb stairs. Be sure the bathroom is on the same floor. Move throw rugs and furniture that could cause falls. Make sure that the lighting is good. Put grab bars and seats in tubs and showers. ? · Find out what your loved one can do and what he or she needs help with. Try not to do things for your loved one that your loved one can do on his or her own. Help him or her learn and practice new skills. ? · Visit and talk with your loved one often. Try doing activities together that you both enjoy, such as playing cards or board games. Keep in touch with your loved one's friends as much as you can. Encourage them to visit. ? · Take care of yourself. Do not try to do everything yourself. Ask other family members to help. Eat well, get enough rest, and take time to do things that you enjoy. Keep up with your own doctor visits, and make sure to take your medicines regularly. Get out of the house as much as you can. Join a local support group. Find out if you qualify for home health care visits to help with rehab or for adult day care. When should you call for help? Call 911 anytime you think you may need emergency care. For example, call if:  ? · You have signs of another stroke. These may include:  ¨ Sudden numbness, tingling, weakness, or loss of movement in your face, arm, or leg, especially on only one side of your body. ¨ Sudden vision changes. ¨ Sudden trouble speaking. ¨ Sudden confusion or trouble understanding simple statements. ¨ Sudden problems with walking or balance. ¨ A sudden, severe headache that is different from past headaches. Call 911 even if these symptoms go away in a few minutes. ?Call your doctor now or seek immediate medical care if:  ? · You have new symptoms that may be related to your stroke, such as falls or trouble swallowing. ? Watch closely for changes in your health, and be sure to contact your doctor if you have any problems. Where can you learn more? Go to http://shamika-chinyere.info/. Enter X495 in the search box to learn more about \"Stroke: Care Instructions. \"  Current as of: March 20, 2017  Content Version: 11.4  © 2619-5346 [employer]. Care instructions adapted under license by [employer] (which disclaims liability or warranty for this information).  If you have questions about a medical condition or this instruction, always ask your healthcare professional. [place] [address] any warranty or liability for your use of this information. Learning About High Blood Pressure  What is high blood pressure? Blood pressure is a measure of how hard the blood pushes against the walls of your arteries. It's normal for blood pressure to go up and down throughout the day, but if it stays up, you have high blood pressure. Another name for high blood pressure is hypertension. Two numbers tell you your blood pressure. The first number is the systolic pressure. It shows how hard the blood pushes when your heart is pumping. The second number is the diastolic pressure. It shows how hard the blood pushes between heartbeats, when your heart is relaxed and filling with blood. A blood pressure of less than 120/80 (say \"120 over 80\") is ideal for an adult. High blood pressure is 140/90 or higher. You have high blood pressure if your top number is 140 or higher or your bottom number is 90 or higher, or both. Many people fall into the category in between, called prehypertension. People with prehypertension need to make lifestyle changes to bring their blood pressure down and help prevent or delay high blood pressure. What happens when you have high blood pressure? · Blood flows through your arteries with too much force. Over time, this damages the walls of your arteries. But you can't feel it. High blood pressure usually doesn't cause symptoms. · Fat and calcium start to build up in your arteries. This buildup is called plaque. Plaque makes your arteries narrower and stiffer. Blood can't flow through them as easily. · This lack of good blood flow starts to damage some of the organs in your body. This can lead to problems such as coronary artery disease and heart attack, heart failure, stroke, kidney failure, and eye damage. How can you prevent high blood pressure? · Stay at a healthy weight. · Try to limit how much sodium you eat to less than 2,300 milligrams (mg) a day.  If you limit your sodium to 1,500 mg a day, you can lower your blood pressure even more. ¨ Buy foods that are labeled \"unsalted,\" \"sodium-free,\" or \"low-sodium. \" Foods labeled \"reduced-sodium\" and \"light sodium\" may still have too much sodium. ¨ Flavor your food with garlic, lemon juice, onion, vinegar, herbs, and spices instead of salt. Do not use soy sauce, steak sauce, onion salt, garlic salt, mustard, or ketchup on your food. ¨ Use less salt (or none) when recipes call for it. You can often use half the salt a recipe calls for without losing flavor. · Be physically active. Get at least 30 minutes of exercise on most days of the week. Walking is a good choice. You also may want to do other activities, such as running, swimming, cycling, or playing tennis or team sports. · Limit alcohol to 2 drinks a day for men and 1 drink a day for women. · Eat plenty of fruits, vegetables, and low-fat dairy products. Eat less saturated and total fats. How is high blood pressure treated? · Your doctor will suggest making lifestyle changes. For example, your doctor may ask you to eat healthy foods, quit smoking, lose extra weight, and be more active. · If lifestyle changes don't help enough or your blood pressure is very high, you will have to take medicine every day. Follow-up care is a key part of your treatment and safety. Be sure to make and go to all appointments, and call your doctor if you are having problems. It's also a good idea to know your test results and keep a list of the medicines you take. Where can you learn more? Go to http://shamika-chinyere.info/. Enter P501 in the search box to learn more about \"Learning About High Blood Pressure. \"  Current as of: September 21, 2016  Content Version: 11.4  © 4426-8886 [employer]. Care instructions adapted under license by [employer] (which disclaims liability or warranty for this information).  If you have questions about a medical condition or this instruction, always ask your healthcare professional. [address] any warranty or liability for your use of this information. DISCHARGE SUMMARY from Nurse    PATIENT INSTRUCTIONS:    After general anesthesia or intravenous sedation, for 24 hours or while taking prescription Narcotics:  · Limit your activities  · Do not drive and operate hazardous machinery  · Do not make important personal or business decisions  · Do  not drink alcoholic beverages  · If you have not urinated within 8 hours after discharge, please contact your surgeon on call. Report the following to your surgeon:  · Excessive pain, swelling, redness or odor of or around the surgical area  · Temperature over 100.5  · Nausea and vomiting lasting longer than 4 hours or if unable to take medications  · Any signs of decreased circulation or nerve impairment to extremity: change in color, persistent  numbness, tingling, coldness or increase pain  · Any questions    What to do at Home:  Recommended activity: Activity as tolerated, or as physician advised    If you experience any of the following symptoms of a Stroke, Warning Signs of a Heart Attack and When to Call for Help as listed under discharge teachings , please follow up with your primary care physician or call 911. *  Please give a list of your current medications to your Primary Care Provider. *  Please update this list whenever your medications are discontinued, doses are      changed, or new medications (including over-the-counter products) are added. *  Please carry medication information at all times in case of emergency situations. These are general instructions for a healthy lifestyle:    No smoking/ No tobacco products/ Avoid exposure to second hand smoke  Surgeon General's Warning:  Quitting smoking now greatly reduces serious risk to your health.     Obesity, smoking, and sedentary lifestyle greatly increases your risk for illness    A healthy diet, regular physical exercise & weight monitoring are important for maintaining a healthy lifestyle    You may be retaining fluid if you have a history of heart failure or if you experience any of the following symptoms:  Weight gain of 3 pounds or more overnight or 5 pounds in a week, increased swelling in our hands or feet or shortness of breath while lying flat in bed. Please call your doctor as soon as you notice any of these symptoms; do not wait until your next office visit. Recognize signs and symptoms of STROKE:    F-face looks uneven    A-arms unable to move or move unevenly    S-speech slurred or non-existent    T-time-call 911 as soon as signs and symptoms begin-DO NOT go       Back to bed or wait to see if you get better-TIME IS BRAIN. Warning Signs of HEART ATTACK     Call 911 if you have these symptoms:   Chest discomfort. Most heart attacks involve discomfort in the center of the chest that lasts more than a few minutes, or that goes away and comes back. It can feel like uncomfortable pressure, squeezing, fullness, or pain.  Discomfort in other areas of the upper body. Symptoms can include pain or discomfort in one or both arms, the back, neck, jaw, or stomach.  Shortness of breath with or without chest discomfort.  Other signs may include breaking out in a cold sweat, nausea, or lightheadedness. Don't wait more than five minutes to call 911 - MINUTES MATTER! Fast action can save your life. Calling 911 is almost always the fastest way to get lifesaving treatment. Emergency Medical Services staff can begin treatment when they arrive -- up to an hour sooner than if someone gets to the hospital by car. The discharge information has been reviewed with the patient. The patient verbalized understanding.   Discharge medications reviewed with the patient and appropriate educational materials and side effects teaching were provided.   ___________________________________________________________________________________________________________________________________

## 2018-03-24 NOTE — PROGRESS NOTES
Report received from [address] [facility] RN,including sbar,mar,kardex. Up to [facility] to void. 18 Medicated for pain with relief. Up tp [facility]  0745 Bedside shift change report given to Marissa Mccloud RN (oncoming nurse) by Ba Damon (offgoing nurse). Report included the following information SBAR, Kardex and MAR.

## 2018-06-04 ENCOUNTER — HOSPITAL ENCOUNTER (OUTPATIENT)
Dept: PHYSICAL THERAPY | Age: 73
Discharge: HOME | End: 2018-06-04
Payer: MEDICARE

## 2018-06-04 PROCEDURE — 97162 PT EVAL MOD COMPLEX 30 MIN: CPT

## 2018-06-04 PROCEDURE — G8979 MOBILITY GOAL STATUS: HCPCS

## 2018-06-04 PROCEDURE — G8978 MOBILITY CURRENT STATUS: HCPCS

## 2018-06-04 NOTE — PROGRESS NOTES
PHYSICAL THERAPY - DAILY TREATMENT NOTE    Patient Name: Winter Millan        Date: 2018  : 1945   YES Patient  Verified  Visit #:   1     Insurance: Payor: Nathan Miranda / Plan[place] / Product Type: Medicare /      In time: 4:00 Out time: 4:30   Total Treatment Time: 30     Medicare Time Tracking (below)   Total Timed Codes (min):  0 1:1 Treatment Time:  0     TREATMENT AREA = Low back pain [M54.5]    SUBJECTIVE    Pain Level (on 0 to 10 scale):  4  / 10   Medication Changes/New allergies or changes in medical history, any new surgeries or procedures? NO    If yes, update Summary List   Subjective Functional Status/Changes:  []  No changes reported   CC: Low back pain s/p scoliotic correction surgery on 2018    CEDRICK/HPI:Pt reports chronic hx of low back pain due to scoliosis. Pt states she has had 4 surgeries to help with her scoliotic curvature with the most recent being 2018. Pt states she had a \"mini stroke\" after the surgery. Pt states she walks short distances with walker and uses motorized scooter for long distances. PT discussed suicide as noted on pink sheet. Pt reports she is not currently suicidal and has enough information in regards to her previous suicidal thoughts. PT encouraged pt to reach out to PT staff if she would like further information or speak to anyone in regards to suicide. Pain  Today:4/10  Best:8/10  Worst:4/10    Aggravating factors:walkinmg, standing    Relieving Factors:resting    Past History/Treatments: Chronic low back pain with 3 previous surgeries. Pain/Dificulty with functional activities:  [x] Yes [] No Sit<>stand  [x] Yes [] No Squatting  [x] Yes [] No Lifting  [x] Yes [] No Pushing/pulling  [x] Yes [] No   Chores/yardwork  [x] Yes [] No Stairs  [x] Yes [] No Walking(Room to room)       OBJECTIVE  Observation/Posture: Pt used motorized w/c scooter to gain entrance into clinic.  Significant forward flexed trunk and right scoliotic curvature noted. Well healed surgical incision noted along midline of L/S. Gait: Pt required CGA A and FWW to ambulate 50 ft. No LOB noted however significant forward flexed trunk and relied heavily on FWW. Pt required rest break after ambulating approx 30 ft. Stair: Pt ambulated 3 steps reciprocally with B HR and CGA from PT. Rhomberg EO: 20 seconds  Rhomberg EC: 8 seconds  Unable to stand SL and tandem stance    Active Movements:   Significant L/S AROM limited in all planes    LE Strength   Left Right   Hip flexion 4/5 3+/5   Hip extension 3+/5 3+/5   Hip abduction 4-/5 4-/5   Knee extension 4/5 4/5   Knee flexion 4/5 4/5   Ankle PF 4/5 4/5   Ankle DF 4/5 4/5     Other Objective/Functional Measures:    Evaluation shortened due to patient handy ride arriving early. Post Treatment Pain Level (on 0 to 10) scale:   4  / 10     ASSESSMENT    Assessment/Changes in Function:See POC     Justification for Eval Code Complexity: Moderate  Patient History (low 0, mod 1-2, high 3-4): High: chronic low back pain, hx of mult surgeries, CVA post surgery, bound to motorized scooter   Examination (low 1-2, mod 3+, high 4+): High: decreased L/S AROM, decreased ambulation distance, decreased LE strength, decreased balance   Clinical Presentation (low: stable/uncomplicated; mod: evolving; high: unstable/unpredictable): Mod: evolving  Clinical Decision Making (low , mod 26-74, high 1-25):  Moderate: FOTO: 37    [x]  See Plan of Care  []  See Progress Note/ Recertification  []  See Discharge Summary        Patient will continue to benefit from skilled PT services to modify and progress therapeutic interventions, address functional mobility deficits, address ROM deficits, address strength deficits, analyze and address soft tissue restrictions, analyze and cue movement patterns, analyze and modify body mechanics/ergonomics, assess and modify postural abnormalities, address imbalance/dizziness and instruct in home and community integration  to attain remaining goals   Progress toward goals / Updated goals:    See POC     PLAN    [x]  Upgrade activities as tolerated  [x]  Update interventions per flow sheet YES Continue plan of care   []  Discharge due to :    []  Other:      Therapist: Cassie Dorsey DPT    Date: 6/4/2018 Time: 3:57 PM   Future Appointments  Date Time Provider Nikki Villanueva   6/4/2018 4:00 PM Waqas Aggarwal PT [facility] AT [facility]

## 2018-06-04 NOTE — PROGRESS NOTES
[address] PHYSICAL THERAPY  [address] [place] [address] Magdiel Mccall, [address] - Phone: [phone]  Fax: [phone] / [facility]  Patient Name: Jean Kaufman : 1945   Medical   Diagnosis: Low back pain [M54.5] Treatment Diagnosis: Low back pain   Onset Date: Chronic, DOS: 2018     Referral Source: Unknown, Provider, MD Start of Care [facility]): 2018   Prior Hospitalization: See medical history Provider #: [id number]   Prior Level of Function: Progressive decline in independence with ADL's and functional activities   Comorbidities: CVA, L/S surgeries x4, BMI,DM, HTN, heart disease, depression, scoliosis   Medications: Verified on Patient Summary List   The Plan of Care and following information is based on the information from the initial evaluation.   ===========================================================================================  Assessment / key information:  Pt is a 74year old female who presents to PT today with chronic low back pain s/p scoliotic correction surgery on 2018. Pt presents with decreased balance, abnormal posture, decreased ambulation tolerance, decreased LE strength and decreased L/S AROM that is leading to a progressive decline with independence with ADL's and functional activities. Due to pt balance and ambulation, Pt is currently not appropriate for our Aquatic therapy program. Pt relies on FWW for ambulation and her poor activity tolerance makes for an unsafe environment. Pt significant forward flexed trunk places pt <8 inches from water surface. Pt will f/u with referring MD in regards to land based therapy. As pt activity tolerance and balance improves, pt would be a candidate for the aquatherapy program. Based off of today's clinical evaluation, Pt would benefit from land based therapy to address functional deficits listed above.  PT will recommend progressing to aquatherapy program as safety concerns improve. OBJECTIVE  Observation/Posture: Pt used motorized w/c scooter to gain entrance into clinic. Significant forward flexed trunk and right scoliotic curvature noted. Well healed surgical incision noted along midline of L/S.     Gait:              Pt required CGA A and FWW to ambulate 50 ft. No LOB noted however significant forward flexed trunk and relied heavily on FWW.  Pt required rest break after ambulating approx 30 ft.      Stair: Pt ambulated 3 steps reciprocally with B HR and CGA from PT.       Rhomberg EO: 20 seconds  Rhomberg EC: 8 seconds  Unable to stand SL and tandem stance     Active Movements:   Significant L/S AROM limited in all planes     LE Strength    Left Right   Hip flexion 4/5 3+/5   Hip extension 3+/5 3+/5   Hip abduction 4-/5 4-/5   Knee extension 4/5 4/5   Knee flexion 4/5 4/5   Ankle PF 4/5 4/5   Ankle DF 4/5 4/5        ===========================================================================================  Eval Complexity: History: HIGH Complexity :3+ comorbidities / personal factors will impact the outcome/ POC Exam:HIGH Complexity : 4+ Standardized tests and measures addressing body structure, function, activity limitation and / or participation in recreation  Presentation: MEDIUM Complexity : Evolving with changing characteristics  Clinical Decision Making:MEDIUM Complexity : FOTO score of 26-74Overall Complexity:MEDIUM    Problem List: pain affecting function, decrease ROM, decrease strength, edema affecting function, impaired gait/ balance, decrease ADL/ functional abilitiies, decrease activity tolerance, decrease flexibility/ joint mobility and decrease transfer abilities   Treatment Plan may include any combination of the following: Therapeutic exercise, Therapeutic activities, Neuromuscular re-education, Physical agent/modality, Gait/balance training, Manual therapy, Patient education, Self Care training, Functional mobility training, Home safety training and Stair training  Patient / Family readiness to learn indicated by: asking questions, trying to perform skills and interest  Persons(s) to be included in education: patient (P)  Barriers to Learning/Limitations: None  Measures taken: NA   Patient Goal (s): Improve walking    Patient self reported health status: fair  Rehabilitation Potential: good   Short Term Goals: To be accomplished in  2  weeks:  1. Pt will be compliant with HEP for symptom management at home. 2. Pt will increase Rhomberg EO to at least 30 seconds in order to improve static narrow based balance  3. Pt will ambulate 75 ft with least restrictive AD with supervision in order to increase independence with functional ambulation in home environment   Long Term Goals: To be accomplished in  4  weeks:  1. Pt will be independent with HEP at D/C for self management. 2. Pt will increase hip abduction gross strength to at least 4/5 in order to improve hip stability during single leg stance. 3. Patient to increase FOTO score to > 47 to indicate improved functional independence. 4. Pt will ambulate 150 ft with least restrictive AD mod I with no LOB in order to increase community walking distance. Frequency / Duration:   Patient to be seen  2-3  times per week for 4  weeks:  Patient / Caregiver education and instruction: self care, activity modification and exercises  G-Codes (GP): Mobility [id number] Current  CL= 60-79%   Goal  CK= 40-59%. The severity rating is based on the FOTO Score     Therapist Signature: Tessie John PT Date: 9/7/8014   Certification Period: 6/4/2018-9/3/2018 Time: 3:57 PM   ===========================================================================================  To ensure your patient receives the highest quality care and to avoid disruption in therapy please sign and return this plan of care within 21 days.   Per Medicaid guidelines if the plan of care is not received within 21 days the patient's care must be put on hold until signed. I certify that the above Physical Therapy Services are being furnished while the patient is under my care. I agree with the treatment plan and certify that this therapy is necessary. Physician Signature:        Date:       Time:     Please sign and return to [facility] or you may fax the signed copy to [phone]. Thank you.

## 2018-06-07 ENCOUNTER — HOSPITAL ENCOUNTER (OUTPATIENT)
Dept: PHYSICAL THERAPY | Age: 73
Discharge: HOME | End: 2018-06-07
Payer: MEDICARE

## 2018-06-07 PROCEDURE — 97110 THERAPEUTIC EXERCISES: CPT

## 2018-06-07 PROCEDURE — 97530 THERAPEUTIC ACTIVITIES: CPT

## 2018-06-07 NOTE — PROGRESS NOTES
PHYSICAL THERAPY - DAILY TREATMENT NOTE      Patient Name: Mario Mckeon        Date: 2018  : 1945   YES Patient  Verified  Visit #:   2   of     Insurance: Payor: Gypsy Mena / Plan: [place] MED[place] PART A & B / Product Type: Medicare /      In time: 4:12 Out time: 4:51   Total Treatment Time: 39     Medicare Time Tracking (below)   Total Timed Codes (min):  39 1:1 Treatment Time:  39     TREATMENT AREA =  Low back pain [M54.5]    SUBJECTIVE    Pain Level (on 0 to 10 scale):  0  / 10   Medication Changes/New allergies or changes in medical history, any new surgeries or procedures? NO    If yes, update Summary List   Subjective Functional Status/Changes:  []  No changes reported     \"Its not hurting today. \"        OBJECTIVE    29 (29) min Therapeutic Exercise:  [x]  See flow sheet   Rationale:      increase ROM, increase strength, improve coordination, improve balance and increase proprioception to improve the patients ability to perform ADL's and functional mobility with increased ease and efficiency of movement      10 (10) min Therapeutic Activity: Log roll, chair to mat transfers   Rationale: To improve ease and safety with transfers and bed mobility    1 min Patient Education:  YES  Reviewed HEP   []  Progressed/Changed HEP based on:   Issued LE strengthening HEP     Other Objective/Functional Measures:    Min(A) required for unilateral LE assist sit to supine  Decr left lumbar rotations with LTR. PT provided min(A) to guide/increase left rotation  Initiated LE strengthening per flowsheet. Encouragement required to engage LLE d/t prior stroke. Min(A) with log roll technique x 3 trials     Post Treatment Pain Level (on 0 to 10) scale:   0  / 10     ASSESSMENT    Assessment/Changes in Function:     Patient participated well in today's session. Significant gait abnormalities present, however is able to ambulate with RW safely short distances.  CGA required for transfers     []  See Progress Note/Recertification   Patient will continue to benefit from skilled PT services to modify and progress therapeutic interventions, address functional mobility deficits, address ROM deficits, address strength deficits, analyze and address soft tissue restrictions, analyze and cue movement patterns, analyze and modify body mechanics/ergonomics, assess and modify postural abnormalities, address imbalance/dizziness and instruct in home and community integration to attain remaining goals. Progress toward goals / Updated goals:    Pt seen for initial f/u visit, no significant progress noted. PLAN    [x]  Upgrade activities as tolerated YES Continue plan of care   []  Discharge due to :    []  Other:      Therapist: Bijal Boone DPT, Cert.  DN    Date: 6/7/2018 Time: 11:29 AM     Future Appointments  Date Time Provider Nikki Villanueva   6/7/2018 4:30 PM [facility]   6/12/2018 3:30 PM Hermilo Velasco PT [facility]   6/14/2018 4:30 PM [facility]   6/18/2018 4:30 PM Yanira Robbins, PT [facility]   6/21/2018 4:30 PM [facility]   6/25/2018 3:30 PM Yanira Robbins PT [facility]   6/28/2018 4:30 PM [facility]

## 2018-06-12 ENCOUNTER — HOSPITAL ENCOUNTER (OUTPATIENT)
Dept: PHYSICAL THERAPY | Age: 73
Discharge: HOME | End: 2018-06-12
Payer: MEDICARE

## 2018-06-12 PROCEDURE — 97110 THERAPEUTIC EXERCISES: CPT

## 2018-06-12 PROCEDURE — 97530 THERAPEUTIC ACTIVITIES: CPT

## 2018-06-12 NOTE — PROGRESS NOTES
PHYSICAL THERAPY - DAILY TREATMENT NOTE    Patient Name: Flor Latham        Date: 2018  : 1945   YES Patient  Verified  Visit #:   3     Insurance: Payor: Vernon Oconnor / Plan: [place] MEDICARE PART A & B / Product Type: Medicare /      In time: 3:30 Out time: 4:05   Total Treatment Time: 35     Medicare/BCBS [place] Time Tracking (below)   Total Timed Codes (min):  35 1:1 Treatment Time:  35     TREATMENT AREA =  Low back pain [M54.5]  SUBJECTIVE    Pain Level (on 0 to 10 scale):  0  / 10   Medication Changes/New allergies or changes in medical history, any new surgeries or procedures? NO    If yes, update Summary List   Subjective Functional Status/Changes:  []  No changes reported     Pt states that she would rather continue with land-based PT at this clinic than transfer to another clinic for aquatic therapy. Pt reports that she had epidural injection last week. Pt reports that her most recent spinal surgery was 3/2017 and her most recent stroke was 3/2018 (reports multiple spinal surgeries and multiple CVA).        OBJECTIVE    20 min Therapeutic Exercise:  [x]  See flow sheet   Rationale:      increase ROM, increase strength, improve coordination, improve balance and increase proprioception to improve the patients ability to perform ADLs/IADLs, functional mobility and gait safely and independently without increased pain/symptoms     15 min Therapeutic Activity: Supine to sit, sit to stand, amb with RW   Rationale: improve functional mobility and gait to improve the patient's ability to perform ADLs/IADLs, functional mobility and gait safely and independently without increased pain/symptoms      During TE min Patient Education:  YES  Reviewed HEP   []  Progressed/Changed HEP based on:   Cont HEP     Other Objective/Functional Measures:     Requires min A for left LE (trailing LE) with sit to supine, transfers supine to sit with S (demonstrates poor body mechanics)  Transfers sit to stand from scooter, Nustep, mat x 5 with S  Ambulates 30' x 2 with RW with S, severely flexed posture     Post Treatment Pain Level (on 0 to 10) scale:   4  / 10 left hip     ASSESSMENT    Assessment/Changes in Function:     Tolerated exercises with c/o increased left hip pain     []  See Progress Note/Recertification   Patient will continue to benefit from skilled PT services to modify and progress therapeutic interventions, address functional mobility deficits, address ROM deficits, address strength deficits, analyze and address soft tissue restrictions, analyze and cue movement patterns, analyze and modify body mechanics/ergonomics, assess and modify postural abnormalities, address imbalance/dizziness and instruct in home and community integration to attain remaining goals. Progress toward goals / Updated goals:    Progressing slowly toward goals:  1. Pt will be compliant with HEP for symptom management at home. 2. Pt will increase Rhomberg EO to at least 30 seconds in order to improve static narrow based balance  3.  Pt will ambulate 75 ft with least restrictive AD with supervision in order to increase independence with functional ambulation in home environment       PLAN    [x]  Upgrade activities as tolerated YES Continue plan of care   []  Discharge due to :    []  Other:      Therapist: Cary Vargas PT    Date: 6/12/2018 Time: 3:34 PM     Future Appointments  Date Time Provider Nikki Villanueva   6/14/2018 4:30 PM [facility]   6/18/2018 4:30 PM Addison Isra, PT [facility]   6/21/2018 4:30 PM [facility]   6/25/2018 3:30 PM Addison Isra, PT [facility]   6/28/2018 4:30 PM [facility]

## 2018-06-14 ENCOUNTER — APPOINTMENT (OUTPATIENT)
Dept: PHYSICAL THERAPY | Age: 73
End: 2018-06-14
Payer: MEDICARE

## 2018-06-18 ENCOUNTER — APPOINTMENT (OUTPATIENT)
Dept: PHYSICAL THERAPY | Age: 73
End: 2018-06-18
Payer: MEDICARE

## 2018-06-21 ENCOUNTER — HOSPITAL ENCOUNTER (OUTPATIENT)
Dept: PHYSICAL THERAPY | Age: 73
Discharge: HOME | End: 2018-06-21
Payer: MEDICARE

## 2018-06-21 PROCEDURE — 97530 THERAPEUTIC ACTIVITIES: CPT

## 2018-06-21 PROCEDURE — 97110 THERAPEUTIC EXERCISES: CPT

## 2018-06-21 NOTE — PROGRESS NOTES
PHYSICAL THERAPY - DAILY TREATMENT NOTE      Patient Name: Sam Pinto        Date: 2018  : 1945   YES Patient  Verified  Visit #:   4   of     Insurance: Payor: Mello Hoskins / Plan: [place] MEDICARE PART A & B / Product Type: Medicare /      In time: 3:40 Out time: 4:25   Total Treatment Time: 45 min     Medicare/BCBS[place] Time Tracking (below)   Total Timed Codes (min):  40 1:1 Treatment Time:  40     TREATMENT AREA =  Low back pain [M54.5]    SUBJECTIVE    Pain Level (on 0 to 10 scale):  0  / 10   Medication Changes/New allergies or changes in medical history, any new surgeries or procedures? NO    If yes, update Summary List   Subjective Functional Status/Changes:  []  No changes reported     \"I'm trying some of those exercises. \"    \"I had an epidural in my back, it hurt a lot. \"        OBJECTIVE    25 (25) min Therapeutic Exercise:  [x]  See flow sheet   Rationale:      increase ROM, increase strength, improve coordination, improve balance and increase proprioception to improve the patients ability to perform ADL's and functional mobility with increased ease and efficiency of movement      15 (15) min Therapeutic Activity: Amb with RW, sit to stands   Rationale:   increase ROM, increase strength, improve coordination, improve balance and increase proprioception to improve the patients ability to improve transfers and safety with ambulation     1 min Patient Education:  YES  Reviewed HEP   []  Progressed/Changed HEP based on: Other Objective/Functional Measures:    Patient's caregiver present during session today. Requested to review previously prescribed HEP. Sit to stands performed from large high/low mat. 5 reps with RW, 5 reps without UE assist and 5 additional reps with 2# dumb bell hold. (S) provided for all. Rest breaks approx 1 minute provided in between sets     SpO2 97% after sit to stands. Taken due to reports of slight SOB.     Amb with RW (S) 30', 10' retro followed by 40' forward ambulation without rest break. Post Treatment Pain Level (on 0 to 10) scale:   0  / 10     ASSESSMENT    Assessment/Changes in Function:     Patient with good tolerance to therex today. Provided additional handout of HEP to caregiver. []  See Progress Note/Recertification   Patient will continue to benefit from skilled PT services to modify and progress therapeutic interventions, address functional mobility deficits, address ROM deficits, address strength deficits, analyze and address soft tissue restrictions, analyze and cue movement patterns, analyze and modify body mechanics/ergonomics, assess and modify postural abnormalities, address imbalance/dizziness and instruct in home and community integration to attain remaining goals. Progress toward goals / Updated goals: · Short Term Goals: To be accomplished in  2  weeks:  1. Pt will be compliant with HEP for symptom management at home. 2. Pt will increase Rhomberg EO to at least 30 seconds in order to improve static narrow based balance  3. Pt will ambulate 75 ft with least restrictive AD with supervision in order to increase independence with functional ambulation in home environment       PLAN    [x]  Upgrade activities as tolerated YES Continue plan of care   []  Discharge due to :    []  Other:      Therapist: Omar Groves DPT, Cert.  DN    Date: 6/21/2018 Time: 11:26 AM     Future Appointments  Date Time Provider Nikki Villanueva   6/21/2018 4:30 PM [facility]   6/25/2018 3:30 PM Gallo Crowell PT [facility]   6/28/2018 4:30 PM [facility]   7/6/2018 10:00 AM [facility] NM RM [facility]

## 2018-06-25 ENCOUNTER — HOSPITAL ENCOUNTER (OUTPATIENT)
Dept: PHYSICAL THERAPY | Age: 73
End: 2018-06-25
Payer: MEDICARE

## 2018-06-28 ENCOUNTER — APPOINTMENT (OUTPATIENT)
Dept: PHYSICAL THERAPY | Age: 73
End: 2018-06-28
Payer: MEDICARE

## 2018-07-02 ENCOUNTER — HOSPITAL ENCOUNTER (OUTPATIENT)
Dept: PHYSICAL THERAPY | Age: 73
Discharge: HOME | End: 2018-07-02
Payer: MEDICARE

## 2018-07-02 PROCEDURE — G8980 MOBILITY D/C STATUS: HCPCS

## 2018-07-02 PROCEDURE — 97530 THERAPEUTIC ACTIVITIES: CPT

## 2018-07-02 PROCEDURE — G8979 MOBILITY GOAL STATUS: HCPCS

## 2018-07-02 NOTE — PROGRESS NOTES
[facility] PHYSICAL THERAPY  [address] [place] [address] Estrella Osler [place], [address] - Phone: [phone]  Fax: [phone]  PROGRESS NOTE  Patient Name: Maureen Morelos : 1945   Treatment/Medical Diagnosis: Low back pain [M54.5]   Referral Source: Margarita Hyman MD     Date of Initial Visit: 18 Attended Visits: 5 Missed Visits: 4     SUMMARY OF TREATMENT  Patient's POC has consisted of therex, therapeutic activities, manual therapy prn, modalities prn, patient and caregiver education, and a comprehensive HEP. Treatment strategies used to address functional mobility deficits, ROM deficits, strength deficits, analyze and address soft tissue restrictions, analyze and cue movement patterns, analyze and modify body mechanics/ergonomics, assess and modify postural abnormalities and instruct in home and community integration. Key Functional Changes/Progress  Patient demonstrates modest objective gains since IE including improved LE strength, decreased pain, improved static standing balance and increased ambulation tolerance. Patient has improved ambulation tolerance to 75 feet today mod(I) with RW; limited secondary to fatigue. Patient's FOTO functional score has improved from 37% at IE to 55% today indicating decreased functional disability.        Stair: Pt ambulated 3 steps reciprocally with B HR and (S) from PT.        Rhomberg EO: 20 seconds at evaluation, 30 seconds today without sway  Rhomberg EC:  8 seconds at evaluation, 30 seconds today with minimal sway  Unable to stand SL and tandem stance      Active Movements:   Significant L/S AROM limited in all planes      LE Strength     Left Right   Hip flexion 4/5 4/5   Hip extension 3+/5 3+/5   Hip abduction 4-/5 4-/5   Knee extension 4/5 4/5   Knee flexion 4/5 4/5   Ankle PF 4/5 4/5   Ankle DF 4/5 4/5         Goal/Measure of Progress Goal Met? · Short Term Goals: To be accomplished in  2  weeks:  1.  Pt will be compliant with HEP for symptom management at home. 2. Pt will increase Rhomberg EO to at least 30 seconds in order to improve static narrow based balance  3. Pt will ambulate 75 ft with least restrictive AD with supervision in order to increase independence with functional ambulation in home environment     1. Reports compliance    2. MET    3. MET     New Goals to be achieved in __4__  weeks:  1. Pt will be independent with HEP at D/C for self management. 2. Pt will increase hip abduction gross strength to at least 4/5 in order to improve hip stability during single leg stance. 3. Pt will ambulate 150 ft with least restrictive AD mod I with no LOB in order to increase community walking distance. Frequency / Duration:   Patient to be seen  2  times per week for 4  weeks:    G-Codes: Mobility [id number] Current  CK= 40-59%   Goal  CK= 40-59%. The severity rating is based on the FOTO Score     RECOMMENDATIONS  Continue and progress functional therex/therapeutic activity as able, utilizing manual therapy and modalities prn. Progress patient towards independent HEP to facilitate self-management of symptoms and progress gains after PT. If you have any questions/comments please contact us directly at [phone]. Thank you for allowing us to assist in the care of your patient. Therapist Signature: Carol Fitzgerald DPT, Cert. DN Date: 9/9/8064   Certification Period: 6/4/18 - 9/3/18     Reporting Period 6/4/18 - 7/2/18   Time: 12:06 PM   NOTE TO PHYSICIAN:  PLEASE COMPLETE THE ORDERS BELOW AND FAX TO   [facility] Physical Therapy: [phone]. If you are unable to process this request in 24 hours please contact our office: [phone].     To ensure your patient receives the highest quality care and to avoid disruption in therapy please sign and return this plan of care within 21 days.  Per Medicaid guidelines if the plan of care is not received within 21 days the patient's care must be put on hold until signed. ___ I have read the above report and request that my patient continue as recommended.   ___ I have read the above report and request that my patient continue therapy with the following changes/special instructions:_________________________________________________________   ___ I have read the above report and request that my patient be discharged from therapy.      Physician Signature:        Date:       Time:

## 2018-07-02 NOTE — PROGRESS NOTES
PHYSICAL THERAPY - DAILY TREATMENT NOTE      Patient Name: Cece Conception        Date: 2018  : 1945   YES Patient  Verified  Visit #:     Insurance: Payor: Craig Eugene / Plan: [place] & [place] / Product Type: Medicare /      In time: 2:00 Out time: 2:32   Total Treatment Time: 32 min     Medicare/[employer] Time Tracking (below)   Total Timed Codes (min):  32 1:1 Treatment Time:  32     TREATMENT AREA =  Low back pain [M54.5]    SUBJECTIVE    Pain Level (on 0 to 10 scale):  0  / 10   Medication Changes/New allergies or changes in medical history, any new surgeries or procedures? NO    If yes, update Summary List   Subjective Functional Status/Changes:  []  No changes reported     \"I want to keep coming. \"    \"I feel stronger. \"        OBJECTIVE    32 (32) min Therapeutic Activity: FOTO, Re-evaluation, 5x sit to stand, Ambulation with RW, TUG   Rationale:   increase ROM, increase strength, improve coordination, improve balance and increase proprioception to improve the patients ability to perform ADL's and functional mobility with increased ease and efficiency of movement      1 min Patient Education:  YES  Reviewed HEP   []  Progressed/Changed HEP based on: Other Objective/Functional Measures:    See PN     Post Treatment Pain Level (on 0 to 10) scale:   0  / 10     ASSESSMENT    Assessment/Changes in Function:     See PN     []  See Progress Note/Recertification   Patient will continue to benefit from skilled PT services to modify and progress therapeutic interventions, address functional mobility deficits, address ROM deficits, address strength deficits, analyze and address soft tissue restrictions, analyze and cue movement patterns, analyze and modify body mechanics/ergonomics, assess and modify postural abnormalities, address imbalance/dizziness and instruct in home and community integration to attain remaining goals.      Progress toward goals / Updated goals:    See PN     PLAN    [x]  Upgrade activities as tolerated YES Continue plan of care   []  Discharge due to :    []  Other:      Therapist: Clinton Mendes DPT, Cert.  DN    Date: 7/2/2018 Time: 12:09 PM     Future Appointments  Date Time Provider Nikki Villanueva   7/2/2018 2:00 PM [facility] AT [facility]   7/6/2018 10:00 AM [facility]

## 2018-07-06 ENCOUNTER — HOSPITAL ENCOUNTER (OUTPATIENT)
Dept: NUCLEAR MEDICINE | Age: 73
Discharge: HOME | End: 2018-07-06
Attending: INTERNAL MEDICINE
Payer: MEDICARE

## 2018-07-06 DIAGNOSIS — K21.9 GERD (GASTROESOPHAGEAL REFLUX DISEASE): ICD-10-CM

## 2018-07-06 PROCEDURE — 78264 GASTRIC EMPTYING IMG STUDY: CPT

## 2018-07-09 ENCOUNTER — ANESTHESIA EVENT (OUTPATIENT)
Dept: ENDOSCOPY | Age: 73
End: 2018-07-09
Payer: MEDICARE

## 2018-07-09 RX ORDER — METOPROLOL SUCCINATE 100 MG/1
100 TABLET, EXTENDED RELEASE ORAL DAILY
Refills: 6 | COMMUNITY
Start: 2018-05-08 | End: 2021-09-03 | Stop reason: ALTCHOICE

## 2018-07-09 RX ORDER — TIZANIDINE 2 MG/1
2 TABLET ORAL AS NEEDED
Refills: 3 | COMMUNITY
Start: 2018-05-22 | End: 2020-02-24

## 2018-07-09 RX ORDER — POTASSIUM CHLORIDE 750 MG/1
10 TABLET, FILM COATED, EXTENDED RELEASE ORAL AS NEEDED
COMMUNITY

## 2018-07-09 RX ORDER — FUROSEMIDE 40 MG/1
40 TABLET ORAL AS NEEDED
COMMUNITY

## 2018-07-09 RX ORDER — BETAMETHASONE DIPROPIONATE 0.5 MG/G
CREAM TOPICAL
COMMUNITY

## 2018-07-09 RX ORDER — GABAPENTIN 300 MG/1
300 CAPSULE ORAL 3 TIMES DAILY
COMMUNITY

## 2018-07-09 RX ORDER — HYDROCODONE BITARTRATE AND ACETAMINOPHEN 10; 325 MG/1; MG/1
1 TABLET ORAL
COMMUNITY
End: 2019-05-09 | Stop reason: ALTCHOICE

## 2018-07-09 NOTE — PERIOP NOTES
PAT - SURGICAL PRE-ADMISSION INSTRUCTIONS    NAME:  Kathlynn Phalen                                                          TODAY'S DATE:  7/9/2018    SURGERY DATE:  7/10/2018                                  SURGERY ARRIVAL TIME:   1000    1. Do NOT eat or drink anything, including candy or gum, after 0500 on 07/10/18 , unless you have specific instructions from your Surgeon or Anesthesia Provider to do so. 2. No smoking on the day of surgery. 3. No alcohol 24 hours prior to the day of surgery. 4. No recreational drugs for one week prior to the day of surgery. 5. Leave all valuables, including money/purse, at home. 6. Remove all jewelry, nail polish, makeup (including mascara); no lotions, powders, deodorant, or perfume/cologne/after shave. 7. Glasses/Contact lenses and Dentures may be worn to the hospital.  They will be removed prior to surgery. 8. Call your doctor if symptoms of a cold or illness develop within 24 ours prior to surgery. 9. AN ADULT MUST DRIVE YOU HOME AFTER OUTPATIENT SURGERY. 10. If you are having an OUTPATIENT procedure, please make arrangements for a responsible adult to be with you for 24 hours after your surgery. 11. If you are admitted to the hospital, you will be assigned to a bed after surgery is complete. Normally a family member will not be able to see you until you are in your assigned bed. 15. Family is encouraged to accompany you to the hospital.  We ask visitors in the treatment area to be limited to ONE person at a time to ensure patient privacy. EXCEPTIONS WILL BE MADE AS NEEDED. 15. Children under 12 are discouraged from entering the treatment area and need to be supervised by an adult when in the waiting room. Special Instructions: Take these medications the morning of surgery with a sip of water:  BP medications, HOLD oral diabetic medication on the MORNING OF surgery.     Patient Prep:    shower with anti-bacterial soap    These surgical instructions were reviewed with Severo Samuels during the PAT phone call. Directions: On the morning of surgery, please go to the [facility]. Enter the building from the [facility] entrance, 1st floor (next to the Emergency Room entrance). Take the elevator to the 2nd floor. Sign in at the Registration Desk.     If you have any questions and/or concerns, please do not hesitate to call:  (Prior to the day of surgery)  [facility] unit:  [phone]  (Day of surgery)  [facility] unit:  [phone]

## 2018-07-10 ENCOUNTER — ANESTHESIA (OUTPATIENT)
Dept: ENDOSCOPY | Age: 73
End: 2018-07-10
Payer: MEDICARE

## 2018-07-10 ENCOUNTER — HOSPITAL ENCOUNTER (OUTPATIENT)
Age: 73
Setting detail: OUTPATIENT SURGERY
Discharge: HOME | End: 2018-07-10
Attending: INTERNAL MEDICINE | Admitting: INTERNAL MEDICINE
Payer: MEDICARE

## 2018-07-10 VITALS
DIASTOLIC BLOOD PRESSURE: 78 MMHG | HEART RATE: 78 BPM | TEMPERATURE: 98.2 F | BODY MASS INDEX: 32.99 KG/M2 | HEIGHT: 65 IN | RESPIRATION RATE: 14 BRPM | SYSTOLIC BLOOD PRESSURE: 123 MMHG | OXYGEN SATURATION: 93 % | WEIGHT: 198 LBS

## 2018-07-10 PROBLEM — K21.9 GASTROESOPHAGEAL REFLUX DISEASE WITHOUT ESOPHAGITIS: Status: ACTIVE | Noted: 2018-07-10

## 2018-07-10 LAB
GLUCOSE BLD STRIP.AUTO-MCNC: 179 MG/DL (ref 70–110)
GLUCOSE BLD STRIP.AUTO-MCNC: 203 MG/DL (ref 70–110)

## 2018-07-10 PROCEDURE — 77030009426 HC FCPS BIOP ENDOSC BSC -B: Performed by: INTERNAL MEDICINE

## 2018-07-10 PROCEDURE — 76060000031 HC ANESTHESIA FIRST 0.5 HR: Performed by: INTERNAL MEDICINE

## 2018-07-10 PROCEDURE — 74011250636 HC RX REV CODE- 250/636: Performed by: NURSE ANESTHETIST, CERTIFIED REGISTERED

## 2018-07-10 PROCEDURE — 82962 GLUCOSE BLOOD TEST: CPT

## 2018-07-10 PROCEDURE — 74011250636 HC RX REV CODE- 250/636

## 2018-07-10 PROCEDURE — 74011636637 HC RX REV CODE- 636/637: Performed by: NURSE ANESTHETIST, CERTIFIED REGISTERED

## 2018-07-10 PROCEDURE — 76040000019: Performed by: INTERNAL MEDICINE

## 2018-07-10 PROCEDURE — 88342 IMHCHEM/IMCYTCHM 1ST ANTB: CPT | Performed by: INTERNAL MEDICINE

## 2018-07-10 PROCEDURE — 88305 TISSUE EXAM BY PATHOLOGIST: CPT | Performed by: INTERNAL MEDICINE

## 2018-07-10 RX ORDER — PROPOFOL 10 MG/ML
INJECTION, EMULSION INTRAVENOUS AS NEEDED
Status: DISCONTINUED | OUTPATIENT
Start: 2018-07-10 | End: 2018-07-10 | Stop reason: HOSPADM

## 2018-07-10 RX ORDER — INSULIN LISPRO 100 [IU]/ML
INJECTION, SOLUTION INTRAVENOUS; SUBCUTANEOUS ONCE
Status: COMPLETED | OUTPATIENT
Start: 2018-07-10 | End: 2018-07-10

## 2018-07-10 RX ORDER — PROPOFOL 10 MG/ML
INJECTION, EMULSION INTRAVENOUS
Status: DISCONTINUED | OUTPATIENT
Start: 2018-07-10 | End: 2018-07-10 | Stop reason: HOSPADM

## 2018-07-10 RX ORDER — SODIUM CHLORIDE, SODIUM LACTATE, POTASSIUM CHLORIDE, CALCIUM CHLORIDE 600; 310; 30; 20 MG/100ML; MG/100ML; MG/100ML; MG/100ML
50 INJECTION, SOLUTION INTRAVENOUS CONTINUOUS
Status: DISCONTINUED | OUTPATIENT
Start: 2018-07-10 | End: 2018-07-10 | Stop reason: HOSPADM

## 2018-07-10 RX ADMIN — SODIUM CHLORIDE, SODIUM LACTATE, POTASSIUM CHLORIDE, AND CALCIUM CHLORIDE 50 ML/HR: 600; 310; 30; 20 INJECTION, SOLUTION INTRAVENOUS at 11:34

## 2018-07-10 RX ADMIN — PROPOFOL 50 MG: 10 INJECTION, EMULSION INTRAVENOUS at 12:18

## 2018-07-10 RX ADMIN — INSULIN LISPRO 6 UNITS: 100 INJECTION, SOLUTION INTRAVENOUS; SUBCUTANEOUS at 11:47

## 2018-07-10 RX ADMIN — PROPOFOL 100 MCG/KG/MIN: 10 INJECTION, EMULSION INTRAVENOUS at 12:18

## 2018-07-10 NOTE — ANESTHESIA POSTPROCEDURE EVALUATION
Post-Anesthesia Evaluation and Assessment    Patient: Aminata Christiansen MRN: [id number]  SSN: [id number]    YOB: 1945  Age: 74 y.o. Sex: female       Cardiovascular Function/Vital Signs  Visit Vitals    /78    Pulse 78    Temp 36.8 °C (98.2 °F)    Resp 14    Ht 5' 5\" (1.651 m)    Wt 89.8 kg (198 lb)    SpO2 93%    Breastfeeding No    BMI 32.95 kg/m2       Patient is status post MAC anesthesia for Procedure(s):  ESOPHAGOGASTRODUODENOSCOPY (EGD). Nausea/Vomiting: None    Postoperative hydration reviewed and adequate. Pain:  Pain Scale 1: Numeric (0 - 10) (07/10/18 1056)  Pain Intensity 1: 0 (07/10/18 1056)   Managed    Neurological Status: At baseline    Mental Status and Level of Consciousness: Alert and oriented     Pulmonary Status:   O2 Device: Room air (07/10/18 1227)   Adequate oxygenation and airway patent    Complications related to anesthesia: None    Post-anesthesia assessment completed.  No concerns    Signed By: Marci Kumar CRNA     July 10, 2018

## 2018-07-10 NOTE — PROCEDURES
Esophagogastroduodenoscopy (EGD) Report    Patient: Tio Santiago MRN: [id number]  SSN: [id number]    YOB: 1945  Age: 74 y.o. Sex: female      Date/Time:  7/10/2018 12:26 PM    Procedure: Esophagogastroduodenoscopy with biopsy    FINDINGS:  1. Esophagus -  Z-line was at 38 cm. Normal mucosa. No ulcers, rings, linear furrows, erosions, strictures or varices. No hiatal hernia. 2.  Stomach - Focal areas of redness in the antrum and distal body with rare pinpoint erosions -- biopsied. No mass, retained food, varices or AVMs. Patent pylorus. 3.  Duodenum -  Normal bulb to the 2nd portion. IMPRESSION:   Focal gastritis DDx:  H pylori, Meds or Bile reflux. Nonerosive GERD that I suspect is secondary to narcotic induced GI dysmotility. RECOMMENDATIONS:  1. Discharge home today if criteria is met. Resume diet and home meds. 2.   Continue Carafate and PPI. 3.   Await path results and will either call or mail to patient in 1-2 weeks. 4.   Schedule nuclear med GES. 5.   RTO in 8 weeks. Indication: GERD  :  Nnamdi Perdue MD  Referring Provider:   Samreen Burgos MD  History: The history and physical exam were reviewed and updated. Endoscope: [id number]  Extent of Exam: second portion of the duodenum  ASA: ASA 3 - Patient with moderate systemic disease with functional limitations  Anethesia/Sedation:  MAC anesthesia    Description of the procedure: The procedure was discussed with the patient including risks, benefits, alternatives including risks of iv sedation, bleeding, perforation and aspiration. A safety timeout was performed. The patient was placed in the left lateral decubitus position. A bite block was placed. Sedation/anesthesia was administered. The patients vital signs were monitored at all times including heart rate/rhythm, blood pressure and oxygen saturation. The endoscope was then passed under direct visualization to the second portion of the duodenum. The endoscope was then slowly withdrawn while visualizing the mucosa. In the stomach a retroflexion was performed and gastric fundus and cardia visualized. The endoscope was then slowly withdrawn. The patient was then transferred to recovery in stable condition. Therapies: None    Specimens:   ID Type Source Tests Collected by Time Destination   1 : Bxs r/o h pylori Preservative Gastric  Virginia Ha MD 7/10/2018 1222 Pathology              Complications:   None; patient tolerated the procedure well. EBL:None      Luis Alberto Luna MD, [facility]  [facility]  July 10, 2018  12:26 PM

## 2018-07-10 NOTE — DISCHARGE INSTRUCTIONS
For the next 12 hours you should not:   1. drive   2. drink alcohol   3. operate any machinery   4. engage in activities that require mental sharpness or manual dexterity    5. take any drugs other than those prescribed by a physician   6. make any legal or financial decisions    Call your doctor's office immediately, if:   1. Vomiting blood   2. High fever   3. Severe abdominal pain    Take it easy today and resume normal activity tomorrow. Resume previous diet. Luis Alberto Cooney MD, [facility]          Patient armband removed and given to patient to take home.   Patient was informed of the privacy risks if armband lost or stolen

## 2018-07-10 NOTE — ANESTHESIA PREPROCEDURE EVALUATION
Anesthetic History   No history of anesthetic complications            Review of Systems / Medical History  Patient summary reviewed and pertinent labs reviewed    Pulmonary  Within defined limits                 Neuro/Psych       CVA  TIA and psychiatric history     Cardiovascular    Hypertension: well controlled          CAD         GI/Hepatic/Renal           PUD and liver disease     Endo/Other    Diabetes: well controlled, type 2         Other Findings   Comments: Documentation of current medication  Current medications obtained, documented and obtained? YES      Risk Factors for Postoperative nausea/vomiting:       History of postoperative nausea/vomiting? NO       Female? YES       Motion sickness? NO       Intended opioid administration for postoperative analgesia? NO      Smoking Abstinence:  Current Smoker? NO  Elective Surgery? YES  Seen preoperatively by anesthesiologist or proxy prior to day of surgery? YES  Pt abstained from smoking 24 hours prior to anesthesia?  N/A    Preventive care/screening for High Blood Pressure:  Aged 18 years and older: YES  Screened for high blood pressure: YES  Patients with high blood pressure referred to primary care provider   for BP management: YES                     Physical Exam    Airway  Mallampati: II  TM Distance: 4 - 6 cm  Neck ROM: normal range of motion   Mouth opening: Normal     Cardiovascular               Dental    Dentition: Poor dentition     Pulmonary                 Abdominal  GI exam deferred       Other Findings            Anesthetic Plan    ASA: 4  Anesthesia type: MAC            Anesthetic plan and risks discussed with: Patient

## 2018-07-10 NOTE — IP AVS SNAPSHOT
[address] [address] [address] 
[phone] Patient: Favian Castillo MRN: [id number] EMT:[id number] About your hospitalization You were admitted on:  July 10, 2018 You last received care in the:  [facility] PHASE 2 RECOVERY You were discharged on:  July 10, 2018 Why you were hospitalized Your primary diagnosis was:  Gastroesophageal Reflux Disease Without Esophagitis Follow-up Information Follow up With Details Comments Contact Info Ash Rose MD Schedule an appointment as soon as possible for a visit  [address] [facility] 
[facility] [id number] 
[phone] Adria Jovel MD Schedule an appointment as soon as possible for a visit in 11 weeks  [address] [facility] [address] [facility] [address] 
[phone] Your Scheduled Appointments Monday July 16, 2018  2:00 PM EDT  
PT MEDICARE F/U with Trish Ramos PT  
[address] Sloan Ruiz [facility] [address] [facility] [phone]  
[phone] Discharge Orders None A check miguel angel indicates which time of day the medication should be taken. My Medications CONTINUE taking these medications Instructions Each Dose to Equal  
 Morning Noon Evening Bedtime  
 amLODIPine 10 mg tablet Commonly known as:  Amy Grijalva Your last dose was: Your next dose is:    
   
   
 10 mg.  
 10 mg  
    
   
   
   
  
 aspirin 325 mg tablet Commonly known as:  ASPIRIN Your last dose was: Your next dose is: Take 1 Tab by mouth daily. 325 mg  
    
   
   
   
  
 atorvastatin 80 mg tablet Commonly known as:  LIPITOR Your last dose was: Your next dose is: Take 1 Tab by mouth nightly. 80 mg  
    
   
   
   
  
 betamethasone dipropionate 0.05 % topical cream  
Commonly known as:  Maria Rakes Your last dose was: Your next dose is: Apply  to affected area daily as needed for Skin Irritation. CARAFATE 1 gram tablet Generic drug:  sucralfate Your last dose was: Your next dose is: Take 1 g by mouth three (3) times daily. 1 g  
    
   
   
   
  
 COLACE 100 mg capsule Generic drug:  docusate sodium Your last dose was: Your next dose is: Take 100 mg by mouth daily. 100 mg  
    
   
   
   
  
 conjugated estrogens 0.625 mg/gram vaginal cream  
Commonly known as:  PREMARIN Your last dose was: Your next dose is:    
   
   
 Apply small amount to introitus MWF as directed. EFFEXOR  mg capsule Generic drug:  venlafaxine-SR Your last dose was: Your next dose is: Take 150 mg by mouth daily. 150 mg  
    
   
   
   
  
 fentaNYL 25 mcg/hr PATCH Commonly known as:  Marianne Upson Your last dose was: Your next dose is:    
   
   
 1 Patch by TransDERmal route every seventy-two (72) hours. 1 Patch  
    
   
   
   
  
 gabapentin 300 mg capsule Commonly known as:  NEURONTIN Your last dose was: Your next dose is: Take 300 mg by mouth three (3) times daily. 300 mg  
    
   
   
   
  
 glimepiride 1 mg tablet Commonly known as:  AMARYL Your last dose was: Your next dose is: Take 1 mg by mouth two (2) times a day. 1 mg HYDROcodone-acetaminophen  mg tablet Commonly known as:  Barnet Cuff Your last dose was: Your next dose is: Take 1 Tab by mouth every six (6) hours as needed for Pain. 1 Tab  
    
   
   
   
  
 ibuprofen 200 mg tablet Commonly known as:  MOTRIN Your last dose was: Your next dose is: Take 400 mg by mouth every eight (8) hours as needed for Pain. 400 mg  
    
   
   
   
  
 LASIX 40 mg tablet Generic drug:  furosemide Your last dose was: Your next dose is: Take 40 mg by mouth as needed. 40 mg  
    
   
   
   
  
 levothyroxine 50 mcg tablet Commonly known as:  SYNTHROID Your last dose was: Your next dose is: Take 50 mcg by mouth Daily (before breakfast). 50 mcg  
    
   
   
   
  
 losartan 100 mg tablet Commonly known as:  COZAAR Your last dose was: Your next dose is: Take 100 mg by mouth daily. 100 mg  
    
   
   
   
  
 metoprolol succinate 100 mg tablet Commonly known as:  TOPROL-XL Your last dose was: Your next dose is: Take 100 mg by mouth daily. 100 mg MIRALAX 17 gram packet Generic drug:  polyethylene glycol Your last dose was: Your next dose is: Take 17 g by mouth every fourty-eight (48) hours. 17 g  
    
   
   
   
  
 mirtazapine 15 mg tablet Commonly known as:  [place] Jara Vizcaino Your last dose was: Your next dose is: Take 15 mg by mouth nightly. 15 mg  
    
   
   
   
  
 omeprazole 40 mg capsule Commonly known as:  PRILOSEC Your last dose was: Your next dose is: Take 40 mg by mouth daily. 40 mg  
    
   
   
   
  
 oxyCODONE-acetaminophen  mg per tablet Commonly known as:  PERCOCET 10 Your last dose was: Your next dose is: Take 1 Tab by mouth every six (6) hours as needed for Pain. 1 Tab  
    
   
   
   
  
 potassium chloride SR 10 mEq tablet Commonly known as:  KLOR-CON 10 Your last dose was: Your next dose is: Take 10 mEq by mouth as needed. 10 mEq  
    
   
   
   
  
 tiZANidine 2 mg tablet Commonly known as:  Smiley Fulling Your last dose was: Your next dose is: Take 2 mg by mouth as needed. 2 mg VITAMIN B-12 1,000 mcg tablet Generic drug:  cyanocobalamin Your last dose was: Your next dose is: Take 1,000 mcg by mouth daily. 1000 mcg VITAMIN D3 1,000 unit Cap Generic drug:  cholecalciferol Your last dose was: Your next dose is: Take 1,000 Units by mouth daily. 1000 Units  
    
   
   
   
  
 ziprasidone 20 mg capsule Commonly known as:  Ok Buckle Your last dose was: Your next dose is: Take 20 mg by mouth daily. 20 mg Opioid Education Prescription Opioids: What You Need to Know: 
 
Prescription opioids can be used to help relieve moderate-to-severe pain and are often prescribed following a surgery or injury, or for certain health conditions. These medications can be an important part of treatment but also come with serious risks. Opioids are strong pain medicines. Examples include hydrocodone, oxycodone, fentanyl, and morphine. Heroin is an example of an illegal opioid. It is important to work with your health care provider to make sure you are getting the safest, most effective care. WHAT ARE THE RISKS AND SIDE EFFECTS OF OPIOID USE? Prescription opioids carry serious risks of addiction and overdose, especially with prolonged use. An opioid overdose, often marked by slow breathing, can cause sudden death. The use of prescription opioids can have a number of side effects as well, even when taken as directed. · Tolerance-meaning you might need to take more of a medication for the same pain relief · Physical dependence-meaning you have symptoms of withdrawal when the medication is stopped. Withdrawal symptoms can include nausea, sweating, chills, diarrhea, stomach cramps, and muscle aches. Withdrawal can last up to several weeks, depending on which drug you took and how long you took it. · Increased sensitivity to pain · Constipation · Nausea, vomiting, and dry mouth · Sleepiness and dizziness · Confusion · Depression · Low levels of testosterone that can result in lower sex drive, energy, and strength · Itching and sweating RISKS ARE GREATER WITH:      
· History of drug misuse, substance use disorder, or overdose · Mental health conditions (such as depression or anxiety) · Sleep apnea · Older age (72 years or older) · Pregnancy Avoid alcohol while taking prescription opioids. Also, unless specifically advised by your health care provider, medications to avoid include: · Benzodiazepines (such as Xanax or Valium) · Muscle relaxants (such as Soma or Flexeril) · Hypnotics (such as Ambien or Lunesta) · Other prescription opioids KNOW YOUR OPTIONS Talk to your health care provider about ways to manage your pain that don't involve prescription opioids. Some of these options may actually work better and have fewer risks and side effects. Options may include: 
· Pain relievers such as acetaminophen, ibuprofen, and naproxen · Some medications that are also used for depression or seizures · Physical therapy and exercise · Counseling to help patients learn how to cope better with triggers of pain and stress. · Application of heat or cold compress · Massage therapy · Relaxation techniques Be Informed Make sure you know the name of your medication, how much and how often to take it, and its potential risks & side effects. IF YOU ARE PRESCRIBED OPIOIDS FOR PAIN: 
· Never take opioids in greater amounts or more often than prescribed. Remember the goal is not to be pain-free but to manage your pain at a tolerable level. · Follow up with your primary care provider to: · Work together to create a plan on how to manage your pain. · Talk about ways to help manage your pain that don't involve prescription opioids. · Talk about any and all concerns and side effects. · Help prevent misuse and abuse. · Never sell or share prescription opioids · Help prevent misuse and abuse. · Store prescription opioids in a secure place and out of reach of others (this may include visitors, children, friends, and family). · Safely dispose of unused/unwanted prescription opioids: Find your community drug take-back program or your pharmacy mail-back program, or flush them down the toilet, following guidance from the Food and Drug Administration (www.fda.gov/Drugs/ResourcesForYou). · Visit www.cdc.gov/drugoverdose to learn about the risks of opioid abuse and overdose. · If you believe you may be struggling with addiction, tell your health care provider and ask for guidance or call c4cast.com at 7-348-404-JBWI. Discharge Instructions For the next 12 hours you should not: 1. drive 2. drink alcohol 3. operate any machinery 4. engage in activities that require mental sharpness or manual dexterity 5. take any drugs other than those prescribed by a physician 6. make any legal or financial decisions Call your doctor's office immediately, if: 1. Vomiting blood 2. High fever 3. Severe abdominal pain Take it easy today and resume normal activity tomorrow. Resume previous diet. Luis Alberto Marquez MD, Danelle Jerry Patient armband removed and given to patient to take home. Patient was informed of the privacy risks if armband lost or stolen Introducing [facility] & HEALTH SERVICES! Rivas Clemens introduces ePub Direct patient portal. Now you can access parts of your medical record, email your doctor's office, and request medication refills online. 1. In your internet browser, go to [email or web address] 2. Click on the First Time User? Click Here link in the Sign In box. You will see the New Member Sign Up page. 3. Enter your ePub Direct Access Code exactly as it appears below.  You will not need to use this code after youve completed the sign-up process. If you do not sign up before the expiration date, you must request a new code. · [employer] Access Code: [email or web address] Expires: 9/5/2018  4:22 PM 
 
4. Enter the last four digits of your Social Security Number (xxxx) and Date of Birth (mm/dd/yyyy) as indicated and click Submit. You will be taken to the next sign-up page. 5. Create a [employer] ID. This will be your [employer] login ID and cannot be changed, so think of one that is secure and easy to remember. 6. Create a [employer] password. You can change your password at any time. 7. Enter your Password Reset Question and Answer. This can be used at a later time if you forget your password. 8. Enter your e-mail address. You will receive e-mail notification when new information is available in [address] 9. Click Sign Up. You can now view and download portions of your medical record. 10. Click the Download Summary menu link to download a portable copy of your medical information. If you have questions, please visit the Frequently Asked Questions section of the [employer] website. Remember, [employer] is NOT to be used for urgent needs. For medical emergencies, dial 911. Now available from your iPhone and Android! Introducing Adolfo Tamez As a Carl Poli patient, I wanted to make you aware of our electronic visit tool called Adolfo Harishtheresajeanie. [facility] 24/7 allows you to connect within minutes with a medical provider 24 hours a day, seven days a week via a mobile device or tablet or logging into a secure website from your computer. You can access Adolfo Harishtheresafin from anywhere in the [place].  
 
A virtual visit might be right for you when you have a simple condition and feel like you just dont want to get out of bed, or cant get away from work for an appointment, when your regular [facility] provider is not available (evenings, weekends or holidays), or when youre out of town and need minor care. Electronic visits cost only $49 and if the [facility] 24/7 provider determines a prescription is needed to treat your condition, one can be electronically transmitted to a nearby pharmacy*. Please take a moment to enroll today if you have not already done so. The enrollment process is free and takes just a few minutes. To enroll, please download the [employer] sudarshan to your tablet or phone, or visit [email or web address] to enroll on your computer. And, as an [address] [place] [address] patient with a [employer] account, the results of your visits will be scanned into your electronic medical record and your primary care provider will be able to view the scanned results. We urge you to continue to see your regular [facility] provider for your ongoing medical care. And while your primary care provider may not be the one available when you seek a [employer] virtual visit, the peace of mind you get from getting a real diagnosis real time can be priceless. For more information on [employer], view our Frequently Asked Questions (FAQs) at [email or web address]. Sincerely, 
 
Ashlie Deleon MD 
Chief Medical Officer [facility] *:  certain medications cannot be prescribed via [employer] Providers Seen During Your Hospitalization Provider Specialty Primary office phone Rusty Meier MD Gastroenterology [phone] Your Primary Care Physician (PCP) Primary Care Physician Office Phone Office Fax Bc Hartman, [address] [facility] [phone] You are allergic to the following Allergen Reactions Codeine Rash Itching Iodine Hives Rash Morphine Hives Pcn (Penicillins) Other (comments) Causes \"Heart swell\" Sulfa (Sulfonamide Antibiotics) Nausea Only Recent Documentation Height Weight Breastfeeding? BMI OB Status Smoking Status 1.651 m 89.8 kg No 32.95 kg/m2 Hysterectomy Never Smoker Emergency Contacts Name Discharge Info Relation Home Work Mobile Abimbola Greer DISCHARGE CAREGIVER [3] Daughter [21] [phone] Gianna Gonzalez DISCHARGE CAREGIVER [3] Daughter [21] [phone] Patient Belongings The following personal items are in your possession at time of discharge: 
  Dental Appliances: Lowers, Uppers, At home  Visual Aid: None Discharge Instructions Attachments/References GASTRITIS (ENGLISH) Patient Handouts Gastritis: Care Instructions Your Care Instructions Gastritis is a sore and upset stomach. It happens when something irritates the stomach lining. Many things can cause it. These include an infection such as the flu or something you ate or drank. Medicines or a sore on the lining of the stomach (ulcer) also can cause it. Your belly may bloat and ache. You may belch, vomit, and feel sick to your stomach. You should be able to relieve the problem by taking medicine. And it may help to change your diet. If gastritis lasts, your doctor may prescribe medicine. Follow-up care is a key part of your treatment and safety. Be sure to make and go to all appointments, and call your doctor if you are having problems. It's also a good idea to know your test results and keep a list of the medicines you take. How can you care for yourself at home? · If your doctor prescribed antibiotics, take them as directed. Do not stop taking them just because you feel better. You need to take the full course of antibiotics. · Be safe with medicines. If your doctor prescribed medicine to decrease stomach acid, take it as directed. Call your doctor if you think you are having a problem with your medicine.  
· Do not take any other medicine, including over-the-counter pain relievers, without talking to your doctor first. 
· If your doctor recommends over-the-counter medicine to reduce stomach acid, such as Pepcid AC, Prilosec, Tagamet HB, or Zantac 75, follow the directions on the label. · Drink plenty of fluids (enough so that your urine is light yellow or clear like water) to prevent dehydration. Choose water and other caffeine-free clear liquids. If you have kidney, heart, or liver disease and have to limit fluids, talk with your doctor before you increase the amount of fluids you drink. · Limit how much alcohol you drink. · Avoid coffee, tea, cola drinks, chocolate, and other foods with caffeine. They increase stomach acid. When should you call for help? Call 911 anytime you think you may need emergency care. For example, call if: 
? · You vomit blood or what looks like coffee grounds. ? · You pass maroon or very bloody stools. ?Call your doctor now or seek immediate medical care if: 
? · You start breathing fast and have not produced urine in the last 8 hours. ? · You cannot keep fluids down. ? Watch closely for changes in your health, and be sure to contact your doctor if: 
? · You do not get better as expected. Where can you learn more? Go to http://shamika-chinyere.info/. Enter [id number] in the search box to learn more about \"Gastritis: Care Instructions. \" Current as of: May 12, 2017 Content Version: 11.4 © 2827-7722 Smart[employer]. Care instructions adapted under license by [employer] (which disclaims liability or warranty for this information). If you have questions about a medical condition or this instruction, always ask your healthcare professional. [address] [address] any warranty or liability for your use of this information. Please provide this summary of care documentation to your next provider.  
  
  
 
  
Signatures-by signing, you are acknowledging that this After Visit Summary has been reviewed with you and you have received a copy. Patient Signature:  ____________________________________________________________ Date:  ____________________________________________________________  
  
Scharlene Alosa Provider Signature:  ____________________________________________________________ Date:  ____________________________________________________________

## 2018-07-10 NOTE — H&P
Date of Surgery Update:  Beverly Garnett was seen and examined. History and physical has been reviewed. The patient has been examined.  There have been no significant clinical changes since the completion of the originally dated History and Physical.    Signed By: Fili Valencia MD     July 10, 2018 10:42 AM

## 2018-07-16 ENCOUNTER — HOSPITAL ENCOUNTER (OUTPATIENT)
Dept: PHYSICAL THERAPY | Age: 73
Discharge: HOME | End: 2018-07-16
Payer: MEDICARE

## 2018-07-16 PROCEDURE — G8978 MOBILITY CURRENT STATUS: HCPCS

## 2018-07-16 PROCEDURE — 97110 THERAPEUTIC EXERCISES: CPT

## 2018-07-16 PROCEDURE — 97530 THERAPEUTIC ACTIVITIES: CPT

## 2018-07-16 PROCEDURE — G8979 MOBILITY GOAL STATUS: HCPCS

## 2018-07-16 NOTE — PROGRESS NOTES
PHYSICAL THERAPY - DAILY TREATMENT NOTE    Patient Name: Aminata Christiansen        Date: 2018  : 1945   YES Patient  Verified  Visit #:     Insurance: Payor: [place] MED[place]ARE / Plan: [place] MEDICARE PART A & B / Product Type: Medicare /      In time: 1:58 Out time: 2:36   Total Treatment Time: 38     Medicare/BCBS Time Tracking (below)   Total Timed Codes (min):  38 1:1 Treatment Time:  38     TREATMENT AREA = Low back pain [M54.5]    SUBJECTIVE    Pain Level (on 0 to 10 scale):  0  / 10   Medication Changes/New allergies or changes in medical history, any new surgeries or procedures? NO    If yes, update Summary List   Subjective Functional Status/Changes:  []  No changes reported     \"I have been sick the last week and I feel like I lost my energy\"          OBJECTIVE    Therapeutic Procedures:  Min Procedure Specifics + Rationale   n/a [x]  Patient Education (performed throughout session) [x] Review HEP    [] Progressed/Changed HEP based on:   [] proper performance and advancement of Therex/TA   [] reduction in pain level    [] increased functional capacity       [] change in directional preference   23 [x] Therapeutic Exercise   [x]  See Flowsheet   Rationale: increase ROM and increase strength to improve the patients ability to participate in ADL's    15 [x] Therapeutic Activity   [x]  See Flowsheet  Sit<>stand, ambulation with FWW,   Rationale: To improve safety, proprioception, coordination, and efficiency with tasks     Other Objective/Functional Measures:    PT increased reps/sets/resistance as noted on the flow sheet. Pt ambulated 66, 74 and 76 ft with FWW and CGA/supervision. See flow sheet for more details. Progressed therex per flow sheet. Post Treatment Pain Level (on 0 to 10) scale:   0  / 10     ASSESSMENT    Assessment/Changes in Function:     PT noted improved ambulation distance compares to [facility].    []  See Plan of Care  []  See Progress Note/ Recertification  []  See Discharge Summary        Patient will continue to benefit from skilled PT services to modify and progress therapeutic interventions, address functional mobility deficits, address ROM deficits, address strength deficits, analyze and address soft tissue restrictions, analyze and cue movement patterns, analyze and modify body mechanics/ergonomics, assess and modify postural abnormalities, address imbalance/dizziness and instruct in home and community integration  to attain remaining goals   Progress toward goals / Updated goals: Mobility [id number] Current  CL= 60-79%   Goal  CK= 40-59%.   The severity rating is based on the Other TUG time    No progress to report as this was pt first visit after re-evaluaiton     PLAN    [x]  Upgrade activities as tolerated  [x]  Update interventions per flow sheet YES Continue plan of care   []  Discharge due to :    []  Other:      Therapist: Ty Kim DPT    Date: 7/16/2018 Time: 10:20 AM   Future Appointments  Date Time Provider Nikki Villanueva   7/16/2018 2:00 PM Jocelyn Moraes, PT [facility] AT [facility]

## 2018-08-10 ENCOUNTER — HOSPITAL ENCOUNTER (OUTPATIENT)
Dept: NON INVASIVE DIAGNOSTICS | Age: 73
Discharge: HOME | End: 2018-08-10
Attending: INTERNAL MEDICINE
Payer: MEDICARE

## 2018-08-10 ENCOUNTER — HOSPITAL ENCOUNTER (OUTPATIENT)
Dept: NUCLEAR MEDICINE | Age: 73
Discharge: HOME | End: 2018-08-10
Attending: INTERNAL MEDICINE
Payer: MEDICARE

## 2018-08-10 VITALS
DIASTOLIC BLOOD PRESSURE: 56 MMHG | SYSTOLIC BLOOD PRESSURE: 167 MMHG | HEIGHT: 64 IN | WEIGHT: 197 LBS | BODY MASS INDEX: 33.63 KG/M2

## 2018-08-10 VITALS
WEIGHT: 197.38 LBS | BODY MASS INDEX: 32.89 KG/M2 | SYSTOLIC BLOOD PRESSURE: 167 MMHG | HEIGHT: 65 IN | DIASTOLIC BLOOD PRESSURE: 56 MMHG

## 2018-08-10 DIAGNOSIS — R07.9 CHEST PAIN: ICD-10-CM

## 2018-08-10 DIAGNOSIS — R06.02 SOB (SHORTNESS OF BREATH): ICD-10-CM

## 2018-08-10 DIAGNOSIS — R60.9 EDEMA: ICD-10-CM

## 2018-08-10 LAB
NUC REST EJECTION FRACTION: 81 %
STRESS BASELINE HR: 57 BPM
STRESS ESTIMATED WORKLOAD: 1 METS
STRESS EXERCISE DUR MIN: NORMAL
STRESS PEAK DIAS BP: 57 MMHG
STRESS PEAK SYS BP: 167 MMHG
STRESS PERCENT HR ACHIEVED: 66 %
STRESS POST PEAK HR: 81 BPM
STRESS RATE PRESSURE PRODUCT: NORMAL BPM*MMHG
STRESS ST DEPRESSION: 0 MM
STRESS ST ELEVATION: 0 MM
STRESS TARGET HR: 123 BPM

## 2018-08-10 PROCEDURE — 93306 TTE W/DOPPLER COMPLETE: CPT

## 2018-08-10 PROCEDURE — 74011250636 HC RX REV CODE- 250/636

## 2018-08-10 PROCEDURE — A9500 TC99M SESTAMIBI: HCPCS

## 2018-08-10 PROCEDURE — 93017 CV STRESS TEST TRACING ONLY: CPT

## 2018-08-10 RX ADMIN — REGADENOSON 0.4 MG: 0.08 INJECTION, SOLUTION INTRAVENOUS at 11:24

## 2018-08-13 LAB
ECHO AV PEAK GRADIENT: 0 MMHG
ECHO AV PEAK VELOCITY: 0 CM/S
ECHO EST RA PRESSURE: 10 MMHG
ECHO LA VOL 2C: 49.67 ML (ref 22–52)
ECHO LA VOL 4C: 68.28 ML (ref 22–52)
ECHO LA VOLUME INDEX A2C: 25.56 ML/M2
ECHO LA VOLUME INDEX A4C: 35.13 ML/M2
ECHO LV EDV A2C: 84.7 ML
ECHO LV EDV A4C: 74.5 ML
ECHO LV EDV BP: 79.8 ML (ref 56–104)
ECHO LV EDV INDEX A4C: 38.3 ML/M2
ECHO LV EDV INDEX BP: 41.1 ML/M2
ECHO LV EDV NDEX A2C: 43.6 ML/M2
ECHO LV EJECTION FRACTION A2C: 74 %
ECHO LV EJECTION FRACTION A4C: 76 %
ECHO LV EJECTION FRACTION BIPLANE: 74.4 % (ref 55–100)
ECHO LV ESV A2C: 22.1 ML
ECHO LV ESV A4C: 17.6 ML
ECHO LV ESV BP: 20.5 ML (ref 19–49)
ECHO LV ESV INDEX A2C: 11.4 ML/M2
ECHO LV ESV INDEX A4C: 9.1 ML/M2
ECHO LV ESV INDEX BP: 10.5 ML/M2
ECHO LV INTERNAL DIMENSION DIASTOLIC: 3.8 CM (ref 3.9–5.3)
ECHO LV INTERNAL DIMENSION SYSTOLIC: 2.55 CM
ECHO LV ISOVOLUMETRIC RELAXATION TIME (IVRT): 49.9 MS
ECHO LV IVSD: 1.11 CM (ref 0.6–0.9)
ECHO LV MASS 2D: 139.9 G (ref 67–162)
ECHO LV MASS INDEX 2D: 72 G/M2
ECHO LV POSTERIOR WALL DIASTOLIC: 0.96 CM (ref 0.6–0.9)
ECHO LVOT DIAM: 2.06 CM
ECHO LVOT PEAK GRADIENT: 2.9 MMHG
ECHO LVOT PEAK VELOCITY: 85.73 CM/S
ECHO MV A VELOCITY: 81.45 CM/S
ECHO MV AREA PHT: 6.1 CM2
ECHO MV E DECELERATION TIME (DT): 124.8 MS
ECHO MV E VELOCITY: 1.04 CM/S
ECHO MV E/A RATIO: 0.01
ECHO MV PRESSURE HALF TIME (PHT): 36.2 MS
ECHO PULMONARY ARTERY SYSTOLIC PRESSURE (PASP): 10 MMHG
ECHO RIGHT VENTRICULAR SYSTOLIC PRESSURE (RVSP): 10 MMHG
ECHO TV REGURGITANT MAX VELOCITY: 0 CM/S
ECHO TV REGURGITANT PEAK GRADIENT: 0 MMHG

## 2018-08-14 ENCOUNTER — HOSPITAL ENCOUNTER (OUTPATIENT)
Dept: PHYSICAL THERAPY | Age: 73
Discharge: HOME | End: 2018-08-14
Payer: MEDICARE

## 2018-08-14 PROCEDURE — G8980 MOBILITY D/C STATUS: HCPCS

## 2018-08-14 PROCEDURE — 97110 THERAPEUTIC EXERCISES: CPT

## 2018-08-14 PROCEDURE — G8979 MOBILITY GOAL STATUS: HCPCS

## 2018-08-14 NOTE — PROGRESS NOTES
PHYSICAL THERAPY - DAILY TREATMENT NOTE    Patient Name: Beverly Garnett        Date: 2018  : 1945   YES Patient  Verified  Visit #:     Insurance: Payor: [place] MEDICARE / Plan: [place] MEDICARE PART A & B / Product Type: Medicare /      In time: 2:37(47) Out time: 3:00   Total Treatment Time: 23     Medicare/BCBS Time Tracking (below)   Total Timed Codes (min):  23 1:1 Treatment Time:  13     TREATMENT AREA = Low back pain [M54.5]    SUBJECTIVE    Pain Level (on 0 to 10 scale):  4  / 10   Medication Changes/New allergies or changes in medical history, any new surgeries or procedures? NO    If yes, update Summary List   Subjective Functional Status/Changes:  []  No changes reported     \"I have to leave by 3 because that's what time I told my ride\"          OBJECTIVE    Therapeutic Procedures:  Min Procedure Specifics + Rationale   n/a [x]  Patient Education (performed throughout session) [x] Review HEP    [] Progressed/Changed HEP based on:   [] proper performance and advancement of Therex/TA   [] reduction in pain level    [] increased functional capacity       [] change in directional preference   (13) [x] Therapeutic Exercise   [x]  See Flowsheet   Rationale: increase ROM and increase strength to improve the patients ability to participate in ADL's      Other Objective/Functional Measures:    See D/C note. Pt demonstrated and verbalized independence with HEP. See flow sheet for more details. Progressed therex per flow sheet. Post Treatment Pain Level (on 0 to 10) scale:   1-2  / 10     ASSESSMENT    Assessment/Changes in Function:     See D/C note   []  See Plan of Care  []  See Progress Note/ Recertification  []  See Discharge Summary           Progress toward goals / Updated goals:    See D/C note     PLAN    []  Upgrade activities as tolerated  []  Update interventions per flow sheet NO Continue plan of care   [x]  Discharge due to : Lack of attendance.    []  Other:      Therapist: Ely Chin, FRANKIE    Date: 8/14/2018 Time: 9:41 AM   Future Appointments  Date Time Provider Nikki Villanueva   8/14/2018 2:30 PM Sierra Rutledge PT [facility]

## 2018-08-14 NOTE — PROGRESS NOTES
[address]  [facility] PHYSICAL THERAPY  [address] [facility] [address], [address] - Phone: [phone]  Fax: [phone]  DISCHARGE SUMMARY FOR PHYSICAL THERAPY          Patient Name: Leticia Arias : 1945   Treatment/Medical Diagnosis: Low back pain [M54.5]   Onset Date: Chronic DOS: 2018    Referral Source: Ino Billings MD [facility]): 2018   Prior Hospitalization: See Medical History Provider #: [id number]   Prior Level of Function:  Progressive decline in independence with ADL's and functional activities        Comorbidities: CVA, L/S surgeries x4, BMI,DM, HTN, heart disease, depression, scoliosis   Medications: Verified on Patient Summary List   Visits from [facility]: 7 Missed Visits: 5     Goal/Measure of Progress Goal Met? 1. Pt will be independent with HEP at D/C for self management. Status at last Eval: compliant Current Status: Pt demonstrated and verbalized independence yes   2. Pt will increase hip abduction gross strength to at least 4/5 in order to improve hip stability during single leg stance   Status at last Eval: <4/5 Current Status: <4/5 no   3. Pt will ambulate 150 ft with least restrictive AD mod I with no LOB in order to increase community walking distance   Status at last Eval: <150 ft Current Status: <150 ft no     SUMMARY OF TREATMENT  Patient's POC has consisted of therex, therapeutic activities, manual therapy prn, modalities prn, patient and caregiver education, and a comprehensive HEP. Treatment strategies used to address functional mobility deficits, ROM deficits, strength deficits, analyze and address soft tissue restrictions, analyze and cue movement patterns, analyze and modify body mechanics/ergonomics, assess and modify postural abnormalities and instruct in home and community integration.      Key Functional Changes/Progress: Ms. Leroy Jewell has been receiving PT at clinic since 2018 which has consisted of 7 attended visits and 5 missed sessions. Minimal progress to report since last progress note written on 07/02/2018 as pt has only attended 1 appointment since then. Pt reports she has been sick and spent majority of her time in bed. PT updated HEP. Pt demonstrated and verbalized independence with program. Pt to be D/C from PT d/t lack of attendance. G-Codes (GP): Mobility  [id number] Goal  CL= 60-79%  D/C  CK= 40-59%. The severity rating is based on the Other TUG time     Assessments/Recommendations: Discontinue therapy due to lack of attendance or compliance. If you have any questions/comments please contact us directly at [phone]. Thank you for allowing us to assist in the care of your patient. Therapist Signature:  Kaden Mcelroy DPT Date: 8/14/2018   Reporting Period: 07/02/2018-08/14/2018 Time: 3:56 PM      Certification Period: 06/04/2018-09/03/2018       NOTE TO PHYSICIAN:  PLEASE COMPLETE THE ORDERS BELOW AND FAX TO   [facility]: [phone]. If you are unable to process this request in 24 hours please contact our office: [phone].    ___ I have read the above report and request that my patient be discharged from therapy.      Physician Signature:        Date:       Time:

## 2018-08-17 ENCOUNTER — APPOINTMENT (OUTPATIENT)
Dept: GENERAL RADIOLOGY | Age: 73
DRG: 305 | End: 2018-08-17
Attending: EMERGENCY MEDICINE
Payer: MEDICARE

## 2018-08-17 ENCOUNTER — HOSPITAL ENCOUNTER (INPATIENT)
Age: 73
LOS: 4 days | Discharge: NURSING, REHAB OR LONG-TERM CARE | DRG: 305 | End: 2018-08-21
Attending: EMERGENCY MEDICINE | Admitting: HOSPITALIST
Payer: MEDICARE

## 2018-08-17 DIAGNOSIS — R07.9 ACUTE CHEST PAIN: ICD-10-CM

## 2018-08-17 DIAGNOSIS — I16.0 HYPERTENSIVE URGENCY: Primary | ICD-10-CM

## 2018-08-17 PROBLEM — E11.9 DIABETES (HCC): Status: ACTIVE | Noted: 2018-08-17

## 2018-08-17 PROBLEM — I16.1 HYPERTENSIVE EMERGENCY: Status: ACTIVE | Noted: 2018-08-17

## 2018-08-17 LAB
ALBUMIN SERPL-MCNC: 3.4 G/DL (ref 3.4–5)
ALBUMIN/GLOB SERPL: 1 {RATIO} (ref 0.8–1.7)
ALP SERPL-CCNC: 216 U/L (ref 45–117)
ALT SERPL-CCNC: 22 U/L (ref 13–56)
ANION GAP SERPL CALC-SCNC: 9 MMOL/L (ref 3–18)
APPEARANCE UR: CLEAR
AST SERPL-CCNC: 25 U/L (ref 15–37)
BACTERIA URNS QL MICRO: ABNORMAL /HPF
BASOPHILS # BLD: 0 K/UL (ref 0–0.1)
BASOPHILS NFR BLD: 0 % (ref 0–2)
BILIRUB SERPL-MCNC: 0.3 MG/DL (ref 0.2–1)
BILIRUB UR QL: NEGATIVE
BUN SERPL-MCNC: 7 MG/DL (ref 7–18)
BUN/CREAT SERPL: 10 (ref 12–20)
CALCIUM SERPL-MCNC: 8.9 MG/DL (ref 8.5–10.1)
CHLORIDE SERPL-SCNC: 109 MMOL/L (ref 100–108)
CO2 SERPL-SCNC: 25 MMOL/L (ref 21–32)
COLOR UR: YELLOW
CREAT SERPL-MCNC: 0.72 MG/DL (ref 0.6–1.3)
DIFFERENTIAL METHOD BLD: ABNORMAL
EOSINOPHIL # BLD: 0.1 K/UL (ref 0–0.4)
EOSINOPHIL NFR BLD: 1 % (ref 0–5)
EPITH CASTS URNS QL MICRO: ABNORMAL /LPF (ref 0–5)
ERYTHROCYTE [DISTWIDTH] IN BLOOD BY AUTOMATED COUNT: 12.7 % (ref 11.6–14.5)
GLOBULIN SER CALC-MCNC: 3.5 G/DL (ref 2–4)
GLUCOSE SERPL-MCNC: 147 MG/DL (ref 74–99)
GLUCOSE UR STRIP.AUTO-MCNC: NEGATIVE MG/DL
HCT VFR BLD AUTO: 32.8 % (ref 35–45)
HGB BLD-MCNC: 10.7 G/DL (ref 12–16)
HGB UR QL STRIP: NEGATIVE
INR PPP: 1 (ref 0.8–1.2)
KETONES UR QL STRIP.AUTO: NEGATIVE MG/DL
LEUKOCYTE ESTERASE UR QL STRIP.AUTO: ABNORMAL
LIPASE SERPL-CCNC: 46 U/L (ref 73–393)
LYMPHOCYTES # BLD: 3.2 K/UL (ref 0.9–3.6)
LYMPHOCYTES NFR BLD: 34 % (ref 21–52)
MAGNESIUM SERPL-MCNC: 1.6 MG/DL (ref 1.6–2.6)
MCH RBC QN AUTO: 29 PG (ref 24–34)
MCHC RBC AUTO-ENTMCNC: 32.6 G/DL (ref 31–37)
MCV RBC AUTO: 88.9 FL (ref 74–97)
MONOCYTES # BLD: 0.7 K/UL (ref 0.05–1.2)
MONOCYTES NFR BLD: 8 % (ref 3–10)
NEUTS SEG # BLD: 5.6 K/UL (ref 1.8–8)
NEUTS SEG NFR BLD: 57 % (ref 40–73)
NITRITE UR QL STRIP.AUTO: NEGATIVE
PH UR STRIP: 8 [PH] (ref 5–8)
PLATELET # BLD AUTO: 256 K/UL (ref 135–420)
PMV BLD AUTO: 12 FL (ref 9.2–11.8)
POTASSIUM SERPL-SCNC: 3.4 MMOL/L (ref 3.5–5.5)
PROT SERPL-MCNC: 6.9 G/DL (ref 6.4–8.2)
PROT UR STRIP-MCNC: NEGATIVE MG/DL
PROTHROMBIN TIME: 12.4 SEC (ref 11.5–15.2)
RBC # BLD AUTO: 3.69 M/UL (ref 4.2–5.3)
RBC #/AREA URNS HPF: NEGATIVE /HPF (ref 0–5)
SODIUM SERPL-SCNC: 143 MMOL/L (ref 136–145)
SP GR UR REFRACTOMETRY: 1.01 (ref 1–1.03)
TROPONIN I SERPL-MCNC: <0.02 NG/ML (ref 0–0.04)
TROPONIN I SERPL-MCNC: <0.02 NG/ML (ref 0–0.04)
UROBILINOGEN UR QL STRIP.AUTO: 1 EU/DL (ref 0.2–1)
WBC # BLD AUTO: 9.7 K/UL (ref 4.6–13.2)
WBC URNS QL MICRO: ABNORMAL /HPF (ref 0–4)

## 2018-08-17 PROCEDURE — 93005 ELECTROCARDIOGRAM TRACING: CPT

## 2018-08-17 PROCEDURE — 96375 TX/PRO/DX INJ NEW DRUG ADDON: CPT

## 2018-08-17 PROCEDURE — 83735 ASSAY OF MAGNESIUM: CPT | Performed by: EMERGENCY MEDICINE

## 2018-08-17 PROCEDURE — 96366 THER/PROPH/DIAG IV INF ADDON: CPT

## 2018-08-17 PROCEDURE — 71045 X-RAY EXAM CHEST 1 VIEW: CPT

## 2018-08-17 PROCEDURE — 65660000001 HC RM ICU INTERMED STEPDOWN

## 2018-08-17 PROCEDURE — 85610 PROTHROMBIN TIME: CPT | Performed by: EMERGENCY MEDICINE

## 2018-08-17 PROCEDURE — 99285 EMERGENCY DEPT VISIT HI MDM: CPT

## 2018-08-17 PROCEDURE — 80053 COMPREHEN METABOLIC PANEL: CPT | Performed by: EMERGENCY MEDICINE

## 2018-08-17 PROCEDURE — 83690 ASSAY OF LIPASE: CPT | Performed by: EMERGENCY MEDICINE

## 2018-08-17 PROCEDURE — 74011000250 HC RX REV CODE- 250: Performed by: EMERGENCY MEDICINE

## 2018-08-17 PROCEDURE — 81001 URINALYSIS AUTO W/SCOPE: CPT | Performed by: EMERGENCY MEDICINE

## 2018-08-17 PROCEDURE — 96365 THER/PROPH/DIAG IV INF INIT: CPT

## 2018-08-17 PROCEDURE — 74011250636 HC RX REV CODE- 250/636: Performed by: EMERGENCY MEDICINE

## 2018-08-17 PROCEDURE — 84484 ASSAY OF TROPONIN QUANT: CPT | Performed by: EMERGENCY MEDICINE

## 2018-08-17 PROCEDURE — 85025 COMPLETE CBC W/AUTO DIFF WBC: CPT | Performed by: EMERGENCY MEDICINE

## 2018-08-17 PROCEDURE — 74011250637 HC RX REV CODE- 250/637: Performed by: EMERGENCY MEDICINE

## 2018-08-17 RX ORDER — OXYCODONE AND ACETAMINOPHEN 5; 325 MG/1; MG/1
1 TABLET ORAL
Status: COMPLETED | OUTPATIENT
Start: 2018-08-17 | End: 2018-08-17

## 2018-08-17 RX ORDER — NITROGLYCERIN 0.4 MG/1
0.4 TABLET SUBLINGUAL
Status: COMPLETED | OUTPATIENT
Start: 2018-08-17 | End: 2018-08-17

## 2018-08-17 RX ORDER — LORAZEPAM 2 MG/ML
1 INJECTION INTRAMUSCULAR ONCE
Status: COMPLETED | OUTPATIENT
Start: 2018-08-17 | End: 2018-08-17

## 2018-08-17 RX ORDER — METOPROLOL TARTRATE 5 MG/5ML
5 INJECTION INTRAVENOUS ONCE
Status: COMPLETED | OUTPATIENT
Start: 2018-08-17 | End: 2018-08-17

## 2018-08-17 RX ORDER — GABAPENTIN 300 MG/1
300 CAPSULE ORAL ONCE
Status: COMPLETED | OUTPATIENT
Start: 2018-08-17 | End: 2018-08-17

## 2018-08-17 RX ORDER — NITROGLYCERIN 40 MG/100ML
0-200 INJECTION INTRAVENOUS
Status: DISCONTINUED | OUTPATIENT
Start: 2018-08-17 | End: 2018-08-21 | Stop reason: HOSPADM

## 2018-08-17 RX ADMIN — NITROGLYCERIN 0.4 MG: 0.4 TABLET SUBLINGUAL at 20:10

## 2018-08-17 RX ADMIN — NITROGLYCERIN 0.4 MG: 0.4 TABLET SUBLINGUAL at 20:23

## 2018-08-17 RX ADMIN — METOPROLOL TARTRATE 5 MG: 5 INJECTION, SOLUTION INTRAVENOUS at 22:31

## 2018-08-17 RX ADMIN — NITROGLYCERIN 0.4 MG: 0.4 TABLET SUBLINGUAL at 20:16

## 2018-08-17 RX ADMIN — LORAZEPAM 1 MG: 2 INJECTION, SOLUTION INTRAMUSCULAR; INTRAVENOUS at 20:10

## 2018-08-17 RX ADMIN — NITROGLYCERIN 10 MCG/MIN: 40 INJECTION INTRAVENOUS at 21:32

## 2018-08-17 RX ADMIN — OXYCODONE HYDROCHLORIDE AND ACETAMINOPHEN 1 TABLET: 5; 325 TABLET ORAL at 22:33

## 2018-08-17 RX ADMIN — GABAPENTIN 300 MG: 300 CAPSULE ORAL at 22:03

## 2018-08-17 NOTE — ED PROVIDER NOTES
EMERGENCY DEPARTMENT HISTORY AND PHYSICAL EXAM    7:57 PM      Date: 8/17/2018  Patient Name: Leticia Arias    History of Presenting Illness     Chief Complaint   Patient presents with    Chest Pain         History Provided By: Patient    Chief Complaint: Chest pain  Duration:  Hours  Timing:  Acute  Location:Lower central chest and left lower chest  Quality: Pressure  Severity: Moderate  Modifying Factors: None  Associated Symptoms: Nausea and mild SOB      Additional History (Context): Leticia Arias is a 74 y.o. female with a hx of HTN, diabetes, CVA with residual left sided weakness, CAD, HLD, and gastroparesis who presents with complaints of lower central chest pain that she describes as someone pushing on her chest that started one hour ago. She reports associated left lower chest pain, nausea, and some mild SOB. Her blood pressure when her pain started was around 200/100 per the pt. She was given 324mg of ASA en route. She had a negative nuclear stress test a week ago. She states that she has not eaten in two days. She denies vomiting, hx of cardiac stents, abdominal pain, and any further complaints. PCP: Areli Levine MD    Current Facility-Administered Medications   Medication Dose Route Frequency Provider Last Rate Last Dose    nitroglycerin (TRIDIL) 400 mcg/ml infusion  0-200 mcg/min IntraVENous TITRATE Lela Nugent MD 15 mL/hr at 08/17/18 2320 100 mcg/min at 08/17/18 2320     Current Outpatient Prescriptions   Medication Sig Dispense Refill    furosemide (LASIX) 40 mg tablet Take 40 mg by mouth as needed.  gabapentin (NEURONTIN) 300 mg capsule Take 300 mg by mouth three (3) times daily.  HYDROcodone-acetaminophen (NORCO)  mg tablet Take 1 Tab by mouth every six (6) hours as needed for Pain.  potassium chloride SR (KLOR-CON 10) 10 mEq tablet Take 10 mEq by mouth as needed.  metoprolol succinate (TOPROL-XL) 100 mg tablet Take 100 mg by mouth daily.   6    aspirin (ASPIRIN) 325 mg tablet Take 1 Tab by mouth daily. 30 Tab 0    glimepiride (AMARYL) 1 mg tablet Take 1 mg by mouth two (2) times a day.  fentaNYL (DURAGESIC) 25 mcg/hr PATCH 1 Patch by TransDERmal route every seventy-two (72) hours.  amLODIPine (NORVASC) 10 mg tablet 10 mg.      ibuprofen (MOTRIN) 200 mg tablet Take 400 mg by mouth every eight (8) hours as needed for Pain.  oxyCODONE-acetaminophen (PERCOCET 10)  mg per tablet Take 1 Tab by mouth every six (6) hours as needed for Pain.  omeprazole (PRILOSEC) 40 mg capsule Take 40 mg by mouth daily.  cholecalciferol (VITAMIN D3) 1,000 unit cap Take 1,000 Units by mouth daily.  losartan (COZAAR) 100 mg tablet Take 100 mg by mouth daily.  venlafaxine-SR (EFFEXOR XR) 150 mg capsule Take 150 mg by mouth daily.  cyanocobalamin (VITAMIN B-12) 1,000 mcg tablet Take 1,000 mcg by mouth daily.  docusate sodium (COLACE) 100 mg capsule Take 100 mg by mouth daily.  betamethasone dipropionate (DIPROSONE) 0.05 % topical cream Apply  to affected area daily as needed for Skin Irritation.  tiZANidine (ZANAFLEX) 2 mg tablet Take 2 mg by mouth as needed. 3    conjugated estrogens (PREMARIN) 0.625 mg/gram vaginal cream Apply small amount to introitus MWF as directed. 30 g 1    atorvastatin (LIPITOR) 80 mg tablet Take 1 Tab by mouth nightly. 30 Tab 0    mirtazapine (REMERON) 15 mg tablet Take 15 mg by mouth nightly.  levothyroxine (SYNTHROID) 50 mcg tablet Take 50 mcg by mouth Daily (before breakfast).  ziprasidone (GEODON) 20 mg capsule Take 20 mg by mouth daily.  polyethylene glycol (MIRALAX) 17 gram packet Take 17 g by mouth every fourty-eight (48) hours.  sucralfate (CARAFATE) 1 gram tablet Take 1 g by mouth three (3) times daily.          Past History     Past Medical History:  Past Medical History:   Diagnosis Date    Anxiety     CAD (coronary artery disease)     Chronic pain     Depression     Diabetes ([address]     Diverticulosis     Dysphagia     Dysuria     Early satiety     Esophageal reflux     Fatty liver     Gastric ulcer     Gastroparesis     H/O joint replacement 1991    left knee    Heart disease     Hypercholesteremia     Hypertension     Iron deficiency anemia     Neurogenic bladder     Recurrent UTI     Right flank pain     Scoliosis     Spinal stenosis     Stenosis of lumbosacral spine     Stroke ([address] 04/2018    Tardive dyskinesia     Urgency of urination        Past Surgical History:  Past Surgical History:   Procedure Laterality Date    EGD  7/10/2018         HX APPENDECTOMY      HX BREAST LUMPECTOMY      HX CHOLECYSTECTOMY  1980    HX COLECTOMY  2012    HX COLONOSCOPY  10/29/2009    hyperplastic polyp, hemorrhoids    HX HERNIA REPAIR      HX HYSTERECTOMY  1970    HX KNEE ARTHROSCOPY      HX KNEE REPLACEMENT  1991    left knee    HX LUMBAR FUSION      x 3       Family History:  Family History   Problem Relation Age of Onset    Heart Disease Mother     Diabetes Mother     Heart Attack Mother     Cancer Father      lung CA    Cancer Brother     Cancer Sister        Social History:  Social History   Substance Use Topics    Smoking status: Never Smoker    Smokeless tobacco: Never Used    Alcohol use No       Allergies: Allergies   Allergen Reactions    Codeine Rash and Itching    Iodine Hives and Rash    Morphine Hives    Pcn [Penicillins] Other (comments)     Causes \"Heart swell\"    Sulfa (Sulfonamide Antibiotics) Nausea Only         Review of Systems     Review of Systems   Constitutional: Positive for appetite change (decreased). Respiratory: Positive for shortness of breath. Cardiovascular: Positive for chest pain. Gastrointestinal: Positive for nausea. Negative for abdominal pain and vomiting. All other systems reviewed and are negative.              Visit Vitals    /78    Pulse 80    Temp 98.6 °F (37 °C)    Resp 13    Ht 5' 5\" (1.651 m)    Wt 88.5 kg (195 lb)    SpO2 94%    BMI 32.45 kg/m2           Physical Exam / Medical Decision Making   I am the first provider for this patient. I reviewed the vital signs, available nursing notes, past medical history, past surgical history, family history and social history. Vital Signs-Reviewed the patient's vital signs. Physical exam:  General:  Well-developed, well-nourished, anxious appearing, nondiaphoretic. Head:  Normocephalic atraumatic. Eyes:  Pupils midrange extraocular movements intact. No pallor or conjunctival injection. Nose:  No rhinorrhea, inspection grossly normal.    Ears:  Grossly normal to inspection, no discharge. Mouth:  Mucous membranes moist, no appreciable intraoral lesion. Neck/Back:  Trachea midline, no asymmetry. Chest:  Grossly normal inspection, symmetric chest rise. Pulmonary:  Clear to auscultation bilaterally no wheezes rhonchi or rales. Cardiovascular:  S1-S2 no murmurs rubs or gallops. Abdomen: Soft, nontender, nondistended no guarding rebound or peritoneal signs. Extremities:  Grossly normal to inspection, peripheral pulses intact     Neurologic:  Alert and oriented no appreciable focal neurologic deficit    Skin:  Warm and dry  Psychiatric:  Grossly normal mood and affect  Nursing note reviewed, vital signs reviewed. ED course:  Patient with acute chest pain. She is afebrile and not tachycardic saturation is normal on room air blood pressure fairly elevated in the 190s will continue nitroglycerin she did receive aspirin from EMS primary concern here is for ruling out ACS less likely pulmonary embolism and she does report radiation to her LEFT side but there is no abdominal tenderness, no nausea or vomiting    EKG done at 1959 hrs.  normal sinus rhythm heart rate 87 intervals are within normal limits, there are T wave inversions in V3 V4 especially in aVL which seems to be similar distribution of her T-wave inversions in the past.  No clear ST changes    Chest x-ray per my interpretation rotated to the RIGHT with no clear abnormality    Labs unremarkable troponin negative    EMR reviewed, recently had a stress test that was negative, think that she needs aggressive blood pressure management, started a titrating nitro drip, also treated anxiety component or pain of her RIGHT leg, reported as a neuropathy type sensation and noted gabapentin    Patient reevaluated blood pressure still increasing now around 614Y systolic, given final grams of Lopressor and plan for admission    Repeat blood pressures 282 systolic, will titrate nitroglycerin down and continue      Patient's presentation, history, physical exam and laboratory evaluations were reviewed. I felt the patient would benefit from inpatient management and treatment. Consult:  Discussed care with Dr. Hilda Hernandez. Standard discussion; including history of patients chief complaint, available diagnostic results, and treatment course. Patient was accepted to their service. Disposition:    Admitted to medicine service      Portions of this chart were created with Dragon medical speech to text program.   Unrecognized errors may be present. I have spent 35 minutes of critical care time (excluding time for other separate services) involved in lab review, consultations with specialist, family decision-making, and documentation. During this entire length of time I was immediately available to the patient. Critical Care: The reason for providing this level of medical care for this critically ill patient was due a critical illness that impaired one or more vital organ systems such that there was a high probability of imminent or life threatening deterioration in the patients condition.  This care involved high complexity decision making to assess, manipulate, and support vital system functions, to treat this degree of vital organ system failure and to prevent further life threatening deterioration of the patients condition. Diagnostic Study Results     Labs -  Recent Results (from the past 12 hour(s))   EKG, 12 LEAD, INITIAL    Collection Time: 08/17/18  7:59 PM   Result Value Ref Range    Ventricular Rate 87 BPM    Atrial Rate 87 BPM    P-R Interval 178 ms    QRS Duration 66 ms    Q-T Interval 378 ms    QTC Calculation (Bezet) 454 ms    Calculated P Axis 50 degrees    Calculated R Axis 17 degrees    Calculated T Axis -123 degrees    Diagnosis       Normal sinus rhythm  Possible Left atrial enlargement  ST & T wave abnormality, consider inferior ischemia  ST & T wave abnormality, consider anterolateral ischemia  Abnormal ECG  When compared with ECG of 10-AUG-2018 10:57,  Vent. rate has increased BY  29 BPM  Non-specific change in ST segment in Anterior leads  T wave inversion now evident in Inferior leads  T wave inversion now evident in Anterolateral leads     CBC WITH AUTOMATED DIFF    Collection Time: 08/17/18  8:05 PM   Result Value Ref Range    WBC 9.7 4.6 - 13.2 K/uL    RBC 3.69 (L) 4.20 - 5.30 M/uL    HGB 10.7 (L) 12.0 - 16.0 g/dL    HCT 32.8 (L) 35.0 - 45.0 %    MCV 88.9 74.0 - 97.0 FL    MCH 29.0 24.0 - 34.0 PG    MCHC 32.6 31.0 - 37.0 g/dL    RDW 12.7 11.6 - 14.5 %    PLATELET 763 332 - 673 K/uL    MPV 12.0 (H) 9.2 - 11.8 FL    NEUTROPHILS 57 40 - 73 %    LYMPHOCYTES 34 21 - 52 %    MONOCYTES 8 3 - 10 %    EOSINOPHILS 1 0 - 5 %    BASOPHILS 0 0 - 2 %    ABS. NEUTROPHILS 5.6 1.8 - 8.0 K/UL    ABS. LYMPHOCYTES 3.2 0.9 - 3.6 K/UL    ABS. MONOCYTES 0.7 0.05 - 1.2 K/UL    ABS. EOSINOPHILS 0.1 0.0 - 0.4 K/UL    ABS.  BASOPHILS 0.0 0.0 - 0.1 K/UL    DF AUTOMATED     METABOLIC PANEL, COMPREHENSIVE    Collection Time: 08/17/18  8:05 PM   Result Value Ref Range    Sodium 143 136 - 145 mmol/L    Potassium 3.4 (L) 3.5 - 5.5 mmol/L    Chloride 109 (H) 100 - 108 mmol/L    CO2 25 21 - 32 mmol/L    Anion gap 9 3.0 - 18 mmol/L    Glucose 147 (H) 74 - 99 mg/dL    BUN 7 7.0 - 18 MG/DL    Creatinine 0.72 0.6 - 1.3 MG/DL    BUN/Creatinine ratio 10 (L) 12 - 20      GFR est AA >60 >60 ml/min/1.73m2    GFR est non-AA >60 >60 ml/min/1.73m2    Calcium 8.9 8.5 - 10.1 MG/DL    Bilirubin, total 0.3 0.2 - 1.0 MG/DL    ALT (SGPT) 22 13 - 56 U/L    AST (SGOT) 25 15 - 37 U/L    Alk.  phosphatase 216 (H) 45 - 117 U/L    Protein, total 6.9 6.4 - 8.2 g/dL    Albumin 3.4 3.4 - 5.0 g/dL    Globulin 3.5 2.0 - 4.0 g/dL    A-G Ratio 1.0 0.8 - 1.7     LIPASE    Collection Time: 08/17/18  8:05 PM   Result Value Ref Range    Lipase 46 (L) 73 - 393 U/L   MAGNESIUM    Collection Time: 08/17/18  8:05 PM   Result Value Ref Range    Magnesium 1.6 1.6 - 2.6 mg/dL   TROPONIN I    Collection Time: 08/17/18  8:05 PM   Result Value Ref Range    Troponin-I, Qt. <0.02 0.0 - 0.045 NG/ML   PROTHROMBIN TIME + INR    Collection Time: 08/17/18  8:05 PM   Result Value Ref Range    Prothrombin time 12.4 11.5 - 15.2 sec    INR 1.0 0.8 - 1.2     URINALYSIS W/ RFLX MICROSCOPIC    Collection Time: 08/17/18 10:45 PM   Result Value Ref Range    Color YELLOW      Appearance CLEAR      Specific gravity 1.009 1.005 - 1.030      pH (UA) 8.0 5.0 - 8.0      Protein NEGATIVE  NEG mg/dL    Glucose NEGATIVE  NEG mg/dL    Ketone NEGATIVE  NEG mg/dL    Bilirubin NEGATIVE  NEG      Blood NEGATIVE  NEG      Urobilinogen 1.0 0.2 - 1.0 EU/dL    Nitrites NEGATIVE  NEG      Leukocyte Esterase TRACE (A) NEG     URINE MICROSCOPIC ONLY    Collection Time: 08/17/18 10:45 PM   Result Value Ref Range    WBC 0 to 3 0 - 4 /hpf    RBC NEGATIVE  0 - 5 /hpf    Epithelial cells FEW 0 - 5 /lpf    Bacteria FEW (A) NEG /hpf   EKG, 12 LEAD, SUBSEQUENT    Collection Time: 08/17/18 11:06 PM   Result Value Ref Range    Ventricular Rate 81 BPM    Atrial Rate 81 BPM    P-R Interval 170 ms    QRS Duration 70 ms    Q-T Interval 392 ms    QTC Calculation (Bezet) 455 ms    Calculated P Axis 45 degrees    Calculated R Axis 21 degrees    Calculated T Axis 27 degrees    Diagnosis Sinus rhythm with premature supraventricular complexes  T wave abnormality, consider anterior ischemia  Abnormal ECG  When compared with ECG of 17-AUG-2018 19:59,  premature supraventricular complexes are now present  Nonspecific T wave abnormality has replaced inverted T waves in Lateral leads         Radiologic Studies -   XR CHEST PORT    (Results Pending)       Diagnosis     Clinical Impression:   1. Hypertensive urgency    2. Acute chest pain            Follow-up Information     None           Patient's Medications   Start Taking    No medications on file   Continue Taking    AMLODIPINE (NORVASC) 10 MG TABLET    10 mg. ASPIRIN (ASPIRIN) 325 MG TABLET    Take 1 Tab by mouth daily. ATORVASTATIN (LIPITOR) 80 MG TABLET    Take 1 Tab by mouth nightly. BETAMETHASONE DIPROPIONATE (DIPROSONE) 0.05 % TOPICAL CREAM    Apply  to affected area daily as needed for Skin Irritation. CHOLECALCIFEROL (VITAMIN D3) 1,000 UNIT CAP    Take 1,000 Units by mouth daily. CONJUGATED ESTROGENS (PREMARIN) 0.625 MG/GRAM VAGINAL CREAM    Apply small amount to introitus MWF as directed. CYANOCOBALAMIN (VITAMIN B-12) 1,000 MCG TABLET    Take 1,000 mcg by mouth daily. DOCUSATE SODIUM (COLACE) 100 MG CAPSULE    Take 100 mg by mouth daily. FENTANYL (DURAGESIC) 25 MCG/HR PATCH    1 Patch by TransDERmal route every seventy-two (72) hours. FUROSEMIDE (LASIX) 40 MG TABLET    Take 40 mg by mouth as needed. GABAPENTIN (NEURONTIN) 300 MG CAPSULE    Take 300 mg by mouth three (3) times daily. GLIMEPIRIDE (AMARYL) 1 MG TABLET    Take 1 mg by mouth two (2) times a day. HYDROCODONE-ACETAMINOPHEN (NORCO)  MG TABLET    Take 1 Tab by mouth every six (6) hours as needed for Pain. IBUPROFEN (MOTRIN) 200 MG TABLET    Take 400 mg by mouth every eight (8) hours as needed for Pain. LEVOTHYROXINE (SYNTHROID) 50 MCG TABLET    Take 50 mcg by mouth Daily (before breakfast).     LOSARTAN (COZAAR) 100 MG TABLET    Take 100 mg by mouth daily. METOPROLOL SUCCINATE (TOPROL-XL) 100 MG TABLET    Take 100 mg by mouth daily. MIRTAZAPINE (REMERON) 15 MG TABLET    Take 15 mg by mouth nightly. OMEPRAZOLE (PRILOSEC) 40 MG CAPSULE    Take 40 mg by mouth daily. OXYCODONE-ACETAMINOPHEN (PERCOCET 10)  MG PER TABLET    Take 1 Tab by mouth every six (6) hours as needed for Pain. POLYETHYLENE GLYCOL (MIRALAX) 17 GRAM PACKET    Take 17 g by mouth every fourty-eight (48) hours. POTASSIUM CHLORIDE SR (KLOR-CON 10) 10 MEQ TABLET    Take 10 mEq by mouth as needed. SUCRALFATE (CARAFATE) 1 GRAM TABLET    Take 1 g by mouth three (3) times daily. TIZANIDINE (ZANAFLEX) 2 MG TABLET    Take 2 mg by mouth as needed. VENLAFAXINE-SR (EFFEXOR XR) 150 MG CAPSULE    Take 150 mg by mouth daily. ZIPRASIDONE (GEODON) 20 MG CAPSULE    Take 20 mg by mouth daily. These Medications have changed    No medications on file   Stop Taking    No medications on file     _______________________________    Attestations:  [employer] [address] [place] [address] acting as a scribe for and in the presence of Vanessa Sandoval MD      August 17, 2018 at 11:21 PM       Provider Attestation:      I personally performed the services described in the documentation, reviewed the documentation, as recorded by the scribe in my presence, and it accurately and completely records my words and actions.  August 17, 2018 at 11:21 PM - Vanessa Sandoval MD    _______________________________

## 2018-08-18 LAB
ANION GAP SERPL CALC-SCNC: 10 MMOL/L (ref 3–18)
BASOPHILS # BLD: 0 K/UL (ref 0–0.1)
BASOPHILS NFR BLD: 0 % (ref 0–2)
BUN SERPL-MCNC: 7 MG/DL (ref 7–18)
BUN/CREAT SERPL: 11 (ref 12–20)
CALCIUM SERPL-MCNC: 9.3 MG/DL (ref 8.5–10.1)
CHLORIDE SERPL-SCNC: 107 MMOL/L (ref 100–108)
CK MB CFR SERPL CALC: NORMAL % (ref 0–4)
CK MB SERPL-MCNC: <1 NG/ML (ref 5–25)
CK SERPL-CCNC: 35 U/L (ref 26–192)
CK SERPL-CCNC: 39 U/L (ref 26–192)
CK SERPL-CCNC: 45 U/L (ref 26–192)
CO2 SERPL-SCNC: 26 MMOL/L (ref 21–32)
CREAT SERPL-MCNC: 0.65 MG/DL (ref 0.6–1.3)
DIFFERENTIAL METHOD BLD: ABNORMAL
EOSINOPHIL # BLD: 0.1 K/UL (ref 0–0.4)
EOSINOPHIL NFR BLD: 1 % (ref 0–5)
ERYTHROCYTE [DISTWIDTH] IN BLOOD BY AUTOMATED COUNT: 12.8 % (ref 11.6–14.5)
GLUCOSE BLD STRIP.AUTO-MCNC: 146 MG/DL (ref 70–110)
GLUCOSE BLD STRIP.AUTO-MCNC: 168 MG/DL (ref 70–110)
GLUCOSE BLD STRIP.AUTO-MCNC: 214 MG/DL (ref 70–110)
GLUCOSE BLD STRIP.AUTO-MCNC: 216 MG/DL (ref 70–110)
GLUCOSE SERPL-MCNC: 125 MG/DL (ref 74–99)
HCT VFR BLD AUTO: 35.3 % (ref 35–45)
HGB BLD-MCNC: 11.4 G/DL (ref 12–16)
LYMPHOCYTES # BLD: 3.3 K/UL (ref 0.9–3.6)
LYMPHOCYTES NFR BLD: 30 % (ref 21–52)
MCH RBC QN AUTO: 29.2 PG (ref 24–34)
MCHC RBC AUTO-ENTMCNC: 32.3 G/DL (ref 31–37)
MCV RBC AUTO: 90.5 FL (ref 74–97)
MONOCYTES # BLD: 0.9 K/UL (ref 0.05–1.2)
MONOCYTES NFR BLD: 8 % (ref 3–10)
NEUTS SEG # BLD: 6.8 K/UL (ref 1.8–8)
NEUTS SEG NFR BLD: 61 % (ref 40–73)
PLATELET # BLD AUTO: 253 K/UL (ref 135–420)
PMV BLD AUTO: 12.6 FL (ref 9.2–11.8)
POTASSIUM SERPL-SCNC: 3.7 MMOL/L (ref 3.5–5.5)
RBC # BLD AUTO: 3.9 M/UL (ref 4.2–5.3)
SODIUM SERPL-SCNC: 143 MMOL/L (ref 136–145)
TROPONIN I SERPL-MCNC: <0.02 NG/ML (ref 0–0.04)
WBC # BLD AUTO: 11.2 K/UL (ref 4.6–13.2)

## 2018-08-18 PROCEDURE — 85025 COMPLETE CBC W/AUTO DIFF WBC: CPT | Performed by: HOSPITALIST

## 2018-08-18 PROCEDURE — 74011250637 HC RX REV CODE- 250/637: Performed by: HOSPITALIST

## 2018-08-18 PROCEDURE — 74011250636 HC RX REV CODE- 250/636: Performed by: HOSPITALIST

## 2018-08-18 PROCEDURE — 74011636637 HC RX REV CODE- 636/637: Performed by: HOSPITALIST

## 2018-08-18 PROCEDURE — 80048 BASIC METABOLIC PNL TOTAL CA: CPT | Performed by: HOSPITALIST

## 2018-08-18 PROCEDURE — 82962 GLUCOSE BLOOD TEST: CPT

## 2018-08-18 PROCEDURE — 51798 US URINE CAPACITY MEASURE: CPT

## 2018-08-18 PROCEDURE — 82550 ASSAY OF CK (CPK): CPT | Performed by: HOSPITALIST

## 2018-08-18 PROCEDURE — 36415 COLL VENOUS BLD VENIPUNCTURE: CPT | Performed by: HOSPITALIST

## 2018-08-18 PROCEDURE — 65660000001 HC RM ICU INTERMED STEPDOWN

## 2018-08-18 RX ORDER — PANTOPRAZOLE SODIUM 40 MG/1
40 TABLET, DELAYED RELEASE ORAL
Status: DISCONTINUED | OUTPATIENT
Start: 2018-08-18 | End: 2018-08-21 | Stop reason: HOSPADM

## 2018-08-18 RX ORDER — ACETAMINOPHEN 325 MG/1
650 TABLET ORAL
Status: DISCONTINUED | OUTPATIENT
Start: 2018-08-18 | End: 2018-08-21 | Stop reason: HOSPADM

## 2018-08-18 RX ORDER — VENLAFAXINE HYDROCHLORIDE 150 MG/1
150 CAPSULE, EXTENDED RELEASE ORAL DAILY
Status: DISCONTINUED | OUTPATIENT
Start: 2018-08-18 | End: 2018-08-21 | Stop reason: HOSPADM

## 2018-08-18 RX ORDER — INSULIN LISPRO 100 [IU]/ML
INJECTION, SOLUTION INTRAVENOUS; SUBCUTANEOUS
Status: DISCONTINUED | OUTPATIENT
Start: 2018-08-18 | End: 2018-08-21 | Stop reason: HOSPADM

## 2018-08-18 RX ORDER — LOSARTAN POTASSIUM 50 MG/1
100 TABLET ORAL DAILY
Status: DISCONTINUED | OUTPATIENT
Start: 2018-08-18 | End: 2018-08-21 | Stop reason: HOSPADM

## 2018-08-18 RX ORDER — MIRTAZAPINE 15 MG/1
15 TABLET, FILM COATED ORAL
Status: DISCONTINUED | OUTPATIENT
Start: 2018-08-18 | End: 2018-08-21 | Stop reason: HOSPADM

## 2018-08-18 RX ORDER — DOCUSATE SODIUM 100 MG/1
100 CAPSULE, LIQUID FILLED ORAL DAILY
Status: DISCONTINUED | OUTPATIENT
Start: 2018-08-18 | End: 2018-08-21 | Stop reason: HOSPADM

## 2018-08-18 RX ORDER — POTASSIUM CHLORIDE 750 MG/1
10 TABLET, FILM COATED, EXTENDED RELEASE ORAL DAILY
Status: DISCONTINUED | OUTPATIENT
Start: 2018-08-18 | End: 2018-08-21 | Stop reason: HOSPADM

## 2018-08-18 RX ORDER — ENOXAPARIN SODIUM 100 MG/ML
40 INJECTION SUBCUTANEOUS EVERY 24 HOURS
Status: DISCONTINUED | OUTPATIENT
Start: 2018-08-18 | End: 2018-08-21 | Stop reason: HOSPADM

## 2018-08-18 RX ORDER — GABAPENTIN 300 MG/1
300 CAPSULE ORAL 3 TIMES DAILY
Status: DISCONTINUED | OUTPATIENT
Start: 2018-08-18 | End: 2018-08-21 | Stop reason: HOSPADM

## 2018-08-18 RX ORDER — AMLODIPINE BESYLATE 10 MG/1
10 TABLET ORAL DAILY
Status: DISCONTINUED | OUTPATIENT
Start: 2018-08-18 | End: 2018-08-21 | Stop reason: HOSPADM

## 2018-08-18 RX ORDER — ASPIRIN 325 MG
325 TABLET ORAL DAILY
Status: DISCONTINUED | OUTPATIENT
Start: 2018-08-18 | End: 2018-08-21 | Stop reason: HOSPADM

## 2018-08-18 RX ORDER — METOPROLOL SUCCINATE 100 MG/1
100 TABLET, EXTENDED RELEASE ORAL DAILY
Status: DISCONTINUED | OUTPATIENT
Start: 2018-08-18 | End: 2018-08-21 | Stop reason: HOSPADM

## 2018-08-18 RX ORDER — LEVOTHYROXINE SODIUM 50 UG/1
50 TABLET ORAL
Status: DISCONTINUED | OUTPATIENT
Start: 2018-08-18 | End: 2018-08-21 | Stop reason: HOSPADM

## 2018-08-18 RX ORDER — ATORVASTATIN CALCIUM 40 MG/1
80 TABLET, FILM COATED ORAL
Status: DISCONTINUED | OUTPATIENT
Start: 2018-08-18 | End: 2018-08-21 | Stop reason: HOSPADM

## 2018-08-18 RX ORDER — ZIPRASIDONE HYDROCHLORIDE 20 MG/1
20 CAPSULE ORAL DAILY
Status: DISCONTINUED | OUTPATIENT
Start: 2018-08-18 | End: 2018-08-21 | Stop reason: HOSPADM

## 2018-08-18 RX ORDER — FUROSEMIDE 40 MG/1
40 TABLET ORAL DAILY
Status: DISCONTINUED | OUTPATIENT
Start: 2018-08-18 | End: 2018-08-21 | Stop reason: HOSPADM

## 2018-08-18 RX ORDER — ACETAMINOPHEN 325 MG/1
325 TABLET ORAL
Status: DISCONTINUED | OUTPATIENT
Start: 2018-08-18 | End: 2018-08-18

## 2018-08-18 RX ORDER — FENTANYL 25 UG/1
1 PATCH TRANSDERMAL
Status: DISCONTINUED | OUTPATIENT
Start: 2018-08-20 | End: 2018-08-21 | Stop reason: HOSPADM

## 2018-08-18 RX ADMIN — LOSARTAN POTASSIUM 100 MG: 50 TABLET ORAL at 09:41

## 2018-08-18 RX ADMIN — PANTOPRAZOLE SODIUM 40 MG: 40 TABLET, DELAYED RELEASE ORAL at 07:09

## 2018-08-18 RX ADMIN — INSULIN LISPRO 4 UNITS: 100 INJECTION, SOLUTION INTRAVENOUS; SUBCUTANEOUS at 22:26

## 2018-08-18 RX ADMIN — LEVOTHYROXINE SODIUM 50 MCG: 50 TABLET ORAL at 07:09

## 2018-08-18 RX ADMIN — DOCUSATE SODIUM 100 MG: 100 CAPSULE, LIQUID FILLED ORAL at 09:41

## 2018-08-18 RX ADMIN — MIRTAZAPINE 15 MG: 15 TABLET, FILM COATED ORAL at 01:08

## 2018-08-18 RX ADMIN — GABAPENTIN 300 MG: 300 CAPSULE ORAL at 09:41

## 2018-08-18 RX ADMIN — GABAPENTIN 300 MG: 300 CAPSULE ORAL at 18:13

## 2018-08-18 RX ADMIN — ENOXAPARIN SODIUM 40 MG: 100 INJECTION SUBCUTANEOUS at 01:40

## 2018-08-18 RX ADMIN — FUROSEMIDE 40 MG: 40 TABLET ORAL at 09:41

## 2018-08-18 RX ADMIN — MIRTAZAPINE 15 MG: 15 TABLET, FILM COATED ORAL at 22:26

## 2018-08-18 RX ADMIN — ATORVASTATIN CALCIUM 80 MG: 40 TABLET, FILM COATED ORAL at 01:08

## 2018-08-18 RX ADMIN — POTASSIUM CHLORIDE 10 MEQ: 750 TABLET, EXTENDED RELEASE ORAL at 09:41

## 2018-08-18 RX ADMIN — INSULIN LISPRO 4 UNITS: 100 INJECTION, SOLUTION INTRAVENOUS; SUBCUTANEOUS at 18:13

## 2018-08-18 RX ADMIN — METOPROLOL SUCCINATE 100 MG: 100 TABLET, EXTENDED RELEASE ORAL at 09:41

## 2018-08-18 RX ADMIN — GABAPENTIN 300 MG: 300 CAPSULE ORAL at 22:26

## 2018-08-18 RX ADMIN — AMLODIPINE BESYLATE 10 MG: 10 TABLET ORAL at 09:41

## 2018-08-18 RX ADMIN — ASPIRIN 325 MG ORAL TABLET 325 MG: 325 PILL ORAL at 09:39

## 2018-08-18 RX ADMIN — NITROGLYCERIN 50 MCG/MIN: 40 INJECTION INTRAVENOUS at 18:16

## 2018-08-18 RX ADMIN — ATORVASTATIN CALCIUM 80 MG: 40 TABLET, FILM COATED ORAL at 22:26

## 2018-08-18 RX ADMIN — NITROGLYCERIN 55 MCG/MIN: 40 INJECTION INTRAVENOUS at 19:07

## 2018-08-18 RX ADMIN — ACETAMINOPHEN 650 MG: 325 TABLET, FILM COATED ORAL at 07:14

## 2018-08-18 RX ADMIN — ACETAMINOPHEN 650 MG: 325 TABLET, FILM COATED ORAL at 20:20

## 2018-08-18 RX ADMIN — VENLAFAXINE HYDROCHLORIDE 150 MG: 150 CAPSULE, EXTENDED RELEASE ORAL at 09:41

## 2018-08-18 NOTE — PROGRESS NOTES
Early AM admit for hypertensive emergency. Home BP meds restarted. Titrate off cardene gtt.  PT eval and treat    Chest pain 2/2 hypertensive emergency    Zeynep Gar DO  Internal Medicine, Hospitalist  Pager: [phone] [address] [facility]

## 2018-08-18 NOTE — H&P
History and Physical    Patient: Maureen Morelos               Sex: female          DOA: 8/17/2018       YOB: 1945      Age:  74 y.o.        LOS:  LOS: 1 day        HPI:     Maureen Morelos is a 74 y.o. female who presented to the ER with SOB and weakness. This has been going on for about 3 days at home. Over the last day it also became associated with chest pain. The pain was in the center of her chest and radiated toward her back. In the ER she was found to have hypertensive emergency with systolic BP over 118. She was begun on nitroglycerin for her chest pain and her nitroglycerin was escalated in the ER to help improve her BP control. She will be admitted for ongoing management. Past Medical History:   Diagnosis Date    Anxiety     CAD (coronary artery disease)     Chronic pain     Depression     Diabetes ([address]     Diverticulosis     Dysphagia     Dysuria     Early satiety     Esophageal reflux     Fatty liver     Gastric ulcer     Gastroparesis     H/O joint replacement 1991    left knee    Heart disease     Hypercholesteremia     Hypertension     Iron deficiency anemia     Neurogenic bladder     Recurrent UTI     Right flank pain     Scoliosis     Spinal stenosis     Stenosis of lumbosacral spine     Stroke ([facility] 04/2018    Tardive dyskinesia     Urgency of urination        Social History:   Tobacco use:  Patient denies tobacco use   Alcohol use:  Patient denies alcohol use   Patient lives alone    Family History: Mother had CAD   Father had lung cancer and Alzheimer's type dementia    Review of Systems    Constitutional:  No fever or weight loss  HEENT:  No headache or visual changes  Cardiovascular:  Chest pain as above with diaphoresis  Respiratory:  No coughing, wheezing, or shortness of breath. GI:  No nausea or vomitting.   No diarrhea  :  No hematuria or dysuria  Skin:  No rashes or moles  Neuro:  No seizures or syncope  Hematological:  No bruising or bleeding  Endocrine:  No diabetes or thyroid disease    Physical Exam:      Visit Vitals    /57    Pulse 65    Temp 98.2 °F (36.8 °C)    Resp 14    Ht 5' 5\" (1.651 m)    Wt 88.5 kg (195 lb)    SpO2 97%    BMI 32.45 kg/m2       Physical Exam:    Gen:  No distress, alert  HEENT:  Normal cephalic atraumatic, extra-occular movements are intact. Neck:  Supple, No JVD  Lungs:  Clear bilaterally, no wheeze, no rales, normal effort  Heart:  Regular Rate and Rhythm, normal S1 and S2, no edema  Abdomen:  Soft, non tender, normal bowel sounds, no guarding.   Extremities:  Well perfused, no cyanosis or edema  Neurological:  Awake and alert, CN's are intact, normal strength throughout extremities  Skin:  No rashes or moles  Mental Status:  Normal thought process, does not appear anxious    Laboratory Studies:    BMP:   Lab Results   Component Value Date/Time     08/18/2018 04:00 AM    K 3.7 08/18/2018 04:00 AM     08/18/2018 04:00 AM    CO2 26 08/18/2018 04:00 AM    AGAP 10 08/18/2018 04:00 AM     (H) 08/18/2018 04:00 AM    BUN 7 08/18/2018 04:00 AM    CREA 0.65 08/18/2018 04:00 AM    GFRAA >60 08/18/2018 04:00 AM    GFRNA >60 08/18/2018 04:00 AM     CMP:   Lab Results   Component Value Date/Time     08/18/2018 04:00 AM    K 3.7 08/18/2018 04:00 AM     08/18/2018 04:00 AM    CO2 26 08/18/2018 04:00 AM    AGAP 10 08/18/2018 04:00 AM     (H) 08/18/2018 04:00 AM    BUN 7 08/18/2018 04:00 AM    CREA 0.65 08/18/2018 04:00 AM    GFRAA >60 08/18/2018 04:00 AM    GFRNA >60 08/18/2018 04:00 AM    CA 9.3 08/18/2018 04:00 AM    MG 1.6 08/17/2018 08:05 PM    ALB 3.4 08/17/2018 08:05 PM    TP 6.9 08/17/2018 08:05 PM    GLOB 3.5 08/17/2018 08:05 PM    AGRAT 1.0 08/17/2018 08:05 PM    SGOT 25 08/17/2018 08:05 PM    ALT 22 08/17/2018 08:05 PM     All Cardiac Markers in the last 24 hours:   Lab Results   Component Value Date/Time    CPK 45 08/18/2018 04:00 AM    CKMB <1.0 08/18/2018 04:00 AM CKND1  08/18/2018 04:00 AM     CALCULATION NOT PERFORMED WHEN RESULT IS BELOW LINEAR LIMIT    Gustavo Lusty <0.02 08/18/2018 04:00 AM    TROIQ <0.02 08/17/2018 11:10 PM    TROIQ <0.02 08/17/2018 08:05 PM       Assessment/Plan     Principal Problem:    Hypertensive emergency (8/17/2018)    Active Problems:    Chest pain (8/17/2018)      HTN (hypertension) (4/8/2013)      Diabetes ([address] (8/17/2018)      Anxiety ()      History of stroke (3/21/2018)      Tardive dyskinesia ()      Neurogenic bladder (2/21/2018)        PLAN:    Continue nitroglycerin, would de-escalate and switch to primary medication for BP control  Follow serial cardiac enzymes  Cardiology consult  BP control  BS control  Follow renal function  DVT prophylaxis.

## 2018-08-18 NOTE — PROGRESS NOTES
Reason for Admission:   Chest pain               RRAT Score:     30             Resources/supports as identified by patient/family:                   Top Challenges facing patient (as identified by patient/family and CM): Finances/Medication cost?      No mcr/sary              Transportation? Stretcher transport              Support system or lack thereof?  dtr                     Living arrangements?     lalone           Self-care/ADLs/Cognition? Current Advanced Directive/Advance Care Plan:                            Plan for utilizing home health:    no                      Likelihood of readmission: mod                 Transition of Care Plan:  Unable to speak with pt, left message for dtr gricelda robertson to call me. pcp dr Kamari Esparza. Has both mcr/sary. Reviewed prev admit back in march. Pt lived alone at same address on face sheet. she went to snf at [place] at that time. Will likely need again. Will speak with dtr when calls back. Do not see any poa, adv directive on file. Patient has designated ____________unable____________ to participate in his/her discharge plan and to receive any needed information.      Name: Monique Cara on face sheet, dtr  Address:  Phone number:[phone]

## 2018-08-18 NOTE — PROGRESS NOTES
Problem: Falls - Risk of  Goal: *Absence of Falls  Document Liz Fall Risk and appropriate interventions in the flowsheet. Outcome: Progressing Towards Goal  Fall Risk Interventions:            Medication Interventions: Bed/chair exit alarm    Elimination Interventions: Bed/chair exit alarm             Problem: Pressure Injury - Risk of  Goal: *Prevention of pressure injury  Document Andrew Scale and appropriate interventions in the flowsheet. Outcome: Progressing Towards Goal  Pressure Injury Interventions:             Activity Interventions: Pressure redistribution bed/mattress(bed type)    Mobility Interventions: HOB 30 degrees or less    Nutrition Interventions: Document food/fluid/supplement intake

## 2018-08-18 NOTE — PROGRESS NOTES
0700: Received report from [employer]. Assumed care of patient in bed in low locked position with phone and call bell in reach    1900: Bedside and Verbal shift change report given to [employer] (oncoming nurse) by Eddi Aggarwal RN (offgoing nurse). Report included the following information SBAR and Med Rec Status.

## 2018-08-18 NOTE — PROGRESS NOTES
Assumed care from [facility]. Patient is awake and alert, c/o mild headache. HOB at 30.   2213> Stopped Nitro drip.   0000> No changes noted  0530> Patient unable to void despite trials. Baldder scan showed > 400 in bladder. Paged and talked to Dr. Joe John who ordered straight cath once.   0604> Straight cath performed. Patient tolerated well. Bedside and Verbal shift change report given to Sergio quiles RN (oncoming nurse) by Manohar Guevara RN (offgoing nurse). Report included the following information SBAR, Kardex, Intake/Output, MAR, Recent Results and Cardiac Rhythm NSR .

## 2018-08-18 NOTE — PROGRESS NOTES
[]Hide copied text  []Ma[employer]olver for attribution information  Physical Exam   Skin:         Dual skin checks completed with Mukesh Santacruz RN

## 2018-08-18 NOTE — PROGRESS NOTES
Assumed care from [facility], [address] [place] [address]. Transferred to patient to bed. Bathed, skin assessment completed with ELISA Demarco. No skin breakdown noted. Mepilex applied to sacrum for prevention. Patient is alert and oriented x 4, anxious and crying. Verbalized that she is sore all over. Lungs are clear, room air, Sats in the 90's. HOB at 27. Nitro drip infusing for BP control. Patient denies any chest pain at this time. Will continue to monitor. Titrating nitro drip for SBP goal of <160  0218> Stopped nitro drip at this time.  Will continue to monitor

## 2018-08-18 NOTE — ROUTINE PROCESS
In chart to assist primary RN. 1015: Pt has not voided since midnight. Bladder scan 549. Dr. Tamra Richmond notified. Orders received- straight cath now, if not voided in 8 hours, bladder scan- if >300- straight cath. If not voided in another 8 hours- bladder scan - if >300- straight cath. If not voided in another 8 hours and bladder scan > 300- place [place]. 1020: Straight cath- 700 ml. Pt stated that she has a history of catheterizin her self.

## 2018-08-18 NOTE — PROGRESS NOTES
Problem: Falls - Risk of  Goal: *Absence of Falls  Document Liz Fall Risk and appropriate interventions in the flowsheet. Outcome: Progressing Towards Goal  Fall Risk Interventions:            Medication Interventions: Bed/chair exit alarm, Assess postural VS orthostatic hypotension, Patient to call before getting OOB    Elimination Interventions: Bed/chair exit alarm, Call light in reach, Patient to call for help with toileting needs, Toileting schedule/hourly rounds             Problem: Pressure Injury - Risk of  Goal: *Prevention of pressure injury  Document Andrew Scale and appropriate interventions in the flowsheet. Outcome: Progressing Towards Goal  Pressure Injury Interventions: Activity Interventions: Pressure redistribution bed/mattress(bed type)    Mobility Interventions: HOB 30 degrees or less, Turn and reposition approx.  every two hours(pillow and wedges), Pressure redistribution bed/mattress (bed type)    Nutrition Interventions: Document food/fluid/supplement intake

## 2018-08-19 LAB
ATRIAL RATE: 81 BPM
ATRIAL RATE: 87 BPM
CALCULATED P AXIS, ECG09: 45 DEGREES
CALCULATED P AXIS, ECG09: 50 DEGREES
CALCULATED R AXIS, ECG10: 17 DEGREES
CALCULATED R AXIS, ECG10: 21 DEGREES
CALCULATED T AXIS, ECG11: -123 DEGREES
CALCULATED T AXIS, ECG11: 27 DEGREES
DIAGNOSIS, 93000: NORMAL
DIAGNOSIS, 93000: NORMAL
GLUCOSE BLD STRIP.AUTO-MCNC: 192 MG/DL (ref 70–110)
GLUCOSE BLD STRIP.AUTO-MCNC: 213 MG/DL (ref 70–110)
GLUCOSE BLD STRIP.AUTO-MCNC: 221 MG/DL (ref 70–110)
P-R INTERVAL, ECG05: 170 MS
P-R INTERVAL, ECG05: 178 MS
Q-T INTERVAL, ECG07: 378 MS
Q-T INTERVAL, ECG07: 392 MS
QRS DURATION, ECG06: 66 MS
QRS DURATION, ECG06: 70 MS
QTC CALCULATION (BEZET), ECG08: 454 MS
QTC CALCULATION (BEZET), ECG08: 455 MS
VENTRICULAR RATE, ECG03: 81 BPM
VENTRICULAR RATE, ECG03: 87 BPM

## 2018-08-19 PROCEDURE — 65660000000 HC RM CCU STEPDOWN

## 2018-08-19 PROCEDURE — 82962 GLUCOSE BLOOD TEST: CPT

## 2018-08-19 PROCEDURE — 97162 PT EVAL MOD COMPLEX 30 MIN: CPT

## 2018-08-19 PROCEDURE — 74011250637 HC RX REV CODE- 250/637: Performed by: HOSPITALIST

## 2018-08-19 PROCEDURE — 74011636637 HC RX REV CODE- 636/637: Performed by: HOSPITALIST

## 2018-08-19 PROCEDURE — 97530 THERAPEUTIC ACTIVITIES: CPT

## 2018-08-19 PROCEDURE — 51798 US URINE CAPACITY MEASURE: CPT

## 2018-08-19 PROCEDURE — 77030011943

## 2018-08-19 PROCEDURE — 74011250636 HC RX REV CODE- 250/636: Performed by: HOSPITALIST

## 2018-08-19 RX ADMIN — ATORVASTATIN CALCIUM 80 MG: 40 TABLET, FILM COATED ORAL at 21:35

## 2018-08-19 RX ADMIN — PANTOPRAZOLE SODIUM 40 MG: 40 TABLET, DELAYED RELEASE ORAL at 06:59

## 2018-08-19 RX ADMIN — LOSARTAN POTASSIUM 100 MG: 50 TABLET ORAL at 08:51

## 2018-08-19 RX ADMIN — AMLODIPINE BESYLATE 10 MG: 10 TABLET ORAL at 08:52

## 2018-08-19 RX ADMIN — INSULIN LISPRO 4 UNITS: 100 INJECTION, SOLUTION INTRAVENOUS; SUBCUTANEOUS at 17:17

## 2018-08-19 RX ADMIN — GABAPENTIN 300 MG: 300 CAPSULE ORAL at 08:52

## 2018-08-19 RX ADMIN — METOPROLOL SUCCINATE 100 MG: 100 TABLET, EXTENDED RELEASE ORAL at 08:52

## 2018-08-19 RX ADMIN — GABAPENTIN 300 MG: 300 CAPSULE ORAL at 17:16

## 2018-08-19 RX ADMIN — ENOXAPARIN SODIUM 40 MG: 100 INJECTION SUBCUTANEOUS at 02:02

## 2018-08-19 RX ADMIN — ASPIRIN 325 MG ORAL TABLET 325 MG: 325 PILL ORAL at 08:52

## 2018-08-19 RX ADMIN — DOCUSATE SODIUM 100 MG: 100 CAPSULE, LIQUID FILLED ORAL at 08:52

## 2018-08-19 RX ADMIN — VENLAFAXINE HYDROCHLORIDE 150 MG: 150 CAPSULE, EXTENDED RELEASE ORAL at 08:52

## 2018-08-19 RX ADMIN — MIRTAZAPINE 15 MG: 15 TABLET, FILM COATED ORAL at 21:35

## 2018-08-19 RX ADMIN — ACETAMINOPHEN 650 MG: 325 TABLET, FILM COATED ORAL at 21:36

## 2018-08-19 RX ADMIN — POTASSIUM CHLORIDE 10 MEQ: 750 TABLET, EXTENDED RELEASE ORAL at 08:51

## 2018-08-19 RX ADMIN — ZIPRASIDONE HYDROCHLORIDE 20 MG: 20 CAPSULE ORAL at 08:52

## 2018-08-19 RX ADMIN — INSULIN LISPRO 2 UNITS: 100 INJECTION, SOLUTION INTRAVENOUS; SUBCUTANEOUS at 21:35

## 2018-08-19 RX ADMIN — GABAPENTIN 300 MG: 300 CAPSULE ORAL at 21:35

## 2018-08-19 RX ADMIN — ACETAMINOPHEN 650 MG: 325 TABLET, FILM COATED ORAL at 06:59

## 2018-08-19 RX ADMIN — LEVOTHYROXINE SODIUM 50 MCG: 50 TABLET ORAL at 05:41

## 2018-08-19 RX ADMIN — FUROSEMIDE 40 MG: 40 TABLET ORAL at 08:52

## 2018-08-19 RX ADMIN — INSULIN LISPRO 4 UNITS: 100 INJECTION, SOLUTION INTRAVENOUS; SUBCUTANEOUS at 12:40

## 2018-08-19 NOTE — PROGRESS NOTES
attempted to conduct an initial consultation and spiritual assessment for Rachael Lundborg, who is a 74 y. o.female. However, patient was unavailable, busy with nursing staff, with curtain closed. Patients primary language is: [place]. According to the patients EMR, her [employer] affiliation is: [place]. The  provided the following interventions:  Offered silent prayer on patient's behalf. Reviewed chart. Plan:  Chaplains will continue our attempts to connect with patient and then provide pastoral care on an as-needed/requested basis.     [facility]  Board Certified [facility],[address] [place] [address]    [phone]

## 2018-08-19 NOTE — PROGRESS NOTES
0700: Received report from [employer]. Assumed care of patient in bed in low locked position with call bell and phone within reach. 1900: Bedside and Verbal shift change report given to [address] [place] [address] (oncoming nurse) by Elizabeth Maria RN (offgoing nurse). Report included the following information SBAR, Med Rec Status, Cardiac Rhythm sinus  and Alarm Parameters .

## 2018-08-19 NOTE — PROGRESS NOTES
Problem: Mobility Impaired (Adult and Pediatric)  Goal: *Acute Goals and Plan of Care (Insert Text)  Physical Therapy Goals  Initiated 8/19/2018 and to be accomplished within 7 day(s)  1. Patient will move from supine to sit and sit to supine , scoot up and down and roll side to side in bed with modified independence. 2.  Patient will transfer from bed to chair and chair to bed with modified independence using the least restrictive device. 3.  Patient will perform sit to stand with modified independence. 4.  Patient will ambulate with modified independence for >/= 100 feet with the least restrictive device. physical Therapy EVALUATION    Patient: Kathlynn Phalen (11 y.o. female)  Date: 8/19/2018  Primary Diagnosis: Hypertensive emergency  Chest pain  Diabetes [facility])        Precautions:      PLOF: Lives alone, ambulatory with rollator, has a caregiver that comes for 10 hours a day to help with ADLs - cooking, cleaning house, etc.    ASSESSMENT :  Patient requires between minimal assistance/contact guard assist for bed mobility, transfers and ambulation. Didn't tolerate gait well c/o increased pain Left hip with radiation to Left LE. Per patient, normally take percoset at home for pain but they are just giving her tylenol. Nurse Fernando Chandler made aware. Will benefit from skilled PT intervention post-op to increase overall functional mobility independence and safety. Patient presents with deficits in:   Bed Mobility, Transfers and Gait    Patient will benefit from skilled intervention to address the above impairments.   Patients rehabilitation potential is considered to be Good  Factors which may influence rehabilitation potential include:   []         None noted  []         Mental ability/status  [x]         Medical condition  []         Home/family situation and support systems  []         Safety awareness  [x]         Pain tolerance/management  []         Other:     Recommendations for nursing:   Written on communication board: OOB with assist of 1  Verbally communicated to: nurse Gena Betancourt     PLAN :  Recommendations and Planned Interventions:*  [x]           Bed Mobility Training             []    Neuromuscular Re-Education  [x]           Transfer Training                   []    Orthotic/Prosthetic Training  [x]           Gait Training                          []    Modalities  []           Therapeutic Exercises          []    Edema Management/Control  []           Therapeutic Activities            [x]    Patient and Family Training/Education*  [x]           Other (comment): Plan of care, bed mobility, transfers and gait with rolling walker    Frequency/Duration: Patient will be followed by physical therapy 1 time per day/4-7 days per week to address goals. Discharge Recommendations: Home Health  Further Equipment Recommendations for Discharge: N/A     SUBJECTIVE:   Patient stated I normally take percoset at home for pain but they are just giving me tylenol.   - nurse Gena Betancourt made aware    OBJECTIVE DATA SUMMARY:     Past Medical History:   Diagnosis Date    Anxiety     CAD (coronary artery disease)     Chronic pain     Depression     Diabetes ([address]     Diverticulosis     Dysphagia     Dysuria     Early satiety     Esophageal reflux     Fatty liver     Gastric ulcer     Gastroparesis     H/O joint replacement 1991    left knee    Heart disease     Hypercholesteremia     Hypertension     Iron deficiency anemia     Neurogenic bladder     Recurrent UTI     Right flank pain     Scoliosis     Spinal stenosis     Stenosis of lumbosacral spine     Stroke ([address] 04/2018    Tardive dyskinesia     Urgency of urination      Past Surgical History:   Procedure Laterality Date    EGD  7/10/2018         HX APPENDECTOMY      HX BREAST LUMPECTOMY      HX CHOLECYSTECTOMY  1980    HX COLECTOMY  2012    HX COLONOSCOPY  10/29/2009    hyperplastic polyp, hemorrhoids    HX HERNIA REPAIR      HX HYSTERECTOMY  1970    HX KNEE ARTHROSCOPY      HX KNEE REPLACEMENT  1991    left knee    HX LUMBAR FUSION      x 3     Barriers to Learning/Limitations: yes;  none  Compensate with: visual, verbal, tactile, kinesthetic cues/model    G CODE:Mobility  Current  CK= 40-59%   Goal  CI= 1-19%. The severity rating is based on the Other [employer] Balance Scale    Eval Complexity: History: MEDIUM  Complexity : 1-2 comorbidities / personal factors will impact the outcome/ POC Exam:MEDIUM Complexity : 3 Standardized tests and measures addressing body structure, function, activity limitation and / or participation in recreation  Presentation: MEDIUM Complexity : Evolving with changing characteristics  Clinical Decision Making:Medium Complexity [facility] Standing Balance Scale Overall Complexity:MEDIUM    [address] Sitting Balance Scale  0: Pt performs 25% or less of sitting activity (Max assist) CN, 100% impaired. 1: Pt supports self with upper extremities but requires therapist assistance. Pt performs 25-50% of effort (Mod assist) CM, 80% to <100% impaired. 1+: Pt supports self with upper extremities but requires therapist assistance. Pt performs >50% effort. (Min assist). CL, 60% to <80% impaired. 2: Pt supports self independently with both upper extremities. CL, 60% to <80% impaired. 2+: Pt support self independently with 1 upper extremity. CK, 40% to <60% impaired. 3: Pt sits without upper extremity support for up to 30 seconds. CK, 40% to <60% impaired. 3+: Pt sits without upper extremity support for 30 seconds or greater. CJ, 20% to <40% impaired. 4: Pt moves and returns trunkal midpoint 1-2 inches in one plane. CJ, 20% to <40% impaired. 4+: Pt moves and returns trunkal midpoint 1-2 inches in multiple planes. CI, 1% to <20% impaired. 5: Pt moves and returns trunkal midpoint in all planes greater than 2 inches. CH, 0% impaired.     https://www.moses.org/ [employer] Scale  0: Pt performs 25% or less of standing activity (Max assist) CN, 100% impaired. 1: Pt supports self with upper extremities but requires therapist assistance. Pt performs 25-50% of effort (Mod assist) CM, 80% to <100% impaired. 1+: Pt supports self with upper extremities but requires therapist assistance. Pt performs >50% effort. (Min assist). CL, 60% to <80% impaired. 2: Pt supports self independently with both upper extremities (walker, crutches, parallel bars). CL, 60% to <80% impaired. 2+: Pt support self independently with 1 upper extremity (cane, crutch, 1 parallel bar). CK, 40% to <60% impaired. 3: Pt stands without upper extremity support for up to 30 seconds. CK, 40% to <60% impaired. 3+: Pt stands without upper extremity support for 30 seconds or greater. CJ, 20% to <40% impaired. 4: Pt independently moves and returns center of gravity 1-2 inches in one plane. CJ, 20% to <40% impaired. 4+: Pt independently moves and returns center of gravity 1-2 inches in multiple planes. CI, 1% to <20% impaired. 5: Pt independently moves and returns center of gravity in all planes greater than 2 inches. CH, 0% impaired. Prior Level of Function/Home Situation: Lives alone, ambulatory with rollator, has a caregiver that comes for 10 hours a day to help with ADLs - cooking, cleaning house, etc.  Home Situation  Home Environment: Private residence  # Steps to Enter: 3  One/Two Story Residence: One story  Living Alone: Yes  Support Systems: Child(juma)  Patient Expects to be Discharged to[de-identified] Private residence  Current DME Used/Available at Home: delaney Barber  Critical Behavior:  Neurologic State: Alert  Orientation Level: Oriented X4  Cognition: Appropriate decision making; Appropriate for age attention/concentration; Appropriate safety awareness; Follows commands  Safety/Judgement: Awareness of environment; Fall prevention  Psychosocial  Patient Behaviors: Calm; Cooperative  Purposeful Interaction: Yes  Pt Identified Daily Priority: Clinical issues (comment)  Treasure Process: Nurture loving kindness;Establish trust;Teaching/learning; Attend basic human needs; Supportive expression  Caring Interventions: Reassure; Therapeutic modalities  Reassure: Therapeutic listening; Informing; Acceptance;Caring rounds  Therapeutic Modalities: Intentional therapeutic touch  Skin Condition/Temp: Dry;Warm  Skin Integrity: Intact;Scars (comment)  Skin Integumentary  Skin Color: Appropriate for ethnicity  Skin Condition/Temp: Dry;Warm  Skin Integrity: Intact;Scars (comment)  Turgor: Non-tenting  Hair Growth: Present  Varicosities: Absent     Strength:    Strength: Generally decreased, functional (both LE)      Tone & Sensation:   Tone: Normal (both LE)       Range Of Motion:  AROM: Generally decreased, functional (both LE)      Functional Mobility:  Bed Mobility:  Supine to Sit: Contact guard assistance  Transfers:  Sit to Stand: Minimum assistance  Stand to Sit: Contact guard assistance  Stand Pivot Transfers: Contact guard assistance (with rolling walker)     Bed to Chair: Contact guard assistance  Balance:   Sitting - Static: Good (unsupported)  Sitting - Dynamic: Fair (occasional) (Plus)  Standing: With support  Standing - Static: Fair (Minus)  Standing - Dynamic : Fair (Minus)  Ambulation/Gait Training:  Distance (ft): 15 Feet (ft)  Assistive Device: Walker, rolling  Ambulation - Level of Assistance: Contact guard assistance  Gait Abnormalities: Decreased step clearance; Other (trunk flexed forward)  Base of Support: Center of gravity altered  Speed/Amy: Pace decreased (<100 feet/min)  Step Length: Left shortened;Right shortened     Pain:  Pre treatment pain level: 4, Left hip radiating to Left LE  Post treatment pain level: 4, Left hip radiating to Left LE  Pain Scale 1: Numeric (0 - 10)  Pain Intensity 1: 0      Activity Tolerance:   Fair Minus    Please refer to the flowsheet for vital signs taken during this treatment.   After treatment: *  [x]         Patient left in no apparent distress sitting up in chair  []         Patient left in no apparent distress in bed  [x]         Call bell left within reach  [x]         Nursing notified  []         Caregiver present  []         Bed alarm activated    COMMUNICATION/EDUCATION:   [x]         Fall prevention education was provided and the patient/caregiver indicated understanding. [x]         Patient/family have participated as able in goal setting and plan of care. [x]         Patient/family agree to work toward stated goals and plan of care. []         Patient understands intent and goals of therapy, but is neutral about his/her participation. []         Patient is unable to participate in goal setting and plan of care.     Thank you for this referral.  Ashley Mcknight, PT   Time Calculation: 24 mins

## 2018-08-19 NOTE — PROGRESS NOTES
[facility]  Hospitalist Division           Inpatient Daily Progress Note        Patient: Dennis Dukes MRN: [id number]  CSN: [id number]    YOB: 1945  Age: 74 y.o. Sex: female    DOA: 8/17/2018 LOS:  LOS: 2 days                    Chief Complaint:  Hypertensive emergency     Interval History:    75 yo female, presented to ED w/ SOB, weakness for about 3 days at home; chest pain that occurred in center of chest and radiated toward her back was associated for 1 day pta. In ER, hypertensive; SBP > 200. Nitroglycerin for BP control and active CP. Admitted to ICU for ongoing management. Nitro currently titrated off. On amlodipine, losartan, metoprolol for BP control. EKG ok, troponin negative. CP currently resolved. 8/19/18: Transfer to floor, nitro off,  monitor BP, needs better glycemic control; a1c in am. Would recommend adding lantus. Subjective:      \"I'm ok\"  NAD. Objective:      Visit Vitals    /67    Pulse 62    Temp 99.1 °F (37.3 °C)    Resp 14    Ht 5' 5\" (1.651 m)    Wt 88.5 kg (195 lb)    SpO2 96%    BMI 32.45 kg/m2           Physical Exam:  General appearance: alert, cooperative  Lungs: clear to auscultation bilaterally  Heart: regular rate and rhythm, S1, S2 normal  Abdomen: soft, non tender, non distended. Normoactive bowel sounds. Extremities: bilateral lower ext edema   Skin: Skin color, texture, turgor normal.   Neurologic: Grossly normal  PSY: Mood and affect normal, appropriately behaved      Intake and Output:  Current Shift:     Last three shifts:  08/17 1901 - 08/19 0700  In: 275 [P.O.:120;  I.V.:155]  Out: 1950 [Urine:1950]    Recent Results (from the past 24 hour(s))   GLUCOSE, POC    Collection Time: 08/18/18  7:47 AM   Result Value Ref Range    Glucose (POC) 146 (H) 70 - 110 mg/dL   CARDIAC PANEL,(CK, CKMB & TROPONIN)    Collection Time: 08/18/18 10:05 AM   Result Value Ref Range    CK 39 26 - 192 U/L    CK - MB <1.0 <3.6 ng/ml    CK-MB Index  0.0 - 4.0 %     CALCULATION NOT PERFORMED WHEN RESULT IS BELOW LINEAR LIMIT    Troponin-I, Qt. <0.02 0.0 - 0.045 NG/ML   GLUCOSE, POC    Collection Time: 08/18/18  1:01 PM   Result Value Ref Range    Glucose (POC) 168 (H) 70 - 110 mg/dL   CARDIAC PANEL,(CK, CKMB & TROPONIN)    Collection Time: 08/18/18  4:20 PM   Result Value Ref Range    CK 35 26 - 192 U/L    CK - MB <1.0 <3.6 ng/ml    CK-MB Index  0.0 - 4.0 %     CALCULATION NOT PERFORMED WHEN RESULT IS BELOW LINEAR LIMIT    Troponin-I, Qt. <0.02 0.0 - 0.045 NG/ML   GLUCOSE, POC    Collection Time: 08/18/18  4:48 PM   Result Value Ref Range    Glucose (POC) 216 (H) 70 - 110 mg/dL   GLUCOSE, POC    Collection Time: 08/18/18 10:22 PM   Result Value Ref Range    Glucose (POC) 214 (H) 70 - 110 mg/dL           Lab Results   Component Value Date/Time    Glucose 125 (H) 08/18/2018 04:00 AM    Glucose 147 (H) 08/17/2018 08:05 PM    Glucose 103 (H) 03/23/2018 04:35 AM    Glucose 215 (H) 03/21/2018 11:30 AM    Glucose 201 (H) 03/02/2018 10:32 AM        Assessment/Plan:     Patient Active Problem List   Diagnosis Code    Small bowel obstruction ([facility] K56.609    HTN (hypertension) I10    DMII (diabetes mellitus, type 2) ([facility]) E11.9    UTI (lower urinary tract infection) N39.0    Depression F32.9    Scoliosis M41.9    Stenosis of lumbosacral spine M48.07    Gastroparesis K31.84    Gastric ulcer K25.9    Tardive dyskinesia G24.01    Hypercholesteremia E78.00    Diverticulosis K57.90    Early satiety R68.81    Dysphagia R13.10    Heart disease I51.9    Anxiety F41.9    Fatty liver K76.0    SBO (small bowel obstruction) ([facility]) K56.609    Acute abdominal pain R10.9    Sepsis ([facility]) A41.9    Lumbar radiculopathy M54.16    Urgency of urination R39.15    Neurogenic bladder N31.9    TIA (transient ischemic attack) G45.9    History of stroke Z86.73    Gastroesophageal reflux disease without esophagitis K21.9    Diabetes ([facility] [address] [facility] E11.9  Chest pain R07.9    Hypertensive emergency I16.1       A/P:    Hypertensive Emergency  -monitor BP  -off nitro  -amlodipine, metoprolol, losartan     Chest pain  -EKG ok  -nitro gtt started in ED, currently off   -troponin neg  -resolved    DM II  -accuchecks  -SSI  -a1c in am  -will need better glycemic control    CAD  -bb, statin, asa   -echo 8/10/18 EF 56-60%; grade 1 left ventricular diastolic dysfunction, mild mitral valve reg, mild pulm htn present (on lasix)     Hypercholesteremia  -statin     Hypothyroid  -synthroid    Stenosis (back/neck pain)  -fentanyl patch  -tylenol     DVT prophylaxis  -lovenox  Bowel-colace  GI-protonix       PT/OT    Marisa Wakefield NP-[facility]  RFLIY:[phone]  Office:  [phone]

## 2018-08-19 NOTE — PROGRESS NOTES
Problem: Falls - Risk of  Goal: *Absence of Falls  Document Liz Fall Risk and appropriate interventions in the flowsheet. Outcome: Progressing Towards Goal  Fall Risk Interventions:            Medication Interventions: Bed/chair exit alarm    Elimination Interventions: Bed/chair exit alarm             Problem: Pressure Injury - Risk of  Goal: *Prevention of pressure injury  Document Andrew Scale and appropriate interventions in the flowsheet. Outcome: Progressing Towards Goal  Pressure Injury Interventions:             Activity Interventions: Pressure redistribution bed/mattress(bed type)    Mobility Interventions: HOB 30 degrees or less, Pressure redistribution bed/mattress (bed type)    Nutrition Interventions: Document food/fluid/supplement intake

## 2018-08-19 NOTE — PROGRESS NOTES
Problem: Falls - Risk of  Goal: *Absence of Falls  Document Liz Fall Risk and appropriate interventions in the flowsheet. Fall Risk Interventions:            Medication Interventions: Bed/chair exit alarm    Elimination Interventions: Bed/chair exit alarm             Problem: Pressure Injury - Risk of  Goal: *Prevention of pressure injury  Document Andrew Scale and appropriate interventions in the flowsheet. Outcome: Progressing Towards Goal  Pressure Injury Interventions:             Activity Interventions: Pressure redistribution bed/mattress(bed type)    Mobility Interventions: HOB 30 degrees or less, Pressure redistribution bed/mattress (bed type)    Nutrition Interventions: Document food/fluid/supplement intake

## 2018-08-20 LAB
ANION GAP SERPL CALC-SCNC: 9 MMOL/L (ref 3–18)
BASOPHILS # BLD: 0 K/UL (ref 0–0.1)
BASOPHILS NFR BLD: 0 % (ref 0–2)
BUN SERPL-MCNC: 10 MG/DL (ref 7–18)
BUN/CREAT SERPL: 16 (ref 12–20)
CA-I SERPL-SCNC: 1.12 MMOL/L (ref 1.12–1.32)
CALCIUM SERPL-MCNC: 8.4 MG/DL (ref 8.5–10.1)
CHLORIDE SERPL-SCNC: 107 MMOL/L (ref 100–108)
CO2 SERPL-SCNC: 30 MMOL/L (ref 21–32)
CREAT SERPL-MCNC: 0.64 MG/DL (ref 0.6–1.3)
DIFFERENTIAL METHOD BLD: ABNORMAL
EOSINOPHIL # BLD: 0.3 K/UL (ref 0–0.4)
EOSINOPHIL NFR BLD: 3 % (ref 0–5)
ERYTHROCYTE [DISTWIDTH] IN BLOOD BY AUTOMATED COUNT: 13 % (ref 11.6–14.5)
GLUCOSE BLD STRIP.AUTO-MCNC: 126 MG/DL (ref 70–110)
GLUCOSE BLD STRIP.AUTO-MCNC: 147 MG/DL (ref 70–110)
GLUCOSE BLD STRIP.AUTO-MCNC: 172 MG/DL (ref 70–110)
GLUCOSE BLD STRIP.AUTO-MCNC: 208 MG/DL (ref 70–110)
GLUCOSE BLD STRIP.AUTO-MCNC: 289 MG/DL (ref 70–110)
GLUCOSE SERPL-MCNC: 103 MG/DL (ref 74–99)
HBA1C MFR BLD: 8.3 % (ref 4.2–5.6)
HCT VFR BLD AUTO: 34.3 % (ref 35–45)
HGB BLD-MCNC: 10.8 G/DL (ref 12–16)
LYMPHOCYTES # BLD: 3.4 K/UL (ref 0.9–3.6)
LYMPHOCYTES NFR BLD: 29 % (ref 21–52)
MAGNESIUM SERPL-MCNC: 1.4 MG/DL (ref 1.6–2.6)
MCH RBC QN AUTO: 28.6 PG (ref 24–34)
MCHC RBC AUTO-ENTMCNC: 31.5 G/DL (ref 31–37)
MCV RBC AUTO: 90.7 FL (ref 74–97)
MONOCYTES # BLD: 0.9 K/UL (ref 0.05–1.2)
MONOCYTES NFR BLD: 8 % (ref 3–10)
NEUTS SEG # BLD: 7.1 K/UL (ref 1.8–8)
NEUTS SEG NFR BLD: 60 % (ref 40–73)
PHOSPHATE SERPL-MCNC: 4.3 MG/DL (ref 2.5–4.9)
PLATELET # BLD AUTO: 262 K/UL (ref 135–420)
PMV BLD AUTO: 12 FL (ref 9.2–11.8)
POTASSIUM SERPL-SCNC: 3.5 MMOL/L (ref 3.5–5.5)
RBC # BLD AUTO: 3.78 M/UL (ref 4.2–5.3)
SODIUM SERPL-SCNC: 146 MMOL/L (ref 136–145)
WBC # BLD AUTO: 11.8 K/UL (ref 4.6–13.2)

## 2018-08-20 PROCEDURE — 97530 THERAPEUTIC ACTIVITIES: CPT

## 2018-08-20 PROCEDURE — 77030038269 HC DRN EXT URIN PURWCK BARD -A

## 2018-08-20 PROCEDURE — 85025 COMPLETE CBC W/AUTO DIFF WBC: CPT | Performed by: HOSPITALIST

## 2018-08-20 PROCEDURE — 74011250637 HC RX REV CODE- 250/637: Performed by: HOSPITALIST

## 2018-08-20 PROCEDURE — 84100 ASSAY OF PHOSPHORUS: CPT | Performed by: HOSPITALIST

## 2018-08-20 PROCEDURE — 74011636637 HC RX REV CODE- 636/637: Performed by: HOSPITALIST

## 2018-08-20 PROCEDURE — 83735 ASSAY OF MAGNESIUM: CPT | Performed by: HOSPITALIST

## 2018-08-20 PROCEDURE — 74011250636 HC RX REV CODE- 250/636: Performed by: HOSPITALIST

## 2018-08-20 PROCEDURE — 74011250637 HC RX REV CODE- 250/637: Performed by: NURSE PRACTITIONER

## 2018-08-20 PROCEDURE — 83036 HEMOGLOBIN GLYCOSYLATED A1C: CPT | Performed by: NURSE PRACTITIONER

## 2018-08-20 PROCEDURE — 80048 BASIC METABOLIC PNL TOTAL CA: CPT | Performed by: HOSPITALIST

## 2018-08-20 PROCEDURE — 36415 COLL VENOUS BLD VENIPUNCTURE: CPT | Performed by: NURSE PRACTITIONER

## 2018-08-20 PROCEDURE — 82962 GLUCOSE BLOOD TEST: CPT

## 2018-08-20 PROCEDURE — 82330 ASSAY OF CALCIUM: CPT | Performed by: HOSPITALIST

## 2018-08-20 PROCEDURE — 65660000000 HC RM CCU STEPDOWN

## 2018-08-20 RX ORDER — MAGNESIUM SULFATE HEPTAHYDRATE 40 MG/ML
2 INJECTION, SOLUTION INTRAVENOUS
Status: COMPLETED | OUTPATIENT
Start: 2018-08-20 | End: 2018-08-20

## 2018-08-20 RX ORDER — LANOLIN ALCOHOL/MO/W.PET/CERES
400 CREAM (GRAM) TOPICAL ONCE
Status: COMPLETED | OUTPATIENT
Start: 2018-08-20 | End: 2018-08-20

## 2018-08-20 RX ADMIN — FUROSEMIDE 40 MG: 40 TABLET ORAL at 09:01

## 2018-08-20 RX ADMIN — LOSARTAN POTASSIUM 100 MG: 50 TABLET ORAL at 09:01

## 2018-08-20 RX ADMIN — INSULIN LISPRO 6 UNITS: 100 INJECTION, SOLUTION INTRAVENOUS; SUBCUTANEOUS at 12:27

## 2018-08-20 RX ADMIN — LEVOTHYROXINE SODIUM 50 MCG: 50 TABLET ORAL at 06:00

## 2018-08-20 RX ADMIN — METOPROLOL SUCCINATE 100 MG: 100 TABLET, EXTENDED RELEASE ORAL at 09:00

## 2018-08-20 RX ADMIN — POTASSIUM CHLORIDE 10 MEQ: 750 TABLET, EXTENDED RELEASE ORAL at 09:01

## 2018-08-20 RX ADMIN — ENOXAPARIN SODIUM 40 MG: 100 INJECTION SUBCUTANEOUS at 01:30

## 2018-08-20 RX ADMIN — Medication 400 MG: at 09:02

## 2018-08-20 RX ADMIN — MIRTAZAPINE 15 MG: 15 TABLET, FILM COATED ORAL at 23:48

## 2018-08-20 RX ADMIN — ACETAMINOPHEN 650 MG: 325 TABLET, FILM COATED ORAL at 20:04

## 2018-08-20 RX ADMIN — AMLODIPINE BESYLATE 10 MG: 10 TABLET ORAL at 09:02

## 2018-08-20 RX ADMIN — ACETAMINOPHEN 650 MG: 325 TABLET, FILM COATED ORAL at 06:00

## 2018-08-20 RX ADMIN — MAGNESIUM SULFATE HEPTAHYDRATE 2 G: 40 INJECTION, SOLUTION INTRAVENOUS at 20:05

## 2018-08-20 RX ADMIN — VENLAFAXINE HYDROCHLORIDE 150 MG: 150 CAPSULE, EXTENDED RELEASE ORAL at 09:01

## 2018-08-20 RX ADMIN — ASPIRIN 325 MG ORAL TABLET 325 MG: 325 PILL ORAL at 09:00

## 2018-08-20 RX ADMIN — PANTOPRAZOLE SODIUM 40 MG: 40 TABLET, DELAYED RELEASE ORAL at 06:36

## 2018-08-20 RX ADMIN — GABAPENTIN 300 MG: 300 CAPSULE ORAL at 09:02

## 2018-08-20 RX ADMIN — GABAPENTIN 300 MG: 300 CAPSULE ORAL at 21:38

## 2018-08-20 RX ADMIN — GABAPENTIN 300 MG: 300 CAPSULE ORAL at 17:40

## 2018-08-20 RX ADMIN — DOCUSATE SODIUM 100 MG: 100 CAPSULE, LIQUID FILLED ORAL at 09:01

## 2018-08-20 RX ADMIN — INSULIN LISPRO 2 UNITS: 100 INJECTION, SOLUTION INTRAVENOUS; SUBCUTANEOUS at 23:34

## 2018-08-20 RX ADMIN — ATORVASTATIN CALCIUM 80 MG: 40 TABLET, FILM COATED ORAL at 21:38

## 2018-08-20 NOTE — PROGRESS NOTES
I have spoken to Sintia Gamez at [facility]. and she will review the patient for admission. I spoke to the Dr Alfonso Beard and explained that the patient is requesting rehab for discharge, the DC plan had be to return home with home care. I explained that Sintia Gamez with [facility] is reviewing the patient for admission. Sintia Gamez stated her building is having difficulty accessing information through [facility]. She is requesting patient information be loaded into [facility]. She stated she will let case management know if she can accept. The patient states she lives alone. She has a walker, cane and a hospital bed. She states she is active with [facility] for personal care. Our system was not showing Medicaid. I have called admitting and had them update the patient information to include her Medicaid number. I have called and left a message for the patients daughter Villa Quick at . The number for the patient other daughter, Girish Dowell is incorrect.   Jocelynn Alvarez RN

## 2018-08-20 NOTE — PROGRESS NOTES
Spoke to the patient. She states she would like to go to rehab.  patient has not been matched to a rehab facility. SNF list provided, she would like [facility]. Matched in e discharge to [facility]. I explained that she will need another facility if [facility] does not accepted, she agreed to matching to [facility] ans rehab. I have called [facility] and left a message for Dylan requesting admission.   Myriam Curran RN
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
## 2018-08-20 NOTE — PROGRESS NOTES
0700[facility] [address] [place] [address] and assumed care of patient. Patient alert and oriented x4. VS stable. No signs of distress or pain. Will continue to monitor.

## 2018-08-20 NOTE — PROGRESS NOTES
conducted an initial consultation and Spiritual Assessment for Silas Castleman, who is a 74 y.o.,female. Patients Primary Language is: [place]. According to the patients EMR Gnosticist Affiliation is: [place]. The reason the Patient came to the hospital is:   Patient Active Problem List    Diagnosis Date Noted    Diabetes ([facility] 08/17/2018    Chest pain 08/17/2018    Hypertensive emergency 08/17/2018    Gastroesophageal reflux disease without esophagitis 07/10/2018    TIA (transient ischemic attack) 03/21/2018    History of stroke 03/21/2018    Neurogenic bladder 02/21/2018    Urgency of urination 12/06/2017    Lumbar radiculopathy 09/14/2016    Sepsis ([address] 05/31/2016    Acute abdominal pain 05/05/2016    SBO (small bowel obstruction) ([facility] 12/01/2014    Depression     Scoliosis     Stenosis of lumbosacral spine     Gastroparesis     Gastric ulcer     Tardive dyskinesia     Hypercholesteremia     Diverticulosis     Early satiety     Dysphagia     Heart disease     Anxiety     Fatty liver     Small bowel obstruction ([address] 04/08/2013    HTN (hypertension) 04/08/2013    DMII (diabetes mellitus, type 2) ([address] 04/08/2013    UTI (lower urinary tract infection) 04/08/2013        The  provided the following Interventions:  Initiated attempted to initiate a relationship of care and support with patient in room 2604 this morning. Listened empathically as patient with previous medical issues and limited conversation ability tried to share her story. Provided information about Spiritual Care Services. Offered prayer and assurance of continued prayers on patients behalf. The following outcomes were achieved:  Patient shared limited information about her medical narrative . Patient processed feeling about current hospitalization. Patient expressed gratitude for pastoral care visit.     Assessment:  Patient does not have any Hoahaoism/cultural needs that will affect patients preferences in health care. There are no further spiritual or Adventism issues which require Spiritual Care Services interventions at this time. Plan:  Chaplains will continue to follow and will provide pastoral care on an as needed/requested basis    . Angely Bettencourt   Spiritual Care   [phone]

## 2018-08-20 NOTE — PROGRESS NOTES
Problem: Falls - Risk of  Goal: *Absence of Falls  Document Liz Fall Risk and appropriate interventions in the flowsheet.    Fall Risk Interventions:            Medication Interventions: Bed/chair exit alarm    Elimination Interventions: Call light in reach

## 2018-08-20 NOTE — CDMP QUERY
The admitting diagnosis in the progress notes for this patient indicates unspecified chest pain.   Please review the following list of potential diagnoses and If known, please clarify the underlying cause. Hypertensive emergency   CAD (specify with or w/o unstable angina)   GERD   Costochondritis   Pleuritic   Psychogenic causes   Stress and /or anxiety   Other Cause   Chest pain, cause undetermined    Please document in your progress notes and discharge summary. The medical record reflects the following:    Risk: 75 yo female w/PMH CAD, CVA, HTN, anxiety, admitted with chest pain    Clinical Indicators:Per ED note presents with complaints of lower central chest pain that she describes as someone pushing on her chest that started one hour ago. She reports associated left lower chest pain, nausea, and some mild SOB    Per H&P  74 y.o. female who presented to the ER with SOB and weakness. This has been going on for about 3 days at home. Over the last day it also became associated with chest pain. The pain was,  in the center of her chest and radiated toward her back. In the ER she was found to have hypertensive emergency with systolic BP over 231. Initial EKG  Normal sinus rhythm   Possible Left atrial enlargement   ST & T wave abnormality, consider inferior ischemia    ST & T wave abnormality, consider anterolateral ischemia     troponin level 0.02  x  3    Treatment: Admitted to ICU, NTG and cardene gtt, Cardiology consult ordered, Serial csrdiac enzymes, EKG's     Please clarify and document your clinical opinion in the progress notes and discharge summary including the definitive and/or presumptive diagnosis, (suspected or probable), related to the above clinical findings. Please include clinical findings supporting your diagnosis. If you DECLINE this query or would like to communicate with [facility], please utilize the \"[email or web address] message box\" at the TOP of the Progress Note on the right. Thank you,  Hamp Homans RN   [phone]

## 2018-08-20 NOTE — PROGRESS NOTES
TRANSFER - OUT REPORT:    Verbal report given to Oleg RN (name) on Beverly Garnett  being transferred to [facility] (unit) for routine progression of care       Report consisted of patients Situation, Background, Assessment and   Recommendations(SBAR). Information from the following report(s) SBAR, Kardex, Intake/Output, MAR, Recent Results and Cardiac Rhythm SB-SR was reviewed with the receiving nurse. Lines:   Peripheral IV 08/17/18 Left Antecubital (Active)   Site Assessment Clean, dry, & intact 8/20/2018  3:00 PM   Phlebitis Assessment 0 8/20/2018  3:00 PM   Infiltration Assessment 0 8/20/2018  3:00 PM   Dressing Status Clean, dry, & intact 8/20/2018  3:00 PM   Dressing Type Transparent;Tape 8/20/2018 12:00 PM   Hub Color/Line Status Pink;Capped 8/20/2018 12:00 PM   Action Taken Open ports on tubing capped 8/20/2018 12:00 PM   Alcohol Cap Used Yes 8/20/2018 12:00 PM        Opportunity for questions and clarification was provided.       Patient transported with:   Registered Nurse

## 2018-08-20 NOTE — PROGRESS NOTES
Problem: Falls - Risk of  Goal: *Absence of Falls  Document Liz Fall Risk and appropriate interventions in the flowsheet. Outcome: Progressing Towards Goal  Fall Risk Interventions:            Medication Interventions: Bed/chair exit alarm, Patient to call before getting OOB    Elimination Interventions: Call light in reach, Patient to call for help with toileting needs, Toileting schedule/hourly rounds             Problem: Pressure Injury - Risk of  Goal: *Prevention of pressure injury  Document Andrew Scale and appropriate interventions in the flowsheet. Outcome: Progressing Towards Goal  Pressure Injury Interventions: Activity Interventions: Pressure redistribution bed/mattress(bed type)    Mobility Interventions: HOB 30 degrees or less, Pressure redistribution bed/mattress (bed type), Turn and reposition approx.  every two hours(pillow and wedges)    Nutrition Interventions: Document food/fluid/supplement intake, Offer support with meals,snacks and hydration    Friction and Shear Interventions: Apply protective barrier, creams and emollients, HOB 30 degrees or less, Minimize layers

## 2018-08-20 NOTE — ROUTINE PROCESS
Physical Exam   Skin:           Primary Nurse Sb James and Jase Goldman RN performed a dual skin assessment on this patient. No impairment noted.

## 2018-08-20 NOTE — PROGRESS NOTES
Problem: Falls - Risk of  Goal: *Absence of Falls  Document Liz Fall Risk and appropriate interventions in the flowsheet. Outcome: Progressing Towards Goal  Fall Risk Interventions:            Medication Interventions: Bed/chair exit alarm, Teach patient to arise slowly, Evaluate medications/consider consulting pharmacy    Elimination Interventions: Call light in reach, Toilet paper/wipes in reach, Toileting schedule/hourly rounds             Problem: Pressure Injury - Risk of  Goal: *Prevention of pressure injury  Document Andrew Scale and appropriate interventions in the flowsheet. Outcome: Progressing Towards Goal  Pressure Injury Interventions: Activity Interventions: Pressure redistribution bed/mattress(bed type)    Mobility Interventions: HOB 30 degrees or less, PT/OT evaluation, Turn and reposition approx.  every two hours(pillow and wedges)    Nutrition Interventions: Document food/fluid/supplement intake, Discuss nutritional consult with provider, Offer support with meals,snacks and hydration    Friction and Shear Interventions: Foam dressings/transparent film/skin sealants, Apply protective barrier, creams and emollients, Minimize layers, Lift team/patient mobility team, Transferring/repositioning devices

## 2018-08-20 NOTE — PROGRESS NOTES
I have spoken to the patient and she agreed to Matching to [facility] as well as [facility]. Matched in [place].   Samantha Lee RN

## 2018-08-20 NOTE — PROGRESS NOTES
[facility]  Hospitalist Division           Inpatient Daily Progress Note        Patient: Virginia Johns MRN: [id number]  CSN: [id number]    YOB: 1945  Age: 74 y.o. Sex: female    DOA: 8/17/2018 LOS:  LOS: 3 days                    Chief Complaint:  Hypertensive emergency     Interval History:    75 yo female, presented to ED w/ SOB, weakness for about 3 days at home; chest pain that occurred in center of chest and radiated toward her back was associated for 1 day pta. In ER, hypertensive; SBP > 200. Nitroglycerin for BP control and active CP. Admitted to ICU for ongoing management. Nitro currently titrated off. On amlodipine, losartan, metoprolol for BP control. EKG ok, troponin negative. CP currently resolved. 8/19/18: Transfer to floor, nitro off,  monitor BP, needs better glycemic control; a1c in am.    8/20/18: Monitor for urinary retention, BP ok, replace Mg, BS ok. Transfer to floor. Subjective:      \"I'm ok\"  NAD. Objective:      Visit Vitals    /68    Pulse (!) 57    Temp 98.2 °F (36.8 °C)    Resp 14    Ht 5' 5\" (1.651 m)    Wt 89.7 kg (197 lb 12 oz)    SpO2 95%    BMI 32.91 kg/m2           Physical Exam:  General appearance: alert, cooperative  Lungs: clear to auscultation bilaterally  Heart: regular rate and rhythm, S1, S2 normal  Abdomen: soft, non tender, non distended. Normoactive bowel sounds. Extremities: bilateral lower ext edema   Skin: Skin color, texture, turgor normal.   Neurologic: Grossly normal  PSY: Mood and affect normal, appropriately behaved      Intake and Output:  Current Shift:     Last three shifts:  08/18 1901 - 08/20 0700  In: 144.9 [P.O.:120;  I.V.:24.9]  Out: 2277 [Urine:2275]    Recent Results (from the past 24 hour(s))   GLUCOSE, POC    Collection Time: 08/19/18 12:26 PM   Result Value Ref Range    Glucose (POC) 221 (H) 70 - 110 mg/dL   GLUCOSE, POC    Collection Time: 08/19/18  5:04 PM   Result Value Ref Range    Glucose (POC) 213 (H) 70 - 110 mg/dL   GLUCOSE, POC    Collection Time: 08/19/18  9:30 PM   Result Value Ref Range    Glucose (POC) 192 (H) 70 - 110 mg/dL   HEMOGLOBIN A1C W/O EAG    Collection Time: 08/20/18  3:15 AM   Result Value Ref Range    Hemoglobin A1c 8.3 (H) 4.2 - 5.6 %   CBC WITH AUTOMATED DIFF    Collection Time: 08/20/18  3:15 AM   Result Value Ref Range    WBC 11.8 4.6 - 13.2 K/uL    RBC 3.78 (L) 4.20 - 5.30 M/uL    HGB 10.8 (L) 12.0 - 16.0 g/dL    HCT 34.3 (L) 35.0 - 45.0 %    MCV 90.7 74.0 - 97.0 FL    MCH 28.6 24.0 - 34.0 PG    MCHC 31.5 31.0 - 37.0 g/dL    RDW 13.0 11.6 - 14.5 %    PLATELET 293 707 - 750 K/uL    MPV 12.0 (H) 9.2 - 11.8 FL    NEUTROPHILS 60 40 - 73 %    LYMPHOCYTES 29 21 - 52 %    MONOCYTES 8 3 - 10 %    EOSINOPHILS 3 0 - 5 %    BASOPHILS 0 0 - 2 %    ABS. NEUTROPHILS 7.1 1.8 - 8.0 K/UL    ABS. LYMPHOCYTES 3.4 0.9 - 3.6 K/UL    ABS. MONOCYTES 0.9 0.05 - 1.2 K/UL    ABS. EOSINOPHILS 0.3 0.0 - 0.4 K/UL    ABS.  BASOPHILS 0.0 0.0 - 0.1 K/UL    DF AUTOMATED     CALCIUM, IONIZED    Collection Time: 08/20/18  3:15 AM   Result Value Ref Range    Ionized Calcium 1.12 1.12 - 3.14 MMOL/L   METABOLIC PANEL, BASIC    Collection Time: 08/20/18  3:15 AM   Result Value Ref Range    Sodium 146 (H) 136 - 145 mmol/L    Potassium 3.5 3.5 - 5.5 mmol/L    Chloride 107 100 - 108 mmol/L    CO2 30 21 - 32 mmol/L    Anion gap 9 3.0 - 18 mmol/L    Glucose 103 (H) 74 - 99 mg/dL    BUN 10 7.0 - 18 MG/DL    Creatinine 0.64 0.6 - 1.3 MG/DL    BUN/Creatinine ratio 16 12 - 20      GFR est AA >60 >60 ml/min/1.73m2    GFR est non-AA >60 >60 ml/min/1.73m2    Calcium 8.4 (L) 8.5 - 10.1 MG/DL   PHOSPHORUS    Collection Time: 08/20/18  3:15 AM   Result Value Ref Range    Phosphorus 4.3 2.5 - 4.9 MG/DL   MAGNESIUM    Collection Time: 08/20/18  3:15 AM   Result Value Ref Range    Magnesium 1.4 (L) 1.6 - 2.6 mg/dL           Lab Results   Component Value Date/Time    Glucose 103 (H) 08/20/2018 03:15 AM Glucose 125 (H) 08/18/2018 04:00 AM    Glucose 147 (H) 08/17/2018 08:05 PM    Glucose 103 (H) 03/23/2018 04:35 AM    Glucose 215 (H) 03/21/2018 11:30 AM        Assessment/Plan:     Patient Active Problem List   Diagnosis Code    [facility] K56.609    HTN (hypertension) I10    DMII (diabetes mellitus, type 2) ([facility] E11.9    UTI (lower urinary tract infection) N39.0    Depression F32.9    Scoliosis M41.9    Stenosis of lumbosacral spine M48.07    Gastroparesis K31.84    Gastric ulcer K25.9    Tardive dyskinesia G24.01    Hypercholesteremia E78.00    Diverticulosis K57.90    Early satiety R68.81    Dysphagia R13.10    Heart disease I51.9    Anxiety F41.9    Fatty liver K76.0    SBO (small bowel obstruction) ([facility]) K56.609    Acute abdominal pain R10.9    Sepsis ([facility]) A41.9    Lumbar radiculopathy M54.16    Urgency of urination R39.15    Neurogenic bladder N31.9    TIA (transient ischemic attack) G45.9    History of stroke Z86.73    Gastroesophageal reflux disease without esophagitis K21.9    Diabetes ([facility] E11.9    Chest pain R07.9    Hypertensive emergency I16.1       A/P:    Hypertensive Emergency  -monitor BP  -off nitro  -amlodipine, metoprolol, losartan     Chest pain  -EKG ok  -nitro gtt started in ED, currently off   -troponin neg  -resolved    DM II  -accuchecks  -SSI  -a1c 8.3     CAD  -bb, statin, asa   -echo 8/10/18 EF 56-60%; grade 1 left ventricular diastolic dysfunction, mild mitral valve reg, mild pulm htn present (on lasix)     Hypercholesteremia  -statin     Hypothyroid  -synthroid    Stenosis (back/neck pain)  -fentanyl patch  -tylenol     Urinary retention  -straight cath'd early this am  -bladder scan prn/monitor for retention   -I/Os    DVT prophylaxis  -lovenox  Bowel-colace  GI-protonix     PT/OT  Home health   Replace Mg     Trixie Elaine, Adama Mauro, NP-C  [address] [place] [address]  Hospitalist Division  CSUFL:[phone]  Office: [phone]

## 2018-08-20 NOTE — DISCHARGE SUMMARY
2 [facility]  Hospitalist Division    Discharge Summary    Patient: Gail Morley MRN: [id number]  CSN: [id number]    YOB: 1945  Age: 74 y.o.   Sex: female    DOA: 8/17/2018 LOS:  LOS: 3 days   Discharge Date:      Admission Diagnoses: Hypertensive emergency  Chest pain  Diabetes [facility])    Discharge Diagnoses:    Problem List as of 8/20/2018  Date Reviewed: 7/10/2018          Codes Class Noted - Resolved    Diabetes ([facility] ICD-10-CM: E11.9  ICD-9-CM: 250.00  8/17/2018 - Present        Chest pain ICD-10-CM: R07.9  ICD-9-CM: 786.50  8/17/2018 - Present        * (Principal)Hypertensive emergency ICD-10-CM: I16.1  ICD-9-CM: 401.9  8/17/2018 - Present        Gastroesophageal reflux disease without esophagitis ICD-10-CM: K21.9  ICD-9-CM: 530.81  7/10/2018 - Present        TIA (transient ischemic attack) ICD-10-CM: G45.9  ICD-9-CM: 435.9  3/21/2018 - Present        History of stroke ICD-10-CM: Z86.73  ICD-9-CM: V12.54  3/21/2018 - Present        Neurogenic bladder ICD-10-CM: N31.9  ICD-9-CM: 596.54  2/21/2018 - Present        Urgency of urination ICD-10-CM: R39.15  ICD-9-CM: 788.63  12/6/2017 - Present        Lumbar radiculopathy ICD-10-CM: M54.16  ICD-9-CM: 724.4  9/14/2016 - Present        Sepsis ([facility] ICD-10-CM: A41.9  ICD-9-CM: 038.9, 995.91  5/31/2016 - Present        Acute abdominal pain ICD-10-CM: R10.9  ICD-9-CM: 789.00, 338.19  5/5/2016 - Present        SBO (small bowel obstruction) ([facility] ICD-10-CM: U34.199  ICD-9-CM: 560.9  12/1/2014 - Present        Depression ICD-10-CM: F32.9  ICD-9-CM: 154  Unknown - Present        Scoliosis ICD-10-CM: M41.9  ICD-9-CM: 737.30  Unknown - Present        Stenosis of lumbosacral spine ICD-10-CM: M48.07  ICD-9-CM: 724.02  Unknown - Present        Gastroparesis ICD-10-CM: K31.84  ICD-9-CM: 536.3  Unknown - Present        Gastric ulcer ICD-10-CM: K25.9  ICD-9-CM: 531.90  Unknown - Present        Tardive dyskinesia ICD-10-CM: G24.01  ICD-9-CM: 333.85  Unknown - Present        Hypercholesteremia ICD-10-CM: E78.00  ICD-9-CM: 272.0  Unknown - Present        Diverticulosis ICD-10-CM: K57.90  ICD-9-CM: 562.10  Unknown - Present        Early satiety ICD-10-CM: R68.81  ICD-9-CM: 780.94  Unknown - Present        Dysphagia ICD-10-CM: R13.10  ICD-9-CM: 787.20  Unknown - Present        Heart disease ICD-10-CM: I51.9  ICD-9-CM: 429.9  Unknown - Present        Anxiety ICD-10-CM: F41.9  ICD-9-CM: 300.00  Unknown - Present        Fatty liver ICD-10-CM: K76.0  ICD-9-CM: 571.8  Unknown - Present        Small bowel obstruction ([facility] ICD-10-CM: U81.071  ICD-9-CM: 560.9  4/8/2013 - Present        HTN (hypertension) ICD-10-CM: I10  ICD-9-CM: 401.9  4/8/2013 - Present        DMII (diabetes mellitus, type 2) ([facility] ICD-10-CM: E11.9  ICD-9-CM: 250.00  4/8/2013 - Present        UTI (lower urinary tract infection) ICD-10-CM: N39.0  ICD-9-CM: 599.0  4/8/2013 - Present              Discharge Condition: Good    Discharge To: Home    Consults: None    Hospital Course:     73 yo female, presented to ED w/ SOB, weakness for about 3 days at home; chest pain that occurred in center of chest and radiated toward her back was associated for 1 day pta. In ER, hypertensive; SBP > 200. Nitroglycerin for BP control and active CP. Admitted to ICU for ongoing management. Nitro currently titrated off. On amlodipine, losartan, metoprolol for BP control. EKG ok, troponin negative. CP currently resolved. The patient will be discharged home today on her home blood pressure medications. Currently BP's are within normal limits. No complaints. She should follow-up with her PCP in 1 week in regards to her BP regimen and glycemic control.  Patient to arrange.        Inpatient treatment:    Hypertensive Emergency  -monitor BP  -off nitro  -amlodipine, metoprolol, losartan      Chest pain  -EKG ok  -nitro gtt started in ED, currently off   -troponin neg  -resolved     DM II  -accuchecks  -SSI  -a1c 8.3      CAD  -bb, statin, asa   -echo 8/10/18 EF 56-60%; grade 1 left ventricular diastolic dysfunction, mild mitral valve reg, mild pulm htn present (on lasix)      Hypercholesteremia  -statin      Hypothyroid  -synthroid     Stenosis (back/neck pain)  -fentanyl patch  -tylenol      Urinary retention  -straight cath'd early this am  -bladder scan prn/monitor for retention   -I/Os     DVT prophylaxis  -lovenox  Bowel-colace  GI-protonix      PT/OT  Home health   Replace Mg             Physical Exam:    General appearance: alert, cooperative  Lungs: clear to auscultation bilaterally  Heart: regular rate and rhythm, S1, S2 normal  Abdomen: soft, non tender, non distended. Normoactive bowel sounds. Extremities: bilateral lower ext edema   Skin: Skin color, texture, turgor normal.   Neurologic: Grossly normal  PSY: Mood and affect normal, appropriately behaved    Significant Diagnostic Studies:   EXAM: AP radiograph of the chest     INDICATION: Chest pain     COMPARISON: March 21, 2018 and March 2, 2018     _______________     FINDINGS:     Normal heart size and mediastinal contour. No consolidation, pleural effusion,  or pneumothorax. No acute osseous abnormalities.     _______________     IMPRESSION  IMPRESSION:     No acute pulmonary findings     Discharge Medications:     Current Discharge Medication List      CONTINUE these medications which have NOT CHANGED    Details   furosemide (LASIX) 40 mg tablet Take 40 mg by mouth as needed. gabapentin (NEURONTIN) 300 mg capsule Take 300 mg by mouth three (3) times daily. HYDROcodone-acetaminophen (NORCO)  mg tablet Take 1 Tab by mouth every six (6) hours as needed for Pain.      potassium chloride SR (KLOR-CON 10) 10 mEq tablet Take 10 mEq by mouth as needed. metoprolol succinate (TOPROL-XL) 100 mg tablet Take 100 mg by mouth daily. Refills: 6      aspirin (ASPIRIN) 325 mg tablet Take 1 Tab by mouth daily.   Qty: 30 Tab, Refills: 0      glimepiride (AMARYL) 1 mg tablet Take 1 mg by mouth two (2) times a day. fentaNYL (DURAGESIC) 25 mcg/hr PATCH 1 Patch by TransDERmal route every seventy-two (72) hours. amLODIPine (NORVASC) 10 mg tablet 10 mg.      ibuprofen (MOTRIN) 200 mg tablet Take 400 mg by mouth every eight (8) hours as needed for Pain. oxyCODONE-acetaminophen (PERCOCET 10)  mg per tablet Take 1 Tab by mouth every six (6) hours as needed for Pain. omeprazole (PRILOSEC) 40 mg capsule Take 40 mg by mouth daily. cholecalciferol (VITAMIN D3) 1,000 unit cap Take 1,000 Units by mouth daily. losartan (COZAAR) 100 mg tablet Take 100 mg by mouth daily. venlafaxine-SR (EFFEXOR XR) 150 mg capsule Take 150 mg by mouth daily. cyanocobalamin (VITAMIN B-12) 1,000 mcg tablet Take 1,000 mcg by mouth daily. docusate sodium (COLACE) 100 mg capsule Take 100 mg by mouth daily. betamethasone dipropionate (DIPROSONE) 0.05 % topical cream Apply  to affected area daily as needed for Skin Irritation. tiZANidine (ZANAFLEX) 2 mg tablet Take 2 mg by mouth as needed. Refills: 3      conjugated estrogens (PREMARIN) 0.625 mg/gram vaginal cream Apply small amount to introitus MWF as directed. Qty: 30 g, Refills: 1      atorvastatin (LIPITOR) 80 mg tablet Take 1 Tab by mouth nightly. Qty: 30 Tab, Refills: 0      mirtazapine (REMERON) 15 mg tablet Take 15 mg by mouth nightly. levothyroxine (SYNTHROID) 50 mcg tablet Take 50 mcg by mouth Daily (before breakfast). ziprasidone (GEODON) 20 mg capsule Take 20 mg by mouth daily. polyethylene glycol (MIRALAX) 17 gram packet Take 17 g by mouth every fourty-eight (48) hours. sucralfate (CARAFATE) 1 gram tablet Take 1 g by mouth three (3) times daily. Activity: Activity as tolerated    Diet: Diabetic Diet    Wound Care: None needed    Follow-up:     The patient should follow-up with PCP in regards to recent hospitalization. Patient to arrange.      Discharge time > 35 mins   Jesse Pennington NP  8/20/2018, 10:28 AM

## 2018-08-20 NOTE — ROUTINE PROCESS
1600 TRANSFER - IN REPORT:    Verbal report received from Mariano Abraham RN(name) on Jens Chandler  being received from ICU(unit) for routine progression of care      Report consisted of patients Situation, Background, Assessment and   Recommendations(SBAR). Information from the following report(s) SBAR and Kardex was reviewed with the receiving nurse. Opportunity for questions and clarification was provided. Assessment completed upon patients arrival to unit and care assumed.      Pt oriented to room and call bell, denies pain, no orders for telemetry

## 2018-08-20 NOTE — PROGRESS NOTES
Problem: Mobility Impaired (Adult and Pediatric)  Goal: *Acute Goals and Plan of Care (Insert Text)  Physical Therapy Goals  Initiated 8/19/2018 and to be accomplished within 7 day(s)  1. Patient will move from supine to sit and sit to supine , scoot up and down and roll side to side in bed with modified independence. 2.  Patient will transfer from bed to chair and chair to bed with modified independence using the least restrictive device. 3.  Patient will perform sit to stand with modified independence. 4.  Patient will ambulate with modified independence for >/= 100 feet with the least restrictive device. Outcome: Progressing Towards Goal    PHYSICAL THERAPY: Daily TREATMENT Note   INPATIENT: Medicare: Hospital Day: 4     Patient: Oswaldo Newby (62 y.o. female)    Date: 8/20/2018  Primary Diagnosis: Hypertensive emergency  Chest pain  Diabetes ([facility]    ,  ,   Precautions:  fall risk    Chart, physical therapy assessment, plan of care and goals were reviewed. PLOF: independent ambulation with ww. Caregiver assist for IADLs    ASSESSMENT:  Pt supine in bed and agreeable to PT. Required supervision for sup->sit. Sits EOB with S/CG; c/o fatigue and required ~3 min seated rest.  Performed b/l LAQ x 10 reps again requiring rest to recover after. Sit to stand with min assist to ww. Able to wt shift and clear RLE, however minimal L foot clearance and due to significant fwd trunk flexion and inappropriate ww height pt assisted to return to sit EOB. After ww adjusted pt performs bed to chair trf with ww, min assist, ambulating 3' with vc's for safety. Pt positioned into recliner for comfort and left with call bell and needs in reach, visitor at bedside. Pt c/o Left calf pain \"cramp\" during treatment. (-) Barbi's sign, redness, warmth, but she stated that she felt a \"burning\" pain to post calf when standing/ambulating. I did pass this on to her RN for further f/u.      Progression toward goals: Improving slowly and progressing toward goals     PLAN:  Patient continues to benefit from skilled intervention to address the above impairments. Continue treatment per established plan of care. EDUCATION:   Education:  Patient was educated on the following topics: exercises, transfers, ambulation, use of call bell   Barriers to Learning/Limitations: None  Compensate with: visual, verbal, tactile, kinesthetic cues/model    Discharge Recommendations:  [place] for STR as pt is not able to perform independent mobility and lives alone. Further Equipment Recommendations for Discharge:  N/A; TBD with rehab  Factors which may impact discharge planning: medical condition     SUBJECTIVE:   Patient stated my (left) calf cramps. I would like to go to rehab.     OBJECTIVE DATA SUMMARY:   Critical Behavior:  Neurologic State: Alert  Orientation Level: Oriented X4  Cognition: Follows commands  Safety/Judgement: Fall prevention    G CODE:Mobility [id number] Current  CL= 60-79%   Goal  CK= 40-59%. The severity rating is based on the Other :    [employer] Balance Scale  0: Pt performs 25% or less of standing activity (Max assist) CN, 100% impaired. 1: Pt supports self with upper extremities but requires therapist assistance. Pt performs 25-50% of effort (Mod assist) CM, 80% to <100% impaired. 1+: Pt supports self with upper extremities but requires therapist assistance. Pt performs >50% effort. (Min assist). CL, 60% to <80% impaired. 2: Pt supports self independently with both upper extremities (walker, crutches, parallel bars). CL, 60% to <80% impaired. 2+: Pt support self independently with 1 upper extremity (cane, crutch, 1 parallel bar). CK, 40% to <60% impaired. 3: Pt stands without upper extremity support for up to 30 seconds. CK, 40% to <60% impaired. 3+: Pt stands without upper extremity support for 30 seconds or greater. CJ, 20% to <40% impaired.   4: Pt independently moves and returns center of gravity 1-2 inches in one plane. CJ, 20% to <40% impaired. 4+: Pt independently moves and returns center of gravity 1-2 inches in multiple planes. CI, 1% to <20% impaired. 5: Pt independently moves and returns center of gravity in all planes greater than 2 inches. CH, 0% impaired.       Functional Mobility:      Functional Status      Indep   (I)   Mod I   Super-vision   Min A   Mod A   Max A   Total A   Assist x2 Verbal cues Additional time Not tested   Comments   Rolling []  []  [] []    []    []  []  [] [] [] [x]    Supine to sit []  []  [x] []  []  []  []  [] [x] [x] []    Sit to supine []  []  [] []  []  []  []  [] [] [] [x]    Sit to stand []  []  [] [x]  []  []  []  [] [x] [x] [] Flexed trunk posture   Stand to sit []  []  [] [x]  []  []  []  [] [x] [x] []    Bed to chair transfers []  []  [] [x]  []  []  []  [] [x] [x] []        Balance    Good   Fair   Poor+   Unable   Not tested   Comments   Sitting static []  [x]  []  []  []    Sitting dynamic []  [x]  []  []  []    Standing static []  []  [x]  []  []    Standing dynamic []  []  [x]  []  []      Mobility/Gait:   Level of Assistance: Minimum assistance  Assistive Device: gait belt and rolling walker  Distance Ambulated: 3 feet     Base of Support: center of gravity altered  Speed/Amy: shuffled and slow  Step Length: left shortened and right shortened  Stance: right decreased  Gait Abnormalities: decreased step clearance    Therapeutic Exercises:       EXERCISE   Sets   Reps   Active Active Assist   Passive Self ROM   Comments   Ankle Pumps    [] [] [] []    Quad Sets/Glut Sets   [] [] [] []    Hamstring Sets   [] [] [] []    Short Arc Quads   [] [] [] []    Heel Slides   [] [] [] []    Straight Leg Raises   [] [] [] []    Hip Abd/Add   [] [] [] []    Long Arc Quads 1 10 [x] [] [] []    Seated Marching   [] [] [] []    Standing Marching   [] [] [] []       [] [] [] []        Vital Signs  Temp: 98.5 °F (36.9 °C)     Pulse (Heart Rate): 64     BP: 159/61     Resp Rate: 13     O2 Sat (%): 97 %  Pain: n/a  Pre treatment pain level: 0  Post treatment pain level: 0  Pain Scale 1: Numeric (0 - 10)  Pain Intensity 1: 0  Activity Tolerance:   Poor+     After treatment:   Patient left in no apparent distress sitting up in chair  Call bell left within reach  Nursing notified    Recommendations for nursing: assist of one with ww      Earline Beard, PT   Time Calculation: 25 mins

## 2018-08-21 VITALS
WEIGHT: 197.75 LBS | RESPIRATION RATE: 18 BRPM | TEMPERATURE: 97.9 F | HEIGHT: 65 IN | OXYGEN SATURATION: 97 % | HEART RATE: 63 BPM | BODY MASS INDEX: 32.95 KG/M2 | DIASTOLIC BLOOD PRESSURE: 91 MMHG | SYSTOLIC BLOOD PRESSURE: 152 MMHG

## 2018-08-21 LAB
ANION GAP SERPL CALC-SCNC: 7 MMOL/L (ref 3–18)
BASOPHILS # BLD: 0 K/UL (ref 0–0.1)
BASOPHILS NFR BLD: 0 % (ref 0–2)
BUN SERPL-MCNC: 9 MG/DL (ref 7–18)
BUN/CREAT SERPL: 14 (ref 12–20)
CALCIUM SERPL-MCNC: 8.4 MG/DL (ref 8.5–10.1)
CHLORIDE SERPL-SCNC: 106 MMOL/L (ref 100–108)
CO2 SERPL-SCNC: 30 MMOL/L (ref 21–32)
CREAT SERPL-MCNC: 0.65 MG/DL (ref 0.6–1.3)
DIFFERENTIAL METHOD BLD: ABNORMAL
EOSINOPHIL # BLD: 0.3 K/UL (ref 0–0.4)
EOSINOPHIL NFR BLD: 2 % (ref 0–5)
ERYTHROCYTE [DISTWIDTH] IN BLOOD BY AUTOMATED COUNT: 13.1 % (ref 11.6–14.5)
GLUCOSE BLD STRIP.AUTO-MCNC: 146 MG/DL (ref 70–110)
GLUCOSE BLD STRIP.AUTO-MCNC: 166 MG/DL (ref 70–110)
GLUCOSE BLD STRIP.AUTO-MCNC: 420 MG/DL (ref 70–110)
GLUCOSE SERPL-MCNC: 168 MG/DL (ref 74–99)
HCT VFR BLD AUTO: 34.7 % (ref 35–45)
HGB BLD-MCNC: 11.1 G/DL (ref 12–16)
LYMPHOCYTES # BLD: 3.8 K/UL (ref 0.9–3.6)
LYMPHOCYTES NFR BLD: 32 % (ref 21–52)
MAGNESIUM SERPL-MCNC: 2 MG/DL (ref 1.6–2.6)
MCH RBC QN AUTO: 28.8 PG (ref 24–34)
MCHC RBC AUTO-ENTMCNC: 32 G/DL (ref 31–37)
MCV RBC AUTO: 90.1 FL (ref 74–97)
MONOCYTES # BLD: 1 K/UL (ref 0.05–1.2)
MONOCYTES NFR BLD: 9 % (ref 3–10)
NEUTS SEG # BLD: 6.9 K/UL (ref 1.8–8)
NEUTS SEG NFR BLD: 57 % (ref 40–73)
PLATELET # BLD AUTO: 256 K/UL (ref 135–420)
PMV BLD AUTO: 12.3 FL (ref 9.2–11.8)
POTASSIUM SERPL-SCNC: 3.5 MMOL/L (ref 3.5–5.5)
RBC # BLD AUTO: 3.85 M/UL (ref 4.2–5.3)
SODIUM SERPL-SCNC: 143 MMOL/L (ref 136–145)
WBC # BLD AUTO: 12 K/UL (ref 4.6–13.2)

## 2018-08-21 PROCEDURE — 80048 BASIC METABOLIC PNL TOTAL CA: CPT | Performed by: HOSPITALIST

## 2018-08-21 PROCEDURE — 74011250637 HC RX REV CODE- 250/637: Performed by: HOSPITALIST

## 2018-08-21 PROCEDURE — 85025 COMPLETE CBC W/AUTO DIFF WBC: CPT | Performed by: HOSPITALIST

## 2018-08-21 PROCEDURE — 36415 COLL VENOUS BLD VENIPUNCTURE: CPT | Performed by: HOSPITALIST

## 2018-08-21 PROCEDURE — 82962 GLUCOSE BLOOD TEST: CPT

## 2018-08-21 PROCEDURE — 74011250636 HC RX REV CODE- 250/636: Performed by: HOSPITALIST

## 2018-08-21 PROCEDURE — 74011636637 HC RX REV CODE- 636/637: Performed by: HOSPITALIST

## 2018-08-21 PROCEDURE — 97116 GAIT TRAINING THERAPY: CPT

## 2018-08-21 PROCEDURE — 83735 ASSAY OF MAGNESIUM: CPT | Performed by: HOSPITALIST

## 2018-08-21 RX ORDER — ONDANSETRON 2 MG/ML
4 INJECTION INTRAMUSCULAR; INTRAVENOUS
Status: DISCONTINUED | OUTPATIENT
Start: 2018-08-21 | End: 2018-08-21 | Stop reason: HOSPADM

## 2018-08-21 RX ADMIN — INSULIN LISPRO 2 UNITS: 100 INJECTION, SOLUTION INTRAVENOUS; SUBCUTANEOUS at 07:30

## 2018-08-21 RX ADMIN — ASPIRIN 325 MG ORAL TABLET 325 MG: 325 PILL ORAL at 08:48

## 2018-08-21 RX ADMIN — LEVOTHYROXINE SODIUM 50 MCG: 50 TABLET ORAL at 05:07

## 2018-08-21 RX ADMIN — METOPROLOL SUCCINATE 100 MG: 100 TABLET, EXTENDED RELEASE ORAL at 08:49

## 2018-08-21 RX ADMIN — PANTOPRAZOLE SODIUM 40 MG: 40 TABLET, DELAYED RELEASE ORAL at 08:49

## 2018-08-21 RX ADMIN — AMLODIPINE BESYLATE 10 MG: 10 TABLET ORAL at 08:49

## 2018-08-21 RX ADMIN — ENOXAPARIN SODIUM 40 MG: 100 INJECTION SUBCUTANEOUS at 01:31

## 2018-08-21 RX ADMIN — LOSARTAN POTASSIUM 100 MG: 50 TABLET ORAL at 08:48

## 2018-08-21 RX ADMIN — GABAPENTIN 300 MG: 300 CAPSULE ORAL at 08:48

## 2018-08-21 RX ADMIN — DOCUSATE SODIUM 100 MG: 100 CAPSULE, LIQUID FILLED ORAL at 08:48

## 2018-08-21 NOTE — PROGRESS NOTES
Problem: Falls - Risk of  Goal: *Absence of Falls  Document Liz Fall Risk and appropriate interventions in the flowsheet. Outcome: Progressing Towards Goal  Fall Risk Interventions:            Medication Interventions: Patient to call before getting OOB, Teach patient to arise slowly    Elimination Interventions: Patient to call for help with toileting needs, Call light in reach             Problem: Pressure Injury - Risk of  Goal: *Prevention of pressure injury  Document Andrew Scale and appropriate interventions in the flowsheet. Outcome: Progressing Towards Goal  Pressure Injury Interventions:             Activity Interventions: Assess need for specialty bed, Pressure redistribution bed/mattress(bed type), PT/OT evaluation    Mobility Interventions: HOB 30 degrees or less, Pressure redistribution bed/mattress (bed type)    Nutrition Interventions: Document food/fluid/supplement intake    Friction and Shear Interventions: Foam dressings/transparent film/skin sealants, Apply protective barrier, creams and emollients, Minimize layers, HOB 30 degrees or less

## 2018-08-21 NOTE — ANCILLARY DISCHARGE INSTRUCTIONS
Patient and/or next of kin has been given the [facility] Important Message From Medicare About Your Rights\" letter and all questions were answered.

## 2018-08-21 NOTE — PROGRESS NOTES
Bedside shift change report given to Chikis CHEATHAM RN (oncoming nurse) by Levon Hammans, RN (offgoing nurse). Report included the following information SBAR, Kardex, Intake/Output, and MAR. Pt is non tele. 1950  Shift assessment completed. PRN Tylenol given for pain 4/10 to left hip. 2040  Pain reassessed - 0/10 reported. 2148  Refused Mirtazapine, states that she does not take it anymore. 2348  Pt requested for Mirtazapine, states that she got it confused with Geodone. 0030  Pt in bed asleep. No distress noted. .    0500  Pt in bed resting, denies pain. Bedside and Verbal shift change report given to DIEGO Herbert (oncoming nurse) by Nora Rivera RN (offgoing nurse). Report included the following information SBAR, Kardex, Intake/Output and MAR.

## 2018-08-21 NOTE — DISCHARGE SUMMARY
[facility]    [facility]    Patient: Jessica Mao MRN: [id number]  CSN: [id number]    YOB: 1945  Age: 74 y.o.   Sex: female    DOA: 8/17/2018 LOS:  LOS: 4 days   Discharge Date:      Admission Diagnoses: Hypertensive emergency  Chest pain  Diabetes [facility])    Discharge Diagnoses:    Problem List as of 8/21/2018  Date Reviewed: 7/10/2018          Codes Class Noted - Resolved    Diabetes ([facility] ICD-10-CM: E11.9  ICD-9-CM: 250.00  8/17/2018 - Present        Chest pain ICD-10-CM: R07.9  ICD-9-CM: 786.50  8/17/2018 - Present        * (Principal)Hypertensive emergency ICD-10-CM: I16.1  ICD-9-CM: 401.9  8/17/2018 - Present        Gastroesophageal reflux disease without esophagitis ICD-10-CM: K21.9  ICD-9-CM: 530.81  7/10/2018 - Present        TIA (transient ischemic attack) ICD-10-CM: G45.9  ICD-9-CM: 435.9  3/21/2018 - Present        History of stroke ICD-10-CM: Z86.73  ICD-9-CM: V12.54  3/21/2018 - Present        Neurogenic bladder ICD-10-CM: N31.9  ICD-9-CM: 596.54  2/21/2018 - Present        Urgency of urination ICD-10-CM: R39.15  ICD-9-CM: 788.63  12/6/2017 - Present        Lumbar radiculopathy ICD-10-CM: M54.16  ICD-9-CM: 724.4  9/14/2016 - Present        Sepsis ([facility] ICD-10-CM: A41.9  ICD-9-CM: 038.9, 995.91  5/31/2016 - Present        Acute abdominal pain ICD-10-CM: R10.9  ICD-9-CM: 789.00, 338.19  5/5/2016 - Present        SBO (small bowel obstruction) ([facility] ICD-10-CM: R57.665  ICD-9-CM: 560.9  12/1/2014 - Present        Depression ICD-10-CM: F32.9  ICD-9-CM: 860  Unknown - Present        Scoliosis ICD-10-CM: M41.9  ICD-9-CM: 737.30  Unknown - Present        Stenosis of lumbosacral spine ICD-10-CM: M48.07  ICD-9-CM: 724.02  Unknown - Present        Gastroparesis ICD-10-CM: K31.84  ICD-9-CM: 536.3  Unknown - Present        Gastric ulcer ICD-10-CM: K25.9  ICD-9-CM: 531.90  Unknown - Present        Tardive dyskinesia ICD-10-CM: G24.01  ICD-9-CM: 333.85  Unknown - Present        Hypercholesteremia ICD-10-CM: E78.00  ICD-9-CM: 272.0  Unknown - Present        Diverticulosis ICD-10-CM: K57.90  ICD-9-CM: 562.10  Unknown - Present        Early satiety ICD-10-CM: R68.81  ICD-9-CM: 780.94  Unknown - Present        Dysphagia ICD-10-CM: R13.10  ICD-9-CM: 787.20  Unknown - Present        Heart disease ICD-10-CM: I51.9  ICD-9-CM: 429.9  Unknown - Present        Anxiety ICD-10-CM: F41.9  ICD-9-CM: 300.00  Unknown - Present        Fatty liver ICD-10-CM: K76.0  ICD-9-CM: 571.8  Unknown - Present        Small bowel obstruction ([facility] ICD-10-CM: S84.642  ICD-9-CM: 560.9  4/8/2013 - Present        HTN (hypertension) ICD-10-CM: I10  ICD-9-CM: 401.9  4/8/2013 - Present        DMII (diabetes mellitus, type 2) ([facility] ICD-10-CM: E11.9  ICD-9-CM: 250.00  4/8/2013 - Present        UTI (lower urinary tract infection) ICD-10-CM: N39.0  ICD-9-CM: 599.0  4/8/2013 - Present              Discharge Condition: Stable    Discharge To: SNF    Consults: Hospitalist    Hospital Course: 75 [facility] female with hx of CAD, HTN, HLD, CVA with residual left side weakness, DM, chronic pain, depression presented to the ED on 8/17 with SOB and weakness. Pt reports this has been ongoing at home for ~3 days. Pt report central chest pain that radiated towards her back for ~ 24 hours. EMS called, pt was given  mg en route. In the ED, she was found to have hypertensive emergency with systolic BP over 947 mmHg. She was started on NTG gtt and admitted to ICU for further management of care. Per EMR, pt had negative nuclear stress test ~one week ago. Negative troponin. She was able to be titrated off Nitro gtt - on oral antihypertensives. No further c/o chest pain. Pt was to discharge home on 8/20, however decided she wanted to go to rehab/SNF. Case management following, pt declined by [facility] accepted pt. BP remains controlled.   She is appropriate for d/c home, to follow up with PCP within one week. Physical Exam:  General appearance: alert, cooperative, no distress, appears stated age  Head: Normocephalic, without obvious abnormality, atraumatic  Lungs: clear to auscultation bilaterally  Heart: regular rate and rhythm, S1, S2 normal, no murmur, click, rub or gallop  Abdomen: soft, non-tender. Bowel sounds normal. No masses,  no organomegaly  Extremities: extremities normal, atraumatic, no cyanosis or edema  Skin: Skin color, texture, turgor normal. No rashes or lesions  Neurologic: Grossly normal  PSY: Mood and affect normal, appropriately behaved    Significant Diagnostic Studies: labs:   Recent Results (from the past 24 hour(s))   GLUCOSE, POC    Collection Time: 08/20/18 11:56 AM   Result Value Ref Range    Glucose (POC) 420 (HH) 70 - 110 mg/dL   GLUCOSE, POC    Collection Time: 08/20/18 11:57 AM   Result Value Ref Range    Glucose (POC) 289 (H) 70 - 110 mg/dL   GLUCOSE, POC    Collection Time: 08/20/18  4:52 PM   Result Value Ref Range    Glucose (POC) 126 (H) 70 - 110 mg/dL   GLUCOSE, POC    Collection Time: 08/20/18  7:31 PM   Result Value Ref Range    Glucose (POC) 208 (H) 70 - 110 mg/dL   GLUCOSE, POC    Collection Time: 08/20/18 11:18 PM   Result Value Ref Range    Glucose (POC) 172 (H) 70 - 110 mg/dL   CBC WITH AUTOMATED DIFF    Collection Time: 08/21/18  6:15 AM   Result Value Ref Range    WBC 12.0 4.6 - 13.2 K/uL    RBC 3.85 (L) 4.20 - 5.30 M/uL    HGB 11.1 (L) 12.0 - 16.0 g/dL    HCT 34.7 (L) 35.0 - 45.0 %    MCV 90.1 74.0 - 97.0 FL    MCH 28.8 24.0 - 34.0 PG    MCHC 32.0 31.0 - 37.0 g/dL    RDW 13.1 11.6 - 14.5 %    PLATELET 074 338 - 285 K/uL    MPV 12.3 (H) 9.2 - 11.8 FL    NEUTROPHILS 57 40 - 73 %    LYMPHOCYTES 32 21 - 52 %    MONOCYTES 9 3 - 10 %    EOSINOPHILS 2 0 - 5 %    BASOPHILS 0 0 - 2 %    ABS. NEUTROPHILS 6.9 1.8 - 8.0 K/UL    ABS. LYMPHOCYTES 3.8 (H) 0.9 - 3.6 K/UL    ABS. MONOCYTES 1.0 0.05 - 1.2 K/UL    ABS. EOSINOPHILS 0.3 0.0 - 0.4 K/UL    ABS.  BASOPHILS 0.0 0.0 - 0.1 K/UL    DF AUTOMATED     METABOLIC PANEL, BASIC    Collection Time: 08/21/18  6:15 AM   Result Value Ref Range    Sodium 143 136 - 145 mmol/L    Potassium 3.5 3.5 - 5.5 mmol/L    Chloride 106 100 - 108 mmol/L    CO2 30 21 - 32 mmol/L    Anion gap 7 3.0 - 18 mmol/L    Glucose 168 (H) 74 - 99 mg/dL    BUN 9 7.0 - 18 MG/DL    Creatinine 0.65 0.6 - 1.3 MG/DL    BUN/Creatinine ratio 14 12 - 20      GFR est AA >60 >60 ml/min/1.73m2    GFR est non-AA >60 >60 ml/min/1.73m2    Calcium 8.4 (L) 8.5 - 10.1 MG/DL   MAGNESIUM    Collection Time: 08/21/18  6:15 AM   Result Value Ref Range    Magnesium 2.0 1.6 - 2.6 mg/dL   GLUCOSE, POC    Collection Time: 08/21/18  7:19 AM   Result Value Ref Range    Glucose (POC) 166 (H) 70 - 110 mg/dL         Discharge Medications:     Current Discharge Medication List      CONTINUE these medications which have NOT CHANGED    Details   furosemide (LASIX) 40 mg tablet Take 40 mg by mouth as needed. gabapentin (NEURONTIN) 300 mg capsule Take 300 mg by mouth three (3) times daily. HYDROcodone-acetaminophen (NORCO)  mg tablet Take 1 Tab by mouth every six (6) hours as needed for Pain.      potassium chloride SR (KLOR-CON 10) 10 mEq tablet Take 10 mEq by mouth as needed. metoprolol succinate (TOPROL-XL) 100 mg tablet Take 100 mg by mouth daily. Refills: 6      aspirin (ASPIRIN) 325 mg tablet Take 1 Tab by mouth daily. Qty: 30 Tab, Refills: 0      glimepiride (AMARYL) 1 mg tablet Take 1 mg by mouth two (2) times a day. fentaNYL (DURAGESIC) 25 mcg/hr PATCH 1 Patch by TransDERmal route every seventy-two (72) hours. amLODIPine (NORVASC) 10 mg tablet 10 mg.      ibuprofen (MOTRIN) 200 mg tablet Take 400 mg by mouth every eight (8) hours as needed for Pain. oxyCODONE-acetaminophen (PERCOCET 10)  mg per tablet Take 1 Tab by mouth every six (6) hours as needed for Pain. omeprazole (PRILOSEC) 40 mg capsule Take 40 mg by mouth daily. cholecalciferol (VITAMIN D3) 1,000 unit cap Take 1,000 Units by mouth daily. losartan (COZAAR) 100 mg tablet Take 100 mg by mouth daily. venlafaxine-SR (EFFEXOR XR) 150 mg capsule Take 150 mg by mouth daily. cyanocobalamin (VITAMIN B-12) 1,000 mcg tablet Take 1,000 mcg by mouth daily. docusate sodium (COLACE) 100 mg capsule Take 100 mg by mouth daily. betamethasone dipropionate (DIPROSONE) 0.05 % topical cream Apply  to affected area daily as needed for Skin Irritation. tiZANidine (ZANAFLEX) 2 mg tablet Take 2 mg by mouth as needed. Refills: 3      conjugated estrogens (PREMARIN) 0.625 mg/gram vaginal cream Apply small amount to introitus MWF as directed. Qty: 30 g, Refills: 1      atorvastatin (LIPITOR) 80 mg tablet Take 1 Tab by mouth nightly. Qty: 30 Tab, Refills: 0      mirtazapine (REMERON) 15 mg tablet Take 15 mg by mouth nightly. levothyroxine (SYNTHROID) 50 mcg tablet Take 50 mcg by mouth Daily (before breakfast). ziprasidone (GEODON) 20 mg capsule Take 20 mg by mouth daily. polyethylene glycol (MIRALAX) 17 gram packet Take 17 g by mouth every fourty-eight (48) hours. sucralfate (CARAFATE) 1 gram tablet Take 1 g by mouth three (3) times daily. Activity: Activity as tolerated and PT/OT Eval and Treat    Diet: Cardiac Diet and Diabetic Diet    Wound Care: None needed    Follow-up: Follow up with PCP within one week.     Discharge time: > 35 mins  Megan Bridges NP  8/21/2018, 10:35 AM

## 2018-08-21 NOTE — DIABETES MGMT
GLYCEMIC CONTROL AND NUTRITION    Assessment/Recommendations:  Pt is 156% ideal wt with BMI = 32.4 kg/m2 (obese weight classification). Pt appears well nourished. Weight has been stable since prior admission 3/2018 (per documented weights). Good glycemic control today. Pt states she left her dentures at home and needs soft foods. She is being transferred today to rehab facility. Will adjust diet to dental soft in event her transfer is delayed. Most recent blood glucose values:  8/21/18:  166, 146  8/20/18:  147, 420, 289, 208, 126, 172    Current A1C of 8.3 % is equivalent to average blood glucose of ___189_mg/dl over the past 2-3 months. Current hospital diabetes medications: corrective lispro normal insulin sensitivity ACHS    Previous day's insulin requirements: 8 units    Home diabetes medications:  glimepiride 1 mg/d    Diet:  Consistent CHO - intake at lunch - 250 calories, 5 grams protein.   Ht - 65\"     Wt Readings from Last 3 Encounters:   08/20/18 89.7 kg (197 lb 12 oz)   08/10/18 89.4 kg (197 lb)   08/10/18 89.5 kg (197 lb 6 oz)     ideal wt - 56.8 kg    3/21/18 weight from prior admission - 90.7 kg      Education:  ____Refer to Diabetes Education Record             _x___Education not indicated at this time      Karen Lopes, [address], [address]   Office:  [address] [place] [address] Pager:  [phone]

## 2018-08-21 NOTE — ROUTINE PROCESS
0730 Bedside, Verbal and Written shift change report given to [facility] (oncoming nurse) by Pancho Willis RN (offgoing nurse). Report included the following information SBAR and Kardex.  Patient currently resting in bed, alert and oriented to all spheres, denies pain or shortness of breath, call bell in reach, 5 Ps and hourly rounding completed, bed locked in low position     0934 pt notified this nurse she is having nausea- notified Dr Veena Johnson, gave verbal order for zofran 4 mg IV q 6 hours prn- order placed

## 2018-08-21 NOTE — PROGRESS NOTES
Transportation at Discharge:  8/21/2018    Transport Company/Representative:  [address] / [employer] number: [phone]  Reference number:  None     Estimated pick-up time:  4:00P  Destination:  [facility]    Method of Transport: Stretcher/ BLS    Insurance Info: Medicare / [facility]  Authorization: [id number]     [id number] transportation arrangements at the request of BALAJI Gaffney 81, [id number] ext [phone]

## 2018-08-21 NOTE — MANAGEMENT PLAN
Discharge/Transition Planning    0930: Notified patient that [facility] and [facility] had declined. [facility] is only accepting facility. Pt went off on several tangents and needed frequent redirection. She is followed by [facility] for mental health. Pt stated she needed time to decide. 1200: Transport not available till 4 pm with [facility]. PCS form and envelop[e at nurses station. RN to call report to [phone]. Pt going to [address], Room [address]B. Pt cannot travel safely without Medical transport at this time      Care Management Interventions  PCP Verified by CM:  Yes  Palliative Care Criteria Met (RRAT>21 & CHF Dx)?: No  Mode of Transport at Discharge: BLS  Transition of Care Consult (CM Consult): Discharge Planning, SNF  Discharge Durable Medical Equipment: No  Physical Therapy Consult: Yes  Occupational Therapy Consult: Yes  Speech Therapy Consult: No  Current Support Network: Lives Alone  Confirm Follow Up Transport: Family  Plan discussed with Pt/Family/Caregiver: Yes  Freedom of Choice Offered: Yes  Discharge Location  Discharge Placement: Skilled nursing facility [facility]      David Cortez RN BSN  Outcomes Manager  Pager # [phone]

## 2018-08-21 NOTE — PROGRESS NOTES
Problem: Mobility Impaired (Adult and Pediatric)  Goal: *Acute Goals and Plan of Care (Insert Text)  Physical Therapy Goals  Initiated 8/19/2018 and to be accomplished within 7 day(s)  1. Patient will move from supine to sit and sit to supine , scoot up and down and roll side to side in bed with modified independence. 2.  Patient will transfer from bed to chair and chair to bed with modified independence using the least restrictive device. 3.  Patient will perform sit to stand with modified independence. 4.  Patient will ambulate with modified independence for >/= 100 feet with the least restrictive device. Outcome: Progressing Towards Goal  physical Therapy TREATMENT    Patient: Sarbjit Palomino (19 y.o. female)  Date: 8/21/2018  Diagnosis: Hypertensive emergency  Chest pain  Diabetes [facility]) Hypertensive emergency  Precautions:   Chart, physical therapy assessment, plan of care and goals were reviewed. PLOF:ambulatory with a RW  ASSESSMENT:  Pt ambulated 24ft with RW, forward flexed posture, decreased step height and length due to posture, decreased pace, no LOB or path deviations. No assistance needed for bed mobility. Education: posture  Progression toward goals:  []      Improving appropriately and progressing toward goals  [x]      Improving slowly and progressing toward goals  []      Not making progress toward goals and plan of care will be adjusted     PLAN:  Patient continues to benefit from skilled intervention to address the above impairments. Continue treatment per established plan of care.   Discharge Recommendations:  Home Health vs Skilled Nursing Facility  Further Equipment Recommendations for Discharge:  Pt has a RW     SUBJECTIVE:   Patient stated I want to go to rehab because the home therapist don't have any machines to work out on.    OBJECTIVE DATA SUMMARY:   Critical Behavior:  Neurologic State: Anesthetized  Orientation Level: Oriented X4  Cognition: Follows commands  Safety/Judgement: Fall prevention  Functional Mobility Training:  Bed Mobility:  Supine to Sit: Supervision  Sit to Supine: Stand-by assistance  Transfers:  Sit to Stand: Contact guard assistance; Additional time  Stand to Sit: Contact guard assistance  Balance:  Sitting: Intact; With support  Sitting - Static: Good (unsupported)  Sitting - Dynamic: Fair (occasional)  Standing: Impaired; With support  Standing - Static: Poor  Standing - Dynamic : Poor  Ambulation/Gait Training:  Distance (ft): 24 Feet (ft)  Assistive Device: Gait belt;Walker, rolling  Ambulation - Level of Assistance: Contact guard assistance  Gait Abnormalities: Decreased step clearance  Base of Support: Center of gravity altered  Speed/Amy: Delayed; Slow  Step Length: Right shortened;Left shortened  GCODE:Mobility  Current  CJ= 20-39%   Goal  CK= 40-59%. The severity rating is based on the Level of Assistance required for Functional Mobility and ADLs. Pain:  Pre:0  Post:0  Pain Scale 1: Numeric (0 - 10)  Pain Intensity 1: 0  Activity Tolerance:   Fair-  Please refer to the flowsheet for vital signs taken during this treatment.   After treatment:   [] Patient left in no apparent distress sitting up in chair  [x] Patient left in no apparent distress in bed  [x] Call bell left within reach  [] Nursing notified  [] Caregiver present  [] Bed alarm activated      Jennifer Pollard PTA   Time Calculation: 17 mins

## 2018-10-22 ENCOUNTER — HOSPITAL ENCOUNTER (OUTPATIENT)
Dept: GENERAL RADIOLOGY | Age: 73
Discharge: HOME | End: 2018-10-22
Attending: NEUROLOGICAL SURGERY
Payer: MEDICARE

## 2018-10-22 DIAGNOSIS — M54.2 NECK PAIN: ICD-10-CM

## 2018-10-22 PROCEDURE — 72050 X-RAY EXAM NECK SPINE 4/5VWS: CPT

## 2018-11-17 ENCOUNTER — HOSPITAL ENCOUNTER (OUTPATIENT)
Dept: CT IMAGING | Age: 73
Discharge: HOME | End: 2018-11-17
Attending: INTERNAL MEDICINE
Payer: MEDICARE

## 2018-11-17 DIAGNOSIS — J84.10 PULMONARY FIBROSIS, UNSPECIFIED (HCC): ICD-10-CM

## 2018-11-17 PROCEDURE — 71250 CT THORAX DX C-: CPT

## 2019-01-28 ENCOUNTER — HOSPITAL ENCOUNTER (OUTPATIENT)
Dept: PHYSICAL THERAPY | Age: 74
End: 2019-01-28

## 2019-02-04 ENCOUNTER — HOSPITAL ENCOUNTER (OUTPATIENT)
Dept: PHYSICAL THERAPY | Age: 74
Discharge: HOME | End: 2019-02-04
Payer: MEDICARE

## 2019-02-04 PROCEDURE — 97110 THERAPEUTIC EXERCISES: CPT

## 2019-02-04 PROCEDURE — 97163 PT EVAL HIGH COMPLEX 45 MIN: CPT

## 2019-02-04 PROCEDURE — 97162 PT EVAL MOD COMPLEX 30 MIN: CPT

## 2019-02-04 NOTE — PROGRESS NOTES
PHYSICAL THERAPY - DAILY TREATMENT NOTE Patient Name: Tami Parks        Date: 2019 : 1945   YES Patient  Verified Visit #:     Insurance: Payor: Sujata Solano / Plan: [place] MEDICARE PART A & B / Product Type: Medicare / In time: 2:30 Out time: 3:20 Total Treatment Time: 50 Medicare Time Tracking (below) Total Timed Codes (min):  10 1:1 Treatment Time:  10 TREATMENT AREA = Left low back pain [M54.5] SUBJECTIVE Pain Level (on 0 to 10 scale):  3   10 Medication Changes/New allergies or changes in medical history, any new surgeries or procedures? NO    If yes, update Summary List  
Subjective Functional Status/Changes:  []  No changes reported CC: low back pain CEDRICK/HPI: Pt arrived in a , but reports she occasionally uses a walker or cane around the home. Pt reports complaints of back pain and bilateral calf pain. She uses muscle relaxers ocassional but she feels unstable when using them so tries to avoid them. Pt had a history of CVA in 2018 and scoliosis surgery 2018. Pt reports bilateral diabetic neuropathy. Pt reports she is also currently being treated for a UTI. She has nurses aid helps her with getting in and out of bed, with dressing, and meal prep. At home she sleeps in a hospital bed and has a bedside commode. Pain Today: 3/10 Best: 2/10 Worst: 8/10 Numbness/tingling: bilateral LE 
 [] Contstant:   
 [x] Intermittent: 
 
Aggravating factors: moving, twisting, bending over Relieving Factors: pain meds, laying down with legs propped Past History/Treatments: previous PT at [facility] [place] for back pain and weakness (2018) Pain with functional activities: 
[x] Yes [] No Sit<>stand 
[x] Yes [] No Squatting 
[x] Yes [] No Lifting 
[x] Yes [] No Walking General Health:  Red Flags Indicated? [] Yes    [x] No 
[] Yes [x] No Recent weight change (If yes, due to dieting? [] Yes  [] No) [] Yes [x] No Unremitting pain at night [x] Yes [] No Dysfunction of bowel or bladder (UTI) OBJECTIVE Observation/Posture: increased thoracic kyphosis and forward head with severe scoliosis Standing posture: 66 deg trunk flexion (measured on left side on flank) Palpation: tenderness along bilateral lumbar and thoracic paraspinals Gait: RW x 75' - decreased speed and step length bilaterally STS: Mod-I, use of bilateral UE Bed mobility: Mod-I, decreased speed Supine <> Sit: Tal for LE handling Active Movements: [] N/A   [] Too acute   [] Other: ROM  AROM Comments:pain, area Forward flexion 40-60 NT Standing posture: 66 deg hip flexion Rotation right 5-10 75% Rotation left 5-10 50% Left hip extension - 17 Left knee extension 5 LE Strength Left Right Hip flexion 3+/5 3+/5 Hip extension Hip abduction (leg bent) 3+/5 3+/5 Knee extension 3+/5 3+/5 Knee flexion 4-/5 4-/5 Ankle DF 4-/5 4-/5 Special Tests Flexibility Deficits: bilateral hip flexor tightness: L hip ext -17 deg in supine Rhomberg EO: 20 sec, Total standing tolerance: 60 sec Therapeutic Procedures: 
Min Procedure Specifics + Rationale  
n/a [x]  Patient Education (performed throughout session) [x] Review HEP [] Progressed/Changed HEP based on:  
[] proper performance and advancement of Therex/TA [] reduction in pain level   
[] increased functional capacity [] change in directional preference 10 [x] Therapeutic Exercise [x]  See Flowsheet Rationale: increase ROM and increase strength to improve the patients ability to participate in ADL's Other Objective/Functional Measures: HEP initiated to include LAQ, glute squeezes, and seated scap stabs. Post Treatment Pain Level (on 0 to 10) scale:   3  / 10 ASSESSMENT Assessment/Changes in Function: see POC Justification for Eval Code Complexity: moderate Patient History (low 0, mod 1-2, high 3-4):high; obese, previously had 4 back surgeries, diabetic neuropathy, CVA, requires home care limiting initiative, Left TKA Examination (low 1-2, mod 3+, high 4+): high; decreased lumbar AROM, decreased LE strength, abnormal gait pattern, impaired balance, poor beg mobility Clinical Presentation (low: stable/uncomplicated; mod: evolving; high: unstable/unpredictable): high Clinical Decision Making (low , mod 26-74, high 1-25): bob; FOTO; 18 
 
[x]  See Plan of Care 
[]  See Progress Note/ Recertification 
[]  See Discharge Summary Patient will continue to benefit from skilled PT services to modify and progress therapeutic interventions, address functional mobility deficits, address ROM deficits, address strength deficits, analyze and address soft tissue restrictions, analyze and cue movement patterns, analyze and modify body mechanics/ergonomics, assess and modify postural abnormalities, address imbalance/dizziness and instruct in home and community integration  to attain remaining goals Progress toward goals / Updated goals: 
See POC. PLAN [x]  Upgrade activities as tolerated 
[x]  Update interventions per flow sheet YES Continue plan of care  
[]  Discharge due to :   
[]  Other:   
 
Therapist: Lynne Ramsey DPT Date: 2/4/2019 Time: 2:25 PM  
 
Future Appointments Date Time Provider Nikki Villanueva 2/4/2019  2:30 PM Katie Suárez, [facility]

## 2019-02-04 NOTE — PROGRESS NOTES
Chandni Schulz [address]  [facility] PHYSICAL THERAPY 
[address] [facility] [address], [place], [address] - Phone: [phone]  Fax: [phone] PLAN OF CARE / STATEMENT OF MEDICAL NECESSITY FOR PHYSICAL THERAPY SERVICES Patient Name: Kerri Rosales : 1945 Medical  
Diagnosis: Left low back pain [M54.5] Treatment Diagnosis: Low back pain Onset Date: Chronic, with recent increase in weakness and decrease in amb tolerance Referral Source: Kimberly Leon MD [facility]): 2019 Prior Hospitalization: See medical history Provider #: [id number] Prior Level of Function: Gradual decline in function, has nurse at home, Limited amb with SPC/RW Comorbidities: Diabetes, HTN, scoliosis, CVA, left TKA, Hx of multiple L/S surgeries Medications: Verified on Patient Summary List  
The Plan of Care and following information is based on the information from the initial evaluation.  
========================================================================================== Assessment / key information:  Pt is a 74year old female who presents to PT today with chronic low back pain. She previously has had 4 corrective surgeries for scoliosis, with the most recent being in 2018. Pt arrived today in a  but reports she occasionally uses a RW or SPC within her home. Pt currently requires assist in home for dressing, grooming, and meal prep. Pt presents with abnormal standing posture characterized by severe hip flexion (66 deg), decreased standing tolerance, decreased balance, decreased LE strength bilaterally, and decreased lumbar AROM. Pt would benefit from skilled therapy to address these impairments to reduce her risk of falls and help her improve her ability to ambulate within her home and the community. FOTO score 18 indicating 82% functional disability. OBJECTIVE Observation/Posture: increased thoracic kyphosis and forward head with severe scoliosis Standing posture: 66 deg trunk flexion  
  
Palpation: tenderness along bilateral lumbar and thoracic paraspinals 
  
Gait: RW x 75' - decreased speed and step length bilaterally  
  
STS: Mod-I, use of bilateral UE Bed mobility: Mod-I, decreased speed Supine <> Sit: Tal for LE handling  
  
Active Movements: [] N/A   [] Too acute   [] Other: ROM  AROM Comments:pain, area Forward flexion 40-60 NT Standing posture: 66 deg hip flexion Rotation right 5-10 75%    
Rotation left 5-10 50%   Left hip extension - 17deg    
Left knee extension Lacking 5deg    
  *right hip and knee extension also limited; all other LE AROM WNL 
  
LE Strength 
  Left Right Hip flexion 3+/5 3+/5 Hip extension      
Hip abduction (leg bent) 3+/5 3+/5 Knee extension 3+/5 3+/5 Knee flexion 4-/5 4-/5 Ankle DF 4-/5 4-/5  
  
  
Special Tests Flexibility Deficits: bilateral hip flexor tightness: L hip ext -17 deg in supine 
  
Rhomberg EO: 20 sec Total standing tolerance: 60 sec  
========================================================================================== Eval Complexity: History: HIGH Complexity :3+ comorbidities / personal factors will impact the outcome/ POC Exam:HIGH Complexity : 4+ Standardized tests and measures addressing body structure, function, activity limitation and / or participation in recreation  Presentation: HIGH Complexity : Unstable and unpredictable characteristics  Clinical Decision Making:HIGH Complexity : FOTO score of 1- 25 Overall Complexity:HIGH Problem List: pain affecting function, decrease ROM, decrease strength, impaired gait/ balance, decrease ADL/ functional abilitiies, decrease activity tolerance, decrease flexibility/ joint mobility and decrease transfer abilities Treatment Plan may include any combination of the following: Therapeutic exercise, Therapeutic activities, Neuromuscular re-education, Physical agent/modality, Gait/balance training, Manual therapy, Aquatic therapy, Patient education, Self Care training, Functional mobility training, Home safety training and Stair training Patient / Family readiness to learn indicated by: asking questions, trying to perform skills and interest 
Persons(s) to be included in education: patient (P) Barriers to Learning/Limitations: None Measures taken: NA  
Patient Goal (s): Reduce pain and improve ability to stand upright Patient self reported health status: fair Rehabilitation Potential: fair ? Short Term Goals: To be accomplished in  2  weeks: 1. Pt will be compliant with HEP for symptom management at home. 2. Pt will increase her Rhomberg EO to >/= 30 sec, indicating improving balance and a reduction of falls risk. 3. Pt will have a standing left hip flexion angle of </= 45 to reduce the stress on her spine and other structures. ? Long Term Goals: To be accomplished in  4  weeks: 1. Pt will be independent with HEP at D/C for self management. 2. Pt will improve bilateral knee flexion strength to 4/5 to help with bed mobility and standing tolerance. 3. Pt will have a standing left hip flexion angle of </= 30 deg to allow her improved ability to ambulate and reduce the risk of further derangement. 4. Pt will improve her FOTO to 30, indicating a reduction in disability. Frequency / Duration:   Patient to be seen  2  times per week for 4  weeks: script stated 4-12 weeks but pt voiced concern over activity tolerance and transportation needs so will see pt for 4 weeks then reassess to decide appropriateness of therapy. Patient / Caregiver education and instruction: self care, activity modification and exercises Therapist Signature: James Taylor DPT Date: 2/4/2019 Certification Period: 2/4/19 - 5/3/19 Time: 2:18 PM  
=========================================================================================== 
 I certify that the above Physical Therapy Services are being furnished while the patient is under my care. I agree with the treatment plan and certify that this therapy is necessary. Physician Signature:       Date:      Time:  Please sign and return to [employer] or you may fax the signed copy to [phone]. Thank you.

## 2019-02-12 ENCOUNTER — HOSPITAL ENCOUNTER (OUTPATIENT)
Dept: PHYSICAL THERAPY | Age: 74
Discharge: HOME | End: 2019-02-12
Payer: MEDICARE

## 2019-02-12 PROCEDURE — 97110 THERAPEUTIC EXERCISES: CPT

## 2019-02-12 NOTE — PROGRESS NOTES
PHYSICAL THERAPY - DAILY TREATMENT NOTE Patient Name: Jean Rudolph        Date: 2019 : 1945   YES Patient  Verified Visit #:   2     Insurance: Payor: Nina Capps / Plan: [place] MED[place] PART [place] & B / Product Type: Medicare / In time: 205 Out time: 36 Total Treatment Time: 45 Medicare/BCBS Time Tracking (below) Total Timed Codes (min):  38 1:1 Treatment Time:  45 TREATMENT AREA =  Left low back pain [M54.5] SUBJECTIVE Pain Level (on 0 to 10 scale):  4  / 10 Medication Changes/New allergies or changes in medical history, any new surgeries or procedures? NO    If yes, update Summary List  
Subjective Functional Status/Changes:  []  No changes reported Pt reports she felt better after initial evaluation. Reports compliance with HEP. OBJECTIVE 38 min Therapeutic Exercise:  [x]  See flow sheet Rationale:      increase ROM and increase strength to improve the patients ability to participate in ADL's  
 min Patient Education:  Machelle Valencia []  Progressed/Changed HEP based on: Other Objective/Functional Measures: 
See flowsheet for more details Mod I: for stand pivot transfers and mod A assistance for LE with sit to supine transfers. Increased lower back and hip pain with supine fowlers position laying. Pt requested to lay with wedge due to increased dizziness/nausea. Pt education regarding delayed onset muscle soreness was provided. Pt demonstrated understanding of increased soreness that should resolved after a couple of days. Progressed therex per flowsheet Post Treatment Pain Level (on 0 to 10) scale:   0  / 10 ASSESSMENT Assessment/Changes in Function:  
 
Patient tolerated treatment session well with reports of lower back/hip pain with supine position. Able to sit>stand transfer from elevated surface with BUE support via RW.  
  
[]  See Progress Note/Recertification Patient will continue to benefit from skilled PT services to analyze,, cue,, progress,, modify,, demonstrate,, instruct, and address, movement patterns,, therapeutic interventions,, postural abnormalities,, soft tissue restrictions,, ROM,, strength,, functional mobility,, body mechanics/ergonomics, and home and community integration, to attain remaining goals. Progress toward goals / Updated goals: 
1st Session since initial evaluation, no notable progress PLAN [x]  Upgrade activities as tolerated YES Continue plan of care  
[]  Discharge due to :   
[]  Other:   
 
Therapist: Maeve Robles DPT Date: 2/12/2019 Time: 7:29 AM  
 
Future Appointments Date Time Provider Nikki Villanueva 2/12/2019  2:00 PM Artisios Jeans [facility]  
2/14/2019  2:30 PM Elle Crouch [facility]  
2/19/2019  2:00 PM Elle Crouch [facility]  
2/21/2019  2:30 PM Elle Crouch [facility]  
2/25/2019  8:30 AM [facility] [address] [facility] SERV [facility]  
2/26/2019  2:00 PM Elle Crouch PT [facility]  
2/28/2019  2:00 PM Nelson Suárez, [facility]

## 2019-02-14 ENCOUNTER — HOSPITAL ENCOUNTER (OUTPATIENT)
Dept: PHYSICAL THERAPY | Age: 74
Discharge: HOME | End: 2019-02-14
Payer: MEDICARE

## 2019-02-14 PROCEDURE — 97110 THERAPEUTIC EXERCISES: CPT

## 2019-02-14 NOTE — PROGRESS NOTES
PHYSICAL THERAPY - DAILY TREATMENT NOTE Patient Name: Faisal Juarez        Date: 2019 : 1945   YES Patient  Verified Visit #:   3     Insurance: Payor: Pola Parra / Plan: [place] MEDICARE PART A & B / Product Type: Medicare / In time: 2:35 Out time: 3:05 Total Treatment Time: 30 Medicare/BCBS [place] Time Tracking (below) Total Timed Codes (min):  25 1:1 Treatment Time:  25 TREATMENT AREA =  LBP 
SUBJECTIVE Pain Level (on 0 to 10 scale):  3  / 10 Medication Changes/New allergies or changes in medical history, any new surgeries or procedures? NO    If yes, update Summary List  
Subjective Functional Status/Changes:  []  No changes reported \"Can I do some walking today\" OBJECTIVE 
 
30 
(25) min Therapeutic Exercise:  [x]  See flow sheet Rationale:      increase ROM and increase strength to improve the patients ability to tolerate improved upright stance and posture  
 min Patient Education:  YES  Reviewed HEP []  Progressed/Changed HEP based on:   Cont HEP Other Objective/Functional Measures: VC for form through out session Pt reported mild right hip pain when sittin gin long sit however improved with Fowlers HL Pt able to amb 50 feet with RW 
  
Post Treatment Pain Level (on 0 to 10) scale:   0  / 10 ASSESSMENT Assessment/Changes in Function:  
 
Good tolerance to added Nu-step and amb with RW 
  
[]  See Progress Note/Recertification Patient will continue to benefit from skilled PT services to analyze, cue, progress, modify,, demonstrate, instruct, and address, movement patterns, therapeutic interventions, postural abnormalities, soft tissue restrictions, ROM, strength, functional mobility, body mechanics/ergonomics, and home and community integration, to attain remaining goals. Progress toward goals / Updated goals: 
Plan to initiate hip flexor stretching NV  
PLAN 
 
[]  Upgrade activities as tolerated YES Continue plan of care []  Discharge due to :   
[]  Other:   
 
Therapist: Gerda Davis DPT Date: 2/14/2019 Time: 3:28 PM  
 
Future Appointments Date Time Provider Nikki Villanueva 2/19/2019  2:00 PM Merline Abdalla [address]  
2/21/2019  2:30 PM Merline Abdalla [address]  
2/25/2019  8:30 AM [address] [place] [address] Drive [address] [facility] CV SERV [address]  
2/26/2019  2:00 PM Merline Abdalla PT [address]  
2/28/2019  2:00 PM Anamika Suárez, [address]

## 2019-02-19 ENCOUNTER — HOSPITAL ENCOUNTER (OUTPATIENT)
Dept: PHYSICAL THERAPY | Age: 74
Discharge: HOME | End: 2019-02-19
Payer: MEDICARE

## 2019-02-19 PROCEDURE — 97110 THERAPEUTIC EXERCISES: CPT

## 2019-02-19 NOTE — PROGRESS NOTES
PHYSICAL THERAPY - DAILY TREATMENT NOTE Patient Name: Drew Hernandez        Date: 2019 : 1945   YES Patient  Verified Visit #:   4   of   8  Insurance: Payor: Santiago Duffy / Plan: Nette Hernandez / Product Type: Medicare / In time: 2:00 Out time: 2:45 Total Treatment Time: 45 Medicare/BCBS [place] Time Tracking (below) Total Timed Codes (min):  35 1:1 Treatment Time:  25 TREATMENT AREA =  LBP 
SUBJECTIVE Pain Level (on 0 to 10 scale):  5  / 10 Medication Changes/New allergies or changes in medical history, any new surgeries or procedures? NO    If yes, update Summary List  
Subjective Functional Status/Changes:  []  No changes reported \"I did something I shouldn't have. I took a stretching class next door earlier today and now I'm sore, but I'll try to make it. \" OBJECTIVE Modalities Rationale:    decrease pain and increase tissue extensibility to improve patient's ability to reduce contracture and reduce post session soreness. min [] Estim, type/location:   
                                 []  att     []  unatt     []  w/US     []  w/ice    []  w/heat 
 min []  Mechanical Traction: type/lbs   
               []  pro   []  sup   []  int   []  cont    []  before manual    []  after manual  
 min []  Ultrasound, settings/location:    
 min []  Iontophoresis w/ dexamethasone, location:   
                                           []  take home patch-6hour remove at        []  in clinic  
10 min []  Ice     [x]  Heat    location/position: L/S and right thigh; Fowlers with feet elevated  
 min []  Vasopneumatic Device, press/temp:   
 min []  Other:   
[x] Skin assessment post-treatment (if applicable):   
[x]  intact    []  redness- no adverse reaction    
[]redness  adverse reaction:   
 
35(25) min Therapeutic Exercise:  [x]  See flow sheet Rationale:      increase ROM, increase strength and improve balance to improve the patients ability to ambulate safely 5(0) min Manual Therapy: Sidelying manual hip flexor stretch Rationale:      increase ROM and increase tissue extensibility to improve patient's ability to maintain proper posture. min Patient Education:  YES  Reviewed HEP []  Progressed/Changed HEP based on:   Cont HEP Other Objective/Functional Measures: 
Pt only able to tolerate 3 mins on NS due to fatigue PT added standing hip extension and abduction. Placed pt facing //bars to prevent increased trunk flexion during hip ext. Manual hip flexor stretch done on the left and attempted on the right but was stopped due to pt reporting severe pain. Pt was sat up and given light stretching to the right LE then given MHP. Pt reported this reduced the pain significantly. Post Treatment Pain Level (on 0 to 10) scale:   3  / 10 ASSESSMENT Assessment/Changes in Function:  
 
Pt did well with introduction to new exercises today and was able to cont to ambulate using RW between exercises. Pt continues to be limited due to posture and tight hip flexors. []  See Progress Note/Recertification Patient will continue to benefit from skilled PT services to analyze, cue, progress, modify,, demonstrate, instruct, and address, movement patterns, therapeutic interventions, postural abnormalities, soft tissue restrictions, ROM, strength, functional mobility, body mechanics/ergonomics, and home and community integration, to attain remaining goals. Progress toward goals / Updated goals: · Short Term Goals: To be accomplished in  2  weeks: 1. Pt will be compliant with HEP for symptom management at home. 2. Pt will increase her Rhomberg EO to >/= 30 sec, indicating improving balance and a reduction of falls risk. 3. Pt will have a standing left hip flexion angle of </= 45 to reduce the stress on her spine and other structures. Manual hip flexor stretch done today. · Long Term Goals: To be accomplished in  4  weeks: 1. Pt will be independent with HEP at D/C for self management. 2. Pt will improve bilateral knee flexion strength to 4/5 to help with bed mobility and standing tolerance. 3. Pt will have a standing left hip flexion angle of </= 30 deg to allow her improved ability to ambulate and reduce the risk of further derangement. 4. Pt will improve her FOTO to 30, indicating a reduction in disability. PLAN 
 
[]  Upgrade activities as tolerated YES Continue plan of care  
[]  Discharge due to :   
[]  Other:   
 
Therapist: Julianna Gallardo DPT Date: 2/19/2019 Time: 2:43 PM  
 
Future Appointments Date Time Provider Nikki Villanueva 2/21/2019  2:30 PM Cristian Hoyos, PT [facility]  
2/25/2019  8:30 AM [facility] [address] [facility] SERV [facility]  
2/26/2019  2:00 PM Cristian Hoyos, PT [facility]  
2/28/2019  2:00 PM Yoselyn Suárez, PT [facility]

## 2019-02-21 ENCOUNTER — APPOINTMENT (OUTPATIENT)
Dept: PHYSICAL THERAPY | Age: 74
End: 2019-02-21
Payer: MEDICARE

## 2019-02-25 ENCOUNTER — HOSPITAL ENCOUNTER (OUTPATIENT)
Dept: NON INVASIVE DIAGNOSTICS | Age: 74
Discharge: HOME | End: 2019-02-25
Attending: NURSE PRACTITIONER
Payer: MEDICARE

## 2019-02-25 DIAGNOSIS — R06.00 DYSPNEA, UNSPECIFIED: ICD-10-CM

## 2019-02-25 PROCEDURE — 93306 TTE W/DOPPLER COMPLETE: CPT

## 2019-02-28 ENCOUNTER — HOSPITAL ENCOUNTER (OUTPATIENT)
Dept: PHYSICAL THERAPY | Age: 74
Discharge: HOME | End: 2019-02-28
Payer: MEDICARE

## 2019-02-28 PROCEDURE — 97110 THERAPEUTIC EXERCISES: CPT

## 2019-02-28 NOTE — PROGRESS NOTES
PHYSICAL THERAPY - DAILY TREATMENT NOTE Patient Name: Faisal Juarez        Date: 2019 : 1945   YES Patient  Verified Visit #:   5   of   8  Insurance: Payor: Pola Parra / Plan: Yunier Palmer / Product Type: Medicare / In time: 2:00 Out time: 2:35 Total Treatment Time: 35 Medicare/BCBS [place] Time Tracking (below) Total Timed Codes (min):  35 1:1 Treatment Time:  35 TREATMENT AREA =  LBP 
SUBJECTIVE Pain Level (on 0 to 10 scale):  3  / 10 Medication Changes/New allergies or changes in medical history, any new surgeries or procedures? NO    If yes, update Summary List  
Subjective Functional Status/Changes:  []  No changes reported \"I feel like I already did a workout today. I had to get myself onto the handiride and that was hard\" OBJECTIVE 35 min Therapeutic Exercise:  [x]  See flow sheet Rationale:      increase ROM, increase strength and posture to improve the patients ability to amb and perform ADLs with increased ease  
  min Patient Education:  Greg Gallardo []  Progressed/Changed HEP based on:   Cont HEP Other Objective/Functional Measures: Amb with RW 90feet today Required rest break on nu-step to complete 5 mins today VC for upright posture and decreased shd tension with exercises today Noted increased tolerance to Fowlers and LE extended Post Treatment Pain Level (on 0 to 10) scale:   3  / 10 ASSESSMENT Assessment/Changes in Function:  
 
Increased amb tolerance today 
  
[]  See Progress Note/Recertification Patient will continue to benefit from skilled PT services to analyze, cue, progress, modify,, demonstrate, instruct, and address, movement patterns, therapeutic interventions, postural abnormalities, soft tissue restrictions, ROM, strength, functional mobility, body mechanics/ergonomics, and home and community integration, to attain remaining goals. Progress toward goals / Updated goals: · Short Term Goals: To be accomplished in  2  weeks: 1. Pt will be compliant with HEP for symptom management at home. 2. Pt will increase her Rhomberg EO to >/= 30 sec, indicating improving balance and a reduction of falls risk. NV 
3. Pt will have a standing left hip flexion angle of </= 45 to reduce the stress on her spine and other structures. added heel sludes today · Long Term Goals: To be accomplished in  4  weeks: 1. Pt will be independent with HEP at D/C for self management. 2. Pt will improve bilateral knee flexion strength to 4/5 to help with bed mobility and standing tolerance. 3. Pt will have a standing left hip flexion angle of </= 30 deg to allow her improved ability to ambulate and reduce the risk of further derangement. 4. Pt will improve her FOTO to 30, indicating a reduction in disability. PLAN 
 
[]  Upgrade activities as tolerated YES Continue plan of care  
[]  Discharge due to :   
[]  Other:   
 
Therapist: Andreina Donovan DPT Date: 2/28/2019 Time: 2:23 PM  
No future appointments.

## 2019-03-04 ENCOUNTER — HOSPITAL ENCOUNTER (OUTPATIENT)
Dept: PHYSICAL THERAPY | Age: 74
Discharge: HOME | End: 2019-03-04
Payer: MEDICARE

## 2019-03-04 PROCEDURE — 97110 THERAPEUTIC EXERCISES: CPT

## 2019-03-04 PROCEDURE — 97530 THERAPEUTIC ACTIVITIES: CPT

## 2019-03-04 NOTE — PROGRESS NOTES
PHYSICAL THERAPY - DAILY TREATMENT NOTE Patient Name: Jean Kaufman        Date: 3/4/2019 : 1945   YES Patient  Verified Visit #:   6   of   8  Insurance: Payor: Rachell Bret / Plan: Yunier Palmer / Product Type: Medicare / In time: 3:05 Out time: 3:43 Total Treatment Time: 45 Medicare/BCBS [place] Time Tracking (below) Total Timed Codes (min):  38 1:1 Treatment Time:  45 TREATMENT AREA =  LBP 
SUBJECTIVE Pain Level (on 0 to 10 scale):  2-3   10 Medication Changes/New allergies or changes in medical history, any new surgeries or procedures? NO    If yes, update Summary List  
Subjective Functional Status/Changes:  []  No changes reported \"I was about a 6 earlier but feeling a bit better now. I took a pain pill before I left too. \" OBJECTIVE 30 min Therapeutic Exercise:  [x]  See flow sheet Rationale:      increase ROM, increase strength and improve balance to improve the patients ability to ambulate and perform ADLs with increased ease. 8 min Therapeutic Activity: Standing glute squeezes, amb with RW 40' x 2 Rationale:   Improve standing posture and ambulation endurance and gait mechanics 
 min Patient Education:  YES  Reviewed HEP []  Progressed/Changed HEP based on:   Cont HEP. Other Objective/Functional Measures: 
 
Pt required VC during standing hip extension and abduction to avoid trunk sway. Romberg: EO: 30 sec EC: 25 sec Standing hip flexion angle: right -55 deg, left -50 deg Post Treatment Pain Level (on 0 to 10) scale:   2  / 10 ASSESSMENT Assessment/Changes in Function:  
 
Pt demo decreased activity tolerance today (only able to amb 40', previously did 90'). Pt tolerated exercises well with no increase in pain today. Discussed future potential of aquatic therapy today with pt (will check depth to see if pt able). []  See Progress Note/Recertification Patient will continue to benefit from skilled PT services to analyze, cue, progress, modify,, demonstrate, instruct, and address, movement patterns, therapeutic interventions, postural abnormalities, soft tissue restrictions, ROM, strength, functional mobility, body mechanics/ergonomics, and home and community integration, to attain remaining goals. Progress toward goals / Updated goals: · Short Term Goals: To be accomplished in  2  weeks: 1. Pt will be compliant with HEP for symptom management at home. 2. Pt will increase her Rhomberg EO to >/= 30 sec, indicating improving balance and a reduction of falls risk. MET (EO: 30 sec, EC: 25 sec) 3. Pt will have a standing left hip flexion angle of </= 45 to reduce the stress on her spine and other structures. Progressing, left hip flexion angle 55 deg today. · Long Term Goals: To be accomplished in  4  weeks: 1. Pt will be independent with HEP at D/C for self management. 2. Pt will improve bilateral knee flexion strength to 4/5 to help with bed mobility and standing tolerance. 3. Pt will have a standing left hip flexion angle of </= 30 deg to allow her improved ability to ambulate and reduce the risk of further derangement. 4. Pt will improve her FOTO to 30, indicating a reduction in disability. PLAN 
 
[]  Upgrade activities as tolerated YES Continue plan of care  
[]  Discharge due to :   
[]  Other:   
 
Therapist: Flavia Cano DPT Date: 3/4/2019 Time: 2:06 PM  
 
Future Appointments Date Time Provider Nikki Villanueva 3/4/2019  3:00 PM Ada Suárez, PT [facility] AT [facility]

## 2019-03-08 ENCOUNTER — HOSPITAL ENCOUNTER (OUTPATIENT)
Dept: PHYSICAL THERAPY | Age: 74
Discharge: HOME | End: 2019-03-08
Payer: MEDICARE

## 2019-03-08 PROCEDURE — 97110 THERAPEUTIC EXERCISES: CPT

## 2019-03-08 NOTE — PROGRESS NOTES
PHYSICAL THERAPY - DAILY TREATMENT NOTE    Patient Name: Fede Isaac        Date: 3/8/2019  : 1945   YES Patient  Verified  Visit #:     Insurance: Payor: Olga Lidia Bravo / Plan: [place] MED[place] PART [place] & B / Product Type: Medicare /      In time: 12:30 Out time: 1:10   Total Treatment Time: 40     Medicare/BCBS Time Tracking (below)   Total Timed Codes (min):  40 1:1 Treatment Time:  40     TREATMENT AREA = Left low back pain [M54.5]    SUBJECTIVE    Pain Level (on 0 to 10 scale):  2  / 10   Medication Changes/New allergies or changes in medical history, any new surgeries or procedures? NO    If yes, update Summary List   Subjective Functional Status/Changes:  []  No changes reported     \"It's not so bad today \"          OBJECTIVE     Therapeutic Procedures:  Min Procedure Specifics + Rationale   n/a [x]  Patient Education (performed throughout session) [x] Review HEP    [] Progressed/Changed HEP based on:   [] proper performance and advancement of Therex/TA   [] reduction in pain level    [] increased functional capacity       [] change in directional preference   40(40) [x] Therapeutic Exercise   [x]  See Flowsheet   Rationale: increase ROM and increase strength to improve the patients ability to participate in ADL's        Other Objective/Functional Measures:    Safe negotiation of stairs, requires supervision.   Reviewed pool rules     Post Treatment Pain Level (on 0 to 10) scale:   1-2  / 10     ASSESSMENT    Assessment/Changes in Function:       See PN         Patient will continue to benefit from skilled PT services to modify and progress therapeutic interventions, address functional mobility deficits, address ROM deficits, address strength deficits, analyze and address soft tissue restrictions, analyze and cue movement patterns, analyze and modify body mechanics/ergonomics, assess and modify postural abnormalities and instruct in home and community integration  to attain remaining goals Progress toward goals / Updated goals:    See PN     PLAN    [x]  Upgrade activities as tolerated  [x]  Update interventions per flow sheet YES Continue plan of care   []  Discharge due to :    []  Other:      Therapist: Geoff Tang \"JOVANNY\" FRANKIE Knight, Cert. MDT, Cert. DN, Cert.  SMT    Date: 3/8/2019 Time: 12:37 PM     Future Appointments   Date Time Provider Nikki Villanueva   3/15/2019  1:30 PM [facility]

## 2019-03-08 NOTE — PROGRESS NOTES
[address] [facility] PHYSICAL THERAPY  [address], [address] - Phone: [phone]  Fax: [phone]  PROGRESS NOTE  Patient Name: Sam Pinto : 1945   Treatment/Medical Diagnosis: Left low back pain [M54.5]   Referral Source: Bronson Mayen MD     Date of Initial Visit: 18 Attended Visits: 7 Missed Visits: 0     SUMMARY OF TREATMENT  Patient's POC has consisted of therex, therapeutic activities, manual therapy prn, modalities prn, pt. education, and a comprehensive HEP. Treatment strategies used to address functional mobility deficits, ROM deficits, strength deficits, analyze and address soft tissue restrictions, analyze and cue movement patterns, analyze and modify body mechanics/ergonomics, assess and modify postural abnormalities and instruct in home and community integration. CURRENT STATUS  Patient making steady progress in pain relief and functional mobility. She reports reduced use of pain medication and ability to alleviate symptoms with her HEP. Standing tolerance progressing but requires multiple rest periods. Patient educated on progression of care, and expressed interest in aquatherapy to further promote conditioning, core stability, and pain levels in low load states. Goal/Measure of Progress Goal Met? · Short Term Goals: To be accomplished in  2  weeks:  1. Pt will be compliant with HEP for symptom management at home. 2. Pt will increase her Rhomberg EO to >/= 30 sec, indicating improving balance and a reduction of falls risk. 3. Pt will have a standing left hip flexion angle of </= 45 to reduce the stress on her spine and other structures. · Long Term Goals: To be accomplished in  4  weeks:  1. Pt will be independent with HEP at D/C for self management. 2. Pt will improve bilateral knee flexion strength to 4/5 to help with bed mobility and standing tolerance.    3. Pt will have a standing left hip flexion angle of </= 30 deg to allow her improved ability to ambulate and reduce the risk of further derangement. 4. Pt will improve her FOTO to 30, indicating a reduction in disability. MET    MET      NO, 50 degrees          Ongoing    NO      NO        MET     New Goals to be achieved in __4__  weeks:  1. Patient to be Independent with HEP to self-manage/prevent symptoms after DC. 2.  Patient to be independent in performing aquatherapy to transition to aquatic based HEP at DC. 3.  Pt will improve bilateral knee flexion strength to 4/5 to help with bed mobility and standing tolerance. Frequency / Duration:   Patient to be seen  2  times per week for 4  weeks:    G-Codes: n/a  RECOMMENDATIONS  Continue and progress functional therex/therapeutic activity as able, utilizing manual therapy and modalities prn. Progress patient towards independent HEP to facilitate self-management of symptoms and progress gains after PT. Patient may benefit from aquatherapy to further promote functional level. If you have any questions/comments please contact us directly at [phone]. Thank you for allowing us to assist in the care of your patient. Therapist Signature: Kelby Sever \"JOVANNY\" Oren Burch DPT, Cert. MDT, CertRaul DN, Cert. SMT Date: 4/5/0232   Certification Period: 2/4/19-5/3/19     Reporting Period 2/4/19-3/8/19   Time: 12:49 PM   NOTE TO PHYSICIAN:  PLEASE COMPLETE THE ORDERS BELOW AND FAX TO   [facility]: [phone]. If you are unable to process this request in 24 hours please contact our office: [phone].    ___ I have read the above report and request that my patient continue as recommended.   ___ I have read the above report and request that my patient continue therapy with the following changes/special instructions:_________________________________________________________   ___ I have read the above report and request that my patient be discharged from therapy.      Physician Signature:        Date: Time:

## 2019-03-12 ENCOUNTER — APPOINTMENT (OUTPATIENT)
Dept: PHYSICAL THERAPY | Age: 74
End: 2019-03-12
Payer: MEDICARE

## 2019-03-15 ENCOUNTER — HOSPITAL ENCOUNTER (OUTPATIENT)
Dept: PHYSICAL THERAPY | Age: 74
Discharge: HOME | End: 2019-03-15
Payer: MEDICARE

## 2019-03-15 PROCEDURE — 97110 THERAPEUTIC EXERCISES: CPT

## 2019-03-15 NOTE — PROGRESS NOTES
PHYSICAL THERAPY - DAILY TREATMENT NOTE    Patient Name: Sid Flowers        Date: 3/15/2019  : 1945   YES Patient  Verified  Visit #:   8   of   15 Insurance: Payor: Joaquim Fraction / Plan: [place] MEDICARE PART A & B / Product Type: Medicare /      In time: 130 Out time: 200   Total Treatment Time: 30     Medicare/BCBS Time Tracking (below)   Total Timed Codes (min):  30 1:1 Treatment Time:  30     TREATMENT AREA =  Left low back pain [M54.5]    SUBJECTIVE    Pain Level (on 0 to 10 scale):  4  / 10   Medication Changes/New allergies or changes in medical history, any new surgeries or procedures? NO    If yes, update Summary List   Subjective Functional Status/Changes:  []  No changes reported     Pt reports that she just came from the doctor and isnt feeling well today. OBJECTIVE  Therapeutic Procedures:  Min Procedure Specifics + Rationale   (30) [x] Therapeutic Exercise    See Flowsheet   Rationale: increase ROM and increase strength to improve the patients ability to participate in ADL's              min Patient Education:  YES Reviewed HEP         Other Objective/Functional Measures:    See flowsheet for more details    Mod VC and modeling required throughout session for proper form and increased safety. Standing exercises performed in parallel bars due to increased falls risk    Progressed therex per flowsheet     Post Treatment Pain Level (on 0 to 10) scale:   2.5  / 10     ASSESSMENT    Assessment/Changes in Function:     Patient tolerated session poorly due to not feeling well. She required increased rest breaks and therapist performed abbreviated treatment today.       []  See Progress Note/Recertification   Patient will continue to benefit from skilled PT services to analyze,, cue,, progress,, modify,, demonstrate,, instruct, and address, movement patterns,, therapeutic interventions,, postural abnormalities,, soft tissue restrictions,, ROM,, strength,, functional mobility,, body mechanics/ergonomics, and home and community integration, to attain remaining goals. Progress toward goals / Updated goals:    1st session since PN, no notable progress.      PLAN    [x]  Upgrade activities as tolerated YES Continue plan of care   []  Discharge due to :    []  Other:      Therapist: Ad Stephen DPT    Date: 3/15/2019 Time: 1:41 PM     Future Appointments   Date Time Provider Nikki Villanueva   3/19/2019  1:00 PM [facility]   3/21/2019  2:00 PM [facility]   3/26/2019  1:00 PM [facility]

## 2019-03-19 ENCOUNTER — HOSPITAL ENCOUNTER (OUTPATIENT)
Dept: PHYSICAL THERAPY | Age: 74
Discharge: HOME | End: 2019-03-19
Payer: MEDICARE

## 2019-03-19 PROCEDURE — 97150 GROUP THERAPEUTIC PROCEDURES: CPT

## 2019-03-19 NOTE — PROGRESS NOTES
PHYSICAL THERAPY - DAILY TREATMENT NOTE - AQUATICS Patient Name: Herrera Navarro        Date: 3/19/2019 : 1945   YES Patient  Verified Visit #:   9   of   15  Insurance: Payor: Lea Avila / Plan: [place] MEDICARE PART A & B / Product Type: Medicare / In time: 1:00 Out time: 1:30 Total Treatment Time: 30 Medicare/BCBS [place] Time Tracking (below) Total Timed Codes (min):  30 1:1 Treatment Time:  0  
TREATMENT AREA =   Left low back pain [M54.5] SUBJECTIVE Pain Level (on 0 to 10 scale):  2  / 10 Medication Changes/New allergies or changes in medical history, any new surgeries or procedures? NO    If yes, update Summary List  
Subjective Functional Status/Changes:  []  No changes reported Pt states that she is glad to be finally getting in the water. OBJECTIVE 30 min Therapeutic Exercise:   [x]  Aquatic Therapy Rationale:      increase strength, increase ROM, and improve balance to improve the patients ability to perform ADL's and ambulate with less pain and increase activity tolerance. [x]   Patient Education:  Continue Aquatic Therapy and HEP as instructed UE exercise resistance:                                                  LE exercises:                                                           
     [] none                                                                             []  thera-band resistance 
     [] small water weights                                                   [] no UE support 
     [] medium water weights                                              []  1 UE support  
     [] large water weights                                                   [x]  2 UE support    
     [] back supported on wall 
     [] thera-band SLS UE support:                
     [] both UE                    
     [] 1 UE 
     [] 1 finger/fingers 
     []  none 
     [] EC Post Treatment Pain Level (on 0 to 10) scale:   2  / 10 Other  Introduction to aquatic therapy program.  
Pt completed 10-20 reps of each LE exercise depending on difficulty/tolerance. Pt required close SBA for entering and exiting pool. Pt's aide present to A with clothing and shoes. Pt completed 1 warm up lap ambulating with B UE support for balance. After 30' discontinued aquatic therapy secondary to fatigue and to assess sx response. ASSESSMENT Assessment/Changes in Function: Pt with fair tolerance to initiation of aquatic therapy. Pt fatigued, but no increased pain reported. []  See Progress Note/Recertification Patient will continue to benefit from skilled PT services to  analyze,, cue,, progress,, modify,, demonstrate,, instruct, and address, movement patterns,, therapeutic interventions,, postural abnormalities,, soft tissue restrictions,, ROM,, strength,, functional mobility,, body mechanics/ergonomics, and home and community integration, to attain remaining goals. Progress toward goals / Updated goals: 1. Patient to be Independent with HEP to self-manage/prevent symptoms after DC. 2.  Patient to be independent in performing aquatherapy to transition to aquatic based HEP at MT. Initiated aquatic PT today 3. Pt will improve bilateral knee flexion strength to 4/5 to help with bed mobility and standing tolerance. PLAN 
 
[]  Upgrade activities as tolerated YES Continue plan of care  
[]  Discharge due to :   
[]  Other:   
 
Therapist: Greg Johnson PTA Date: 3/19/2019 Time: 4:24 PM  
 
Future Appointments Date Time Provider Nikki Villanueva 3/21/2019  2:00 PM [facility]  
3/26/2019  1:00 PM [facility]

## 2019-03-21 ENCOUNTER — APPOINTMENT (OUTPATIENT)
Dept: PHYSICAL THERAPY | Age: 74
End: 2019-03-21
Payer: MEDICARE

## 2019-03-26 ENCOUNTER — HOSPITAL ENCOUNTER (OUTPATIENT)
Dept: PHYSICAL THERAPY | Age: 74
Discharge: HOME | End: 2019-03-26
Payer: MEDICARE

## 2019-03-26 PROCEDURE — 97150 GROUP THERAPEUTIC PROCEDURES: CPT

## 2019-03-26 NOTE — PROGRESS NOTES
PHYSICAL THERAPY - DAILY TREATMENT NOTE - AQUATICS Patient Name: Kerri Rosales        Date: 3/26/2019 : 1945   YES Patient  Verified Visit #:   10   of   15  Insurance: Payor: Viola Wilson / Plan: [address] / Product Type: Medicare / In time: 1:00 Out time: 1:45 Total Treatment Time: 45 Medicare/BCBS [place] Time Tracking (below) Total Timed Codes (min):  45 1:1 Treatment Time:  0  
TREATMENT AREA =  Left low back pain [M54.5] SUBJECTIVE Pain Level (on 0 to 10 scale):  2.5  / 10 Medication Changes/New allergies or changes in medical history, any new surgeries or procedures? NO    If yes, update Summary List  
Subjective Functional Status/Changes:  []  No changes reported Pt reports her arms and hands were sore from holding on after her first aquatic session last week. OBJECTIVE 
45 
(0) min Therapeutic Exercise:   [x]  Aquatic Therapy Rationale:      increase strength, increase ROM, and improve balance to improve the patients ability to perform ADL's and ambulate with less pain and increase activity tolerance. [x]   Patient Education:  Continue Aquatic Therapy and HEP as instructed UE exercise resistance:                                                  LE exercises:                                                           
     [x] none                                                                             []  thera-band resistance 
     [] small water weights                                                   [] no UE support 
     [] medium water weights                                              []  1 UE support  
     [] large water weights                                                   [x]  2 UE support [x] back supported on wall 
     [] thera-band SLS UE support:                
     [] both UE                    
     [] 1 UE 
     [] 1 finger/fingers 
     []  none 
     [] EC 
  
 Post Treatment Pain Level (on 0 to 10) scale:   2  / 10 Other  Pt performed 2 warm up and 2 cool down laps ambulating with B UE support for balance. Pt performed all LE ex with B UE support and a few UE ex with her back supported on wall for balance. Mod to Max VCing for posture and form with aquatic ex. ASSESSMENT Assessment/Changes in Function:  
 
Pt reports UE soreness after initial aquatic session. []  See Progress Note/Recertification Patient will continue to benefit from skilled PT services to analyze,, cue,, progress,, modify,, demonstrate,, instruct, and address, movement patterns,, therapeutic interventions,, postural abnormalities,, soft tissue restrictions,, ROM,, strength,, functional mobility,, body mechanics/ergonomics, and home and community integration, to attain remaining goals. Progress toward goals / Updated goals: 
1. Patient to be Independent with HEP to self-manage/prevent symptoms after DC. 2.  Patient to be independent in performing aquatherapy to transition to aquatic based HEP at DC.  Pt requires mod to max VCing for form with aquatics. 3.  Pt will improve bilateral knee flexion strength to 4/5 to help with bed mobility and standing tolerance.  PLAN 
 
[]  Upgrade activities as tolerated YES Continue plan of care  
[]  Discharge due to :   
[]  Other:   
 
Therapist: Ciera Montano PTA Date: 3/26/2019 Time: 3:49 PM  
 
No future appointments. \

## 2019-03-29 ENCOUNTER — HOSPITAL ENCOUNTER (OUTPATIENT)
Dept: PHYSICAL THERAPY | Age: 74
Discharge: HOME | End: 2019-03-29
Payer: MEDICARE

## 2019-03-29 PROCEDURE — 97110 THERAPEUTIC EXERCISES: CPT

## 2019-03-29 NOTE — PROGRESS NOTES
PHYSICAL THERAPY - DAILY TREATMENT NOTE Patient Name: Oswaldo Newby        Date: 3/29/2019 : 1945   YES Patient  Verified Visit #:   11   of   15  Insurance: Payor: Jenise Colón / Plan: Yunier Daleyy / Product Type: Medicare / In time: 1048 Out time:  Total Treatment Time: 41 Medicare/BCBS Time Tracking (below) Total Timed Codes (min):  41 1:1 Treatment Time:  45 TREATMENT AREA =  Left low back pain [M54.5] SUBJECTIVE Pain Level (on 0 to 10 scale):  2  / 10 Medication Changes/New allergies or changes in medical history, any new surgeries or procedures? NO    If yes, update Summary List  
Subjective Functional Status/Changes:  []  No changes reported Pt reports increased right shoulder muscle soreness after aquatic session. She states she did well with the water exercises. She arrived early to her session. OBJECTIVETherapeutic Procedures: 
Min Procedure Specifics + Rationale  
41(38) [x] Therapeutic Exercise 
  See Flowsheet Rationale: increase ROM and increase strength to improve the patients ability to participate in ADL's   
 min Patient Education:  Saige Tamez Other Objective/Functional Measures: 
See flowsheet for more details Mod VC and modeling required throughout session for proper form and increased safety Gait Training: RW and CGA 2 x 90 feet with decreased foot clearance, forward flexed posture, severe hip flexion contracture and verbal cues for maintaining TERRIE within RW. At end of last round of gait training, a wheelchair follower was used for safety due to increased signs of fatigue. Sit<>Stands SBA required Standing exercises performed in parallel bars with frequent rest breaks required. Progressed therex per flowsheet Post Treatment Pain Level (on 0 to 10) scale:   0  / 10 ASSESSMENT Assessment/Changes in Function:  
 
Patient tolerated treatment session well and is progressing well towards goals. Pt is improving in her ability to negotiate distances with RW. She reports aquatic therapy is helping. She continues to need frequent cuing. []  See Progress Note/Recertification Patient will continue to benefit from skilled PT services to analyze,, cue,, progress,, modify,, demonstrate,, instruct, and address, movement patterns,, therapeutic interventions,, postural abnormalities,, soft tissue restrictions,, ROM,, strength,, functional mobility,, body mechanics/ergonomics, and home and community integration, to attain remaining goals. Progress toward goals / Updated goals: 
Slow progress towards goals. PLAN [x]  Upgrade activities as tolerated YES Continue plan of care  
[]  Discharge due to :   
[]  Other:   
 
Therapist: Scott Hoang DPT Date: 3/29/2019 Time: 10:51 AM  
 
Future Appointments Date Time Provider Nikki Villanueva 3/29/2019 11:00 AM [facility] AT [facility]

## 2019-04-01 ENCOUNTER — HOSPITAL ENCOUNTER (OUTPATIENT)
Dept: PHYSICAL THERAPY | Age: 74
End: 2019-04-01
Payer: MEDICARE

## 2019-04-05 ENCOUNTER — APPOINTMENT (OUTPATIENT)
Dept: PHYSICAL THERAPY | Age: 74
End: 2019-04-05
Payer: MEDICARE

## 2019-04-08 ENCOUNTER — HOSPITAL ENCOUNTER (OUTPATIENT)
Dept: PHYSICAL THERAPY | Age: 74
Discharge: HOME | End: 2019-04-08
Payer: MEDICARE

## 2019-04-08 PROCEDURE — 97113 AQUATIC THERAPY/EXERCISES: CPT

## 2019-04-08 NOTE — PROGRESS NOTES
[address] [facility] PHYSICAL THERAPY 
[address], [place], [address] - Phone: [phone]  Fax: [phone] Progress Note/CONTINUED PLAN OF CARE for PHYSICAL THERAPY Patient Name: Sravan Kahn : 1945 Treatment/Medical Diagnosis: Left low back pain [M54.5] Referral Source: Chris Yu MD Start of Care [facility] [place]): 18 Prior Hospitalization: See Medical History Provider #: [id number] Medications: Verified on Patient Summary List  
Visits from [facility]: 12 Missed Visits: 3 GOALS 1. Patient to be Independent with HEP to self-manage/prevent symptoms after DC. NOT MET: report infrequent compliance 2.  Patient to be independent in performing aquatherapy to transition to aquatic based HEP at DC.  
Progressing 3.  Pt will improve bilateral knee flexion strength to 4/5 to help with bed mobility and standing tolerance.   
MET: 4+/5 SUMMARY OF TREATMENT Patient's POC has consisted of therex, therapeutic activities, manual therapy prn, modalities prn, pt. education, and a comprehensive HEP. Treatment strategies used to address functional mobility deficits, ROM deficits, strength deficits, analyze and address soft tissue restrictions, analyze and cue movement patterns, analyze and modify body mechanics/ergonomics, assess and modify postural abnormalities and instruct in home and community integration. Most recently, the patient has completed 3 aquatic therapy sessions to address functional mobility deficits. Key Functional Changes/Progress: Albino Malagon reports that she is 65-70% improved since start of care and is still having trouble with prolonged walking due to difficulty with maintaining upright posture. She reports being able to walk a block before having to sit down. She has responded positively to the inclusion of aquatic therapy. During aquatic sessions, she is able to stand about 30 minutes without rest break.  Her reportings of pain following aquatic sessions averages 2 out of 10. She could benefit from more aquatic and land therapy to address her limitations and build her independence with HEP, 
Patient Goal(s) has been updated and includes:  Same as SOC  
? Goals for this certification period include and are to be achieved in   3  weeks: 1. Patient to be Independent with HEP to self-manage/prevent symptoms after DC. 2.  Patient to be independent in performing aquatherapy to transition to aquatic based HEP at DC.  
Frequency / Duration:   Patient to be seen   2   times per week for   3    weeksRecommendations: Continue with therapy. See above If you have any questions/comments please contact us directly at [phone]. Thank you for allowing us to assist in the care of your patient. Therapist Signature: Brooks Tang DPT Date: 4/8/2019 Certification Period: 
Reporting Period: 2/4/19-5/3/19 
2/4/19-4/8/19 Time: 3:49 PM  
NOTE TO PHYSICIAN:  PLEASE COMPLETE THE ORDERS BELOW AND FAX TO [facility]: [phone]. If you are unable to process this request in 24 hours please contact our office: [phone]. 
 
___ I have read the above report and request that my patient continue as recommended.  
___ I have read the above report and request that my patient continue therapy with the following changes/special instructions: ________________________________________________  
___ I have read the above report and request that my patient be discharged from therapy.   
 
Physician Signature:       Date:      Time:

## 2019-04-08 NOTE — PROGRESS NOTES
PHYSICAL THERAPY - DAILY TREATMENT NOTE - AQUATICS Patient Name: Robb Ny        Date: 2019 : 1945   YES Patient  Verified Visit #:   12   of   15  Insurance: Payor: Zoe Sánchez / Plan: Yunier Palmer / Product Type: Medicare / In time: 100 Out time: 152 Total Treatment Time: 46 Medicare/BCBS Time Tracking (below) Total Timed Codes (min):  52 1:1 Treatment Time:  10 TREATMENT AREA =  Left low back pain [M54.5] SUBJECTIVE Pain Level (on 0 to 10 scale):  2  / 10 Medication Changes/New allergies or changes in medical history, any new surgeries or procedures? NO    If yes, update Summary List  
Subjective Functional Status/Changes:  []  No changes reported Aide Franz reports that she is 65-70% improved since start of care and is still having trouble with prolonged walking due to difficulty with maintaining upright posture. She reports being able to walk a block before having to sit down. OBJECTIVE 
 
52(10) min Therapeutic Exercise:  [x]  AQUATIC THERAPY Rationale:      increase strength, increase ROM, and improve balance to improve the patient's ability to perform ADL's and ambulate with less pain and increase activity tolerance. [x]   Patient Education:  Continue Aquatic Therapy and HEP as instructed Forward, Retrograde and Lateral Walking at the beginning/end of session with the wall as assistance. LE exercises performed with bilateral/unilateral UE support including heel raises, hip 3-way, mini squats, knee extensions and leg curls. UE exercises incorporated UE flexion, abduction, elbow flexion/extension, lumbo thoracic rotation and cues for engaging core musculature and maintaining upright posture.   
  
UE exercise resistance: NO UE's Today                                                                                                      
     [] none/wrist weights [] small water weights                                                    
     [] medium water weights                                             
     [] large water weights                                                   
     [] back supported on wall Pt required SBA assistance entering/exiting the pool. Post Treatment Pain Level (on 0 to 10) scale:   2  / 10 ASSESSMENT Assessment/Changes in Function:  
 
See PN 
  
[]  See Progress Note/Recertification Patient will continue to benefit from skilled PT services to analyze,, cue,, progress,, modify,, demonstrate,, instruct, and address, movement patterns,, therapeutic interventions,, postural abnormalities,, soft tissue restrictions,, ROM,, strength,, functional mobility,, body mechanics/ergonomics, and home and community integration, to attain remaining goals. Progress toward goals / Updated goals: 1. Patient to be Independent with HEP to self-manage/prevent symptoms after DC. NOT MET: report infrequent compliance 2. Patient to be independent in performing aquatherapy to transition to aquatic based HEP at DC. Progressing 3. Pt will improve bilateral knee flexion strength to 4/5 to help with bed mobility and standing tolerance. MET: 4+/5 PLAN 
 
[]  Upgrade activities as tolerated YES Continue plan of care  
[]  Discharge due to :   
[]  Other:   
 
Therapist: Manohar Murray DPT Date: 4/8/2019 Time: 1:41 PM  
 
Future Appointments Date Time Provider Nikki Villanueva 4/11/2019  2:00 PM Abdi Moreau PT [facility]  
4/15/2019  1:00 PM [address] [place] [address]  
4/17/2019 11:30 AM Abdi Moreau PT [facility]  
4/22/2019  1:00 PM [address] [place] [address]  
4/24/2019 11:30 AM Abdi Moreau [facility]  
4/29/2019  1:00 PM Lloyd Nathan [facility]

## 2019-04-11 ENCOUNTER — HOSPITAL ENCOUNTER (OUTPATIENT)
Dept: PHYSICAL THERAPY | Age: 74
Discharge: HOME | End: 2019-04-11
Payer: MEDICARE

## 2019-04-11 PROCEDURE — 97110 THERAPEUTIC EXERCISES: CPT

## 2019-04-11 NOTE — PROGRESS NOTES
PHYSICAL THERAPY - DAILY TREATMENT NOTE Patient Name: Delon Jones        Date: 2019 : 1945   YES Patient  Verified Visit #:   +  Insurance: Payor: Divine Redmond / Plan: Yunier Palmer / Product Type: Medicare / In time: 1:53early Out time: 2:47 Total Treatment Time: 47 Medicare/BCBS Time Tracking (below) Total Timed Codes (min):  54 1:1 Treatment Time:  54 TREATMENT AREA = Left low back pain [M54.5] SUBJECTIVE Pain Level (on 0 to 10 scale):  2  / 10 Medication Changes/New allergies or changes in medical history, any new surgeries or procedures? NO    If yes, update Summary List  
Subjective Functional Status/Changes:  []  No changes reported \"I'm glad you got me in earlyI have to leave here by quarter to 3. \" \"My back hasn't been feeling so bad. That pool helps a lot. \" 
 
  
 
OBJECTIVE Therapeutic Procedures: 
Min Procedure Specifics + Rationale  
n/a [x]  Patient Education (performed throughout session) [x] Review HEP [] Progressed/Changed HEP based on:  
[] proper performance and advancement of Therex/TA [] reduction in pain level   
[] increased functional capacity [] change in directional preference 54(54) [x] Therapeutic Exercise [x]  See Flowsheet Rationale: increase ROM and increase strength to improve the patients ability to participate in ADL's Other Objective/Functional Measures: 
 
Increased reps/sets/resistance per flow sheet. Performed RFISitting throughout treatment b/w sets/reps/exercises as needed as prophylaxis and symptoms management. Post Treatment Pain Level (on 0 to 10) scale:   0  / 10 ASSESSMENT Assessment/Changes in Function:  
 
 
Responded well to RFISitting , able to abolish \"fatigue\" and pain Patient will continue to benefit from skilled PT services to modify and progress therapeutic interventions, address functional mobility deficits, address ROM deficits, address strength deficits, analyze and address soft tissue restrictions, analyze and cue movement patterns and instruct in home and community integration  to attain remaining goals Progress toward goals / Updated goals: 
1st session since progress note. No significant progress observed PLAN [x]  Upgrade activities as tolerated 
[x]  Update interventions per flow sheet YES Continue plan of care  
[]  Discharge due to :   
[]  Other:   
 
Therapist: Tashi Adler \"BJ\" FRANKIE Knight, Cert. MDT, Cert. DN, Cert. SMT Date: 4/11/2019 Time: 1:55 PM  
 
Future Appointments Date Time Provider Nikki Villanueva 4/11/2019  2:00 PM Vikash Antoine [facility]  
4/15/2019  1:00 PM [address] [place] [address]  
4/17/2019 11:30 AM Vikash Antoine [facility]  
4/22/2019  1:00 PM [address] [place] [address]  
4/24/2019 11:30 AM Vikash Antoine [facility]  
4/29/2019  1:00 PM Marnagi [facility]

## 2019-04-17 ENCOUNTER — HOSPITAL ENCOUNTER (EMERGENCY)
Age: 74
Discharge: HOME | End: 2019-04-17
Attending: EMERGENCY MEDICINE
Payer: MEDICARE

## 2019-04-17 ENCOUNTER — APPOINTMENT (OUTPATIENT)
Dept: CT IMAGING | Age: 74
End: 2019-04-17
Attending: EMERGENCY MEDICINE
Payer: MEDICARE

## 2019-04-17 ENCOUNTER — HOSPITAL ENCOUNTER (OUTPATIENT)
Dept: PHYSICAL THERAPY | Age: 74
Discharge: HOME | End: 2019-04-17
Payer: MEDICARE

## 2019-04-17 VITALS
TEMPERATURE: 98.9 F | HEART RATE: 73 BPM | DIASTOLIC BLOOD PRESSURE: 74 MMHG | RESPIRATION RATE: 18 BRPM | SYSTOLIC BLOOD PRESSURE: 180 MMHG | OXYGEN SATURATION: 95 %

## 2019-04-17 DIAGNOSIS — R10.13 ABDOMINAL PAIN, EPIGASTRIC: Primary | ICD-10-CM

## 2019-04-17 DIAGNOSIS — R11.2 NON-INTRACTABLE VOMITING WITH NAUSEA, UNSPECIFIED VOMITING TYPE: ICD-10-CM

## 2019-04-17 LAB
ALBUMIN SERPL-MCNC: 4.1 G/DL (ref 3.4–5)
ALBUMIN/GLOB SERPL: 1.1 {RATIO} (ref 0.8–1.7)
ALP SERPL-CCNC: 151 U/L (ref 45–117)
ALT SERPL-CCNC: 13 U/L (ref 13–56)
ANION GAP SERPL CALC-SCNC: 12 MMOL/L (ref 3–18)
APPEARANCE UR: CLEAR
AST SERPL-CCNC: 8 U/L (ref 15–37)
BASOPHILS # BLD: 0 K/UL (ref 0–0.1)
BASOPHILS NFR BLD: 0 % (ref 0–2)
BILIRUB SERPL-MCNC: 0.4 MG/DL (ref 0.2–1)
BILIRUB UR QL: NEGATIVE
BUN SERPL-MCNC: 11 MG/DL (ref 7–18)
BUN/CREAT SERPL: 12 (ref 12–20)
CALCIUM SERPL-MCNC: 9.1 MG/DL (ref 8.5–10.1)
CHLORIDE SERPL-SCNC: 103 MMOL/L (ref 100–108)
CO2 SERPL-SCNC: 23 MMOL/L (ref 21–32)
COLOR UR: YELLOW
CREAT SERPL-MCNC: 0.89 MG/DL (ref 0.6–1.3)
DIFFERENTIAL METHOD BLD: ABNORMAL
EOSINOPHIL # BLD: 0 K/UL (ref 0–0.4)
EOSINOPHIL NFR BLD: 0 % (ref 0–5)
ERYTHROCYTE [DISTWIDTH] IN BLOOD BY AUTOMATED COUNT: 13.1 % (ref 11.6–14.5)
GLOBULIN SER CALC-MCNC: 3.9 G/DL (ref 2–4)
GLUCOSE SERPL-MCNC: 296 MG/DL (ref 74–99)
GLUCOSE UR STRIP.AUTO-MCNC: 250 MG/DL
HCT VFR BLD AUTO: 37.3 % (ref 35–45)
HGB BLD-MCNC: 12.2 G/DL (ref 12–16)
HGB UR QL STRIP: NEGATIVE
KETONES UR QL STRIP.AUTO: NEGATIVE MG/DL
LEUKOCYTE ESTERASE UR QL STRIP.AUTO: NEGATIVE
LIPASE SERPL-CCNC: 46 U/L (ref 73–393)
LYMPHOCYTES # BLD: 1.2 K/UL (ref 0.9–3.6)
LYMPHOCYTES NFR BLD: 8 % (ref 21–52)
MCH RBC QN AUTO: 28.4 PG (ref 24–34)
MCHC RBC AUTO-ENTMCNC: 32.7 G/DL (ref 31–37)
MCV RBC AUTO: 86.7 FL (ref 74–97)
MONOCYTES # BLD: 0.2 K/UL (ref 0.05–1.2)
MONOCYTES NFR BLD: 2 % (ref 3–10)
NEUTS SEG # BLD: 14.1 K/UL (ref 1.8–8)
NEUTS SEG NFR BLD: 90 % (ref 40–73)
NITRITE UR QL STRIP.AUTO: NEGATIVE
PH UR STRIP: 7 [PH] (ref 5–8)
PLATELET # BLD AUTO: 266 K/UL (ref 135–420)
PMV BLD AUTO: 11.6 FL (ref 9.2–11.8)
POTASSIUM SERPL-SCNC: 3.2 MMOL/L (ref 3.5–5.5)
PROT SERPL-MCNC: 8 G/DL (ref 6.4–8.2)
PROT UR STRIP-MCNC: NEGATIVE MG/DL
RBC # BLD AUTO: 4.3 M/UL (ref 4.2–5.3)
SODIUM SERPL-SCNC: 138 MMOL/L (ref 136–145)
SP GR UR REFRACTOMETRY: 1.01 (ref 1–1.03)
TROPONIN I SERPL-MCNC: <0.02 NG/ML (ref 0–0.04)
UROBILINOGEN UR QL STRIP.AUTO: 0.2 EU/DL (ref 0.2–1)
WBC # BLD AUTO: 15.5 K/UL (ref 4.6–13.2)

## 2019-04-17 PROCEDURE — 99285 EMERGENCY DEPT VISIT HI MDM: CPT

## 2019-04-17 PROCEDURE — 96361 HYDRATE IV INFUSION ADD-ON: CPT

## 2019-04-17 PROCEDURE — 97110 THERAPEUTIC EXERCISES: CPT

## 2019-04-17 PROCEDURE — 81003 URINALYSIS AUTO W/O SCOPE: CPT

## 2019-04-17 PROCEDURE — 83690 ASSAY OF LIPASE: CPT

## 2019-04-17 PROCEDURE — 84484 ASSAY OF TROPONIN QUANT: CPT

## 2019-04-17 PROCEDURE — 74011250636 HC RX REV CODE- 250/636: Performed by: EMERGENCY MEDICINE

## 2019-04-17 PROCEDURE — 96374 THER/PROPH/DIAG INJ IV PUSH: CPT

## 2019-04-17 PROCEDURE — 80053 COMPREHEN METABOLIC PANEL: CPT

## 2019-04-17 PROCEDURE — 94762 N-INVAS EAR/PLS OXIMTRY CONT: CPT

## 2019-04-17 PROCEDURE — 96375 TX/PRO/DX INJ NEW DRUG ADDON: CPT

## 2019-04-17 PROCEDURE — 74176 CT ABD & PELVIS W/O CONTRAST: CPT

## 2019-04-17 PROCEDURE — 85025 COMPLETE CBC W/AUTO DIFF WBC: CPT

## 2019-04-17 RX ORDER — HYDRALAZINE HYDROCHLORIDE 20 MG/ML
20 INJECTION INTRAMUSCULAR; INTRAVENOUS ONCE
Status: DISCONTINUED | OUTPATIENT
Start: 2019-04-17 | End: 2019-04-17

## 2019-04-17 RX ORDER — PROCHLORPERAZINE EDISYLATE 5 MG/ML
10 INJECTION INTRAMUSCULAR; INTRAVENOUS
Status: COMPLETED | OUTPATIENT
Start: 2019-04-17 | End: 2019-04-17

## 2019-04-17 RX ORDER — PROCHLORPERAZINE EDISYLATE 5 MG/ML
10 INJECTION INTRAMUSCULAR; INTRAVENOUS
Status: DISCONTINUED | OUTPATIENT
Start: 2019-04-17 | End: 2019-04-18 | Stop reason: HOSPADM

## 2019-04-17 RX ORDER — DICYCLOMINE HYDROCHLORIDE 10 MG/1
10 CAPSULE ORAL 4 TIMES DAILY
Qty: 20 CAP | Refills: 0 | Status: SHIPPED | OUTPATIENT
Start: 2019-04-17 | End: 2019-04-22

## 2019-04-17 RX ORDER — PANTOPRAZOLE SODIUM 40 MG/1
40 TABLET, DELAYED RELEASE ORAL DAILY
Qty: 20 TAB | Refills: 0 | Status: SHIPPED | OUTPATIENT
Start: 2019-04-17 | End: 2019-05-07

## 2019-04-17 RX ORDER — PROCHLORPERAZINE MALEATE 10 MG
10 TABLET ORAL
Qty: 12 TAB | Refills: 0 | Status: SHIPPED | OUTPATIENT
Start: 2019-04-17 | End: 2019-04-24

## 2019-04-17 RX ORDER — DIPHENHYDRAMINE HYDROCHLORIDE 50 MG/ML
12.5 INJECTION, SOLUTION INTRAMUSCULAR; INTRAVENOUS
Status: COMPLETED | OUTPATIENT
Start: 2019-04-17 | End: 2019-04-17

## 2019-04-17 RX ORDER — FAMOTIDINE 10 MG/ML
20 INJECTION INTRAVENOUS
Status: COMPLETED | OUTPATIENT
Start: 2019-04-17 | End: 2019-04-17

## 2019-04-17 RX ORDER — HYDROMORPHONE HYDROCHLORIDE 1 MG/ML
0.5 INJECTION, SOLUTION INTRAMUSCULAR; INTRAVENOUS; SUBCUTANEOUS
Status: COMPLETED | OUTPATIENT
Start: 2019-04-17 | End: 2019-04-17

## 2019-04-17 RX ADMIN — HYDROMORPHONE HYDROCHLORIDE 0.5 MG: 1 INJECTION, SOLUTION INTRAMUSCULAR; INTRAVENOUS; SUBCUTANEOUS at 19:47

## 2019-04-17 RX ADMIN — PROCHLORPERAZINE EDISYLATE 10 MG: 5 INJECTION INTRAMUSCULAR; INTRAVENOUS at 21:50

## 2019-04-17 RX ADMIN — SODIUM CHLORIDE 500 ML: 900 INJECTION, SOLUTION INTRAVENOUS at 18:18

## 2019-04-17 RX ADMIN — PROCHLORPERAZINE EDISYLATE 10 MG: 5 INJECTION INTRAMUSCULAR; INTRAVENOUS at 18:15

## 2019-04-17 RX ADMIN — DIPHENHYDRAMINE HYDROCHLORIDE 12.5 MG: 50 INJECTION, SOLUTION INTRAMUSCULAR; INTRAVENOUS at 19:47

## 2019-04-17 RX ADMIN — FAMOTIDINE 20 MG: 10 INJECTION INTRAVENOUS at 18:24

## 2019-04-17 NOTE — ED PROVIDER NOTES
EMERGENCY DEPARTMENT HISTORY AND PHYSICAL EXAM 
 
Date: 4/17/2019 Patient Name: Maureen Morelos History of Presenting Illness Chief Complaint Patient presents with  Abdominal Pain  Vomiting History Provided By: Patient Additional History (Context): Maureen Morelos is a 66 y.o. female with diabetes, hypertension, hyperlipidemia and obesity who presents with abdominal pain, nausea vomiting since last night. Has not had a bowel movement yet today but did have one yesterday. Denies jesse hematemesis melena or hematochezia dysuria. Checking blood sugars which she said are around 200. Was able to take her blood pressure medications today. PCP: Essence Ge MD 
 
Current Facility-Administered Medications Medication Dose Route Frequency Provider Last Rate Last Dose  prochlorperazine (COMPAZINE) injection 10 mg  10 mg IntraVENous Q6H PRN Lacy Valdivia PA   10 mg at 04/17/19 1815  prochlorperazine (COMPAZINE) injection 10 mg  10 mg IntraVENous NOW Lacy Valdivia PA Current Outpatient Medications Medication Sig Dispense Refill  pantoprazole (PROTONIX) 40 mg tablet Take 1 Tab by mouth daily for 20 days. 20 Tab 0  
 dicyclomine (BENTYL) 10 mg capsule Take 1 Cap by mouth four (4) times daily for 5 days. 20 Cap 0  
 prochlorperazine (COMPAZINE) 10 mg tablet Take 1 Tab by mouth every eight (8) hours as needed for Nausea for up to 7 days. 12 Tab 0  
 betamethasone dipropionate (DIPROSONE) 0.05 % topical cream Apply  to affected area daily as needed for Skin Irritation.  furosemide (LASIX) 40 mg tablet Take 40 mg by mouth as needed.  gabapentin (NEURONTIN) 300 mg capsule Take 300 mg by mouth three (3) times daily.  HYDROcodone-acetaminophen (NORCO)  mg tablet Take 1 Tab by mouth every six (6) hours as needed for Pain.  potassium chloride SR (KLOR-CON 10) 10 mEq tablet Take 10 mEq by mouth as needed.  tiZANidine (ZANAFLEX) 2 mg tablet Take 2 mg by mouth as needed. 3  
 metoprolol succinate (TOPROL-XL) 100 mg tablet Take 100 mg by mouth daily. 6  
 conjugated estrogens (PREMARIN) 0.625 mg/gram vaginal cream Apply small amount to introitus MWF as directed. 30 g 1  
 aspirin (ASPIRIN) 325 mg tablet Take 1 Tab by mouth daily. 30 Tab 0  
 mirtazapine (REMERON) 15 mg tablet Take 15 mg by mouth nightly.  glimepiride (AMARYL) 1 mg tablet Take 1 mg by mouth two (2) times a day.  levothyroxine (SYNTHROID) 50 mcg tablet Take 50 mcg by mouth Daily (before breakfast).  fentaNYL (DURAGESIC) 25 mcg/hr PATCH 1 Patch by TransDERmal route every seventy-two (72) hours.  amLODIPine (NORVASC) 10 mg tablet 10 mg.    
 ziprasidone (GEODON) 20 mg capsule Take 20 mg by mouth daily.  ibuprofen (MOTRIN) 200 mg tablet Take 400 mg by mouth every eight (8) hours as needed for Pain.  oxyCODONE-acetaminophen (PERCOCET 10)  mg per tablet Take 1 Tab by mouth every six (6) hours as needed for Pain.  cholecalciferol (VITAMIN D3) 1,000 unit cap Take 1,000 Units by mouth daily.  losartan (COZAAR) 100 mg tablet Take 100 mg by mouth daily.  venlafaxine-SR (EFFEXOR XR) 150 mg capsule Take 150 mg by mouth daily.  cyanocobalamin (VITAMIN B-12) 1,000 mcg tablet Take 1,000 mcg by mouth daily. Past History Past Medical History: 
Past Medical History:  
Diagnosis Date  Anxiety  Atrophic vaginitis  CAD (coronary artery disease)  Chronic pain  Depression  Diabetes ([facility] [address]  Diverticulosis  Dysphagia  Dysuria  Early satiety  Esophageal reflux  Fatty liver  Gastric ulcer  Gastroparesis  H/O joint replacement 1991  
 left knee  Heart disease  Hypercholesteremia  Hypertension  Iron deficiency anemia  Neurogenic bladder  Recurrent UTI  Right flank pain  Scoliosis  Spinal stenosis  Stenosis of lumbosacral spine  Stroke [facility]) 04/2018  Tardive dyskinesia  Urgency of urination Past Surgical History: 
Past Surgical History:  
Procedure Laterality Date  EGD  7/10/2018  HX APPENDECTOMY  HX BREAST LUMPECTOMY [address]  HX COLECTOMY  2012  HX COLONOSCOPY  10/29/2009  
 hyperplastic polyp, hemorrhoids [address] HYSTERECTOMY  1970  HX KNEE ARTHROSCOPY    
 HX KNEE REPLACEMENT  1991  
 left knee  HX LUMBAR FUSION    
 x 3 Family History: 
Family History Problem Relation Age of Onset  Heart Disease Mother  Diabetes Mother  Heart Attack Mother  Cancer Father   
     lung CA  Cancer Brother  Cancer Sister Social History: 
Social History Tobacco Use  Smoking status: Never Smoker  Smokeless tobacco: Never Used Substance Use Topics  Alcohol use: No  
 Drug use: No  
 
 
Allergies: Allergies Allergen Reactions  Codeine Rash and Itching  Iodine Hives and Rash  Morphine Hives  Pcn [Penicillins] Other (comments) Causes \"Heart swell\"  Sulfa (Sulfonamide Antibiotics) Nausea Only Review of Systems Review of Systems Constitutional: Negative for fever. Respiratory: Negative for shortness of breath. Cardiovascular: Positive for chest pain. Gastrointestinal: Positive for abdominal pain, nausea and vomiting. Negative for blood in stool, constipation and diarrhea. All Other Systems Negative Physical Exam  
 
Vitals:  
 04/17/19 1915 04/17/19 1930 04/17/19 1945 04/17/19 2000 BP: 194/75 191/69 192/77 180/74 Pulse:      
Resp:      
Temp:      
SpO2: 99% 97% 99% 95% Physical Exam  
Constitutional: She is oriented to person, place, and time. She appears well-developed. She appears distressed. HENT:  
Head: Normocephalic and atraumatic. Eyes: Pupils are equal, round, and reactive to light. Neck: No JVD present. No tracheal deviation present. No thyromegaly present. Cardiovascular: Normal rate, regular rhythm and normal heart sounds. Exam reveals no gallop and no friction rub. No murmur heard. Pulmonary/Chest: Effort normal and breath sounds normal. No stridor. No respiratory distress. She has no wheezes. She has no rales. She exhibits no tenderness. Abdominal: Soft. She exhibits no distension and no mass. There is tenderness. There is no rebound and no guarding. Moderate epigastric tenderness to palpation. Musculoskeletal: She exhibits no edema or tenderness. Lymphadenopathy:  
  She has no cervical adenopathy. Neurological: She is alert and oriented to person, place, and time. Skin: Skin is warm and dry. No rash noted. No erythema. No pallor. Psychiatric: She has a normal mood and affect. Her behavior is normal. Thought content normal.  
Nursing note and vitals reviewed. Diagnostic Study Results Labs - Recent Results (from the past 12 hour(s)) CBC WITH AUTOMATED DIFF Collection Time: 04/17/19  6:15 PM  
Result Value Ref Range WBC 15.5 (H) 4.6 - 13.2 K/uL  
 RBC 4.30 4.20 - 5.30 M/uL  
 HGB 12.2 12.0 - 16.0 g/dL HCT 37.3 35.0 - 45.0 % MCV 86.7 74.0 - 97.0 FL  
 MCH 28.4 24.0 - 34.0 PG  
 MCHC 32.7 31.0 - 37.0 g/dL  
 RDW 13.1 11.6 - 14.5 % PLATELET 099 555 - 992 K/uL MPV 11.6 9.2 - 11.8 FL  
 NEUTROPHILS 90 (H) 40 - 73 % LYMPHOCYTES 8 (L) 21 - 52 % MONOCYTES 2 (L) 3 - 10 % EOSINOPHILS 0 0 - 5 % BASOPHILS 0 0 - 2 %  
 ABS. NEUTROPHILS 14.1 (H) 1.8 - 8.0 K/UL  
 ABS. LYMPHOCYTES 1.2 0.9 - 3.6 K/UL  
 ABS. MONOCYTES 0.2 0.05 - 1.2 K/UL  
 ABS. EOSINOPHILS 0.0 0.0 - 0.4 K/UL  
 ABS. BASOPHILS 0.0 0.0 - 0.1 K/UL  
 DF AUTOMATED METABOLIC PANEL, COMPREHENSIVE Collection Time: 04/17/19  6:15 PM  
Result Value Ref Range Sodium 138 136 - 145 mmol/L Potassium 3.2 (L) 3.5 - 5.5 mmol/L  Chloride 103 100 - 108 mmol/L  
 CO2 23 21 - 32 mmol/L Anion gap 12 3.0 - 18 mmol/L Glucose 296 (H) 74 - 99 mg/dL BUN 11 7.0 - 18 MG/DL Creatinine 0.89 0.6 - 1.3 MG/DL  
 BUN/Creatinine ratio 12 12 - 20 GFR est AA >60 >60 ml/min/1.73m2 GFR est non-AA >60 >60 ml/min/1.73m2 Calcium 9.1 8.5 - 10.1 MG/DL Bilirubin, total 0.4 0.2 - 1.0 MG/DL  
 ALT (SGPT) 13 13 - 56 U/L  
 AST (SGOT) 8 (L) 15 - 37 U/L Alk. phosphatase 151 (H) 45 - 117 U/L Protein, total 8.0 6.4 - 8.2 g/dL Albumin 4.1 3.4 - 5.0 g/dL Globulin 3.9 2.0 - 4.0 g/dL A-G Ratio 1.1 0.8 - 1.7 LIPASE Collection Time: 04/17/19  6:15 PM  
Result Value Ref Range Lipase 46 (L) 73 - 393 U/L  
TROPONIN I Collection Time: 04/17/19  6:15 PM  
Result Value Ref Range Troponin-I, QT <0.02 0.0 - 0.045 NG/ML  
URINALYSIS W/ RFLX MICROSCOPIC Collection Time: 04/17/19  9:00 PM  
Result Value Ref Range Color YELLOW Appearance CLEAR Specific gravity 1.011 1.005 - 1.030    
 pH (UA) 7.0 5.0 - 8.0 Protein NEGATIVE  NEG mg/dL Glucose 250 (A) NEG mg/dL Ketone NEGATIVE  NEG mg/dL Bilirubin NEGATIVE  NEG Blood NEGATIVE  NEG Urobilinogen 0.2 0.2 - 1.0 EU/dL Nitrites NEGATIVE  NEG Leukocyte Esterase NEGATIVE  NEG Radiologic Studies -  
CT ABD PELV WO CONT    (Results Pending) CT Results  (Last 48 hours) None CXR Results  (Last 48 hours) None Medical Decision Making I am the first provider for this patient. I reviewed the vital signs, available nursing notes, past medical history, past surgical history, family history and social history. Vital Signs-Reviewed the patient's vital signs. Records Reviewed: Nursing Notes Procedures: 
Procedures Provider Notes (Medical Decision Making): Patient was initially very uncomfortable describing a burning sensation in her upper abdomen.   Labs are back now showing a leukocytosis and patient says that her pain has improved somewhat but still having moderate amount of pain we will send for scan as discussed with ED attending. 9:41 PM 
 
Patient says that she continues to feel improved. Will give her another dose of anti-nausea medication prior to discharge per her request.  CT showed no acute intra-abdominal pathology. MED RECONCILIATION: 
Current Facility-Administered Medications Medication Dose Route Frequency  prochlorperazine (COMPAZINE) injection 10 mg  10 mg IntraVENous Q6H PRN  prochlorperazine (COMPAZINE) injection 10 mg  10 mg IntraVENous NOW Current Outpatient Medications Medication Sig  pantoprazole (PROTONIX) 40 mg tablet Take 1 Tab by mouth daily for 20 days.  dicyclomine (BENTYL) 10 mg capsule Take 1 Cap by mouth four (4) times daily for 5 days.  prochlorperazine (COMPAZINE) 10 mg tablet Take 1 Tab by mouth every eight (8) hours as needed for Nausea for up to 7 days.  betamethasone dipropionate (DIPROSONE) 0.05 % topical cream Apply  to affected area daily as needed for Skin Irritation.  furosemide (LASIX) 40 mg tablet Take 40 mg by mouth as needed.  gabapentin (NEURONTIN) 300 mg capsule Take 300 mg by mouth three (3) times daily.  HYDROcodone-acetaminophen (NORCO)  mg tablet Take 1 Tab by mouth every six (6) hours as needed for Pain.  potassium chloride SR (KLOR-CON 10) 10 mEq tablet Take 10 mEq by mouth as needed.  tiZANidine (ZANAFLEX) 2 mg tablet Take 2 mg by mouth as needed.  metoprolol succinate (TOPROL-XL) 100 mg tablet Take 100 mg by mouth daily.  conjugated estrogens (PREMARIN) 0.625 mg/gram vaginal cream Apply small amount to introitus MWF as directed.  aspirin (ASPIRIN) 325 mg tablet Take 1 Tab by mouth daily.  mirtazapine (REMERON) 15 mg tablet Take 15 mg by mouth nightly.  glimepiride (AMARYL) 1 mg tablet Take 1 mg by mouth two (2) times a day.   
 levothyroxine (SYNTHROID) 50 mcg tablet Take 50 mcg by mouth Daily (before breakfast).  fentaNYL (DURAGESIC) 25 mcg/hr PATCH 1 Patch by TransDERmal route every seventy-two (72) hours.  amLODIPine (NORVASC) 10 mg tablet 10 mg.  
 ziprasidone (GEODON) 20 mg capsule Take 20 mg by mouth daily.  ibuprofen (MOTRIN) 200 mg tablet Take 400 mg by mouth every eight (8) hours as needed for Pain.  oxyCODONE-acetaminophen (PERCOCET 10)  mg per tablet Take 1 Tab by mouth every six (6) hours as needed for Pain.  cholecalciferol (VITAMIN D3) 1,000 unit cap Take 1,000 Units by mouth daily.  losartan (COZAAR) 100 mg tablet Take 100 mg by mouth daily.  venlafaxine-SR (EFFEXOR XR) 150 mg capsule Take 150 mg by mouth daily.  cyanocobalamin (VITAMIN B-12) 1,000 mcg tablet Take 1,000 mcg by mouth daily. Disposition: 
home DISCHARGE NOTE:  
9:41 PM 
 
Pt has been reexamined. Patient has no new complaints, changes, or physical findings. Care plan outlined and precautions discussed. Results of labs, CT were reviewed with the patient. All medications were reviewed with the patient; will d/c home with see below. All of pt's questions and concerns were addressed. Patient was instructed and agrees to follow up with PCP, as well as to return to the ED upon further deterioration. Patient is ready to go home. Follow-up Information Follow up With Specialties Details Why Contact Info Gilma Zapien MD Internal Medicine Schedule an appointment as soon as possible for a visit in 1 day  [address] [phone] [facility] EMERGENCY DEPT Emergency Medicine  If symptoms worsen return immediately [address] 
[phone] Current Discharge Medication List  
  
START taking these medications Details  
pantoprazole (PROTONIX) 40 mg tablet Take 1 Tab by mouth daily for 20 days. Qty: 20 Tab, Refills: 0  
  
dicyclomine (BENTYL) 10 mg capsule Take 1 Cap by mouth four (4) times daily for 5 days. Qty: 20 Cap, Refills: 0  
  
prochlorperazine (COMPAZINE) 10 mg tablet Take 1 Tab by mouth every eight (8) hours as needed for Nausea for up to 7 days. Qty: 12 Tab, Refills: 0 Diagnosis Clinical Impression: 1. Abdominal pain, epigastric 2. Non-intractable vomiting with nausea, unspecified vomiting type

## 2019-04-17 NOTE — PROGRESS NOTES
PHYSICAL THERAPY - DAILY TREATMENT NOTE Patient Name: Robb Ny        Date: 2019 : 1945   YES Patient  Verified Visit #:   14   of   15_+  Insurance: Payor: [place] MED[place] / Plan: [place] MEDICARE PART A & B / Product Type: Medicare / In time: 11:45 Out time: 12:23 Total Treatment Time: 45 Medicare/BCBS Time Tracking (below) Total Timed Codes (min):  38 1:1 Treatment Time:  45 TREATMENT AREA = Left low back pain [M54.5] SUBJECTIVE Pain Level (on 0 to 10 scale):  2  / 10 Medication Changes/New allergies or changes in medical history, any new surgeries or procedures? NO    If yes, update Summary List  
Subjective Functional Status/Changes:  []  No changes reported \"I didn't come Monday b/c I was feeling sick. I'm better now. I'm late b/c I wasn't sure if I was good enough to come. \"  
 
  
 
OBJECTIVE Therapeutic Procedures: 
Min Procedure Specifics + Rationale  
n/a [x]  Patient Education (performed throughout session) [x] Review HEP [] Progressed/Changed HEP based on:  
[] proper performance and advancement of Therex/TA [] reduction in pain level   
[] increased functional capacity [] change in directional preference  
38(38) [x] Therapeutic Exercise [x]  See Flowsheet Rationale: increase ROM and increase strength to improve the patients ability to participate in ADL's Other Objective/Functional Measures: 
 
Clarified with patient her status re: her illness, stating that she felt fine and would be willing to participate in therapy. Patient informed that her treatment time will be abbreviated due to her tardiness. Post Treatment Pain Level (on 0 to 10) scale:   0  / 10 ASSESSMENT Assessment/Changes in Function:  
 
Limitations in treatment due to TC. However, patient required less rest periods to perform in session Patient will continue to benefit from skilled PT services to modify and progress therapeutic interventions, address functional mobility deficits, address ROM deficits, address strength deficits, analyze and address soft tissue restrictions, analyze and cue movement patterns, analyze and modify body mechanics/ergonomics and instruct in home and community integration  to attain remaining goals Progress toward goals / Updated goals: 
Compliant towards HEP  
PLAN [x]  Upgrade activities as tolerated 
[x]  Update interventions per flow sheet YES Continue plan of care  
[]  Discharge due to :   
[]  Other:   
 
Therapist: Bryanna Tripathi \"BJ\" FRANKIE Knight, Cert. MDT, Cert. DN, Cert. SMT Date: 4/17/2019 Time: 11:46 AM  
 
Future Appointments Date Time Provider Nikki Villanueva 4/22/2019  1:00 PM [address] [place] [address]  
4/24/2019 11:30 AM Quirino Ramirez [facility]  
4/29/2019  1:00 PM \[facility] [place] [facility]\""rudy [facility]

## 2019-04-18 NOTE — ED NOTES
I have reviewed discharge instructions with the patient. The patient verbalized understanding. Patient armband removed and shredded Pt VSS, NAD, pain reassessed

## 2019-04-18 NOTE — DISCHARGE INSTRUCTIONS
Patient Education        Indigestion (Dyspepsia or Heartburn): Care Instructions  Your Care Instructions  Sometimes it can be hard to pinpoint the cause of indigestion. (It is also called dyspepsia or heartburn.) Most cases of an upset stomach with bloating, burning, burping, and nausea are minor and go away within several hours. Home treatment and over-the-counter medicine often are able to control symptoms. But if you take medicine to relieve your indigestion without making diet and lifestyle changes, your symptoms are likely to return again and again. If you get indigestion often, it may be a sign of a more serious medical problem. Be sure to follow up with your doctor, who may want to do tests to be sure of the cause of your indigestion. Follow-up care is a key part of your treatment and safety. Be sure to make and go to all appointments, and call your doctor if you are having problems. It's also a good idea to know your test results and keep a list of the medicines you take. How can you care for yourself at home? · Your doctor may recommend over-the-counter medicine. For mild or occasional indigestion, antacids such as Gaviscon, Mylanta, Maalox, or Tums, may help. Be safe with medicines. Be careful when you take over-the-counter antacid medicines. Many of these medicines have aspirin in them. Read the label to make sure that you are not taking more than the recommended dose. Too much aspirin can be harmful. · Your doctor also may recommend over-the-counter acid reducers, such as Pepcid AC, Tagamet HB, Zantac 75, or Prilosec. Read and follow all instructions on the label. If you use these medicines often, talk with your doctor. · Change your eating habits. ? It's best to eat several small meals instead of two or three large meals. ? After you eat, wait 2 to 3 hours before you lie down. ? Chocolate, mint, and alcohol can make GERD worse. ?  Spicy foods, foods that have a lot of acid (like tomatoes and oranges), and coffee can make GERD symptoms worse in some people. If your symptoms are worse after you eat a certain food, you may want to stop eating that food to see if your symptoms get better. · Do not smoke or chew tobacco. Smoking can make GERD worse. If you need help quitting, talk to your doctor about stop-smoking programs and medicines. These can increase your chances of quitting for good. · If you have GERD symptoms at night, raise the head of your bed 6 to 8 inches. You can do this by putting the frame on blocks or placing a foam wedge under the head of your mattress. (Adding extra pillows does not work.)  · Do not wear tight clothing around your middle. · Lose weight if you need to. Losing just 5 to 10 pounds can help. · Do not take anti-inflammatory medicines, such as aspirin, ibuprofen (Advil, Motrin), or naproxen (Aleve). These can irritate the stomach. If you need a pain medicine, try acetaminophen (Tylenol), which does not cause stomach upset. When should you call for help? Call your doctor now or seek immediate medical care if:    · You have new or worse belly pain.     · You are vomiting.    Watch closely for changes in your health, and be sure to contact your doctor if:    · You have new or worse symptoms of indigestion.     · You have trouble or pain swallowing.     · You are losing weight.     · You do not get better as expected. Where can you learn more? Go to http://shamika-chinyere.info/. Enter K684 in the search box to learn more about \"Indigestion (Dyspepsia or Heartburn): Care Instructions. \"  Current as of: March 27, 2018  Content Version: 11.9  © 4125-8638 "[employer]. Care instructions adapted under license by [employer] (which disclaims liability or warranty for this information).  If you have questions about a medical condition or this instruction, always ask your healthcare professional. [employer] disclaims any warranty or liability for your use of this information. Patient Education        Abdominal Pain: Care Instructions  Your Care Instructions    Abdominal pain has many possible causes. Some aren't serious and get better on their own in a few days. Others need more testing and treatment. If your pain continues or gets worse, you need to be rechecked and may need more tests to find out what is wrong. You may need surgery to correct the problem. Don't ignore new symptoms, such as fever, nausea and vomiting, urination problems, pain that gets worse, and dizziness. These may be signs of a more serious problem. Your doctor may have recommended a follow-up visit in the next 8 to 12 hours. If you are not getting better, you may need more tests or treatment. The doctor has checked you carefully, but problems can develop later. If you notice any problems or new symptoms, get medical treatment right away. Follow-up care is a key part of your treatment and safety. Be sure to make and go to all appointments, and call your doctor if you are having problems. It's also a good idea to know your test results and keep a list of the medicines you take. How can you care for yourself at home? · Rest until you feel better. · To prevent dehydration, drink plenty of fluids, enough so that your urine is light yellow or clear like water. Choose water and other caffeine-free clear liquids until you feel better. If you have kidney, heart, or liver disease and have to limit fluids, talk with your doctor before you increase the amount of fluids you drink. · If your stomach is upset, eat mild foods, such as rice, dry toast or crackers, bananas, and applesauce. Try eating several small meals instead of two or three large ones. · Wait until 48 hours after all symptoms have gone away before you have spicy foods, alcohol, and drinks that contain caffeine. · Do not eat foods that are high in fat.   · Avoid anti-inflammatory medicines such as aspirin, ibuprofen (Advil, Motrin), and naproxen (Aleve). These can cause stomach upset. Talk to your doctor if you take daily aspirin for another health problem. When should you call for help? Call 911 anytime you think you may need emergency care. For example, call if:    · You passed out (lost consciousness).     · You pass maroon or very bloody stools.     · You vomit blood or what looks like coffee grounds.     · You have new, severe belly pain.    Call your doctor now or seek immediate medical care if:    · Your pain gets worse, especially if it becomes focused in one area of your belly.     · You have a new or higher fever.     · Your stools are black and look like tar, or they have streaks of blood.     · You have unexpected vaginal bleeding.     · You have symptoms of a urinary tract infection. These may include:  ? Pain when you urinate. ? Urinating more often than usual.  ? Blood in your urine.     · You are dizzy or lightheaded, or you feel like you may faint.    Watch closely for changes in your health, and be sure to contact your doctor if:    · You are not getting better after 1 day (24 hours). Where can you learn more? Go to http://shamika-chinyere.info/. Enter B607 in the search box to learn more about \"Abdominal Pain: Care Instructions. \"  Current as of: September 23, 2018  Content Version: 11.9  © 1911-7452 [employer]. Care instructions adapted under license by [employer] (which disclaims liability or warranty for this information). If you have questions about a medical condition or this instruction, always ask your healthcare professional. [place] [address] any warranty or liability for your use of this information. Patient Education        Nausea and Vomiting: Care Instructions  Your Care Instructions    When you are nauseated, you may feel weak and sweaty and notice a lot of saliva in your mouth.  Nausea often leads to vomiting. Most of the time you do not need to worry about nausea and vomiting, but they can be signs of other illnesses. Two common causes of nausea and vomiting are stomach flu and food poisoning. Nausea and vomiting from viral stomach flu will usually start to improve within 24 hours. Nausea and vomiting from food poisoning may last from 12 to 48 hours. The doctor has checked you carefully, but problems can develop later. If you notice any problems or new symptoms, get medical treatment right away. Follow-up care is a key part of your treatment and safety. Be sure to make and go to all appointments, and call your doctor if you are having problems. It's also a good idea to know your test results and keep a list of the medicines you take. How can you care for yourself at home? · To prevent dehydration, drink plenty of fluids, enough so that your urine is light yellow or clear like water. Choose water and other caffeine-free clear liquids until you feel better. If you have kidney, heart, or liver disease and have to limit fluids, talk with your doctor before you increase the amount of fluids you drink. · Rest in bed until you feel better. · When you are able to eat, try clear soups, mild foods, and liquids until all symptoms are gone for 12 to 48 hours. Other good choices include dry toast, crackers, cooked cereal, and gelatin dessert, such as Jell-O. When should you call for help? Call 911 anytime you think you may need emergency care. For example, call if:    · You passed out (lost consciousness).    Call your doctor now or seek immediate medical care if:    · You have symptoms of dehydration, such as:  ? Dry eyes and a dry mouth. ? Passing only a little dark urine. ?  Feeling thirstier than usual.     · You have new or worsening belly pain.     · You have a new or higher fever.     · You vomit blood or what looks like coffee grounds.    Watch closely for changes in your health, and be sure to contact your doctor if:    · You have ongoing nausea and vomiting.     · Your vomiting is getting worse.     · Your vomiting lasts longer than 2 days.     · You are not getting better as expected. Where can you learn more? Go to http://shamika-chinyere.info/. Enter 25 [id number] in the search box to learn more about \"Nausea and Vomiting: Care Instructions. \"  Current as of: September 23, 2018  Content Version: 11.9  © 6427-7133 [employer]. Care instructions adapted under license by [employer] (which disclaims liability or warranty for this information). If you have questions about a medical condition or this instruction, always ask your healthcare professional. [place] [address] any warranty or liability for your use of this information.

## 2019-04-20 ENCOUNTER — HOSPITAL ENCOUNTER (EMERGENCY)
Age: 74
Discharge: HOME | End: 2019-04-20
Attending: EMERGENCY MEDICINE | Admitting: EMERGENCY MEDICINE
Payer: MEDICARE

## 2019-04-20 ENCOUNTER — APPOINTMENT (OUTPATIENT)
Dept: GENERAL RADIOLOGY | Age: 74
End: 2019-04-20
Attending: EMERGENCY MEDICINE
Payer: MEDICARE

## 2019-04-20 VITALS
WEIGHT: 190 LBS | SYSTOLIC BLOOD PRESSURE: 195 MMHG | OXYGEN SATURATION: 99 % | TEMPERATURE: 97.4 F | BODY MASS INDEX: 33.66 KG/M2 | RESPIRATION RATE: 18 BRPM | HEIGHT: 63 IN | HEART RATE: 64 BPM | DIASTOLIC BLOOD PRESSURE: 66 MMHG

## 2019-04-20 DIAGNOSIS — N39.0 URINARY TRACT INFECTION WITHOUT HEMATURIA, SITE UNSPECIFIED: ICD-10-CM

## 2019-04-20 DIAGNOSIS — R73.9 HYPERGLYCEMIA: Primary | ICD-10-CM

## 2019-04-20 LAB
ALBUMIN SERPL-MCNC: 3.3 G/DL (ref 3.4–5)
ALBUMIN/GLOB SERPL: 1 {RATIO} (ref 0.8–1.7)
ALP SERPL-CCNC: 124 U/L (ref 45–117)
ALT SERPL-CCNC: 13 U/L (ref 13–56)
ANION GAP SERPL CALC-SCNC: 9 MMOL/L (ref 3–18)
APPEARANCE UR: CLEAR
AST SERPL-CCNC: 10 U/L (ref 15–37)
ATRIAL RATE: 60 BPM
BACTERIA URNS QL MICRO: ABNORMAL /HPF
BASOPHILS # BLD: 0 K/UL (ref 0–0.1)
BASOPHILS NFR BLD: 0 % (ref 0–2)
BILIRUB SERPL-MCNC: 0.2 MG/DL (ref 0.2–1)
BILIRUB UR QL: NEGATIVE
BUN SERPL-MCNC: 13 MG/DL (ref 7–18)
BUN/CREAT SERPL: 15 (ref 12–20)
CALCIUM SERPL-MCNC: 8.3 MG/DL (ref 8.5–10.1)
CALCULATED P AXIS, ECG09: 14 DEGREES
CALCULATED R AXIS, ECG10: 59 DEGREES
CALCULATED T AXIS, ECG11: 43 DEGREES
CHLORIDE SERPL-SCNC: 110 MMOL/L (ref 100–108)
CK MB CFR SERPL CALC: NORMAL % (ref 0–4)
CK MB SERPL-MCNC: <1 NG/ML (ref 5–25)
CK SERPL-CCNC: 51 U/L (ref 26–192)
CO2 SERPL-SCNC: 23 MMOL/L (ref 21–32)
COLOR UR: YELLOW
CREAT SERPL-MCNC: 0.85 MG/DL (ref 0.6–1.3)
DIAGNOSIS, 93000: NORMAL
DIFFERENTIAL METHOD BLD: ABNORMAL
EOSINOPHIL # BLD: 0 K/UL (ref 0–0.4)
EOSINOPHIL NFR BLD: 0 % (ref 0–5)
EPITH CASTS URNS QL MICRO: ABNORMAL /LPF (ref 0–5)
ERYTHROCYTE [DISTWIDTH] IN BLOOD BY AUTOMATED COUNT: 13.1 % (ref 11.6–14.5)
GLOBULIN SER CALC-MCNC: 3.3 G/DL (ref 2–4)
GLUCOSE BLD STRIP.AUTO-MCNC: 192 MG/DL (ref 70–110)
GLUCOSE SERPL-MCNC: 312 MG/DL (ref 74–99)
GLUCOSE UR STRIP.AUTO-MCNC: 500 MG/DL
HCT VFR BLD AUTO: 35.7 % (ref 35–45)
HGB BLD-MCNC: 11.3 G/DL (ref 12–16)
HGB UR QL STRIP: NEGATIVE
KETONES UR QL STRIP.AUTO: NEGATIVE MG/DL
LEUKOCYTE ESTERASE UR QL STRIP.AUTO: ABNORMAL
LIPASE SERPL-CCNC: 35 U/L (ref 73–393)
LYMPHOCYTES # BLD: 1 K/UL (ref 0.9–3.6)
LYMPHOCYTES NFR BLD: 12 % (ref 21–52)
MAGNESIUM SERPL-MCNC: 1.5 MG/DL (ref 1.6–2.6)
MCH RBC QN AUTO: 27.7 PG (ref 24–34)
MCHC RBC AUTO-ENTMCNC: 31.7 G/DL (ref 31–37)
MCV RBC AUTO: 87.5 FL (ref 74–97)
MONOCYTES # BLD: 0.2 K/UL (ref 0.05–1.2)
MONOCYTES NFR BLD: 2 % (ref 3–10)
NEUTS SEG # BLD: 7.6 K/UL (ref 1.8–8)
NEUTS SEG NFR BLD: 86 % (ref 40–73)
NITRITE UR QL STRIP.AUTO: NEGATIVE
P-R INTERVAL, ECG05: 188 MS
PH UR STRIP: 7 [PH] (ref 5–8)
PLATELET # BLD AUTO: 242 K/UL (ref 135–420)
PMV BLD AUTO: 12.1 FL (ref 9.2–11.8)
POTASSIUM SERPL-SCNC: 3.4 MMOL/L (ref 3.5–5.5)
PROT SERPL-MCNC: 6.6 G/DL (ref 6.4–8.2)
PROT UR STRIP-MCNC: NEGATIVE MG/DL
Q-T INTERVAL, ECG07: 472 MS
QRS DURATION, ECG06: 74 MS
QTC CALCULATION (BEZET), ECG08: 472 MS
RBC # BLD AUTO: 4.08 M/UL (ref 4.2–5.3)
RBC #/AREA URNS HPF: ABNORMAL /HPF (ref 0–5)
SODIUM SERPL-SCNC: 142 MMOL/L (ref 136–145)
SP GR UR REFRACTOMETRY: 1.01 (ref 1–1.03)
TROPONIN I SERPL-MCNC: <0.02 NG/ML (ref 0–0.04)
UROBILINOGEN UR QL STRIP.AUTO: 0.2 EU/DL (ref 0.2–1)
VENTRICULAR RATE, ECG03: 60 BPM
WBC # BLD AUTO: 8.8 K/UL (ref 4.6–13.2)
WBC URNS QL MICRO: ABNORMAL /HPF (ref 0–4)

## 2019-04-20 PROCEDURE — 81001 URINALYSIS AUTO W/SCOPE: CPT

## 2019-04-20 PROCEDURE — 83690 ASSAY OF LIPASE: CPT

## 2019-04-20 PROCEDURE — 71046 X-RAY EXAM CHEST 2 VIEWS: CPT

## 2019-04-20 PROCEDURE — 74011250636 HC RX REV CODE- 250/636: Performed by: EMERGENCY MEDICINE

## 2019-04-20 PROCEDURE — 82962 GLUCOSE BLOOD TEST: CPT

## 2019-04-20 PROCEDURE — 82550 ASSAY OF CK (CPK): CPT

## 2019-04-20 PROCEDURE — 74011250637 HC RX REV CODE- 250/637: Performed by: EMERGENCY MEDICINE

## 2019-04-20 PROCEDURE — 80053 COMPREHEN METABOLIC PANEL: CPT

## 2019-04-20 PROCEDURE — 85025 COMPLETE CBC W/AUTO DIFF WBC: CPT

## 2019-04-20 PROCEDURE — 99285 EMERGENCY DEPT VISIT HI MDM: CPT

## 2019-04-20 PROCEDURE — 93005 ELECTROCARDIOGRAM TRACING: CPT

## 2019-04-20 PROCEDURE — 96361 HYDRATE IV INFUSION ADD-ON: CPT

## 2019-04-20 PROCEDURE — 83735 ASSAY OF MAGNESIUM: CPT

## 2019-04-20 PROCEDURE — 96360 HYDRATION IV INFUSION INIT: CPT

## 2019-04-20 RX ORDER — NITROFURANTOIN 25; 75 MG/1; MG/1
100 CAPSULE ORAL 2 TIMES DAILY
Qty: 14 CAP | Refills: 0 | Status: SHIPPED | OUTPATIENT
Start: 2019-04-20 | End: 2019-04-27

## 2019-04-20 RX ORDER — DICYCLOMINE HYDROCHLORIDE 10 MG/1
10 CAPSULE ORAL 4 TIMES DAILY
Status: DISCONTINUED | OUTPATIENT
Start: 2019-04-20 | End: 2019-04-20 | Stop reason: HOSPADM

## 2019-04-20 RX ORDER — NITROFURANTOIN 25; 75 MG/1; MG/1
100 CAPSULE ORAL 2 TIMES DAILY
Status: DISCONTINUED | OUTPATIENT
Start: 2019-04-20 | End: 2019-04-20 | Stop reason: HOSPADM

## 2019-04-20 RX ADMIN — SODIUM CHLORIDE 1000 ML: 900 INJECTION, SOLUTION INTRAVENOUS at 08:16

## 2019-04-20 RX ADMIN — DICYCLOMINE HYDROCHLORIDE 10 MG: 10 CAPSULE ORAL at 09:35

## 2019-04-20 RX ADMIN — SODIUM CHLORIDE 1000 ML: 900 INJECTION, SOLUTION INTRAVENOUS at 09:37

## 2019-04-20 RX ADMIN — NITROFURANTOIN (MONOHYDRATE/MACROCRYSTALS) 100 MG: 75; 25 CAPSULE ORAL at 09:35

## 2019-04-20 NOTE — ED NOTES
I have reviewed discharge instruction and prescriptions with patient. Patient verbalized understanding and has no further questions at this time. Education taught and patient verbalized understanding of education. Teach back method used. Left ac IV removed, catheter tip intact on removal.  Armband removed and shredded per patients request.   
Patients pain 0/10. Belongings given to patient. Patient discharged with [facility] caregiver to home.

## 2019-04-20 NOTE — DISCHARGE INSTRUCTIONS
Patient Education        Learning About High Blood Sugar  What is high blood sugar? Your body turns the food you eat into glucose (sugar), which it uses for energy. But if your body isn't able to use the sugar right away, it can build up in your blood and lead to high blood sugar. When the amount of sugar in your blood stays too high for too much of the time, you may have diabetes. Diabetes is a disease that can cause serious health problems. The good news is that lifestyle changes may help you get your blood sugar back to normal and avoid or delay diabetes. What causes high blood sugar? Sugar (glucose) can build up in your blood if you:  · Are overweight. · Have a family history of diabetes. · Take certain medicines, such as steroids. What are the symptoms? Having high blood sugar may not cause any symptoms at all. Or it may make you feel very thirsty or very hungry. You may also urinate more often than usual, have blurry vision, or lose weight without trying. How is high blood sugar treated? You can take steps to lower your blood sugar level if you understand what makes it get higher. Your doctor may want you to learn how to test your blood sugar level at home. Then you can see how illness, stress, or different kinds of food or medicine raise or lower your blood sugar level. Other tests may be needed to see if you have diabetes. How can you prevent high blood sugar? · Watch your weight. If you're overweight, losing just a small amount of weight may help. Reducing fat around your waist is most important. · Limit the amount of calories, sweets, and unhealthy fat you eat. Ask your doctor if a dietitian can help you. A registered dietitian can help you create meal plans that fit your lifestyle. · Get at least 30 minutes of exercise on most days of the week. Exercise helps control your blood sugar. It also helps you maintain a healthy weight. Walking is a good choice.  You also may want to do other activities, such as running, swimming, cycling, or playing tennis or team sports. · If your doctor prescribed medicines, take them exactly as prescribed. Call your doctor if you think you are having a problem with your medicine. You will get more details on the specific medicines your doctor prescribes. Follow-up care is a key part of your treatment and safety. Be sure to make and go to all appointments, and call your doctor if you are having problems. It's also a good idea to know your test results and keep a list of the medicines you take. Where can you learn more? Go to http://shamikaParade Technologieschinyere.info/. Enter O108 in the search box to learn more about \"Learning About High Blood Sugar. \"  Current as of: July 25, 2018  Content Version: 11.9  © 7482-0838 [employer]. Care instructions adapted under license by [employer] (which disclaims liability or warranty for this information). If you have questions about a medical condition or this instruction, always ask your healthcare professional. [place] [address] any warranty or liability for your use of this information. Patient Education        Urinary Tract Infection in Women: Care Instructions  Your Care Instructions    A urinary tract infection, or UTI, is a general term for an infection anywhere between the kidneys and the urethra (where urine comes out). Most UTIs are bladder infections. They often cause pain or burning when you urinate. UTIs are caused by bacteria and can be cured with antibiotics. Be sure to complete your treatment so that the infection goes away. Follow-up care is a key part of your treatment and safety. Be sure to make and go to all appointments, and call your doctor if you are having problems. It's also a good idea to know your test results and keep a list of the medicines you take. How can you care for yourself at home? · Take your antibiotics as directed.  Do not stop taking them just because you feel better. You need to take the full course of antibiotics. · Drink extra water and other fluids for the next day or two. This may help wash out the bacteria that are causing the infection. (If you have kidney, heart, or liver disease and have to limit fluids, talk with your doctor before you increase your fluid intake.)  · Avoid drinks that are carbonated or have caffeine. They can irritate the bladder. · Urinate often. Try to empty your bladder each time. · To relieve pain, take a hot bath or lay a heating pad set on low over your lower belly or genital area. Never go to sleep with a heating pad in place. To prevent UTIs  · Drink plenty of water each day. This helps you urinate often, which clears bacteria from your system. (If you have kidney, heart, or liver disease and have to limit fluids, talk with your doctor before you increase your fluid intake.)  · Urinate when you need to. · Urinate right after you have sex. · Change sanitary pads often. · Avoid douches, bubble baths, feminine hygiene sprays, and other feminine hygiene products that have deodorants. · After going to the bathroom, wipe from front to back. When should you call for help? Call your doctor now or seek immediate medical care if:    · Symptoms such as fever, chills, nausea, or vomiting get worse or appear for the first time.     · You have new pain in your back just below your rib cage. This is called flank pain.     · There is new blood or pus in your urine.     · You have any problems with your antibiotic medicine.    Watch closely for changes in your health, and be sure to contact your doctor if:    · You are not getting better after taking an antibiotic for 2 days.     · Your symptoms go away but then come back. Where can you learn more? Go to http://shamika-chinyere.info/. Enter Z068 in the search box to learn more about \"Urinary Tract Infection in Women: Care Instructions. \"  Current as of: March 20, 2018  Content Version: 11.9  © 4900-2658 [employer]. Care instructions adapted under license by [employer] (which disclaims liability or warranty for this information). If you have questions about a medical condition or this instruction, always ask your healthcare professional. [address] any warranty or liability for your use of this information.

## 2019-04-20 NOTE — ED TRIAGE NOTES
Pt arrived via EMS with c/o elevated blood sugar this am. Has been non compliant with diet. States compliant with medications though. Recently discharged from hospital for abdominal pain.

## 2019-04-20 NOTE — ED NOTES
Pt's IV positional due to location. Encouraged to keep left arm straight to enhance flow, understanding verballized.

## 2019-04-20 NOTE — ED PROVIDER NOTES
This is a 78-year-old female presenting with concern that she feels \"funny\" and that her sugar is elevated. She denies chest pain nausea vomiting fevers or chills. Her only complaint is that she feels very anxious and her medications are not helping. She also states that she was up all night urinating. Past Medical History:  
Diagnosis Date  Anxiety  Atrophic vaginitis  CAD (coronary artery disease)  Chronic pain  Depression  Diabetes ([address]  Diverticulosis  Dysphagia  Dysuria  Early satiety  Esophageal reflux  Fatty liver  Gastric ulcer  Gastroparesis  H/O joint replacement 1991  
 left knee  Heart disease  Hypercholesteremia  Hypertension  Iron deficiency anemia  Neurogenic bladder  Recurrent UTI  Right flank pain  Scoliosis  Spinal stenosis  Stenosis of lumbosacral spine  Stroke [facility]) 04/2018  Tardive dyskinesia  Urgency of urination Past Surgical History:  
Procedure Laterality Date  EGD  7/10/2018  HX APPENDECTOMY  HX BREAST LUMPECTOMY [facility]  HX COLECTOMY  2012  HX COLONOSCOPY  10/29/2009  
 hyperplastic polyp, hemorrhoids [address] HYSTERECTOMY  1970  HX KNEE ARTHROSCOPY    
 HX KNEE REPLACEMENT  1991  
 left knee  HX LUMBAR FUSION    
 x 3 Family History:  
Problem Relation Age of Onset  Heart Disease Mother  Diabetes Mother  Heart Attack Mother  Cancer Father   
     lung CA  Cancer Brother  Cancer Sister Social History Socioeconomic History  Marital status:  Spouse name: Not on file  Number of children: Not on file  Years of education: Not on file  Highest education level: Not on file Occupational History  Not on file Social Needs  Financial resource strain: Not on file  Food insecurity:  
  Worry: Not on file Inability: Not on file  Transportation needs:  
  Medical: Not on file Non-medical: Not on file Tobacco Use  Smoking status: Never Smoker  Smokeless tobacco: Never Used Substance and Sexual Activity  Alcohol use: No  
 Drug use: No  
 Sexual activity: Never Lifestyle  Physical activity:  
  Days per week: Not on file Minutes per session: Not on file  Stress: Not on file Relationships  Social connections:  
  Talks on phone: Not on file Gets together: Not on file Attends Bahai service: Not on file Active member of club or organization: Not on file Attends meetings of clubs or organizations: Not on file Relationship status: Not on file  Intimate partner violence:  
  Fear of current or ex partner: Not on file Emotionally abused: Not on file Physically abused: Not on file Forced sexual activity: Not on file Other Topics Concern  Not on file Social History Narrative  Not on file ALLERGIES: Codeine; Iodine; Morphine; Pcn [penicillins]; and Sulfa (sulfonamide antibiotics) Review of Systems Constitutional: Negative for fever. HENT: Negative for ear pain and hearing loss. Eyes: Negative for pain and visual disturbance. Respiratory: Negative for shortness of breath. Cardiovascular: Negative for chest pain. Gastrointestinal: Negative for abdominal pain. Genitourinary: Negative for difficulty urinating and dysuria. Neurological: Negative for dizziness, light-headedness and headaches. Psychiatric/Behavioral: The patient is nervous/anxious. Vitals:  
 04/20/19 0756 BP: (!) 220/88 Pulse: 64 Resp: 18 Temp: 97.4 °F (36.3 °C) SpO2: 98% Weight: 86.2 kg (190 lb) Height: 5' 3\" (1.6 m) Physical Exam  
Constitutional: She is oriented to person, place, and time. She appears well-developed. HENT:  
Head: Normocephalic and atraumatic. Eyes: Pupils are equal, round, and reactive to light. Neck: Normal range of motion. Cardiovascular: Normal rate and regular rhythm. Pulmonary/Chest: Effort normal and breath sounds normal. No stridor. Abdominal: Soft. She exhibits no distension. There is no tenderness. Neurological: She is alert and oriented to person, place, and time. Skin: No rash noted. Psychiatric: She has a normal mood and affect. Nursing note and vitals reviewed. XR CHEST PA LAT Final Result IMPRESSION:  
  
No active cardiopulmonary disease. Recent Results (from the past 8 hour(s)) URINALYSIS W/ RFLX MICROSCOPIC Collection Time: 04/20/19  8:00 AM  
Result Value Ref Range Color YELLOW Appearance CLEAR Specific gravity 1.010 1.005 - 1.030    
 pH (UA) 7.0 5.0 - 8.0 Protein NEGATIVE  NEG mg/dL Glucose 500 (A) NEG mg/dL Ketone NEGATIVE  NEG mg/dL Bilirubin NEGATIVE  NEG Blood NEGATIVE  NEG Urobilinogen 0.2 0.2 - 1.0 EU/dL Nitrites NEGATIVE  NEG Leukocyte Esterase MODERATE (A) NEG URINE MICROSCOPIC ONLY Collection Time: 04/20/19  8:00 AM  
Result Value Ref Range WBC 11 to 20 0 - 4 /hpf  
 RBC 0 to 1 0 - 5 /hpf Epithelial cells 1+ 0 - 5 /lpf Bacteria 2+ (A) NEG /hpf  
CBC WITH AUTOMATED DIFF Collection Time: 04/20/19  8:15 AM  
Result Value Ref Range WBC 8.8 4.6 - 13.2 K/uL  
 RBC 4.08 (L) 4.20 - 5.30 M/uL  
 HGB 11.3 (L) 12.0 - 16.0 g/dL HCT 35.7 35.0 - 45.0 % MCV 87.5 74.0 - 97.0 FL  
 MCH 27.7 24.0 - 34.0 PG  
 MCHC 31.7 31.0 - 37.0 g/dL  
 RDW 13.1 11.6 - 14.5 % PLATELET 259 109 - 296 K/uL MPV 12.1 (H) 9.2 - 11.8 FL  
 NEUTROPHILS 86 (H) 40 - 73 % LYMPHOCYTES 12 (L) 21 - 52 % MONOCYTES 2 (L) 3 - 10 % EOSINOPHILS 0 0 - 5 % BASOPHILS 0 0 - 2 %  
 ABS. NEUTROPHILS 7.6 1.8 - 8.0 K/UL  
 ABS. LYMPHOCYTES 1.0 0.9 - 3.6 K/UL  
 ABS. MONOCYTES 0.2 0.05 - 1.2 K/UL  
 ABS. EOSINOPHILS 0.0 0.0 - 0.4 K/UL ABS. BASOPHILS 0.0 0.0 - 0.1 K/UL  
 DF AUTOMATED METABOLIC PANEL, COMPREHENSIVE Collection Time: 04/20/19  8:15 AM  
Result Value Ref Range Sodium 142 136 - 145 mmol/L Potassium 3.4 (L) 3.5 - 5.5 mmol/L Chloride 110 (H) 100 - 108 mmol/L  
 CO2 23 21 - 32 mmol/L Anion gap 9 3.0 - 18 mmol/L Glucose 312 (H) 74 - 99 mg/dL BUN 13 7.0 - 18 MG/DL Creatinine 0.85 0.6 - 1.3 MG/DL  
 BUN/Creatinine ratio 15 12 - 20 GFR est AA >60 >60 ml/min/1.73m2 GFR est non-AA >60 >60 ml/min/1.73m2 Calcium 8.3 (L) 8.5 - 10.1 MG/DL Bilirubin, total 0.2 0.2 - 1.0 MG/DL  
 ALT (SGPT) 13 13 - 56 U/L  
 AST (SGOT) 10 (L) 15 - 37 U/L Alk. phosphatase 124 (H) 45 - 117 U/L Protein, total 6.6 6.4 - 8.2 g/dL Albumin 3.3 (L) 3.4 - 5.0 g/dL Globulin 3.3 2.0 - 4.0 g/dL A-G Ratio 1.0 0.8 - 1.7 MAGNESIUM Collection Time: 04/20/19  8:15 AM  
Result Value Ref Range Magnesium 1.5 (L) 1.6 - 2.6 mg/dL CARDIAC PANEL,(CK, CKMB & TROPONIN) Collection Time: 04/20/19  8:15 AM  
Result Value Ref Range CK 51 26 - 192 U/L  
 CK - MB <1.0 <3.6 ng/ml CK-MB Index  0.0 - 4.0 % CALCULATION NOT PERFORMED WHEN RESULT IS BELOW LINEAR LIMIT Troponin-I, QT <0.02 0.0 - 0.045 NG/ML  
LIPASE Collection Time: 04/20/19  8:15 AM  
Result Value Ref Range Lipase 35 (L) 73 - 393 U/L  
EKG, 12 LEAD, INITIAL Collection Time: 04/20/19  8:19 AM  
Result Value Ref Range Ventricular Rate 60 BPM  
 Atrial Rate 60 BPM  
 P-R Interval 188 ms QRS Duration 74 ms Q-T Interval 472 ms QTC Calculation (Bezet) 472 ms Calculated P Axis 14 degrees Calculated R Axis 59 degrees Calculated T Axis 43 degrees Diagnosis Normal sinus rhythm Nonspecific T wave abnormality Abnormal ECG When compared with ECG of 17-AUG-2018 23:06, 
premature supraventricular complexes are no longer present Nonspecific T wave abnormality, improved in Inferior leads Nonspecific T wave abnormality has replaced inverted T waves in Anterior  
leads MDM Number of Diagnoses or Management Options Diagnosis management comments: 57-year-old female presenting with concern that her sugar is elevated and that she feels \"funny\" she feels very anxious and has been up all night urinating. She was seen 2 days ago for elevated blood sugar. We will check basic labs, EKG and reevaluate. 9:30 AM 
EKG did not show any acute abnormalities. However her urine was positive for infection. Will start patient on Macrobid. Her sugar was 312 we will give 1 additional liter of fluids. Patient states she is compliant with her at home medications. 12:02 PM 
Her liter of fluids patient sugar is down to 192. Will discharge with PCM follow-up and recommend she take the Macrobid as prescribed for her UTI and return for worsening symptoms or concerns Procedures

## 2019-04-20 NOTE — ED NOTES
Pt's caregiver Edy Gtz notified of pt's discharge status and need for transport home. Edy Gtz states she will be here in about 30 minutes.

## 2019-04-22 ENCOUNTER — HOSPITAL ENCOUNTER (OUTPATIENT)
Dept: PHYSICAL THERAPY | Age: 74
Discharge: HOME | End: 2019-04-22
Payer: MEDICARE

## 2019-04-22 PROCEDURE — 97113 AQUATIC THERAPY/EXERCISES: CPT

## 2019-04-22 NOTE — PROGRESS NOTES
PHYSICAL THERAPY - DAILY TREATMENT NOTE - AQUATICS Patient Name: Dennis Racer        Date: 2019 : 1945   YES Patient  Verified Visit #:   15   of   18  Insurance: Payor: Dereck George / Plan: Yunier Palmer / Product Type: Medicare / In time: 105 Out time: 157 Total Treatment Time: 46 Medicare/BCBS Time Tracking (below) Total Timed Codes (min):  52 1:1 Treatment Time:  23 TREATMENT AREA =  Left low back pain [M54.5] SUBJECTIVE Pain Level (on 0 to 10 scale):  2  / 10 Medication Changes/New allergies or changes in medical history, any new surgeries or procedures? NO    If yes, update Summary List  
Subjective Functional Status/Changes:  []  No changes reported Pt states that she was late last visit and was unaware of our arrival policy. She states that she had to got the ER twice last week due to blood sugar and stomach cramps. Pt stated those symptoms were resolved and wanted to complete aquatic therapy today OBJECTIVE 
 
52(23) min Therapeutic Exercise:  [x]  AQUATIC THERAPY Rationale:      increase strength, increase ROM, and improve balance to improve the patient's ability to perform ADL's and ambulate with less pain and increase activity tolerance. [x]   Patient Education:  Continue Aquatic Therapy and HEP as instructed Forward, Retrograde and Lateral Walking at the beginning/end of session with continuous single UE assistance. LE exercises performed with much UE support including heel raises, hip 3-way, mini squats, knee extensions and leg curls. UE exercises incorporated UE flexion, abduction, elbow flexion/extension, lumbo thoracic rotation and cues for engaging core musculature and maintaining upright posture.   
  
UE exercise resistance: attempted UE exercise but with decreased ROM and max VC                                                                                                      
 [] none/wrist weights                                                                             
     [x] small water weights                                                    
     [] medium water weights                                             
     [] large water weights                                                   
     [] back supported on wall Pt required SBA assistance entering/exiting the pool. Post Treatment Pain Level (on 0 to 10) scale:   2.5  / 10 ASSESSMENT Assessment/Changes in Function:  
 
Patient tolerated treatment session well and is progressing well towards goals. Pt was provided education regarding how to sign up for a membership today. She was asisted out of the pool and transported to her caregiver. []  See Progress Note/Recertification Patient will continue to benefit from skilled PT services to analyze,, cue,, progress,, modify,, demonstrate,, instruct, and address, movement patterns,, therapeutic interventions,, postural abnormalities,, soft tissue restrictions,, ROM,, strength,, functional mobility,, body mechanics/ergonomics, and home and community integration, to attain remaining goals. Progress toward goals / Updated goals: 
Slow progress to set goals. PLAN 
 
[]  Upgrade activities as tolerated YES Continue plan of care  
[]  Discharge due to :   
[]  Other:   
 
Therapist: Vida Unger DPT Date: 4/22/2019 Time: 1:51 PM  
 
Future Appointments Date Time Provider Nikki Villanueva 4/24/2019 11:30 AM Vikash Antoine PT [facility]  
4/29/2019  1:00 PM [address] [place] [address]  
5/1/2019 12:30 PM [facility] RM 1 [facility]

## 2019-04-24 ENCOUNTER — APPOINTMENT (OUTPATIENT)
Dept: PHYSICAL THERAPY | Age: 74
End: 2019-04-24
Payer: MEDICARE

## 2019-04-29 ENCOUNTER — APPOINTMENT (OUTPATIENT)
Dept: PHYSICAL THERAPY | Age: 74
End: 2019-04-29
Payer: MEDICARE

## 2019-05-01 ENCOUNTER — HOSPITAL ENCOUNTER (OUTPATIENT)
Dept: CT IMAGING | Age: 74
Discharge: HOME | End: 2019-05-01
Attending: NURSE PRACTITIONER
Payer: MEDICARE

## 2019-05-01 DIAGNOSIS — R04.89 PULMONARY HEMORRHAGE: ICD-10-CM

## 2019-05-01 PROCEDURE — 71250 CT THORAX DX C-: CPT

## 2019-05-02 ENCOUNTER — HOSPITAL ENCOUNTER (OUTPATIENT)
Dept: PHYSICAL THERAPY | Age: 74
Discharge: HOME | End: 2019-05-02

## 2019-05-05 ENCOUNTER — HOSPITAL ENCOUNTER (EMERGENCY)
Age: 74
Discharge: HOME | End: 2019-05-05
Attending: EMERGENCY MEDICINE
Payer: MEDICARE

## 2019-05-05 VITALS
TEMPERATURE: 98.2 F | SYSTOLIC BLOOD PRESSURE: 135 MMHG | OXYGEN SATURATION: 99 % | HEART RATE: 77 BPM | DIASTOLIC BLOOD PRESSURE: 65 MMHG | RESPIRATION RATE: 15 BRPM

## 2019-05-05 DIAGNOSIS — N39.0 ACUTE UTI: Primary | ICD-10-CM

## 2019-05-05 DIAGNOSIS — R73.9 HYPERGLYCEMIA: ICD-10-CM

## 2019-05-05 LAB
APPEARANCE UR: ABNORMAL
BACTERIA URNS QL MICRO: ABNORMAL /HPF
BILIRUB UR QL: NEGATIVE
COLOR UR: YELLOW
EPITH CASTS URNS QL MICRO: NEGATIVE /LPF (ref 0–5)
GLUCOSE BLD STRIP.AUTO-MCNC: 351 MG/DL (ref 70–110)
GLUCOSE UR STRIP.AUTO-MCNC: 250 MG/DL
HGB UR QL STRIP: ABNORMAL
KETONES UR QL STRIP.AUTO: NEGATIVE MG/DL
LEUKOCYTE ESTERASE UR QL STRIP.AUTO: ABNORMAL
NITRITE UR QL STRIP.AUTO: NEGATIVE
PH UR STRIP: 6.5 [PH] (ref 5–8)
PROT UR STRIP-MCNC: NEGATIVE MG/DL
RBC #/AREA URNS HPF: ABNORMAL /HPF (ref 0–5)
SP GR UR REFRACTOMETRY: 1.01 (ref 1–1.03)
UROBILINOGEN UR QL STRIP.AUTO: 0.2 EU/DL (ref 0.2–1)
WBC URNS QL MICRO: ABNORMAL /HPF (ref 0–4)

## 2019-05-05 PROCEDURE — 82962 GLUCOSE BLOOD TEST: CPT

## 2019-05-05 PROCEDURE — 81001 URINALYSIS AUTO W/SCOPE: CPT

## 2019-05-05 PROCEDURE — 87186 SC STD MICRODIL/AGAR DIL: CPT

## 2019-05-05 PROCEDURE — 99285 EMERGENCY DEPT VISIT HI MDM: CPT

## 2019-05-05 PROCEDURE — 74011636637 HC RX REV CODE- 636/637: Performed by: EMERGENCY MEDICINE

## 2019-05-05 PROCEDURE — 87077 CULTURE AEROBIC IDENTIFY: CPT

## 2019-05-05 PROCEDURE — 87086 URINE CULTURE/COLONY COUNT: CPT

## 2019-05-05 PROCEDURE — 74011250637 HC RX REV CODE- 250/637: Performed by: EMERGENCY MEDICINE

## 2019-05-05 RX ORDER — NITROFURANTOIN 25; 75 MG/1; MG/1
100 CAPSULE ORAL
Status: COMPLETED | OUTPATIENT
Start: 2019-05-05 | End: 2019-05-05

## 2019-05-05 RX ORDER — NITROFURANTOIN 25; 75 MG/1; MG/1
100 CAPSULE ORAL 2 TIMES DAILY
Qty: 14 CAP | Refills: 0 | Status: SHIPPED | OUTPATIENT
Start: 2019-05-05 | End: 2019-05-12

## 2019-05-05 RX ORDER — NITROFURANTOIN 25; 75 MG/1; MG/1
100 CAPSULE ORAL 2 TIMES DAILY
Status: DISCONTINUED | OUTPATIENT
Start: 2019-05-05 | End: 2019-05-05

## 2019-05-05 RX ADMIN — NITROFURANTOIN (MONOHYDRATE/MACROCRYSTALS) 100 MG: 75; 25 CAPSULE ORAL at 15:13

## 2019-05-05 RX ADMIN — INSULIN HUMAN 6 UNITS: 100 INJECTION, SOLUTION PARENTERAL at 15:13

## 2019-05-05 NOTE — ED TRIAGE NOTES
Pt arrives via EMS c/o abdominal pain. Was seen last week for same. States it feels like her acid reflux.

## 2019-05-05 NOTE — ED NOTES
I have reviewed discharge instructions with the patient. The patient verbalized understanding. Pt had 3 ID cards, which the caregiver placed in pt's bag. Pt escorted out in her personal wheelchair by caregiver. NAD at time of discharge.

## 2019-05-05 NOTE — DISCHARGE INSTRUCTIONS
Learning About High Blood Sugar  What is high blood sugar? Your body turns the food you eat into glucose (sugar), which it uses for energy. But if your body isn't able to use the sugar right away, it can build up in your blood and lead to high blood sugar. When the amount of sugar in your blood stays too high for too much of the time, you may have diabetes. Diabetes is a disease that can cause serious health problems. The good news is that lifestyle changes may help you get your blood sugar back to normal and avoid or delay diabetes. What causes high blood sugar? Sugar (glucose) can build up in your blood if you:  · Are overweight. · Have a family history of diabetes. · Take certain medicines, such as steroids. What are the symptoms? Having high blood sugar may not cause any symptoms at all. Or it may make you feel very thirsty or very hungry. You may also urinate more often than usual, have blurry vision, or lose weight without trying. How is high blood sugar treated? You can take steps to lower your blood sugar level if you understand what makes it get higher. Your doctor may want you to learn how to test your blood sugar level at home. Then you can see how illness, stress, or different kinds of food or medicine raise or lower your blood sugar level. Other tests may be needed to see if you have diabetes. How can you prevent high blood sugar? · Watch your weight. If you're overweight, losing just a small amount of weight may help. Reducing fat around your waist is most important. · Limit the amount of calories, sweets, and unhealthy fat you eat. Ask your doctor if a dietitian can help you. A registered dietitian can help you create meal plans that fit your lifestyle. · Get at least 30 minutes of exercise on most days of the week. Exercise helps control your blood sugar. It also helps you maintain a healthy weight. Walking is a good choice.  You also may want to do other activities, such as running, swimming, cycling, or playing tennis or team sports. · If your doctor prescribed medicines, take them exactly as prescribed. Call your doctor if you think you are having a problem with your medicine. You will get more details on the specific medicines your doctor prescribes. Follow-up care is a key part of your treatment and safety. Be sure to make and go to all appointments, and call your doctor if you are having problems. It's also a good idea to know your test results and keep a list of the medicines you take. Where can you learn more? Go to http://shamikaOmbitronchinyere.info/. Enter O108 in the search box to learn more about \"Learning About High Blood Sugar. \"  Current as of: July 25, 2018  Content Version: 11.9  © 2006-2018 [employer]. Care instructions adapted under license by [employer] (which disclaims liability or warranty for this information). If you have questions about a medical condition or this instruction, always ask your healthcare professional. [place] [address] any warranty or liability for your use of this information. Patient Education        Urinary Tract Infection in Women: Care Instructions  Your Care Instructions    A urinary tract infection, or UTI, is a general term for an infection anywhere between the kidneys and the urethra (where urine comes out). Most UTIs are bladder infections. They often cause pain or burning when you urinate. UTIs are caused by bacteria and can be cured with antibiotics. Be sure to complete your treatment so that the infection goes away. Follow-up care is a key part of your treatment and safety. Be sure to make and go to all appointments, and call your doctor if you are having problems. It's also a good idea to know your test results and keep a list of the medicines you take. How can you care for yourself at home? · Take your antibiotics as directed.  Do not stop taking them just because you feel better. You need to take the full course of antibiotics. · Drink extra water and other fluids for the next day or two. This may help wash out the bacteria that are causing the infection. (If you have kidney, heart, or liver disease and have to limit fluids, talk with your doctor before you increase your fluid intake.)  · Avoid drinks that are carbonated or have caffeine. They can irritate the bladder. · Urinate often. Try to empty your bladder each time. · To relieve pain, take a hot bath or lay a heating pad set on low over your lower belly or genital area. Never go to sleep with a heating pad in place. To prevent UTIs  · Drink plenty of water each day. This helps you urinate often, which clears bacteria from your system. (If you have kidney, heart, or liver disease and have to limit fluids, talk with your doctor before you increase your fluid intake.)  · Urinate when you need to. · Urinate right after you have sex. · Change sanitary pads often. · Avoid douches, bubble baths, feminine hygiene sprays, and other feminine hygiene products that have deodorants. · After going to the bathroom, wipe from front to back. When should you call for help? Call your doctor now or seek immediate medical care if:    · Symptoms such as fever, chills, nausea, or vomiting get worse or appear for the first time.     · You have new pain in your back just below your rib cage. This is called flank pain.     · There is new blood or pus in your urine.     · You have any problems with your antibiotic medicine.    Watch closely for changes in your health, and be sure to contact your doctor if:    · You are not getting better after taking an antibiotic for 2 days.     · Your symptoms go away but then come back. Where can you learn more? Go to http://shamika-chinyere.info/. Enter N870 in the search box to learn more about \"Urinary Tract Infection in Women: Care Instructions. \"  Current as of: March 20, 2018  Content Version: 11.9  © 3119-5988 [employer]. Care instructions adapted under license by [employer] (which disclaims liability or warranty for this information). If you have questions about a medical condition or this instruction, always ask your healthcare professional. [address] any warranty or liability for your use of this information.

## 2019-05-05 NOTE — ED PROVIDER NOTES
EMERGENCY DEPARTMENT HISTORY AND PHYSICAL EXAM 
 
1:39 PM 
 
 
Date: 5/5/2019 Patient Name: Vel Edwards History of Presenting Illness Chief Complaint Patient presents with  Abdominal Pain History Provided By: Patient Additional History (Context): Vel Edwards is a 66 y.o. female who presents with she is concerned of about a possible urinary tract infection she states that she has had increased dysuria and urgency was seen by her PCP had a culture was obtained which was negative however patient had another episode today where after she attempted to urinate became nauseous to the point where she got sweaty chest pain she is now back to her baseline actually denies any pain at present denies any nausea or flank pain at present. There has been no fevers chills no flank pain no abdominal pain at present PCP: Chrissie Jones MD 
 
 
Current Facility-Administered Medications Medication Dose Route Frequency Provider Last Rate Last Dose  nitrofurantoin (macrocrystal-monohydrate) (MACROBID) capsule 100 mg  100 mg Oral BID Prem Terry MD      
 insulin regular (NOVOLIN R, HUMULIN R) injection 6 Units  6 Units SubCUTAneous NOW Prem Terry MD      
 
Current Outpatient Medications Medication Sig Dispense Refill  nitrofurantoin, macrocrystal-monohydrate, (MACROBID) 100 mg capsule Take 1 Cap by mouth two (2) times a day for 7 days. 14 Cap 0  
 pantoprazole (PROTONIX) 40 mg tablet Take 1 Tab by mouth daily for 20 days. 20 Tab 0  
 betamethasone dipropionate (DIPROSONE) 0.05 % topical cream Apply  to affected area daily as needed for Skin Irritation.  furosemide (LASIX) 40 mg tablet Take 40 mg by mouth as needed.  gabapentin (NEURONTIN) 300 mg capsule Take 300 mg by mouth three (3) times daily.  HYDROcodone-acetaminophen (NORCO)  mg tablet Take 1 Tab by mouth every six (6) hours as needed for Pain.  potassium chloride SR (KLOR-CON 10) 10 mEq tablet Take 10 mEq by mouth as needed.  tiZANidine (ZANAFLEX) 2 mg tablet Take 2 mg by mouth as needed. 3  
 metoprolol succinate (TOPROL-XL) 100 mg tablet Take 100 mg by mouth daily. 6  
 conjugated estrogens (PREMARIN) 0.625 mg/gram vaginal cream Apply small amount to introitus MWF as directed. 30 g 1  
 aspirin (ASPIRIN) 325 mg tablet Take 1 Tab by mouth daily. 30 Tab 0  
 mirtazapine (REMERON) 15 mg tablet Take 15 mg by mouth nightly.  glimepiride (AMARYL) 1 mg tablet Take 1 mg by mouth two (2) times a day.  levothyroxine (SYNTHROID) 50 mcg tablet Take 50 mcg by mouth Daily (before breakfast).  fentaNYL (DURAGESIC) 25 mcg/hr PATCH 1 Patch by TransDERmal route every seventy-two (72) hours.  amLODIPine (NORVASC) 10 mg tablet 10 mg.    
 ziprasidone (GEODON) 20 mg capsule Take 20 mg by mouth daily.  ibuprofen (MOTRIN) 200 mg tablet Take 400 mg by mouth every eight (8) hours as needed for Pain.  oxyCODONE-acetaminophen (PERCOCET 10)  mg per tablet Take 1 Tab by mouth every six (6) hours as needed for Pain.  cholecalciferol (VITAMIN D3) 1,000 unit cap Take 1,000 Units by mouth daily.  losartan (COZAAR) 100 mg tablet Take 100 mg by mouth daily.  venlafaxine-SR (EFFEXOR XR) 150 mg capsule Take 150 mg by mouth daily.  cyanocobalamin (VITAMIN B-12) 1,000 mcg tablet Take 1,000 mcg by mouth daily. Past History Past Medical History: 
Past Medical History:  
Diagnosis Date  Anxiety  Atrophic vaginitis  CAD (coronary artery disease)  Chronic pain  Depression  Diabetes ([facility] [address]  Diverticulosis  Dysphagia  Dysuria  Early satiety  Esophageal reflux  Fatty liver  Gastric ulcer  Gastroparesis  H/O joint replacement 1991  
 left knee  Heart disease  Hypercholesteremia  Hypertension  Iron deficiency anemia  Neurogenic bladder  Recurrent UTI  Right flank pain  Scoliosis  Spinal stenosis  Stenosis of lumbosacral spine  Stroke [facility]) 04/2018  Tardive dyskinesia  Urgency of urination Past Surgical History: 
Past Surgical History:  
Procedure Laterality Date  EGD  7/10/2018  HX APPENDECTOMY  HX BREAST LUMPECTOMY [address]  HX COLECTOMY  2012  HX COLONOSCOPY  10/29/2009  
 hyperplastic polyp, hemorrhoids [address] HYSTERECTOMY  1970  HX KNEE ARTHROSCOPY    
 HX KNEE REPLACEMENT  1991  
 left knee  HX LUMBAR FUSION    
 x 3 Family History: 
Family History Problem Relation Age of Onset  Heart Disease Mother  Diabetes Mother  Heart Attack Mother  Cancer Father   
     lung CA  Cancer Brother  Cancer Sister Social History: 
Social History Tobacco Use  Smoking status: Never Smoker  Smokeless tobacco: Never Used Substance Use Topics  Alcohol use: No  
 Drug use: No  
 
 
Allergies: Allergies Allergen Reactions  Codeine Rash and Itching  Iodine Hives and Rash  Morphine Hives  Pcn [Penicillins] Other (comments) Causes \"Heart swell\"  Sulfa (Sulfonamide Antibiotics) Nausea Only Review of Systems Review of Systems Constitutional: Negative for chills, diaphoresis and fever. HENT: Negative for congestion. Eyes: Negative for visual disturbance. Respiratory: Negative for cough, chest tightness and shortness of breath. Cardiovascular: Negative for chest pain. Gastrointestinal: Negative for abdominal pain, diarrhea, nausea and vomiting. Genitourinary: Positive for dysuria and urgency. Musculoskeletal: Negative for back pain. Skin: Negative for rash. Neurological: Negative for dizziness, syncope and weakness. All other systems reviewed and are negative. Physical Exam  
 
Visit Vitals /68 Pulse 77 Temp 98.2 °F (36.8 °C) Resp 16  
 SpO2 99% Physical Exam  
Constitutional: She is oriented to person, place, and time. She appears well-developed and well-nourished. No distress. HENT:  
Head: Normocephalic and atraumatic. Mouth/Throat: Oropharynx is clear and moist.  
Eyes: Pupils are equal, round, and reactive to light. Conjunctivae and EOM are normal. No scleral icterus. Neck: Normal range of motion. Neck supple. Cardiovascular: Normal rate, regular rhythm and normal heart sounds. No murmur heard. Pulmonary/Chest: Effort normal and breath sounds normal. No respiratory distress. Abdominal: Soft. Bowel sounds are normal. She exhibits no distension. There is no tenderness. No abdominal tenderness no CVA tenderness Musculoskeletal: She exhibits no edema. Lymphadenopathy:  
  She has no cervical adenopathy. Neurological: She is alert and oriented to person, place, and time. Coordination normal.  
Skin: Skin is warm and dry. No rash noted. Psychiatric: She has a normal mood and affect. Her behavior is normal.  
Nursing note and vitals reviewed. Diagnostic Study Results Labs - Recent Results (from the past 12 hour(s)) URINALYSIS W/ RFLX MICROSCOPIC Collection Time: 05/05/19  1:51 PM  
Result Value Ref Range Color YELLOW Appearance CLOUDY Specific gravity 1.008 1.005 - 1.030    
 pH (UA) 6.5 5.0 - 8.0 Protein NEGATIVE  NEG mg/dL Glucose 250 (A) NEG mg/dL Ketone NEGATIVE  NEG mg/dL Bilirubin NEGATIVE  NEG Blood TRACE (A) NEG Urobilinogen 0.2 0.2 - 1.0 EU/dL Nitrites NEGATIVE  NEG Leukocyte Esterase LARGE (A) NEG URINE MICROSCOPIC ONLY Collection Time: 05/05/19  1:51 PM  
Result Value Ref Range WBC 21 to 35 0 - 4 /hpf  
 RBC 0 to 3 0 - 5 /hpf Epithelial cells NEGATIVE  0 - 5 /lpf Bacteria 4+ (A) NEG /hpf  
GLUCOSE, POC Collection Time: 05/05/19  2:45 PM  
Result Value Ref Range Glucose (POC) 351 (H) 70 - 110 mg/dL Radiologic Studies -  
 No orders to display Medical Decision Making I am the first provider for this patient. I reviewed the vital signs, available nursing notes, past medical history, past surgical history, family history and social history. Vital Signs-Reviewed the patient's vital signs. EKG: 
 
Records Reviewed: Nursing Notes, Old Medical Records and Previous Laboratory Studies (Time of Review: 1:39 PM) ED Course: Progress Notes, Reevaluation, and Consults: 
 
 
Provider Notes (Medical Decision Making): MDM Number of Diagnoses or Management Options Diagnosis management comments: Dysuria we will recheck a UA culture denies abdominal pain at present no abdominal tenderness Amount and/or Complexity of Data Reviewed Clinical lab tests: ordered Diagnosis Clinical Impression: 1. Acute UTI 2. Hyperglycemia Disposition: home Follow-up Information Follow up With Specialties Details Why Contact [facility] EMERGENCY DEPT Emergency Medicine  As needed, If symptoms worsen [address] 
[phone] Blaine Garza MD Internal Medicine Schedule an appointment as soon as possible for a visit for ED follow up appointment  [address] [place] [address] 
[phone] Patient's Medications Start Taking NITROFURANTOIN, MACROCRYSTAL-MONOHYDRATE, (MACROBID) 100 MG CAPSULE    Take 1 Cap by mouth two (2) times a day for 7 days. Continue Taking AMLODIPINE (NORVASC) 10 MG TABLET    10 mg. ASPIRIN (ASPIRIN) 325 MG TABLET    Take 1 Tab by mouth daily. BETAMETHASONE DIPROPIONATE (DIPROSONE) 0.05 % TOPICAL CREAM    Apply  to affected area daily as needed for Skin Irritation. CHOLECALCIFEROL (VITAMIN D3) 1,000 UNIT CAP    Take 1,000 Units by mouth daily. CONJUGATED ESTROGENS (PREMARIN) 0.625 MG/GRAM VAGINAL CREAM    Apply small amount to introitus MWF as directed. CYANOCOBALAMIN (VITAMIN B-12) 1,000 MCG TABLET    Take 1,000 mcg by mouth daily. FENTANYL (DURAGESIC) 25 MCG/HR PATCH    1 Patch by TransDERmal route every seventy-two (72) hours. FUROSEMIDE (LASIX) 40 MG TABLET    Take 40 mg by mouth as needed. GABAPENTIN (NEURONTIN) 300 MG CAPSULE    Take 300 mg by mouth three (3) times daily. GLIMEPIRIDE (AMARYL) 1 MG TABLET    Take 1 mg by mouth two (2) times a day. HYDROCODONE-ACETAMINOPHEN (NORCO)  MG TABLET    Take 1 Tab by mouth every six (6) hours as needed for Pain. IBUPROFEN (MOTRIN) 200 MG TABLET    Take 400 mg by mouth every eight (8) hours as needed for Pain. LEVOTHYROXINE (SYNTHROID) 50 MCG TABLET    Take 50 mcg by mouth Daily (before breakfast). LOSARTAN (COZAAR) 100 MG TABLET    Take 100 mg by mouth daily. METOPROLOL SUCCINATE (TOPROL-XL) 100 MG TABLET    Take 100 mg by mouth daily. MIRTAZAPINE (REMERON) 15 MG TABLET    Take 15 mg by mouth nightly. OXYCODONE-ACETAMINOPHEN (PERCOCET 10)  MG PER TABLET    Take 1 Tab by mouth every six (6) hours as needed for Pain. PANTOPRAZOLE (PROTONIX) 40 MG TABLET    Take 1 Tab by mouth daily for 20 days. POTASSIUM CHLORIDE SR (KLOR-CON 10) 10 MEQ TABLET    Take 10 mEq by mouth as needed. TIZANIDINE (ZANAFLEX) 2 MG TABLET    Take 2 mg by mouth as needed. VENLAFAXINE-SR (EFFEXOR XR) 150 MG CAPSULE    Take 150 mg by mouth daily. ZIPRASIDONE (GEODON) 20 MG CAPSULE    Take 20 mg by mouth daily. These Medications have changed No medications on file Stop Taking No medications on file  
 
_______________________________ Please note that this dictation was completed with [employer], the [employer] voice recognition software. Quite often unanticipated grammatical, syntax, homophones, and other interpretive errors are inadvertently transcribed by the computer software. Please disregard these errors. Please excuse any errors that have escaped final proofreading.

## 2019-05-06 ENCOUNTER — HOSPITAL ENCOUNTER (EMERGENCY)
Age: 74
Discharge: HOME | End: 2019-05-07
Attending: EMERGENCY MEDICINE
Payer: MEDICARE

## 2019-05-06 ENCOUNTER — APPOINTMENT (OUTPATIENT)
Dept: GENERAL RADIOLOGY | Age: 74
End: 2019-05-06
Attending: PHYSICIAN ASSISTANT
Payer: MEDICARE

## 2019-05-06 DIAGNOSIS — R31.9 URINARY TRACT INFECTION WITH HEMATURIA, SITE UNSPECIFIED: Primary | ICD-10-CM

## 2019-05-06 DIAGNOSIS — R73.9 HYPERGLYCEMIA: ICD-10-CM

## 2019-05-06 DIAGNOSIS — N39.0 URINARY TRACT INFECTION WITH HEMATURIA, SITE UNSPECIFIED: Primary | ICD-10-CM

## 2019-05-06 DIAGNOSIS — E87.6 HYPOKALEMIA: ICD-10-CM

## 2019-05-06 LAB
ALBUMIN SERPL-MCNC: 3.2 G/DL (ref 3.4–5)
ALBUMIN/GLOB SERPL: 0.8 {RATIO} (ref 0.8–1.7)
ALP SERPL-CCNC: 121 U/L (ref 45–117)
ALT SERPL-CCNC: 14 U/L (ref 13–56)
ANION GAP SERPL CALC-SCNC: 8 MMOL/L (ref 3–18)
AST SERPL-CCNC: 9 U/L (ref 15–37)
BASOPHILS # BLD: 0 K/UL (ref 0–0.1)
BASOPHILS NFR BLD: 0 % (ref 0–2)
BILIRUB SERPL-MCNC: 0.4 MG/DL (ref 0.2–1)
BUN SERPL-MCNC: 17 MG/DL (ref 7–18)
BUN/CREAT SERPL: 10 (ref 12–20)
CALCIUM SERPL-MCNC: 8 MG/DL (ref 8.5–10.1)
CHLORIDE SERPL-SCNC: 99 MMOL/L (ref 100–108)
CK MB CFR SERPL CALC: NORMAL % (ref 0–4)
CK MB SERPL-MCNC: <1 NG/ML (ref 5–25)
CK SERPL-CCNC: 66 U/L (ref 26–192)
CO2 SERPL-SCNC: 26 MMOL/L (ref 21–32)
CREAT SERPL-MCNC: 1.76 MG/DL (ref 0.6–1.3)
DIFFERENTIAL METHOD BLD: ABNORMAL
EOSINOPHIL # BLD: 0.2 K/UL (ref 0–0.4)
EOSINOPHIL NFR BLD: 1 % (ref 0–5)
ERYTHROCYTE [DISTWIDTH] IN BLOOD BY AUTOMATED COUNT: 14 % (ref 11.6–14.5)
GLOBULIN SER CALC-MCNC: 3.9 G/DL (ref 2–4)
GLUCOSE BLD STRIP.AUTO-MCNC: 286 MG/DL (ref 70–110)
GLUCOSE BLD STRIP.AUTO-MCNC: 359 MG/DL (ref 70–110)
GLUCOSE SERPL-MCNC: 370 MG/DL (ref 74–99)
HCT VFR BLD AUTO: 30.5 % (ref 35–45)
HGB BLD-MCNC: 9.6 G/DL (ref 12–16)
LYMPHOCYTES # BLD: 3.1 K/UL (ref 0.9–3.6)
LYMPHOCYTES NFR BLD: 29 % (ref 21–52)
MAGNESIUM SERPL-MCNC: 1.3 MG/DL (ref 1.6–2.6)
MCH RBC QN AUTO: 28.1 PG (ref 24–34)
MCHC RBC AUTO-ENTMCNC: 31.5 G/DL (ref 31–37)
MCV RBC AUTO: 89.2 FL (ref 74–97)
MONOCYTES # BLD: 0.8 K/UL (ref 0.05–1.2)
MONOCYTES NFR BLD: 8 % (ref 3–10)
NEUTS SEG # BLD: 6.4 K/UL (ref 1.8–8)
NEUTS SEG NFR BLD: 62 % (ref 40–73)
PLATELET # BLD AUTO: 220 K/UL (ref 135–420)
PMV BLD AUTO: 11.5 FL (ref 9.2–11.8)
POTASSIUM SERPL-SCNC: 3.3 MMOL/L (ref 3.5–5.5)
PROT SERPL-MCNC: 7.1 G/DL (ref 6.4–8.2)
RBC # BLD AUTO: 3.42 M/UL (ref 4.2–5.3)
SODIUM SERPL-SCNC: 133 MMOL/L (ref 136–145)
TROPONIN I SERPL-MCNC: <0.02 NG/ML (ref 0–0.04)
TROPONIN I SERPL-MCNC: <0.02 NG/ML (ref 0–0.04)
WBC # BLD AUTO: 10.5 K/UL (ref 4.6–13.2)

## 2019-05-06 PROCEDURE — 82962 GLUCOSE BLOOD TEST: CPT

## 2019-05-06 PROCEDURE — 74011000258 HC RX REV CODE- 258: Performed by: EMERGENCY MEDICINE

## 2019-05-06 PROCEDURE — 83735 ASSAY OF MAGNESIUM: CPT

## 2019-05-06 PROCEDURE — 71045 X-RAY EXAM CHEST 1 VIEW: CPT

## 2019-05-06 PROCEDURE — 74011250636 HC RX REV CODE- 250/636: Performed by: EMERGENCY MEDICINE

## 2019-05-06 PROCEDURE — 93005 ELECTROCARDIOGRAM TRACING: CPT

## 2019-05-06 PROCEDURE — 80053 COMPREHEN METABOLIC PANEL: CPT

## 2019-05-06 PROCEDURE — 85025 COMPLETE CBC W/AUTO DIFF WBC: CPT

## 2019-05-06 PROCEDURE — 74011250636 HC RX REV CODE- 250/636: Performed by: PHYSICIAN ASSISTANT

## 2019-05-06 PROCEDURE — 82550 ASSAY OF CK (CPK): CPT

## 2019-05-06 PROCEDURE — 96367 TX/PROPH/DG ADDL SEQ IV INF: CPT

## 2019-05-06 PROCEDURE — 99285 EMERGENCY DEPT VISIT HI MDM: CPT

## 2019-05-06 PROCEDURE — 74011250637 HC RX REV CODE- 250/637: Performed by: PHYSICIAN ASSISTANT

## 2019-05-06 PROCEDURE — 96361 HYDRATE IV INFUSION ADD-ON: CPT

## 2019-05-06 PROCEDURE — 96365 THER/PROPH/DIAG IV INF INIT: CPT

## 2019-05-06 RX ORDER — MAGNESIUM SULFATE HEPTAHYDRATE 40 MG/ML
2 INJECTION, SOLUTION INTRAVENOUS ONCE
Status: COMPLETED | OUTPATIENT
Start: 2019-05-06 | End: 2019-05-07

## 2019-05-06 RX ORDER — POTASSIUM CHLORIDE 20 MEQ/1
40 TABLET, EXTENDED RELEASE ORAL
Status: COMPLETED | OUTPATIENT
Start: 2019-05-06 | End: 2019-05-06

## 2019-05-06 RX ORDER — MAGNESIUM SULFATE HEPTAHYDRATE 40 MG/ML
2 INJECTION, SOLUTION INTRAVENOUS ONCE
Status: DISCONTINUED | OUTPATIENT
Start: 2019-05-06 | End: 2019-05-06

## 2019-05-06 RX ADMIN — MAGNESIUM SULFATE HEPTAHYDRATE 2 G: 40 INJECTION, SOLUTION INTRAVENOUS at 21:25

## 2019-05-06 RX ADMIN — SODIUM CHLORIDE 1000 ML: 900 INJECTION, SOLUTION INTRAVENOUS at 21:25

## 2019-05-06 RX ADMIN — SODIUM CHLORIDE 1000 ML: 900 INJECTION, SOLUTION INTRAVENOUS at 19:04

## 2019-05-06 RX ADMIN — CEFTRIAXONE 1 G: 1 INJECTION, POWDER, FOR SOLUTION INTRAMUSCULAR; INTRAVENOUS at 19:23

## 2019-05-06 RX ADMIN — POTASSIUM CHLORIDE 40 MEQ: 20 TABLET, EXTENDED RELEASE ORAL at 21:24

## 2019-05-06 NOTE — ED TRIAGE NOTES
Pt arrived via EMS with c/o high blood sugar, here yesterday for same complaint. Sugar in the 400's with EMS. Pt states she is out of her meds. Has not followed up with PCP nor filled home medications.

## 2019-05-06 NOTE — DISCHARGE INSTRUCTIONS
Patient Education        Learning About High Blood Sugar  What is high blood sugar? Your body turns the food you eat into glucose (sugar), which it uses for energy. But if your body isn't able to use the sugar right away, it can build up in your blood and lead to high blood sugar. When the amount of sugar in your blood stays too high for too much of the time, you may have diabetes. Diabetes is a disease that can cause serious health problems. The good news is that lifestyle changes may help you get your blood sugar back to normal and avoid or delay diabetes. What causes high blood sugar? Sugar (glucose) can build up in your blood if you:  · Are overweight. · Have a family history of diabetes. · Take certain medicines, such as steroids. What are the symptoms? Having high blood sugar may not cause any symptoms at all. Or it may make you feel very thirsty or very hungry. You may also urinate more often than usual, have blurry vision, or lose weight without trying. How is high blood sugar treated? You can take steps to lower your blood sugar level if you understand what makes it get higher. Your doctor may want you to learn how to test your blood sugar level at home. Then you can see how illness, stress, or different kinds of food or medicine raise or lower your blood sugar level. Other tests may be needed to see if you have diabetes. How can you prevent high blood sugar? · Watch your weight. If you're overweight, losing just a small amount of weight may help. Reducing fat around your waist is most important. · Limit the amount of calories, sweets, and unhealthy fat you eat. Ask your doctor if a dietitian can help you. A registered dietitian can help you create meal plans that fit your lifestyle. · Get at least 30 minutes of exercise on most days of the week. Exercise helps control your blood sugar. It also helps you maintain a healthy weight. Walking is a good choice.  You also may want to do other activities, such as running, swimming, cycling, or playing tennis or team sports. · If your doctor prescribed medicines, take them exactly as prescribed. Call your doctor if you think you are having a problem with your medicine. You will get more details on the specific medicines your doctor prescribes. Follow-up care is a key part of your treatment and safety. Be sure to make and go to all appointments, and call your doctor if you are having problems. It's also a good idea to know your test results and keep a list of the medicines you take. Where can you learn more? Go to http://shamika-chinyere.info/. Enter O108 in the search box to learn more about \"Learning About High Blood Sugar. \"  Current as of: July 25, 2018  Content Version: 11.9  © 2893-3512 [employer]. Care instructions adapted under license by [employer] (which disclaims liability or warranty for this information). If you have questions about a medical condition or this instruction, always ask your healthcare professional. [address] any warranty or liability for your use of this information. [employer] Activation    Thank you for requesting access to [employer]. Please follow the instructions below to securely access and download your online medical record. [employer] allows you to send messages to your doctor, view your test results, renew your prescriptions, schedule appointments, and more. How Do I Sign Up? 1. In your internet browser, go to [email or web address]  2. Click on the First Time User? Click Here link in the Sign In box. You will be redirect to the New Member Sign Up page. 3. Enter your [employer] Access Code exactly as it appears below. You will not need to use this code after youve completed the sign-up process. If you do not sign up before the expiration date, you must request a new code.     [employer] Access Code: [email or web address]  Expires: 6/4/2019  7:58 AM (This is the date your [employer] access code will )    4. Enter the last four digits of your Social Security Number (xxxx) and Date of Birth (mm/dd/yyyy) as indicated and click Submit. You will be taken to the next sign-up page. 5. Create a Identiat ID. This will be your [employer] login ID and cannot be changed, so think of one that is secure and easy to remember. 6. Create a [employer] password. You can change your password at any time. 7. Enter your Password Reset Question and Answer. This can be used at a later time if you forget your password. 8. Enter your e-mail address. You will receive e-mail notification when new information is available in [address] 9. Click Sign Up. You can now view and download portions of your medical record. 10. Click the Download Summary menu link to download a portable copy of your medical information. Additional Information    If you have questions, please visit the Frequently Asked Questions section of the [employer] website at [email or web address] Remember, [employer] is NOT to be used for urgent needs. For medical emergencies, dial 911. Complete all medications as prescribed. Follow-up with primary care doctor tomorrow. Return to the ED immediately for any new or worsening symptoms.

## 2019-05-06 NOTE — ED PROVIDER NOTES
EMERGENCY DEPARTMENT HISTORY AND PHYSICAL EXAM 
 
Date: 5/6/2019 Patient Name: Savanah Campbell History of Presenting Illness Chief Complaint Patient presents with  
 High Blood Sugar History Provided By patient Chief Complaint: high blood sugar Duration: just PTA Timing: acute Location: n/a Quality:n/a Severity:severe Modifying Factors: none Associated Symptoms:fatigue Additional History (Context): Savanah Campbell is a 66 y.o. female with PMH DM, CAD, depression, htn, hypercholesterolemia, stroke, and spinal stenosis who presents to the ED by medic with complaints of high blood sugar. Pt appears very fatigued upon entering the exam room, but is able to verbalize that she checked her glucose at home and the meter registered as high. Pt admits that she has not been using her insulin as prescribed and is running out of her daily medications. She was seen in the ED yesterday for similar complaints, was diagnosed with hyperglycemia and a UTI. Pt prescribed macrobid at discharge, but admits she has not started this medication. Pt states she is supposed to see her PCP tomorrow. Pt denies any chest pain, abd pain, SOB, or nausea at present. No other complaints verbalized at this time. PCP: Tsering Johnson MD 
 
Current Outpatient Medications Medication Sig Dispense Refill  nitrofurantoin, macrocrystal-monohydrate, (MACROBID) 100 mg capsule Take 1 Cap by mouth two (2) times a day for 7 days. 14 Cap 0  
 pantoprazole (PROTONIX) 40 mg tablet Take 1 Tab by mouth daily for 20 days. 20 Tab 0  
 betamethasone dipropionate (DIPROSONE) 0.05 % topical cream Apply  to affected area daily as needed for Skin Irritation.  furosemide (LASIX) 40 mg tablet Take 40 mg by mouth as needed.  gabapentin (NEURONTIN) 300 mg capsule Take 300 mg by mouth three (3) times daily.  HYDROcodone-acetaminophen (NORCO)  mg tablet Take 1 Tab by mouth every six (6) hours as needed for Pain.  potassium chloride SR (KLOR-CON 10) 10 mEq tablet Take 10 mEq by mouth as needed.  tiZANidine (ZANAFLEX) 2 mg tablet Take 2 mg by mouth as needed. 3  
 metoprolol succinate (TOPROL-XL) 100 mg tablet Take 100 mg by mouth daily. 6  
 conjugated estrogens (PREMARIN) 0.625 mg/gram vaginal cream Apply small amount to introitus MWF as directed. 30 g 1  
 aspirin (ASPIRIN) 325 mg tablet Take 1 Tab by mouth daily. 30 Tab 0  
 mirtazapine (REMERON) 15 mg tablet Take 15 mg by mouth nightly.  glimepiride (AMARYL) 1 mg tablet Take 1 mg by mouth two (2) times a day.  levothyroxine (SYNTHROID) 50 mcg tablet Take 50 mcg by mouth Daily (before breakfast).  fentaNYL (DURAGESIC) 25 mcg/hr PATCH 1 Patch by TransDERmal route every seventy-two (72) hours.  amLODIPine (NORVASC) 10 mg tablet 10 mg.    
 ziprasidone (GEODON) 20 mg capsule Take 20 mg by mouth daily.  ibuprofen (MOTRIN) 200 mg tablet Take 400 mg by mouth every eight (8) hours as needed for Pain.  oxyCODONE-acetaminophen (PERCOCET 10)  mg per tablet Take 1 Tab by mouth every six (6) hours as needed for Pain.  cholecalciferol (VITAMIN D3) 1,000 unit cap Take 1,000 Units by mouth daily.  losartan (COZAAR) 100 mg tablet Take 100 mg by mouth daily.  venlafaxine-SR (EFFEXOR XR) 150 mg capsule Take 150 mg by mouth daily.  cyanocobalamin (VITAMIN B-12) 1,000 mcg tablet Take 1,000 mcg by mouth daily. Past History Past Medical History: 
Past Medical History:  
Diagnosis Date  Anxiety  Atrophic vaginitis  CAD (coronary artery disease)  Chronic pain  Depression  Diabetes ([facility] [address]  Diverticulosis  Dysphagia  Dysuria  Early satiety  Esophageal reflux  Fatty liver  Gastric ulcer  Gastroparesis  H/O joint replacement 1991  
 left knee  Heart disease  Hypercholesteremia  Hypertension  Iron deficiency anemia  Neurogenic bladder  Recurrent UTI  Right flank pain  Scoliosis  Spinal stenosis  Stenosis of lumbosacral spine  Stroke [facility]) 04/2018  Tardive dyskinesia  Urgency of urination Past Surgical History: 
Past Surgical History:  
Procedure Laterality Date  EGD  7/10/2018  HX APPENDECTOMY  HX BREAST LUMPECTOMY [address]  HX COLECTOMY  2012  HX COLONOSCOPY  10/29/2009  
 hyperplastic polyp, hemorrhoids [address] HYSTERECTOMY  1970  HX KNEE ARTHROSCOPY    
 HX KNEE REPLACEMENT  1991  
 left knee  HX LUMBAR FUSION    
 x 3 Family History: 
Family History Problem Relation Age of Onset  Heart Disease Mother  Diabetes Mother  Heart Attack Mother  Cancer Father   
     lung CA  Cancer Brother  Cancer Sister Social History: 
Social History Tobacco Use  Smoking status: Never Smoker  Smokeless tobacco: Never Used Substance Use Topics  Alcohol use: No  
 Drug use: No  
 
 
Allergies: Allergies Allergen Reactions  Codeine Rash and Itching  Iodine Hives and Rash  Morphine Hives  Pcn [Penicillins] Other (comments) Causes \"Heart swell\"  Sulfa (Sulfonamide Antibiotics) Nausea Only Review of Systems Review of Systems Constitutional: Positive for fatigue. Negative for chills and fever. HENT: Negative for congestion, ear pain and rhinorrhea. Eyes: Negative. Negative for pain and redness. Respiratory: Negative. Negative for cough, shortness of breath, wheezing and stridor. Cardiovascular: Negative. Negative for chest pain and leg swelling. Gastrointestinal: Negative. Negative for abdominal pain, constipation, diarrhea, nausea and vomiting. Genitourinary: Negative. Negative for dysuria and frequency. Musculoskeletal: Negative. Negative for back pain and neck pain. Skin: Negative. Negative for rash and wound. Neurological: Negative. Negative for dizziness, seizures, syncope and headaches. All other systems reviewed and are negative. All Other Systems Negative Physical Exam  
 
Vitals:  
 05/06/19 2000 05/06/19 2100 05/06/19 2150 05/06/19 2210 BP: 130/56 (!) 119/99 145/86 129/60 Pulse: 69 72 77 76 Resp: 17 18 23 Temp:    98.7 °F (37.1 °C) SpO2: 100% 98%  99% Weight:      
Height:      
 
Physical Exam  
Constitutional: She is oriented to person, place, and time. She appears well-developed and well-nourished. No distress. HENT:  
Head: Normocephalic and atraumatic. Eyes: Conjunctivae are normal. Right eye exhibits no discharge. Left eye exhibits no discharge. No scleral icterus. Neck: Normal range of motion. Neck supple. Cardiovascular: Normal rate, regular rhythm, normal heart sounds and intact distal pulses. Exam reveals no gallop and no friction rub. No murmur heard. Pulmonary/Chest: Effort normal and breath sounds normal. No stridor. No respiratory distress. She has no wheezes. She has no rales. Abdominal: Soft. Bowel sounds are normal. She exhibits no distension. There is no tenderness. There is no guarding. Genitourinary: Rectal exam shows guaiac negative stool. Genitourinary Comments: No external hemorrhoids noted on exam, guaiac negative, no impaction noted on internal exam.   
Musculoskeletal: Normal range of motion. Neurological: She is oriented to person, place, and time. Pt is oriented x 3 but appears fatigued during examination. Skin: Skin is warm and dry. No rash noted. She is not diaphoretic. No erythema. Psychiatric: She has a normal mood and affect. Her behavior is normal. Thought content normal.  
Nursing note and vitals reviewed. Diagnostic Study Results Labs - Recent Results (from the past 12 hour(s)) GLUCOSE, POC Collection Time: 05/06/19  6:49 PM  
Result Value Ref Range Glucose (POC) 359 (H) 70 - 110 mg/dL CBC WITH AUTOMATED DIFF Collection Time: 05/06/19  6:50 PM  
Result Value Ref Range WBC 10.5 4.6 - 13.2 K/uL  
 RBC 3.42 (L) 4.20 - 5.30 M/uL HGB 9.6 (L) 12.0 - 16.0 g/dL HCT 30.5 (L) 35.0 - 45.0 % MCV 89.2 74.0 - 97.0 FL  
 MCH 28.1 24.0 - 34.0 PG  
 MCHC 31.5 31.0 - 37.0 g/dL  
 RDW 14.0 11.6 - 14.5 % PLATELET 009 857 - 593 K/uL MPV 11.5 9.2 - 11.8 FL  
 NEUTROPHILS 62 40 - 73 % LYMPHOCYTES 29 21 - 52 % MONOCYTES 8 3 - 10 % EOSINOPHILS 1 0 - 5 % BASOPHILS 0 0 - 2 %  
 ABS. NEUTROPHILS 6.4 1.8 - 8.0 K/UL  
 ABS. LYMPHOCYTES 3.1 0.9 - 3.6 K/UL  
 ABS. MONOCYTES 0.8 0.05 - 1.2 K/UL  
 ABS. EOSINOPHILS 0.2 0.0 - 0.4 K/UL  
 ABS. BASOPHILS 0.0 0.0 - 0.1 K/UL  
 DF AUTOMATED METABOLIC PANEL, COMPREHENSIVE Collection Time: 05/06/19  6:50 PM  
Result Value Ref Range Sodium 133 (L) 136 - 145 mmol/L Potassium 3.3 (L) 3.5 - 5.5 mmol/L Chloride 99 (L) 100 - 108 mmol/L  
 CO2 26 21 - 32 mmol/L Anion gap 8 3.0 - 18 mmol/L Glucose 370 (H) 74 - 99 mg/dL BUN 17 7.0 - 18 MG/DL Creatinine 1.76 (H) 0.6 - 1.3 MG/DL  
 BUN/Creatinine ratio 10 (L) 12 - 20 GFR est AA 34 (L) >60 ml/min/1.73m2 GFR est non-AA 28 (L) >60 ml/min/1.73m2 Calcium 8.0 (L) 8.5 - 10.1 MG/DL Bilirubin, total 0.4 0.2 - 1.0 MG/DL  
 ALT (SGPT) 14 13 - 56 U/L  
 AST (SGOT) 9 (L) 15 - 37 U/L Alk. phosphatase 121 (H) 45 - 117 U/L Protein, total 7.1 6.4 - 8.2 g/dL Albumin 3.2 (L) 3.4 - 5.0 g/dL Globulin 3.9 2.0 - 4.0 g/dL A-G Ratio 0.8 0.8 - 1.7 MAGNESIUM Collection Time: 05/06/19  6:50 PM  
Result Value Ref Range Magnesium 1.3 (L) 1.6 - 2.6 mg/dL CARDIAC PANEL,(CK, CKMB & TROPONIN) Collection Time: 05/06/19  6:50 PM  
Result Value Ref Range CK 66 26 - 192 U/L  
 CK - MB <1.0 <3.6 ng/ml CK-MB Index  0.0 - 4.0 % CALCULATION NOT PERFORMED WHEN RESULT IS BELOW LINEAR LIMIT Troponin-I, QT <0.02 0.0 - 0.045 NG/ML  
EKG, 12 LEAD, INITIAL Collection Time: 05/06/19  6:53 PM  
Result Value Ref Range Ventricular Rate 67 BPM  
 Atrial Rate 67 BPM  
 P-R Interval 210 ms QRS Duration 74 ms Q-T Interval 452 ms QTC Calculation (Bezet) 477 ms Calculated P Axis 43 degrees Calculated R Axis 18 degrees Calculated T Axis -63 degrees Diagnosis Sinus rhythm with 1st degree AV block Possible Left atrial enlargement T wave abnormality, consider inferior ischemia T wave abnormality, consider anterolateral ischemia Prolonged QT Abnormal ECG When compared with ECG of 20-APR-2019 08:19, 
T wave inversion now evident in Inferior leads T wave inversion now evident in Lateral leads EKG, 12 LEAD, SUBSEQUENT Collection Time: 05/06/19 10:34 PM  
Result Value Ref Range Ventricular Rate 70 BPM  
 Atrial Rate 70 BPM  
 P-R Interval 216 ms  
 QRS Duration 70 ms Q-T Interval 426 ms  
 QTC Calculation (Bezet) 460 ms Calculated P Axis 48 degrees Calculated R Axis 9 degrees Calculated T Axis -87 degrees Diagnosis Sinus rhythm with 1st degree AV block with premature supraventricular  
complexes Possible Left atrial enlargement T wave abnormality, consider inferolateral ischemia Abnormal ECG When compared with ECG of 06-MAY-2019 18:53, 
premature supraventricular complexes are now present T wave inversion less evident in Lateral leads GLUCOSE, POC Collection Time: 05/06/19 10:51 PM  
Result Value Ref Range Glucose (POC) 286 (H) 70 - 110 mg/dL TROPONIN I Collection Time: 05/06/19 11:15 PM  
Result Value Ref Range Troponin-I, QT <0.02 0.0 - 0.045 NG/ML Radiologic Studies -  
XR CHEST PORT    (Results Pending)  
no acute process noted CT Results  (Last 48 hours) None CXR Results  (Last 48 hours) None Medical Decision Making I am the first provider for this patient.  
 
I reviewed the vital signs, available nursing notes, past medical history, past surgical history, family history and social history. Vital Signs-Reviewed the patient's vital signs. Records Reviewed: Sue Amezcua PA-C Procedures: 
Procedures Provider Notes (Medical Decision Making): Impression:  Fatigue, hyperglycemia, UTI IV fluids and PO potassium given. Pt admits to not having started her macrobid, rocephin and IV fluids given in the ED. EKG shows some new T wave inversions to leads V4, V5, and V6, when compared to prior EKG. Initial troponin is WNL, will plan to obtain serial cardiac enzymes and subsequent EKG. Sue Amezcua PA-C Progress: pt has received IV fluids in the ED, second EKG and troponin unchanged. Pt re-evaluated and has no sx at present, alert and oriented x 3. Will plan to d/c pt with recommendation for starting macrobid and close PCP follow-up at her apt today. Pt agrees with this plan. Sue Amezcua PA-C  
 
 
 
MED RECONCILIATION: 
No current facility-administered medications for this encounter. Current Outpatient Medications Medication Sig  
 nitrofurantoin, macrocrystal-monohydrate, (MACROBID) 100 mg capsule Take 1 Cap by mouth two (2) times a day for 7 days.  pantoprazole (PROTONIX) 40 mg tablet Take 1 Tab by mouth daily for 20 days.  betamethasone dipropionate (DIPROSONE) 0.05 % topical cream Apply  to affected area daily as needed for Skin Irritation.  furosemide (LASIX) 40 mg tablet Take 40 mg by mouth as needed.  gabapentin (NEURONTIN) 300 mg capsule Take 300 mg by mouth three (3) times daily.  HYDROcodone-acetaminophen (NORCO)  mg tablet Take 1 Tab by mouth every six (6) hours as needed for Pain.  potassium chloride SR (KLOR-CON 10) 10 mEq tablet Take 10 mEq by mouth as needed.  tiZANidine (ZANAFLEX) 2 mg tablet Take 2 mg by mouth as needed.  metoprolol succinate (TOPROL-XL) 100 mg tablet Take 100 mg by mouth daily.  conjugated estrogens (PREMARIN) 0.625 mg/gram vaginal cream Apply small amount to introitus MWF as directed.  aspirin (ASPIRIN) 325 mg tablet Take 1 Tab by mouth daily.  mirtazapine (REMERON) 15 mg tablet Take 15 mg by mouth nightly.  glimepiride (AMARYL) 1 mg tablet Take 1 mg by mouth two (2) times a day.  levothyroxine (SYNTHROID) 50 mcg tablet Take 50 mcg by mouth Daily (before breakfast).  fentaNYL (DURAGESIC) 25 mcg/hr PATCH 1 Patch by TransDERmal route every seventy-two (72) hours.  amLODIPine (NORVASC) 10 mg tablet 10 mg.  
 ziprasidone (GEODON) 20 mg capsule Take 20 mg by mouth daily.  ibuprofen (MOTRIN) 200 mg tablet Take 400 mg by mouth every eight (8) hours as needed for Pain.  oxyCODONE-acetaminophen (PERCOCET 10)  mg per tablet Take 1 Tab by mouth every six (6) hours as needed for Pain.  cholecalciferol (VITAMIN D3) 1,000 unit cap Take 1,000 Units by mouth daily.  losartan (COZAAR) 100 mg tablet Take 100 mg by mouth daily.  venlafaxine-SR (EFFEXOR XR) 150 mg capsule Take 150 mg by mouth daily.  cyanocobalamin (VITAMIN B-12) 1,000 mcg tablet Take 1,000 mcg by mouth daily. Disposition: D/c 
 
DISCHARGE NOTE:  
Patient is stable for discharge at this time. No new rx given. Rest and follow-up with PCP this week. Return to the ED immediately for any new or worsening sx. Sue Amezcua PA-C 12:13 AM  
 
Follow-up Information Follow up With Specialties Details Why Contact Info Aaron Ferreira MD Internal Medicine Schedule an appointment as soon as possible for a visit in 1 week  [address] [place] [address] 61 [phone] [facility] EMERGENCY DEPT Emergency Medicine  As needed, If symptoms worsen [address] 
[phone] Diagnosis Clinical Impression: 1. Urinary tract infection with hematuria, site unspecified 2. Hyperglycemia 3. Hypokalemia

## 2019-05-07 VITALS
RESPIRATION RATE: 21 BRPM | WEIGHT: 195 LBS | OXYGEN SATURATION: 99 % | HEIGHT: 65 IN | HEART RATE: 75 BPM | SYSTOLIC BLOOD PRESSURE: 138 MMHG | TEMPERATURE: 98.7 F | DIASTOLIC BLOOD PRESSURE: 59 MMHG | BODY MASS INDEX: 32.49 KG/M2

## 2019-05-07 LAB
ATRIAL RATE: 67 BPM
ATRIAL RATE: 72 BPM
CALCULATED P AXIS, ECG09: 43 DEGREES
CALCULATED P AXIS, ECG09: 49 DEGREES
CALCULATED R AXIS, ECG10: 18 DEGREES
CALCULATED R AXIS, ECG10: 2 DEGREES
CALCULATED T AXIS, ECG11: -47 DEGREES
CALCULATED T AXIS, ECG11: -63 DEGREES
DIAGNOSIS, 93000: NORMAL
DIAGNOSIS, 93000: NORMAL
P-R INTERVAL, ECG05: 202 MS
P-R INTERVAL, ECG05: 210 MS
Q-T INTERVAL, ECG07: 422 MS
Q-T INTERVAL, ECG07: 452 MS
QRS DURATION, ECG06: 68 MS
QRS DURATION, ECG06: 74 MS
QTC CALCULATION (BEZET), ECG08: 462 MS
QTC CALCULATION (BEZET), ECG08: 477 MS
VENTRICULAR RATE, ECG03: 67 BPM
VENTRICULAR RATE, ECG03: 72 BPM

## 2019-05-07 NOTE — ED NOTES
Verbal order received by Dr. Anny Modi urine is not needing to be obtained due to urinalysis completed yesterday with readback

## 2019-05-07 NOTE — ED NOTES
I have reviewed discharge instructions with the patient. The patient verbalized understanding. Patient armband removed and shredded. VSS. Patient saturated in urine. Diaper changed and provided scrubs at time of discharge. Patients daughter driving patient home. Patient wheeled out to car by nurse at this time

## 2019-05-08 LAB
BACTERIA SPEC CULT: ABNORMAL
SERVICE CMNT-IMP: ABNORMAL

## 2019-05-09 ENCOUNTER — OFFICE VISIT (OUTPATIENT)
Dept: CARDIOLOGY CLINIC | Age: 74
End: 2019-05-09

## 2019-05-09 VITALS
DIASTOLIC BLOOD PRESSURE: 64 MMHG | WEIGHT: 186 LBS | HEART RATE: 74 BPM | OXYGEN SATURATION: 99 % | BODY MASS INDEX: 30.95 KG/M2 | SYSTOLIC BLOOD PRESSURE: 134 MMHG

## 2019-05-09 DIAGNOSIS — I10 ESSENTIAL HYPERTENSION WITH GOAL BLOOD PRESSURE LESS THAN 140/90: ICD-10-CM

## 2019-05-09 DIAGNOSIS — R07.1 CHEST PAIN ON BREATHING: Primary | ICD-10-CM

## 2019-05-09 DIAGNOSIS — E78.00 PURE HYPERCHOLESTEROLEMIA: ICD-10-CM

## 2019-05-09 RX ORDER — PROMETHAZINE HYDROCHLORIDE 25 MG/1
TABLET ORAL
Refills: 1 | COMMUNITY
Start: 2019-03-28 | End: 2020-02-06

## 2019-05-09 RX ORDER — NITROGLYCERIN 0.4 MG/1
0.4 TABLET SUBLINGUAL
Qty: 25 TAB | Refills: 3 | Status: SHIPPED | OUTPATIENT
Start: 2019-05-09

## 2019-05-09 RX ORDER — CLONIDINE HYDROCHLORIDE 0.1 MG/1
0.05 TABLET ORAL
COMMUNITY
End: 2022-11-01 | Stop reason: ALTCHOICE

## 2019-05-09 RX ORDER — FLUTICASONE FUROATE AND VILANTEROL TRIFENATATE 100; 25 UG/1; UG/1
POWDER RESPIRATORY (INHALATION)
Refills: 3 | COMMUNITY
Start: 2019-04-02 | End: 2020-02-24

## 2019-05-09 RX ORDER — ERGOCALCIFEROL 1.25 MG/1
CAPSULE ORAL
Refills: 0 | COMMUNITY
Start: 2019-03-25

## 2019-05-09 RX ORDER — HYDROXYZINE PAMOATE 25 MG/1
CAPSULE ORAL
Refills: 2 | COMMUNITY
Start: 2019-03-23 | End: 2020-02-24

## 2019-05-09 NOTE — PROGRESS NOTES
[facility]    Ms. Paresh Chowdhury is a 15-year-old female with history of hypertension, hyperlipidemia, diabetes, scoliosis and other multiple medical problem    Patient is here today to establish care with me. She denies any prior history of myocardial infarction or congestive heart failure. She recently was at [facility]"" emergency department with symptoms of UTI. She was treated for UTI. During the work-up she was found to have abnormal EKG. Myocardial infarction was ruled out. She did not have any chest pain at the time. She said that she has chest pain for years for which she has undergone stress test recently. She describes this chest pain is a dull sensation in the chest lasting for less than 5-second, usually 2 to 3 seconds. There is no radiation. There is no exertional component. She does not describe any prolonged chest heaviness or tightness or any pressure sensation. She denies any nausea or vomiting. She is functionally limited because of her scoliosis and degenerative joint disease. She denies PND. She has occasional lower extremity swelling. Denies any nausea, vomiting, abdominal pain, fever, chills, sputum production.  No hematuria or other bleeding complaints    Past Medical History:   Diagnosis Date    Anxiety     Depression     Diabetes ([address]     Diverticulosis     Dysphagia     Esophageal reflux     Fatty liver     Gastric ulcer     Gastroparesis     H/O joint replacement 1991    left knee    Hypercholesteremia     Hypertension     Iron deficiency anemia     Neurogenic bladder     Recurrent UTI     Scoliosis     Spinal stenosis     Stenosis of lumbosacral spine     Stroke ([address] 04/2018    Tardive dyskinesia          Past Surgical History:   Procedure Laterality Date    EGD  7/10/2018         HX APPENDECTOMY      HX BREAST LUMPECTOMY      HX CHOLECYSTECTOMY  1980    HX COLECTOMY  2012  HX COLONOSCOPY  10/29/2009    hyperplastic polyp, hemorrhoids    HX HERNIA REPAIR      HX HYSTERECTOMY  1970    HX KNEE ARTHROSCOPY      HX KNEE REPLACEMENT  1991    left knee    HX LUMBAR FUSION      x 3       Current Outpatient Medications   Medication Sig    nitrofurantoin, macrocrystal-monohydrate, (MACROBID) 100 mg capsule Take 1 Cap by mouth two (2) times a day for 7 days.  betamethasone dipropionate (DIPROSONE) 0.05 % topical cream Apply  to affected area daily as needed for Skin Irritation.  furosemide (LASIX) 40 mg tablet Take 40 mg by mouth as needed.  gabapentin (NEURONTIN) 300 mg capsule Take 300 mg by mouth three (3) times daily.  potassium chloride SR (KLOR-CON 10) 10 mEq tablet Take 10 mEq by mouth as needed.  tiZANidine (ZANAFLEX) 2 mg tablet Take 2 mg by mouth as needed.  metoprolol succinate (TOPROL-XL) 100 mg tablet Take 100 mg by mouth daily.  aspirin (ASPIRIN) 325 mg tablet Take 1 Tab by mouth daily.  mirtazapine (REMERON) 15 mg tablet Take 15 mg by mouth nightly.  glimepiride (AMARYL) 1 mg tablet Take 4 mg by mouth two (2) times a day.  levothyroxine (SYNTHROID) 50 mcg tablet Take 50 mcg by mouth Daily (before breakfast).  fentaNYL (DURAGESIC) 25 mcg/hr PATCH 1 Patch by TransDERmal route every seventy-two (72) hours.  amLODIPine (NORVASC) 10 mg tablet 10 mg.    ibuprofen (MOTRIN) 200 mg tablet Take 400 mg by mouth every eight (8) hours as needed for Pain.  oxyCODONE-acetaminophen (PERCOCET 10)  mg per tablet Take 1 Tab by mouth every six (6) hours as needed for Pain.  cholecalciferol (VITAMIN D3) 1,000 unit cap Take 1,000 Units by mouth daily.  losartan (COZAAR) 100 mg tablet Take 100 mg by mouth daily.  cyanocobalamin (VITAMIN B-12) 1,000 mcg tablet Take 1,000 mcg by mouth daily.  cloNIDine HCl (CATAPRES) 0.1 mg tablet Take 0.05 mg by mouth.     ergocalciferol (ERGOCALCIFEROL) 50,000 unit capsule TAKE ONE CAPSULE BY MOUTH ONCE A WEEK FOR VIT D    BREO ELLIPTA 100-25 mcg/dose inhaler TAKE 1 PUFF BY MOUTH EVERY DAY    hydrOXYzine pamoate (VISTARIL) 50 mg capsule TAKE 1 CAPSULE BY MOUTH TWICE A DAY AS NEEDED    promethazine (PHENERGAN) 25 mg tablet TAKE 1 TAB BY MOUTH TWICE DAILY AS NEEDED FOR NAUSEA     No current facility-administered medications for this visit. Allergies and Sensitivities:  Allergies   Allergen Reactions    Codeine Rash and Itching    Iodine Hives and Rash    Morphine Hives    Pcn [Penicillins] Other (comments)     Causes \"Heart swell\"    Sulfa (Sulfonamide Antibiotics) Nausea Only       Family History:  Family History   Problem Relation Age of Onset    Heart Disease Mother     Diabetes Mother     Heart Attack Mother     Cancer Father         lung CA    Cancer Brother     Cancer Sister        Social History:  Social History     Tobacco Use    Smoking status: Never Smoker    Smokeless tobacco: Never Used   Substance Use Topics    Alcohol use: No    Drug use: No     She  reports that she has never smoked. She has never used smokeless tobacco.  She  reports that she does not drink alcohol. Review of Systems:  Cardiac symptoms as noted above in HPI. All others negative. Denies fatigue, malaise, skin rash, joint pain, blurring vision, photophobia, neck pain, hemoptysis, chronic cough, nausea, vomiting, hematuria, burning micturition, BRBPR, chronic headaches. Physical Exam:  BP Readings from Last 3 Encounters:   05/09/19 134/64   05/07/19 138/59   05/05/19 135/65         Pulse Readings from Last 3 Encounters:   05/09/19 74   05/07/19 75   05/05/19 77          Wt Readings from Last 3 Encounters:   05/09/19 186 lb (84.4 kg)   05/06/19 195 lb (88.5 kg)   04/20/19 190 lb (86.2 kg)       Constitutional: Oriented to person, place, and time. HENT: Head: Normocephalic and atraumatic. Eyes: Conjunctivae and extraocular motions are normal.   Neck: No JVD present. Carotid bruit is not appreciated. Cardiovascular: Regular rhythm. No murmur, gallop or rubs appreciated  Lung: Breath sounds normal. No respiratory distress. No ronchi or rales appreciated  Abdominal: No tenderness. No rebound and no guarding. Musculoskeletal: There is trace lower extremity edema. No cynosis  Lymphadenopathy:  No cervical or supraclavicular adenopathy appriciated. Neurological: No gross motor deficit noted. Skin: No visible skin rash noted. No Ear discharge noted  Psychiatric: Normal mood and affect. Good distal pulse      Review of Data  LABS:   Lab Results   Component Value Date/Time    Sodium 133 (L) 05/06/2019 06:50 PM    Potassium 3.3 (L) 05/06/2019 06:50 PM    Chloride 99 (L) 05/06/2019 06:50 PM    CO2 26 05/06/2019 06:50 PM    Glucose 370 (H) 05/06/2019 06:50 PM    BUN 17 05/06/2019 06:50 PM    Creatinine 1.76 (H) 05/06/2019 06:50 PM     Lipids Latest Ref Rng & Units 3/21/2018   Chol, Total <200 MG/(H)   HDL 40 - 60 MG/DL 72(H)   LDL 0 - 100 MG/. 4(H)   Trig <150 MG/DL 73   Chol/HDL Ratio 0 - 5.0   3.1   Some recent data might be hidden     Lab Results   Component Value Date/Time    ALT (SGPT) 14 05/06/2019 06:50 PM     Lab Results   Component Value Date/Time    Hemoglobin A1c 8.3 (H) 08/20/2018 03:15 AM       EKG  Sinus rhythm at 72 bpm.  Nonspecific T wave inversion diffusely    ECHO (02/19)  Left Ventricle Normal cavity size and systolic function (ejection fraction normal). Mild concentric hypertrophy observed. The estimated ejection fraction is 56 - 60%. Visually measured ejection fraction. There is age-appropriate left ventricular diastolic function. Left Atrium The cavity size is mildly dilated. Right Ventricle Right ventricle not well visualized. Right Atrium Normal size. Aortic Valve Aortic valve not well visualized. No stenosis and no regurgitation. Mitral Valve No stenosis. Mitral valve non-specific thickening. Trace regurgitation.    Tricuspid Valve Tricuspid valve not well visualized. Pulmonic Valve The pulmonic valve was not well visualized. STRESS TEST (8/18)  · Baseline ECG: Normal sinus rhythm. · Negative stress electrocardiogram.  · Gated SPECT: Left ventricular function post-stress was normal. Calculated ejection fraction is 81%. · Left ventricular perfusion is normal.  · Stress test results correlate with a low risk of inducible myocardial ischemia. IMPRESSION & PLAN:  Ms. Caleb Alejandro is 66-year-old female with multiple medical problem    Hypertension:  Her blood pressure today is 134/64 mmHg. Currently she appears to be on amlodipine, metoprolol, Lasix and possibly clonidine. She is going to go home and call us with the list of medication. She did have a echocardiogram without any obvious evidence of hypertensive cardia vascular heart disease. Continue with same medication for now    Hyperlipidemia:  Last LDL was 136. Goal LDL should be less than 70 from cardia vascular standpoint in setting of history of diabetes. She does have a statin medication at home however she does not take it. I have asked her to call us with the medication and start that medication. Abnormal EKG and chest pain:  Patient has chest pain on and off for years. Appears highly atypical for angina. Lasting only for 2 to 3 seconds without any radiation or exertional activity. She had a nuclear stress test in August 2018 which was low risk testing. Her EKG showed T wave changes in setting of hypokalemia. She did have nonspecific ST-T changes in the past EKGs as well. For now I recommend that she continue with aggressive risk factor modification. She is already on aspirin, metoprolol, amlodipine. I will provide her sublingual nitroglycerin and advised her how to use it for angina which I described to her    This plan was discussed with patient who is in agreement. Thank you for allowing me to participate in patient care. Please feel free to call me if you have any question or concern. Lazaro Shaikh MD  Please note: This document has been produced using voice recognition software. Unrecognized errors in transcription may be present.

## 2019-05-09 NOTE — PATIENT INSTRUCTIONS
Nitroglycerin 0.4mg as needed     Physician Assistant in 2 months     Call with name of cholesterol medication you are taking [phone]

## 2019-05-09 NOTE — PROGRESS NOTES
1. Have you been to the ER, urgent care clinic since your last visit? Hospitalized since your last visit? yes    2. Have you seen or consulted any other health care providers outside of the [address] [place] [address] since your last visit? Include any pap smears or colon screening.   No

## 2019-05-12 NOTE — PROGRESS NOTES
Patient discharged on Macrobid, resistance noted, patient must be called in a new antibiotic for cystitis.

## 2019-05-14 NOTE — TELEPHONE ENCOUNTER
Patient called and LM on nursing line. States that she was told to call back with name of her cholesterol pill. She states it is atorvastatin 80mg, she does not take it because it makes her very sick. Verbal order and read back per Clive Dukes MD  Try Livalo 2mg Daily     Patient aware and states she will try it.

## 2019-05-17 ENCOUNTER — TELEPHONE (OUTPATIENT)
Dept: CARDIOLOGY CLINIC | Age: 74
End: 2019-05-17

## 2019-05-17 NOTE — TELEPHONE ENCOUNTER
Contacted pt at [facility] number. Two patient Identifiers confirmed. Advised pt per Dr Herbert Patella notes. Pt verbalized understanding and scheduled with PA.

## 2019-05-20 NOTE — PROGRESS NOTES
[address]  [facility] PHYSICAL THERAPY 
[address], [place], [address] - Phone: [phone]  Fax: [phone] DISCHARGE SUMMARY FOR PHYSICAL THERAPY Patient Name: Artem Rubio : 1945 Treatment/Medical Diagnosis: Left low back pain [M54.5] Referral Source: Gilma Zapien MD [facility]): 2019 Prior Hospitalization: See Medical History Provider #: [id number] Medications: Verified on Patient Summary List  
Visits from [facility]: 15 Missed Visits: 6 GOALS 1. Patient to be Independent with HEP to self-manage/prevent symptoms after DC. 2.  Patient to be independent in performing aquatherapy to transition to aquatic based HEP at DC.  
[address] DUE TO NON-COMPLIANCE. SUMMARY OF TREATMENT Patient's POC has consisted of aquatic and land based therex, therapeutic activities, manual therapy prn, modalities prn, pt. education, and a comprehensive HEP. Treatment strategies used to address functional mobility deficits, ROM deficits, strength deficits, analyze and address soft tissue restrictions, analyze and cue movement patterns, analyze and modify body mechanics/ergonomics, assess and modify postural abnormalities and instruct in home and community integration. Key Functional Changes/Progress: Pt will be discharged today due to non-compliance and not returning to physical therapy since 2019. They have missed 6 appointments and were educated regarding our attendance policy. They were informed on 2019 that they would be discharged if additional appointments were not scheduled. On 2019, she requested to self-discharge physical therapy at this time due to other health complications. Assessments/Recommendations: Discontinue therapy due to lack of attendance or compliance. If you have any questions/comments please contact us directly at [phone]. Thank you for allowing us to assist in the care of your patient. Therapist Signature:  Rick Quinteros DPT Date: 5/20/2019 Reporting Period: 2/4/19-4/22/19 Time: 5:48 AM  
  
Certification Period: 2/4/19-5/3/19 NOTE TO PHYSICIAN:  PLEASE COMPLETE THE ORDERS BELOW AND FAX TO [facility] Physical Therapy: [phone]. If you are unable to process this request in 24 hours please contact our office: [phone]. 
 
___ I have read the above report and request that my patient be discharged from therapy.   
 
Physician Signature:       Date:      Time:

## 2019-05-22 ENCOUNTER — OFFICE VISIT (OUTPATIENT)
Dept: CARDIOLOGY CLINIC | Age: 74
End: 2019-05-22

## 2019-05-22 VITALS
WEIGHT: 181 LBS | BODY MASS INDEX: 30.12 KG/M2 | OXYGEN SATURATION: 99 % | HEART RATE: 73 BPM | SYSTOLIC BLOOD PRESSURE: 181 MMHG | DIASTOLIC BLOOD PRESSURE: 80 MMHG

## 2019-05-22 DIAGNOSIS — E78.2 MIXED HYPERLIPIDEMIA: Primary | ICD-10-CM

## 2019-05-22 RX ORDER — METHYLPREDNISOLONE 4 MG/1
TABLET ORAL
COMMUNITY
End: 2020-02-06

## 2019-05-22 RX ORDER — DOXYCYCLINE 100 MG/1
100 CAPSULE ORAL 2 TIMES DAILY
COMMUNITY
End: 2019-10-17 | Stop reason: ALTCHOICE

## 2019-05-22 RX ORDER — ATORVASTATIN CALCIUM 80 MG/1
80 TABLET, FILM COATED ORAL DAILY
COMMUNITY
End: 2020-02-06

## 2019-05-22 RX ORDER — LUBIPROSTONE 8 UG/1
8 CAPSULE, GELATIN COATED ORAL 2 TIMES DAILY WITH MEALS
COMMUNITY

## 2019-05-22 RX ORDER — OMEPRAZOLE 40 MG/1
40 CAPSULE, DELAYED RELEASE ORAL DAILY
COMMUNITY

## 2019-05-22 RX ORDER — SIMVASTATIN 20 MG/1
20 TABLET, FILM COATED ORAL
Qty: 30 TAB | Refills: 6 | Status: SHIPPED | OUTPATIENT
Start: 2019-05-22 | End: 2020-02-24

## 2019-05-22 RX ORDER — ALBUTEROL SULFATE 90 UG/1
AEROSOL, METERED RESPIRATORY (INHALATION)
COMMUNITY

## 2019-05-22 NOTE — PROGRESS NOTES
[facility]    Ms. Milagros Wilson is a 68-year-old female with history of hypertension, hyperlipidemia, diabetes, scoliosis, CVA and other multiple medical problem    Patient is here today for alternatives to Livalo for lipid lowering therapy, as this is not covered by insurance. She has the medication Atorvastatin listed, but has not take that for months due to intolerance, where she notes insomnia and muscle cramping. She is uncertain if she has tried other types of statin medications. She denies chest pain or dyspnea. She is accompanied with a caregiver. Past Medical History:   Diagnosis Date    Anxiety     Depression     Diabetes ([address]     Diverticulosis     Dysphagia     Esophageal reflux     Fatty liver     Gastric ulcer     Gastroparesis     H/O joint replacement 1991    left knee    Hypercholesteremia     Hypertension     Iron deficiency anemia     Neurogenic bladder     Recurrent UTI     Scoliosis     Spinal stenosis     Stenosis of lumbosacral spine     Stroke [address] [address] 04/2018    Tardive dyskinesia          Past Surgical History:   Procedure Laterality Date    EGD  7/10/2018         HX APPENDECTOMY      HX BREAST LUMPECTOMY      HX CHOLECYSTECTOMY  1980    HX COLECTOMY  2012    HX COLONOSCOPY  10/29/2009    hyperplastic polyp, hemorrhoids    HX HERNIA REPAIR      HX HYSTERECTOMY  1970    HX KNEE ARTHROSCOPY      HX KNEE REPLACEMENT  1991    left knee    HX LUMBAR FUSION      x 3       Current Outpatient Medications   Medication Sig    omeprazole (PRILOSEC) 40 mg capsule Take 40 mg by mouth daily.  lubiPROStone (AMITIZA) 8 mcg capsule Take 8 mcg by mouth two (2) times daily (with meals).  doxycycline (MONODOX) 100 mg capsule Take 100 mg by mouth two (2) times a day.  clotrimazole (MYCELEX EX) by Apply Externally route.  albuterol (PROAIR HFA) 90 mcg/actuation inhaler Take  by inhalation.     methylPREDNISolone (MEDROL) 4 mg tab Take  by mouth daily (with breakfast).  simvastatin (ZOCOR) 20 mg tablet Take 1 Tab by mouth nightly.  pitavastatin calcium (LIVALO) 2 mg tablet Take 1 Tab by mouth daily.  trospium (SANCTURA XL) 60 mg capsule Take 1 Cap by mouth Daily (before breakfast).  conjugated estrogens (PREMARIN) 0.625 mg/gram vaginal cream Apply small amount to introitus MWF as directed.  cloNIDine HCl (CATAPRES) 0.1 mg tablet Take 0.05 mg by mouth.  ergocalciferol (ERGOCALCIFEROL) 50,000 unit capsule TAKE ONE CAPSULE BY MOUTH ONCE A WEEK FOR VIT D    BREO ELLIPTA 100-25 mcg/dose inhaler TAKE 1 PUFF BY MOUTH EVERY DAY    hydrOXYzine pamoate (VISTARIL) 25 mg capsule TAKE 1 CAPSULE BY MOUTH TWICE A DAY AS NEEDED    promethazine (PHENERGAN) 25 mg tablet TAKE 1 TAB BY MOUTH TWICE DAILY AS NEEDED FOR NAUSEA    nitroglycerin (NITROSTAT) 0.4 mg SL tablet 1 Tab by SubLINGual route every five (5) minutes as needed for Chest Pain. Up to 3 doses.  betamethasone dipropionate (DIPROSONE) 0.05 % topical cream Apply  to affected area daily as needed for Skin Irritation.  furosemide (LASIX) 40 mg tablet Take 40 mg by mouth as needed.  gabapentin (NEURONTIN) 300 mg capsule Take 300 mg by mouth three (3) times daily.  potassium chloride SR (KLOR-CON 10) 10 mEq tablet Take 10 mEq by mouth as needed.  tiZANidine (ZANAFLEX) 2 mg tablet Take 2 mg by mouth as needed.  metoprolol succinate (TOPROL-XL) 100 mg tablet Take 100 mg by mouth daily.  aspirin (ASPIRIN) 325 mg tablet Take 1 Tab by mouth daily.  mirtazapine (REMERON) 15 mg tablet Take 15 mg by mouth nightly.  levothyroxine (SYNTHROID) 50 mcg tablet Take 50 mcg by mouth Daily (before breakfast).  fentaNYL (DURAGESIC) 25 mcg/hr PATCH 1 Patch by TransDERmal route every seventy-two (72) hours.     amLODIPine (NORVASC) 10 mg tablet 10 mg.    ibuprofen (MOTRIN) 200 mg tablet Take 400 mg by mouth every eight (8) hours as needed for Pain.  oxyCODONE-acetaminophen (PERCOCET 10)  mg per tablet Take 1 Tab by mouth every six (6) hours as needed for Pain.  cholecalciferol (VITAMIN D3) 1,000 unit cap Take 1,000 Units by mouth daily.  losartan (COZAAR) 100 mg tablet Take 100 mg by mouth daily.  cyanocobalamin (VITAMIN B-12) 1,000 mcg tablet Take 1,000 mcg by mouth daily.  atorvastatin (LIPITOR) 80 mg tablet Take 80 mg by mouth daily.  glimepiride (AMARYL) 1 mg tablet Take 4 mg by mouth two (2) times a day. No current facility-administered medications for this visit. Allergies and Sensitivities:  Allergies   Allergen Reactions    Codeine Rash and Itching    Iodine Hives and Rash    Levofloxacin Nausea and Vomiting    Metformin Other (comments)     diarrhea    Morphine Hives    Pcn [Penicillins] Other (comments)     Causes \"Heart swell\"    Sulfa (Sulfonamide Antibiotics) Nausea Only       Family History:  Family History   Problem Relation Age of Onset    Heart Disease Mother     Diabetes Mother     Heart Attack Mother     Cancer Father         lung CA    Cancer Brother     Cancer Sister        Social History:  Social History     Tobacco Use    Smoking status: Never Smoker    Smokeless tobacco: Never Used   Substance Use Topics    Alcohol use: No    Drug use: No     She  reports that she has never smoked. She has never used smokeless tobacco.  She  reports that she does not drink alcohol. Review of Systems:  Cardiac symptoms as noted above in HPI. All others negative. Denies fatigue, malaise, skin rash, joint pain, blurring vision, photophobia, neck pain, hemoptysis, chronic cough, nausea, vomiting, hematuria, burning micturition, BRBPR, chronic headaches.     Physical Exam:  BP Readings from Last 3 Encounters:   05/22/19 181/80   05/13/19 118/78   05/09/19 134/64         Pulse Readings from Last 3 Encounters:   05/22/19 73   05/09/19 74   05/07/19 75          Wt Readings from Last 3 Encounters:   05/22/19 181 lb (82.1 kg)   05/13/19 186 lb (84.4 kg)   05/09/19 186 lb (84.4 kg)       Constitutional: Oriented to person, place, and time. HENT: Head: Normocephalic and atraumatic. Eyes: Conjunctivae and extraocular motions are normal.   Neck: No JVD present. Carotid bruit is not appreciated. Cardiovascular: Regular rhythm. No murmur, gallop or rubs appreciated  Lung: Breath sounds normal. No respiratory distress. No ronchi or rales appreciated  Abdominal: No tenderness. No rebound and no guarding. Musculoskeletal: There is trace lower extremity edema. No cynosis  Lymphadenopathy:  No cervical or supraclavicular adenopathy appriciated. Neurological: No gross motor deficit noted. Skin: No visible skin rash noted. No Ear discharge noted  Psychiatric: Normal mood and affect. Good distal pulse      Review of Data  LABS:   Lab Results   Component Value Date/Time    Sodium 133 (L) 05/06/2019 06:50 PM    Potassium 3.3 (L) 05/06/2019 06:50 PM    Chloride 99 (L) 05/06/2019 06:50 PM    CO2 26 05/06/2019 06:50 PM    Glucose 370 (H) 05/06/2019 06:50 PM    BUN 17 05/06/2019 06:50 PM    Creatinine 1.76 (H) 05/06/2019 06:50 PM     Lipids Latest Ref Rng & Units 3/21/2018   Chol, Total <200 MG/(H)   HDL 40 - 60 MG/DL 72(H)   LDL 0 - 100 MG/. 4(H)   Trig <150 MG/DL 73   Chol/HDL Ratio 0 - 5.0   3.1   Some recent data might be hidden     Lab Results   Component Value Date/Time    ALT (SGPT) 14 05/06/2019 06:50 PM     Lab Results   Component Value Date/Time    Hemoglobin A1c 8.3 (H) 08/20/2018 03:15 AM       EKG  Sinus rhythm at 72 bpm.  Nonspecific T wave inversion diffusely    ECHO (02/19)  Left Ventricle Normal cavity size and systolic function (ejection fraction normal). Mild concentric hypertrophy observed. The estimated ejection fraction is 56 - 60%. Visually measured ejection fraction. There is age-appropriate left ventricular diastolic function.    Left Atrium The cavity size is mildly dilated. Right Ventricle Right ventricle not well visualized. Right Atrium Normal size. Aortic Valve Aortic valve not well visualized. No stenosis and no regurgitation. Mitral Valve No stenosis. Mitral valve non-specific thickening. Trace regurgitation. Tricuspid Valve Tricuspid valve not well visualized. Pulmonic Valve The pulmonic valve was not well visualized. STRESS TEST (8/18)  · Baseline ECG: Normal sinus rhythm. · Negative stress electrocardiogram.  · Gated SPECT: Left ventricular function post-stress was normal. Calculated ejection fraction is 81%. · Left ventricular perfusion is normal.  · Stress test results correlate with a low risk of inducible myocardial ischemia. IMPRESSION & PLAN:  Ms. Kelly Koch is 68-year-old female with multiple medical problem    Hypertension:  Her blood pressure today is 181/80 mmHg. Currently she appears to be on amlodipine, metoprolol, and Lasix. Continue with this regimen. BP above goal, continue to monitor. Hyperlipidemia:  Last LDL was 136. Goal LDL should be less than 70 from cardiovascular standpoint in setting of diabetes and CVA. She is not taking Atorvastatin due to insomnia and muscle cramping. Livalo is not covered by insurance. Will try Simvastatin 20 mg and titrate if tolerated. Check lipid panel prior to next visit. This plan was discussed with patient who is in agreement. Thank you for allowing me to participate in patient care. Please feel free to call me if you have any question or concern.      Elsa Montaño PA-C

## 2019-06-05 ENCOUNTER — HOSPITAL ENCOUNTER (OUTPATIENT)
Dept: OTHER | Age: 74
Discharge: HOME | End: 2019-06-05
Payer: MEDICARE

## 2019-06-05 DIAGNOSIS — J20.9 ACUTE BRONCHITIS: ICD-10-CM

## 2019-06-05 PROCEDURE — 71046 X-RAY EXAM CHEST 2 VIEWS: CPT

## 2019-07-24 ENCOUNTER — HOSPITAL ENCOUNTER (OUTPATIENT)
Dept: PHYSICAL THERAPY | Age: 74
End: 2019-07-24

## 2019-07-25 ENCOUNTER — APPOINTMENT (OUTPATIENT)
Dept: PHYSICAL THERAPY | Age: 74
End: 2019-07-25

## 2019-08-22 ENCOUNTER — HOSPITAL ENCOUNTER (OUTPATIENT)
Dept: PHYSICAL THERAPY | Age: 74
Discharge: HOME | End: 2019-08-22
Payer: MEDICARE

## 2019-08-22 PROCEDURE — 97161 PT EVAL LOW COMPLEX 20 MIN: CPT

## 2019-08-22 PROCEDURE — 97530 THERAPEUTIC ACTIVITIES: CPT

## 2019-08-22 PROCEDURE — 97110 THERAPEUTIC EXERCISES: CPT

## 2019-08-22 NOTE — PROGRESS NOTES
[facility] PHYSICAL THERAPY  [address] [place] [address] Emily Mccall, [address] - Phone: [phone]  Fax: [phone] / [address] [address] PHYSICAL THERAPY SERVICES  Patient Name: Javad Hutchinson : 1945   Medical   Diagnosis: Low back pain [M54.5] Treatment Diagnosis: Chronic LBP   Onset Date: Chronic, 2019     Referral Source: Gregory Tang MD [place] [facility]): 2019   Prior Hospitalization: See medical history Provider #: [id number]   Prior Level of Function: Disability, reliant on [facility] for community moblity, AD for household distances. Comorbidities: Heart disease, depression, diabetes, OA, thyroid, HTN, scoliosis, multiple CVA/TIA, 4x spine surgeries   Medications: Verified on Patient Summary List   The Plan of Care and following information is based on the information from the initial evaluation.   ===========================================================================================  Assessment / key information: Patient is a 66 y.o. female returning to PT for chronic LBP for the 3rd time in 1 year. Patient had most recently been treated at our location between 19-19, including aquatherapy, and was discharged due to non-compliance and plateau in treatment. Patient reports that since DC she had not continued with her HEP, nor has she utilized the commercial pool that she is a member of. We discussed at length that PT is not a maintenance program, and that she must take personal responsibility for her health especially since she had (+) results from the PT and HEP she had done previously. Patient informed and educated that the aquatherapy program she will partake in will only be a 1 month refresher course as she just recently got discharged from it. She verbalized understanding, and was made aware of our no-show/cancellation policy.  Patient's course of treatment will focus on ===========================================================================================  Eval Complexity: History: HIGH Complexity :3+ comorbidities / personal factors will impact the outcome/ POC Exam:MEDIUM Complexity : 3 Standardized tests and measures addressing body structure, function, activity limitation and / or participation in recreation  Presentation: LOW Complexity : Stable, uncomplicated  Clinical Decision Making:MEDIUM Complexity : FOTO score of 26-74Overall Complexity:MEDIUM  FOTO score: 45 indicating 55% functional disability  Problem List: pain affecting function, decrease ROM, decrease strength, edema affecting function, impaired gait/ balance, decrease ADL/ functional abilitiies, decrease activity tolerance, decrease flexibility/ joint mobility and decrease transfer abilities   Treatment Plan may include any combination of the following: Aquatic therapy and Patient education  Patient / Family readiness to learn indicated by: asking questions, trying to perform skills and interest  Persons(s) to be included in education: patient (P)  Barriers to Learning/Limitations: None  Measures taken: n/a   Patient Goal (s): \"be in less pain\"   Patient self reported health status: fair  Rehabilitation Potential: fair   Short Term Goals: To be accomplished in  2  weeks:  1) Patient to be adherent to HEP to facilitate pain control with ADL's  2) Patient to be compliant with aquatic program attendance to ensure maximum benefits of PT. 3) Patient to tolerate greater than/equal to 30 minutes of aquatic therapy to improve ADL tolerance   Long Term Goals: To be accomplished in  4  weeks:  1) Patient to be Safe and Independent with aquatherapy to transition to aquatic HEP for self management/prevention after DC. 2) Patient to demonstrate modified 5x sit<-->stand transfers < 15\" to indicate reduced risk for falls and increased safety in independence with transfers.    3) Patient to report > 50% improvement in her ability to cook and prepare meals. Frequency / Duration:   Patient to be seen  2  times per week for 8-9   treatments:  Patient / Caregiver education and instruction: activity modification and exercises. We reviewed our facility's Patient Personal Responsibilities form, particularly in regards to our appointment time, attendance policy and compliance towards her home exercise program. We also discussed her POC as deemed appropriate by the treating therapist and physician. Patient verbalized understanding that she must show objective and functional improvement in an appropriate time frame. Patient verbalized understanding that should progress be lacking, we will contact the referring physician for further consultation to address and attempt to establish alternate treatment strategies as necessary or possibly discharge. Therapist Signature: Kelly Horan \"BJ\" Everrett Kehr, DPT, Beryl MAHARAJT, Cert. DN, Cert SMT Date: 8/43/3638   Certification Period: Kelly Horan \"BJ\" Everrett Kehr, DPT, Beryl BOWLES, CertRaul [place], Cert. SMT   Time: 3:18 PM   ===========================================================================================  I certify that the above Physical Therapy Services are being furnished while the patient is under my care. I agree with the treatment plan and certify that this therapy is necessary. Physician Signature:        Date:       Time:     Please sign and return to [facility] [place] [facility] or you may fax the signed copy to [phone]. Thank you.

## 2019-08-22 NOTE — PROGRESS NOTES
PHYSICAL THERAPY - DAILY TREATMENT NOTE  Patient Name: Vandana Rudolph        Date: 2019  : 1945   [x]  Patient  Verified  Visit #:   1   of   8  Insurance: Payor: [employer] / Plan: [place] MEDICARE PART A & B / Product Type: Medicare /      In time:   2:15          Out time:  2:55 Total Treatment Time (min):   40   Medicare Time Tracking (below)   Total Timed Codes (min):  23 1:1 Treatment Time:  23     SUBJECTIVE    Pain Level (on 0 to 10 scale):  3  / 10   Medication Changes/New allergies or changes in medical history, any new surgeries or procedures? []  No    []  Yes   If yes, update Summary List:    Subjective Functional Status/Changes:  []  No changes reported       HISTORY    Present Symptoms: LBP    Present since:  Chronic  [] Improving []  Unchanging []  Worsening        Commenced as a result of: reports bending over more since her last bout of PT back in May 2019. Patient admits to not exercising enough with her HEP. Patient reports that she IS a    or  []  No apparent reason    Symptoms at onset:  [x] Back [x]  Thigh []  Leg     Constant symptoms:  [x] Back [x]  Thigh []  Leg         Worse:  [x] Bending []  Walking []  Lying   [x] Sitting/ Rising []  Standing > 2minutes [x] When still   [x]  Am []  On the move []Other:     Other:      Better:   [] Bending []  Walking []  Lying   [] Sitting/ Rising []  Standing [] When still   []  Am/ as the day progresses/ pm []  On the move []Other:       Other:     Disturbed sleep: [] No    [x] Yes       Sleeping Postures: toss and turn  []  Prone []  Supine   []  R side [] L side      Surface:  [] Soft []  Firm [x]  Medium/tempurpedic     Current Treatment: reports recent injection 3 weeks ago, but no relief. Number of previous episodes: recurrent    Previous Medical History: Hx multiple (4) spine surgeries, with most recent 2-3 years.        Previous Treatments: multiple bouts of PT    SPECIFIC QUESTIONS    [] Cough []  Deep breath [x]  -ve   [] Sneeze []  + ve      Bladder:  [x] Normal []  Abnormal     Gait:  [] Normal [x]  Abnormal     General Health:  [] Good [x]  Fair []  Poor     Imaging:   [] Yes [x]  No     Night pain:   [] Yes [x]  No         Recent or major surgery:  [x] Yes []  No     Accidents/Falls:  [] Yes [x]  No     Unexplained weight loss:  [] Yes [x]  No       Work:  Mechanical Stresses: disability    Leisure: Mechanical Stresses: sedentary, social at [employer] 2x/week  Functional disability from present episode: sleep intolerance, inability to stand long enough to cook, donning and doffing requires mod/max assistance. Aides no longer able to come during the day. 7pm-11pm and 11pm-7am    Functional disability score- See FS FOTO score       OBJECTIVE  EXAMINATION  Posture:  Sitting  [] Good []  Fair [x]  Poor     Standing:  [] Good []  Fair [x]  Poor     Lordosis:  [] Red [x]  Acc []  Normal       Correction of posture:  [] Better [] Worse []  No effect     Other observations:      Neurological:    Motor deficit:   L(0-5) R (0-5) Pain   Hip Flexion (L1,2) 4- 4- []   Knee Extension (L3,4) 4- 4- []   Ankle Dorsiflexion (L4) 4- 4- []   Great Toe Extension (L5) 4- 4- []   Ankle Plantarflexion (S1) 4- 4- []   Knee Flexion (S1,2) 4- 4- []   Gluteus Ras 3- 3- []   Gluteus Medius 3- 3- []     Modified 5x sit <-->stand (with BUE Assist) 24\"    Therapeutic Procedures:  Min Procedure Specifics + Rationale   n/a [x]  Patient Education (performed throughout session) [x] Review HEP    [] Progressed/Changed HEP based on:   [] proper performance and advancement of Therex/TA   [] reduction in pain level    [] increased functional capacity       [] change in directional preference   15 [x] Therapeutic Exercise   [x]  See Flowsheet   Rationale: increase ROM and increase strength to improve the patients ability to participate in ADL's    8(8) [x] Therapeutic Activity   [x]  See Flowsheet  Sit <-->stand transfers, stair negotiation training  Rationale:  To improve safety, proprioception, coordination, and efficiency with tasks     :       Post Treatment Pain Level (on 0 to 10) scale:   3  / 10     ASSESSMENT      min Patient Education:  YES  Reviewed HEP   []  Progressed/Changed HEP based on:          ASSESSMENT    Assessment  Justification for Eval Code Complexity:  Patient History (low 0, mod 1-2, high 3-4): high  Examination (low 1-2, mod 3+, high 4+): low  Clinical Presentation (low stable or uncomplicated, mod evolving or changing, high unstable or unpredictable): low  Clinical Decision Making (low , mod 26-74, high 1-25): FOTO mod      []  See Progress Note/Recertification   Patient will continue to benefit from skilled PT services to : SEE POC   Progress toward goals / Updated goals:    See POC     PLAN    []  Upgrade activities as tolerated YES Continue plan of care   []  Discharge due to :    [x]  Other: Initiate POC     Therapist: [address] [place] [address] \"BJ\" FRANKIE Knight, Cert. MDT, Cert. DN, Cert.  SMT    Date: 8/22/2019 Time: 2:05 PM

## 2019-08-26 ENCOUNTER — HOSPITAL ENCOUNTER (OUTPATIENT)
Dept: PHYSICAL THERAPY | Age: 74
End: 2019-08-26
Payer: MEDICARE

## 2019-08-27 ENCOUNTER — HOSPITAL ENCOUNTER (OUTPATIENT)
Dept: PHYSICAL THERAPY | Age: 74
End: 2019-08-27
Payer: MEDICARE

## 2019-08-29 ENCOUNTER — APPOINTMENT (OUTPATIENT)
Dept: PHYSICAL THERAPY | Age: 74
End: 2019-08-29
Payer: MEDICARE

## 2019-09-03 ENCOUNTER — APPOINTMENT (OUTPATIENT)
Dept: PHYSICAL THERAPY | Age: 74
End: 2019-09-03

## 2019-09-05 ENCOUNTER — APPOINTMENT (OUTPATIENT)
Dept: PHYSICAL THERAPY | Age: 74
End: 2019-09-05

## 2019-09-09 ENCOUNTER — APPOINTMENT (OUTPATIENT)
Dept: PHYSICAL THERAPY | Age: 74
End: 2019-09-09

## 2019-09-16 ENCOUNTER — APPOINTMENT (OUTPATIENT)
Dept: PHYSICAL THERAPY | Age: 74
End: 2019-09-16

## 2019-09-18 NOTE — PROGRESS NOTES
[facility] PHYSICAL THERAPY  [address] [place] [address] Christiano Mccall, [address] - Phone: [phone]  Fax: [phone]      Dear  Kailey Gottlieb MD,   Per your referral, Kareen Flores was evaluated and treated for the diagnosis of Low back pain [M54.5]. The patient was scheduled for 8 visits after her initial evaluation. She attended none of her follow up sessions, and was last seen on her evaluation date. The patient was contacted and reminded of our attendance policy and her patient responsibilities. She stated that she continues to have difficulty with transportation, and verbalized understanding that she will be DC'd for non-compliance to our attendance policy. .     If you have any questions/comments please contact us directly at [phone]. Thank you for allowing us to assist in the care of your patient. Therapist Signature: Toni Wayne, EDMUNDT, Cert. MDT, Cert. DN, Cert. SMT Date: 1/33/6487   Certification Period 8/22/19-11/21/19 Time: 9:20 AM   Reporting Period 8/22/19-9/18/19     NOTE TO PHYSICIAN:  PLEASE COMPLETE THE ORDERS BELOW AND FAX TO   [facility]: [phone]  If you are unable to process this request in 24 hours please contact our office: [phone]    ___ I have read the above report and request that my patient continue as recommended.   ___ I have read the above report and request that my patient continue therapy with the following changes/special instructions:_________________________________________________________   ___ I have read the above report and request that my patient be discharged from therapy.      Physician Signature:        Date:       Time:

## 2019-09-19 ENCOUNTER — HOSPITAL ENCOUNTER (OUTPATIENT)
Dept: PHYSICAL THERAPY | Age: 74
End: 2019-09-19

## 2019-09-19 ENCOUNTER — APPOINTMENT (OUTPATIENT)
Dept: PHYSICAL THERAPY | Age: 74
End: 2019-09-19

## 2019-09-19 ENCOUNTER — APPOINTMENT (OUTPATIENT)
Dept: GENERAL RADIOLOGY | Age: 74
End: 2019-09-19
Attending: PHYSICIAN ASSISTANT
Payer: MEDICARE

## 2019-09-19 ENCOUNTER — HOSPITAL ENCOUNTER (OUTPATIENT)
Dept: CT IMAGING | Age: 74
Discharge: HOME | End: 2019-09-19
Attending: INTERNAL MEDICINE
Payer: MEDICARE

## 2019-09-19 ENCOUNTER — HOSPITAL ENCOUNTER (EMERGENCY)
Age: 74
Discharge: HOME | End: 2019-09-19
Attending: EMERGENCY MEDICINE | Admitting: EMERGENCY MEDICINE
Payer: MEDICARE

## 2019-09-19 VITALS
WEIGHT: 180 LBS | DIASTOLIC BLOOD PRESSURE: 100 MMHG | RESPIRATION RATE: 16 BRPM | TEMPERATURE: 98.6 F | OXYGEN SATURATION: 100 % | BODY MASS INDEX: 29.95 KG/M2 | HEART RATE: 74 BPM | SYSTOLIC BLOOD PRESSURE: 197 MMHG

## 2019-09-19 DIAGNOSIS — R03.0 ELEVATED BLOOD PRESSURE READING: ICD-10-CM

## 2019-09-19 DIAGNOSIS — G89.29 CHRONIC LOW BACK PAIN WITHOUT SCIATICA, UNSPECIFIED BACK PAIN LATERALITY: ICD-10-CM

## 2019-09-19 DIAGNOSIS — R29.6 FREQUENT FALLS: Primary | ICD-10-CM

## 2019-09-19 DIAGNOSIS — M25.551 RIGHT HIP PAIN: ICD-10-CM

## 2019-09-19 DIAGNOSIS — G44.319 ACUTE POST-TRAUMATIC HEADACHE, NOT INTRACTABLE: ICD-10-CM

## 2019-09-19 DIAGNOSIS — M54.50 CHRONIC LOW BACK PAIN WITHOUT SCIATICA, UNSPECIFIED BACK PAIN LATERALITY: ICD-10-CM

## 2019-09-19 LAB
ALBUMIN SERPL-MCNC: 3.4 G/DL (ref 3.4–5)
ALBUMIN/GLOB SERPL: 0.8 {RATIO} (ref 0.8–1.7)
ALP SERPL-CCNC: 112 U/L (ref 45–117)
ALT SERPL-CCNC: 13 U/L (ref 13–56)
ANION GAP SERPL CALC-SCNC: 7 MMOL/L (ref 3–18)
AST SERPL-CCNC: 12 U/L (ref 10–38)
BASOPHILS # BLD: 0 K/UL (ref 0–0.1)
BASOPHILS NFR BLD: 0 % (ref 0–2)
BILIRUB SERPL-MCNC: 0.4 MG/DL (ref 0.2–1)
BUN SERPL-MCNC: 8 MG/DL (ref 7–18)
BUN/CREAT SERPL: 10 (ref 12–20)
CALCIUM SERPL-MCNC: 9.4 MG/DL (ref 8.5–10.1)
CHLORIDE SERPL-SCNC: 106 MMOL/L (ref 100–111)
CK MB CFR SERPL CALC: NORMAL % (ref 0–4)
CK MB SERPL-MCNC: <1 NG/ML (ref 5–25)
CK SERPL-CCNC: 60 U/L (ref 26–192)
CO2 SERPL-SCNC: 24 MMOL/L (ref 21–32)
CREAT SERPL-MCNC: 0.78 MG/DL (ref 0.6–1.3)
DIFFERENTIAL METHOD BLD: ABNORMAL
EOSINOPHIL # BLD: 0 K/UL (ref 0–0.4)
EOSINOPHIL NFR BLD: 0 % (ref 0–5)
ERYTHROCYTE [DISTWIDTH] IN BLOOD BY AUTOMATED COUNT: 12.9 % (ref 11.6–14.5)
GLOBULIN SER CALC-MCNC: 4.2 G/DL (ref 2–4)
GLUCOSE SERPL-MCNC: 201 MG/DL (ref 74–99)
HCT VFR BLD AUTO: 33.8 % (ref 35–45)
HGB BLD-MCNC: 10.7 G/DL (ref 12–16)
LYMPHOCYTES # BLD: 1.7 K/UL (ref 0.9–3.6)
LYMPHOCYTES NFR BLD: 19 % (ref 21–52)
MCH RBC QN AUTO: 29 PG (ref 24–34)
MCHC RBC AUTO-ENTMCNC: 31.7 G/DL (ref 31–37)
MCV RBC AUTO: 91.6 FL (ref 74–97)
MONOCYTES # BLD: 0.7 K/UL (ref 0.05–1.2)
MONOCYTES NFR BLD: 8 % (ref 3–10)
NEUTS SEG # BLD: 6.3 K/UL (ref 1.8–8)
NEUTS SEG NFR BLD: 73 % (ref 40–73)
PLATELET # BLD AUTO: 237 K/UL (ref 135–420)
PMV BLD AUTO: 12.1 FL (ref 9.2–11.8)
POTASSIUM SERPL-SCNC: 4 MMOL/L (ref 3.5–5.5)
PROT SERPL-MCNC: 7.6 G/DL (ref 6.4–8.2)
RBC # BLD AUTO: 3.69 M/UL (ref 4.2–5.3)
SODIUM SERPL-SCNC: 137 MMOL/L (ref 136–145)
TROPONIN I SERPL-MCNC: <0.02 NG/ML (ref 0–0.04)
WBC # BLD AUTO: 8.7 K/UL (ref 4.6–13.2)

## 2019-09-19 PROCEDURE — 72110 X-RAY EXAM L-2 SPINE 4/>VWS: CPT

## 2019-09-19 PROCEDURE — 73502 X-RAY EXAM HIP UNI 2-3 VIEWS: CPT

## 2019-09-19 PROCEDURE — 93005 ELECTROCARDIOGRAM TRACING: CPT

## 2019-09-19 PROCEDURE — 80053 COMPREHEN METABOLIC PANEL: CPT

## 2019-09-19 PROCEDURE — 99283 EMERGENCY DEPT VISIT LOW MDM: CPT

## 2019-09-19 PROCEDURE — 70450 CT HEAD/BRAIN W/O DYE: CPT

## 2019-09-19 PROCEDURE — 85025 COMPLETE CBC W/AUTO DIFF WBC: CPT

## 2019-09-19 PROCEDURE — 74011250637 HC RX REV CODE- 250/637: Performed by: PHYSICIAN ASSISTANT

## 2019-09-19 PROCEDURE — 82550 ASSAY OF CK (CPK): CPT

## 2019-09-19 RX ORDER — OXYCODONE AND ACETAMINOPHEN 5; 325 MG/1; MG/1
2 TABLET ORAL
Status: COMPLETED | OUTPATIENT
Start: 2019-09-19 | End: 2019-09-19

## 2019-09-19 RX ORDER — METHOCARBAMOL 500 MG/1
500 TABLET, FILM COATED ORAL 3 TIMES DAILY
Qty: 15 TAB | Refills: 0 | Status: SHIPPED | OUTPATIENT
Start: 2019-09-19 | End: 2019-09-24

## 2019-09-19 RX ADMIN — OXYCODONE HYDROCHLORIDE AND ACETAMINOPHEN 2 TABLET: 5; 325 TABLET ORAL at 18:02

## 2019-09-19 NOTE — ED TRIAGE NOTES
Siddharth Celaya twice over 2 weeks ago while outside and in kitchen. Had CT of head . Chronic lower back pain. Back hurts. Didn't take all of BP meds. BP high.

## 2019-09-19 NOTE — ED PROVIDER NOTES
EMERGENCY DEPARTMENT HISTORY AND PHYSICAL EXAM    Date: 9/19/2019  Patient Name: Anthony Guido    History of Presenting Illness     Chief Complaint   Patient presents with    Fall         History Provided By: Patient    Chief Complaint: fall  Duration: 2 Weeks  Timing:  Intermittent  Location: back, hip  Quality: Aching and Sharp  Severity: Moderate  Modifying Factors: worse w/ movement, weight bearing  Associated Symptoms: back pain, high blood pressure      HPI: Anthony Guido is a 66 y.o. female with a PMH of Hypertension, diabetes, depression, scoliosis, spinal stenosis, anxiety, gastroparesis, gastric ulcer, diverticulosis, anemia, stroke who presents to the ER for evaluation after having 2 falls over the last 2 weeks. Patient states she is unsure what caused her to fall at those 2 times. She does report hitting her head however had a CT scan earlier today that was negative. Patient is complaining of intermittent lower back pain and right hip pain. She has not been taking any medication for her symptoms. She denied any numbness or tingling, bowel or bladder incontinence, saddle anesthesia, fevers, chest pain, dizziness and has no other symptoms or complaints. PCP: Thor Gamez MD    Current Facility-Administered Medications   Medication Dose Route Frequency Provider Last Rate Last Dose    oxyCODONE-acetaminophen (PERCOCET) 5-325 mg per tablet 2 Tab  2 Tab Oral NOW Hortencia Ron PA-C         Current Outpatient Medications   Medication Sig Dispense Refill    methocarbamol (ROBAXIN) 500 mg tablet Take 1 Tab by mouth three (3) times daily for 5 days. 15 Tab 0    atorvastatin (LIPITOR) 80 mg tablet Take 80 mg by mouth daily.  omeprazole (PRILOSEC) 40 mg capsule Take 40 mg by mouth daily.  lubiPROStone (AMITIZA) 8 mcg capsule Take 8 mcg by mouth two (2) times daily (with meals).  doxycycline (MONODOX) 100 mg capsule Take 100 mg by mouth two (2) times a day.       clotrimazole [facility] EX) by Apply Externally route.  albuterol (PROAIR HFA) 90 mcg/actuation inhaler Take  by inhalation.  methylPREDNISolone (MEDROL) 4 mg tab Take  by mouth daily (with breakfast).  simvastatin (ZOCOR) 20 mg tablet Take 1 Tab by mouth nightly. 30 Tab 6    pitavastatin calcium (LIVALO) 2 mg tablet Take 1 Tab by mouth daily. 30 Tab 6    trospium (SANCTURA XL) 60 mg capsule Take 1 Cap by mouth Daily (before breakfast). 30 Cap 3    conjugated estrogens (PREMARIN) 0.625 mg/gram vaginal cream Apply small amount to introitus MWF as directed. 30 g 1    cloNIDine HCl (CATAPRES) 0.1 mg tablet Take 0.05 mg by mouth.  ergocalciferol (ERGOCALCIFEROL) 50,000 unit capsule TAKE ONE CAPSULE BY MOUTH ONCE A WEEK FOR VIT D  0    BREO ELLIPTA 100-25 mcg/dose inhaler TAKE 1 PUFF BY MOUTH EVERY DAY  3    hydrOXYzine pamoate (VISTARIL) 25 mg capsule TAKE 1 CAPSULE BY MOUTH TWICE A DAY AS NEEDED  2    promethazine (PHENERGAN) 25 mg tablet TAKE 1 TAB BY MOUTH TWICE DAILY AS NEEDED FOR NAUSEA  1    nitroglycerin (NITROSTAT) 0.4 mg SL tablet 1 Tab by SubLINGual route every five (5) minutes as needed for Chest Pain. Up to 3 doses. 25 Tab 3    betamethasone dipropionate (DIPROSONE) 0.05 % topical cream Apply  to affected area daily as needed for Skin Irritation.  furosemide (LASIX) 40 mg tablet Take 40 mg by mouth as needed.  gabapentin (NEURONTIN) 300 mg capsule Take 300 mg by mouth three (3) times daily.  potassium chloride SR (KLOR-CON 10) 10 mEq tablet Take 10 mEq by mouth as needed.  tiZANidine (ZANAFLEX) 2 mg tablet Take 2 mg by mouth as needed. 3    metoprolol succinate (TOPROL-XL) 100 mg tablet Take 100 mg by mouth daily. 6    aspirin (ASPIRIN) 325 mg tablet Take 1 Tab by mouth daily. 30 Tab 0    mirtazapine (REMERON) 15 mg tablet Take 15 mg by mouth nightly.  glimepiride (AMARYL) 1 mg tablet Take 4 mg by mouth two (2) times a day.       levothyroxine (SYNTHROID) 50 mcg tablet Take 50 mcg by mouth Daily (before breakfast).  fentaNYL (DURAGESIC) 25 mcg/hr PATCH 1 Patch by TransDERmal route every seventy-two (72) hours.  amLODIPine (NORVASC) 10 mg tablet 10 mg.      ibuprofen (MOTRIN) 200 mg tablet Take 400 mg by mouth every eight (8) hours as needed for Pain.  oxyCODONE-acetaminophen (PERCOCET 10)  mg per tablet Take 1 Tab by mouth every six (6) hours as needed for Pain.  cholecalciferol (VITAMIN D3) 1,000 unit cap Take 1,000 Units by mouth daily.  losartan (COZAAR) 100 mg tablet Take 100 mg by mouth daily.  cyanocobalamin (VITAMIN B-12) 1,000 mcg tablet Take 1,000 mcg by mouth daily.          Past History     Past Medical History:  Past Medical History:   Diagnosis Date    Anxiety     Depression     Diabetes ([address]     Diverticulosis     Dysphagia     Esophageal reflux     Fatty liver     Gastric ulcer     Gastroparesis     H/O joint replacement 1991    left knee    Hypercholesteremia     Hypertension     Iron deficiency anemia     Neurogenic bladder     Recurrent UTI     Scoliosis     Spinal stenosis     Stenosis of lumbosacral spine     Stroke ([facility] [address] 04/2018    Tardive dyskinesia        Past Surgical History:  Past Surgical History:   Procedure Laterality Date    EGD  7/10/2018         HX APPENDECTOMY      HX BREAST LUMPECTOMY      HX CHOLECYSTECTOMY  1980    HX COLECTOMY  2012    HX COLONOSCOPY  10/29/2009    hyperplastic polyp, hemorrhoids    HX HERNIA REPAIR      HX HYSTERECTOMY  1970    HX KNEE ARTHROSCOPY      HX KNEE REPLACEMENT  1991    left knee    HX LUMBAR FUSION      x 3       Family History:  Family History   Problem Relation Age of Onset    Heart Disease Mother     Diabetes Mother     Heart Attack Mother     Cancer Father         lung CA    Cancer Brother     Cancer Sister        Social History:  Social History     Tobacco Use    Smoking status: Never Smoker    Smokeless tobacco: Never Used   Substance Use Topics    Alcohol use: No    Drug use: No       Allergies: Allergies   Allergen Reactions    Codeine Rash and Itching    Iodine Hives and Rash    Levofloxacin Nausea and Vomiting    Metformin Other (comments)     diarrhea    Morphine Hives    Pcn [Penicillins] Other (comments)     Causes \"Heart swell\"    Sulfa (Sulfonamide Antibiotics) Nausea Only         Review of Systems   Review of Systems   Constitutional: Negative for chills, fatigue and fever. HENT: Negative. Negative for sore throat. Eyes: Negative. Respiratory: Negative for cough and shortness of breath. Cardiovascular: Negative for chest pain and palpitations. Gastrointestinal: Negative for abdominal pain, nausea and vomiting. Genitourinary: Negative for dysuria. Musculoskeletal: Positive for arthralgias and back pain. Right hip pain   Skin: Negative. Neurological: Negative for dizziness, weakness, light-headedness and headaches. Psychiatric/Behavioral: Negative. All other systems reviewed and are negative. Physical Exam     Vitals:    09/19/19 1519   BP: (!) 197/100   Pulse: 74   Resp: 16   Temp: 98.6 °F (37 °C)   SpO2: 100%   Weight: 81.6 kg (180 lb)     Physical Exam   Constitutional: She is oriented to person, place, and time. She appears well-developed and well-nourished. No distress. HENT:   Head: Normocephalic and atraumatic. Mouth/Throat: Uvula is midline and oropharynx is clear and moist. Normal dentition. Without dentition   Eyes: Conjunctivae are normal. No scleral icterus. Neck: Normal range of motion. Neck supple. No JVD present. No tracheal deviation present. Cardiovascular: Normal rate, regular rhythm and normal heart sounds. No murmur heard. Pulmonary/Chest: Effort normal and breath sounds normal. No respiratory distress. She has no wheezes. She has no rales. She exhibits no tenderness. Abdominal: Soft. Bowel sounds are normal. She exhibits no distension. There is no tenderness. There is no rebound and no guarding. Musculoskeletal: She exhibits tenderness. She exhibits no edema. Right hip: She exhibits bony tenderness. Lumbar back: She exhibits decreased range of motion and bony tenderness. Back:         Legs:  Neurological: She is alert and oriented to person, place, and time. She has normal strength. GCS eye subscore is 4. GCS verbal subscore is 5. GCS motor subscore is 6. Skin: Skin is warm and dry. She is not diaphoretic. Psychiatric: She has a normal mood and affect. Nursing note and vitals reviewed. Diagnostic Study Results     Labs -     Recent Results (from the past 12 hour(s))   CBC WITH AUTOMATED DIFF    Collection Time: 09/19/19  4:30 PM   Result Value Ref Range    WBC 8.7 4.6 - 13.2 K/uL    RBC 3.69 (L) 4.20 - 5.30 M/uL    HGB 10.7 (L) 12.0 - 16.0 g/dL    HCT 33.8 (L) 35.0 - 45.0 %    MCV 91.6 74.0 - 97.0 FL    MCH 29.0 24.0 - 34.0 PG    MCHC 31.7 31.0 - 37.0 g/dL    RDW 12.9 11.6 - 14.5 %    PLATELET 343 148 - 536 K/uL    MPV 12.1 (H) 9.2 - 11.8 FL    NEUTROPHILS 73 40 - 73 %    LYMPHOCYTES 19 (L) 21 - 52 %    MONOCYTES 8 3 - 10 %    EOSINOPHILS 0 0 - 5 %    BASOPHILS 0 0 - 2 %    ABS. NEUTROPHILS 6.3 1.8 - 8.0 K/UL    ABS. LYMPHOCYTES 1.7 0.9 - 3.6 K/UL    ABS. MONOCYTES 0.7 0.05 - 1.2 K/UL    ABS. EOSINOPHILS 0.0 0.0 - 0.4 K/UL    ABS.  BASOPHILS 0.0 0.0 - 0.1 K/UL    DF AUTOMATED     METABOLIC PANEL, COMPREHENSIVE    Collection Time: 09/19/19  4:30 PM   Result Value Ref Range    Sodium 137 136 - 145 mmol/L    Potassium 4.0 3.5 - 5.5 mmol/L    Chloride 106 100 - 111 mmol/L    CO2 24 21 - 32 mmol/L    Anion gap 7 3.0 - 18 mmol/L    Glucose 201 (H) 74 - 99 mg/dL    BUN 8 7.0 - 18 MG/DL    Creatinine 0.78 0.6 - 1.3 MG/DL    BUN/Creatinine ratio 10 (L) 12 - 20      GFR est AA >60 >60 ml/min/1.73m2    GFR est non-AA >60 >60 ml/min/1.73m2    Calcium 9.4 8.5 - 10.1 MG/DL    Bilirubin, total 0.4 0.2 - 1.0 MG/DL    ALT (SGPT) 13 13 - 56 U/L    AST (SGOT) 12 10 - 38 U/L    Alk. phosphatase 112 45 - 117 U/L    Protein, total 7.6 6.4 - 8.2 g/dL    Albumin 3.4 3.4 - 5.0 g/dL    Globulin 4.2 (H) 2.0 - 4.0 g/dL    A-G Ratio 0.8 0.8 - 1.7     CARDIAC PANEL,(CK, CKMB & TROPONIN)    Collection Time: 09/19/19  4:30 PM   Result Value Ref Range    CK 60 26 - 192 U/L    CK - MB <1.0 <3.6 ng/ml    CK-MB Index  0.0 - 4.0 %     CALCULATION NOT PERFORMED WHEN RESULT IS BELOW LINEAR LIMIT    Troponin-I, QT <0.02 0.0 - 0.045 NG/ML   EKG, 12 LEAD, INITIAL    Collection Time: 09/19/19  4:34 PM   Result Value Ref Range    Ventricular Rate 69 BPM    Atrial Rate 69 BPM    P-R Interval 154 ms    QRS Duration 76 ms    Q-T Interval 442 ms    QTC Calculation (Bezet) 473 ms    Calculated P Axis 51 degrees    Calculated R Axis 6 degrees    Calculated T Axis 91 degrees    Diagnosis       Sinus rhythm with premature supraventricular complexes  Nonspecific T wave abnormality  Prolonged QT  Abnormal ECG  When compared with ECG of 06-MAY-2019 22:43,  premature supraventricular complexes are now present  Nonspecific T wave abnormality, improved in Inferior leads  Nonspecific T wave abnormality has replaced inverted T waves in Anterior   leads         Radiologic Studies -   XR HIP RT W OR WO PELV 2-3 VWS    (Results Pending)   XR SPINE LUMB MIN 4 V    (Results Pending)     CT Results  (Last 48 hours)               09/19/19 1050  CT HEAD WO CONT Final result    Impression:  IMPRESSION:           1. No acute intracranial abnormality. Stable examination. 2. Subcortical and periventricular white matter low-attenuation compatible with   sequela of chronic ischemic microvascular change.        Narrative:  EXAM: CT head       INDICATION: Fall, headache       COMPARISON: CT head 3/21/2018; MRI brain 3/22/2018       TECHNIQUE: Axial CT imaging of the head was performed without intravenous   contrast.       One or more dose reduction techniques were used on this CT: automated exposure   control, adjustment of the mAs and/or kVp according to patient size, and   iterative reconstruction techniques. The specific techniques used on this CT   exam have been documented in the patient's electronic medical record. Digital   Imaging and Communications in Medicine (DICOM) format image data are available   to nonaffiliated external healthcare facilities or entities on a secure, media   free, reciprocally searchable basis with patient authorization for at least a   12-month period after this study. _______________       FINDINGS:       BRAIN AND POSTERIOR FOSSA: Mild cortical and cerebellar volume loss is present,   unchanged, and within normal limits for patient age. The ventricular size and   configuration remains normal. Basilar cisterns are patent. Mild subcortical and   periventricular white matter low-attenuation is present. There is no   intracranial hemorrhage, mass effect, or midline shift. There are no areas of   abnormal parenchymal attenuation. EXTRA-AXIAL SPACES AND MENINGES: No acute extra-axial fluid collection. CALVARIUM: Intact. SINUSES: Imaged paranasal sinuses and mastoid air cells are clear. OTHER: None.       _______________               CXR Results  (Last 48 hours)    None            Medical Decision Making   I am the first provider for this patient. I reviewed the vital signs, available nursing notes, past medical history, past surgical history, family history and social history. Vital Signs-Reviewed the patient's vital signs. Records Reviewed: Nursing Notes, Old Medical Records, Previous Radiology Studies and Previous Laboratory Studies     332 PM  57-year-old female who presents to the ER complaint of persistent right hip pain and lower back pain. Patient reports 2 recent falls over the last 2 weeks however does not recall why she fell.   Was seen by primary care and referred to have a CT of her head earlier today which was normal.  We will plan on EKG, labs and appropriate imaging at this time to rule out any other acute abnormalities. Patient in no obvious distress on exam.  Brynn Reyes PA-C     5:55 PM  All labs reviewed and noted to be stable. Cardiac work up negative. Xray imaging w/ no acute process per my interpretation. Discussed all results w/ pt. States she has not taken her pain meds since last night. Will give single dose here and advised to f/u w/ pcp. All questions answered and patient in agreement with plan of care. Will plan for discharge. Brynn Reyes PA-C      Disposition:  discharged    DISCHARGE NOTE:       Care plan outlined and precautions discussed. Patient has no new complaints, changes, or physical findings. Results of labs, imaging were reviewed with the patient. All medications were reviewed with the patient; will d/c home with robaxin. All of pt's questions and concerns were addressed. Patient was instructed and agrees to follow up with pcp, as well as to return to the ED upon further deterioration. Patient is ready to go home. Follow-up Information     Follow up With Specialties Details Why [facility] EMERGENCY DEPT Emergency Medicine  If symptoms worsen [address]    Nery Lawrence MD Internal Medicine Call in 1 day As needed for ER follow up for falls and pain  [place]  [phone]            Current Discharge Medication List      START taking these medications    Details   methocarbamol (ROBAXIN) 500 mg tablet Take 1 Tab by mouth three (3) times daily for 5 days. Qty: 15 Tab, Refills: 0         CONTINUE these medications which have NOT CHANGED    Details   atorvastatin (LIPITOR) 80 mg tablet Take 80 mg by mouth daily. omeprazole (PRILOSEC) 40 mg capsule Take 40 mg by mouth daily. lubiPROStone (AMITIZA) 8 mcg capsule Take 8 mcg by mouth two (2) times daily (with meals).       doxycycline (MONODOX) 100 mg capsule Take 100 mg by mouth two (2) times a day. clotrimazole (MYCELEX EX) by Apply Externally route. albuterol (PROAIR HFA) 90 mcg/actuation inhaler Take  by inhalation. methylPREDNISolone (MEDROL) 4 mg tab Take  by mouth daily (with breakfast). simvastatin (ZOCOR) 20 mg tablet Take 1 Tab by mouth nightly. Qty: 30 Tab, Refills: 6      pitavastatin calcium (LIVALO) 2 mg tablet Take 1 Tab by mouth daily. Qty: 30 Tab, Refills: 6      trospium (SANCTURA XL) 60 mg capsule Take 1 Cap by mouth Daily (before breakfast). Qty: 30 Cap, Refills: 3      conjugated estrogens (PREMARIN) 0.625 mg/gram vaginal cream Apply small amount to introitus MWF as directed. Qty: 30 g, Refills: 1      cloNIDine HCl (CATAPRES) 0.1 mg tablet Take 0.05 mg by mouth.      ergocalciferol (ERGOCALCIFEROL) 50,000 unit capsule TAKE ONE CAPSULE BY MOUTH ONCE A WEEK FOR VIT D  Refills: 0      BREO ELLIPTA 100-25 mcg/dose inhaler TAKE 1 PUFF BY MOUTH EVERY DAY  Refills: 3      hydrOXYzine pamoate (VISTARIL) 25 mg capsule TAKE 1 CAPSULE BY MOUTH TWICE A DAY AS NEEDED  Refills: 2      promethazine (PHENERGAN) 25 mg tablet TAKE 1 TAB BY MOUTH TWICE DAILY AS NEEDED FOR NAUSEA  Refills: 1      nitroglycerin (NITROSTAT) 0.4 mg SL tablet 1 Tab by SubLINGual route every five (5) minutes as needed for Chest Pain. Up to 3 doses. Qty: 25 Tab, Refills: 3      betamethasone dipropionate (DIPROSONE) 0.05 % topical cream Apply  to affected area daily as needed for Skin Irritation. furosemide (LASIX) 40 mg tablet Take 40 mg by mouth as needed. gabapentin (NEURONTIN) 300 mg capsule Take 300 mg by mouth three (3) times daily. potassium chloride SR (KLOR-CON 10) 10 mEq tablet Take 10 mEq by mouth as needed. tiZANidine (ZANAFLEX) 2 mg tablet Take 2 mg by mouth as needed. Refills: 3      metoprolol succinate (TOPROL-XL) 100 mg tablet Take 100 mg by mouth daily. Refills: 6      aspirin (ASPIRIN) 325 mg tablet Take 1 Tab by mouth daily.   Qty: 30 Tab, Refills: 0      mirtazapine (REMERON) 15 mg tablet Take 15 mg by mouth nightly. glimepiride (AMARYL) 1 mg tablet Take 4 mg by mouth two (2) times a day. levothyroxine (SYNTHROID) 50 mcg tablet Take 50 mcg by mouth Daily (before breakfast). fentaNYL (DURAGESIC) 25 mcg/hr PATCH 1 Patch by TransDERmal route every seventy-two (72) hours. amLODIPine (NORVASC) 10 mg tablet 10 mg.      ibuprofen (MOTRIN) 200 mg tablet Take 400 mg by mouth every eight (8) hours as needed for Pain. oxyCODONE-acetaminophen (PERCOCET 10)  mg per tablet Take 1 Tab by mouth every six (6) hours as needed for Pain. cholecalciferol (VITAMIN D3) 1,000 unit cap Take 1,000 Units by mouth daily. losartan (COZAAR) 100 mg tablet Take 100 mg by mouth daily. cyanocobalamin (VITAMIN B-12) 1,000 mcg tablet Take 1,000 mcg by mouth daily. Provider Notes (Medical Decision Making):     Procedures:  Procedures        Diagnosis     Clinical Impression:   1. Frequent falls    2. Right hip pain    3. Chronic low back pain without sciatica, unspecified back pain laterality    4.  Elevated blood pressure reading

## 2019-09-20 LAB
ATRIAL RATE: 69 BPM
CALCULATED P AXIS, ECG09: 51 DEGREES
CALCULATED R AXIS, ECG10: 6 DEGREES
CALCULATED T AXIS, ECG11: 91 DEGREES
DIAGNOSIS, 93000: NORMAL
P-R INTERVAL, ECG05: 154 MS
Q-T INTERVAL, ECG07: 442 MS
QRS DURATION, ECG06: 76 MS
QTC CALCULATION (BEZET), ECG08: 473 MS
VENTRICULAR RATE, ECG03: 69 BPM

## 2019-09-24 ENCOUNTER — APPOINTMENT (OUTPATIENT)
Dept: PHYSICAL THERAPY | Age: 74
End: 2019-09-24

## 2019-09-26 ENCOUNTER — APPOINTMENT (OUTPATIENT)
Dept: PHYSICAL THERAPY | Age: 74
End: 2019-09-26

## 2019-09-28 ENCOUNTER — HOSPITAL ENCOUNTER (EMERGENCY)
Age: 74
Discharge: STILL ADMITTED OR ADMINISTRATIVE | End: 2019-09-28
Attending: PHYSICIAN ASSISTANT
Payer: MEDICARE

## 2019-09-28 PROCEDURE — 75810000275 HC EMERGENCY DEPT VISIT NO LEVEL OF CARE

## 2019-09-30 ENCOUNTER — APPOINTMENT (OUTPATIENT)
Dept: PHYSICAL THERAPY | Age: 74
End: 2019-09-30

## 2019-10-17 ENCOUNTER — HOSPITAL ENCOUNTER (EMERGENCY)
Age: 74
Discharge: HOME | End: 2019-10-18
Attending: EMERGENCY MEDICINE | Admitting: EMERGENCY MEDICINE
Payer: MEDICARE

## 2019-10-17 ENCOUNTER — APPOINTMENT (OUTPATIENT)
Dept: CT IMAGING | Age: 74
End: 2019-10-17
Attending: EMERGENCY MEDICINE
Payer: MEDICARE

## 2019-10-17 DIAGNOSIS — R10.9 ACUTE ABDOMINAL PAIN IN LEFT FLANK: Primary | ICD-10-CM

## 2019-10-17 DIAGNOSIS — R11.0 NAUSEA WITHOUT VOMITING: ICD-10-CM

## 2019-10-17 LAB
ALBUMIN SERPL-MCNC: 4 G/DL (ref 3.4–5)
ALBUMIN/GLOB SERPL: 1.1 {RATIO} (ref 0.8–1.7)
ALP SERPL-CCNC: 130 U/L (ref 45–117)
ALT SERPL-CCNC: 14 U/L (ref 13–56)
ANION GAP SERPL CALC-SCNC: 7 MMOL/L (ref 3–18)
APPEARANCE UR: CLEAR
AST SERPL-CCNC: 13 U/L (ref 10–38)
BACTERIA URNS QL MICRO: ABNORMAL /HPF
BASOPHILS # BLD: 0 K/UL (ref 0–0.1)
BASOPHILS NFR BLD: 0 % (ref 0–2)
BILIRUB SERPL-MCNC: 0.6 MG/DL (ref 0.2–1)
BILIRUB UR QL: NEGATIVE
BUN SERPL-MCNC: 14 MG/DL (ref 7–18)
BUN/CREAT SERPL: 18 (ref 12–20)
CALCIUM SERPL-MCNC: 9.4 MG/DL (ref 8.5–10.1)
CHLORIDE SERPL-SCNC: 105 MMOL/L (ref 100–111)
CO2 SERPL-SCNC: 28 MMOL/L (ref 21–32)
COLOR UR: YELLOW
CREAT SERPL-MCNC: 0.76 MG/DL (ref 0.6–1.3)
DIFFERENTIAL METHOD BLD: ABNORMAL
EOSINOPHIL # BLD: 0.2 K/UL (ref 0–0.4)
EOSINOPHIL NFR BLD: 1 % (ref 0–5)
EPITH CASTS URNS QL MICRO: ABNORMAL /LPF (ref 0–5)
ERYTHROCYTE [DISTWIDTH] IN BLOOD BY AUTOMATED COUNT: 13.1 % (ref 11.6–14.5)
GLOBULIN SER CALC-MCNC: 3.5 G/DL (ref 2–4)
GLUCOSE SERPL-MCNC: 207 MG/DL (ref 74–99)
GLUCOSE UR STRIP.AUTO-MCNC: NEGATIVE MG/DL
HCT VFR BLD AUTO: 36.3 % (ref 35–45)
HGB BLD-MCNC: 11.4 G/DL (ref 12–16)
HGB UR QL STRIP: NEGATIVE
KETONES UR QL STRIP.AUTO: NEGATIVE MG/DL
LEUKOCYTE ESTERASE UR QL STRIP.AUTO: ABNORMAL
LIPASE SERPL-CCNC: 32 U/L (ref 73–393)
LYMPHOCYTES # BLD: 2.4 K/UL (ref 0.9–3.6)
LYMPHOCYTES NFR BLD: 20 % (ref 21–52)
MCH RBC QN AUTO: 28.5 PG (ref 24–34)
MCHC RBC AUTO-ENTMCNC: 31.4 G/DL (ref 31–37)
MCV RBC AUTO: 90.8 FL (ref 74–97)
MONOCYTES # BLD: 0.8 K/UL (ref 0.05–1.2)
MONOCYTES NFR BLD: 7 % (ref 3–10)
NEUTS SEG # BLD: 8.4 K/UL (ref 1.8–8)
NEUTS SEG NFR BLD: 72 % (ref 40–73)
NITRITE UR QL STRIP.AUTO: NEGATIVE
PH UR STRIP: 7.5 [PH] (ref 5–8)
PLATELET # BLD AUTO: 299 K/UL (ref 135–420)
PMV BLD AUTO: 12.2 FL (ref 9.2–11.8)
POTASSIUM SERPL-SCNC: 3.7 MMOL/L (ref 3.5–5.5)
PROT SERPL-MCNC: 7.5 G/DL (ref 6.4–8.2)
PROT UR STRIP-MCNC: NEGATIVE MG/DL
RBC # BLD AUTO: 4 M/UL (ref 4.2–5.3)
RBC #/AREA URNS HPF: ABNORMAL /HPF (ref 0–5)
SODIUM SERPL-SCNC: 140 MMOL/L (ref 136–145)
SP GR UR REFRACTOMETRY: 1.01 (ref 1–1.03)
UROBILINOGEN UR QL STRIP.AUTO: 0.2 EU/DL (ref 0.2–1)
WBC # BLD AUTO: 11.8 K/UL (ref 4.6–13.2)
WBC URNS QL MICRO: ABNORMAL /HPF (ref 0–4)

## 2019-10-17 PROCEDURE — 83690 ASSAY OF LIPASE: CPT

## 2019-10-17 PROCEDURE — 96375 TX/PRO/DX INJ NEW DRUG ADDON: CPT

## 2019-10-17 PROCEDURE — 96374 THER/PROPH/DIAG INJ IV PUSH: CPT

## 2019-10-17 PROCEDURE — 74011250636 HC RX REV CODE- 250/636: Performed by: EMERGENCY MEDICINE

## 2019-10-17 PROCEDURE — 74176 CT ABD & PELVIS W/O CONTRAST: CPT

## 2019-10-17 PROCEDURE — 74011250637 HC RX REV CODE- 250/637: Performed by: EMERGENCY MEDICINE

## 2019-10-17 PROCEDURE — 74011000250 HC RX REV CODE- 250: Performed by: EMERGENCY MEDICINE

## 2019-10-17 PROCEDURE — 99285 EMERGENCY DEPT VISIT HI MDM: CPT

## 2019-10-17 PROCEDURE — 80053 COMPREHEN METABOLIC PANEL: CPT

## 2019-10-17 PROCEDURE — 85025 COMPLETE CBC W/AUTO DIFF WBC: CPT

## 2019-10-17 PROCEDURE — 81001 URINALYSIS AUTO W/SCOPE: CPT

## 2019-10-17 RX ORDER — KETOROLAC TROMETHAMINE 15 MG/ML
15 INJECTION, SOLUTION INTRAMUSCULAR; INTRAVENOUS
Status: COMPLETED | OUTPATIENT
Start: 2019-10-17 | End: 2019-10-17

## 2019-10-17 RX ORDER — ONDANSETRON 2 MG/ML
4 INJECTION INTRAMUSCULAR; INTRAVENOUS
Status: COMPLETED | OUTPATIENT
Start: 2019-10-17 | End: 2019-10-17

## 2019-10-17 RX ORDER — FAMOTIDINE 10 MG/ML
20 INJECTION INTRAVENOUS
Status: COMPLETED | OUTPATIENT
Start: 2019-10-17 | End: 2019-10-17

## 2019-10-17 RX ORDER — ACETAMINOPHEN 500 MG
1000 TABLET ORAL
Status: COMPLETED | OUTPATIENT
Start: 2019-10-17 | End: 2019-10-17

## 2019-10-17 RX ADMIN — ONDANSETRON 4 MG: 2 INJECTION INTRAMUSCULAR; INTRAVENOUS at 22:35

## 2019-10-17 RX ADMIN — ACETAMINOPHEN 1000 MG: 500 TABLET, FILM COATED ORAL at 22:37

## 2019-10-17 RX ADMIN — KETOROLAC TROMETHAMINE 15 MG: 15 INJECTION, SOLUTION INTRAMUSCULAR; INTRAVENOUS at 23:10

## 2019-10-17 RX ADMIN — LIDOCAINE HYDROCHLORIDE 40 ML: 20 SOLUTION ORAL; TOPICAL at 23:11

## 2019-10-17 RX ADMIN — FAMOTIDINE 20 MG: 10 INJECTION, SOLUTION INTRAVENOUS at 22:36

## 2019-10-18 VITALS
OXYGEN SATURATION: 97 % | HEART RATE: 65 BPM | DIASTOLIC BLOOD PRESSURE: 66 MMHG | TEMPERATURE: 99 F | RESPIRATION RATE: 15 BRPM | BODY MASS INDEX: 30.95 KG/M2 | SYSTOLIC BLOOD PRESSURE: 161 MMHG | WEIGHT: 186 LBS

## 2019-10-18 RX ORDER — ONDANSETRON 4 MG/1
4 TABLET, ORALLY DISINTEGRATING ORAL
Qty: 12 TAB | Refills: 0 | Status: SHIPPED | OUTPATIENT
Start: 2019-10-18 | End: 2020-01-06

## 2019-10-18 NOTE — ED PROVIDER NOTES
Alma Brown is a 66 y.o. Female with history of and diverticulosis with complaints of left-sided only for the last week is gotten worse. She is nausea but no vomiting. She has some loose stools but no blood in her stool or melena. There is no fever. She has no urinary symptoms. Symptoms are worse with palpation and movement. She is had no hematuria. There is no cough or chest pain. She did not take anything today for it. The history is provided by the patient.         Past Medical History:   Diagnosis Date    Anxiety     Depression     Diabetes ([address]     Diverticulosis     Dysphagia     Esophageal reflux     Fatty liver     Gastric ulcer     Gastroparesis     H/O joint replacement 1991    left knee    Hypercholesteremia     Hypertension     Iron deficiency anemia     Neurogenic bladder     Recurrent UTI     Scoliosis     Spinal stenosis     Stenosis of lumbosacral spine     Stroke ([facility] [address] 04/2018    Tardive dyskinesia        Past Surgical History:   Procedure Laterality Date    EGD  7/10/2018         HX APPENDECTOMY      HX BREAST LUMPECTOMY      HX CHOLECYSTECTOMY  1980    HX COLECTOMY  2012    HX COLONOSCOPY  10/29/2009    hyperplastic polyp, hemorrhoids    HX HERNIA REPAIR      HX HYSTERECTOMY  1970    HX KNEE ARTHROSCOPY      HX KNEE REPLACEMENT  1991    left knee    HX LUMBAR FUSION      x 3         Family History:   Problem Relation Age of Onset    Heart Disease Mother     Diabetes Mother     Heart Attack Mother     Cancer Father         lung CA    Cancer Brother     Cancer Sister        Social History     Socioeconomic History    Marital status:      Spouse name: Not on file    Number of children: Not on file    Years of education: Not on file    Highest education level: Not on file   Occupational History    Not on file   Social Needs    Financial resource strain: Not on file    Food insecurity:     Worry: Not on file     Inability: Not on file   [address] [place] [address] Transportation needs:     Medical: Not on file     Non-medical: Not on file   Tobacco Use    Smoking status: Never Smoker    Smokeless tobacco: Never Used   Substance and Sexual Activity    Alcohol use: No    Drug use: No    Sexual activity: Never   Lifestyle    Physical activity:     Days per week: Not on file     Minutes per session: Not on file    Stress: Not on file   Relationships    Social connections:     Talks on phone: Not on file     Gets together: Not on file     Attends Zoroastrianism service: Not on file     Active member of club or organization: Not on file     Attends meetings of clubs or organizations: Not on file     Relationship status: Not on file    Intimate partner violence:     Fear of current or ex partner: Not on file     Emotionally abused: Not on file     Physically abused: Not on file     Forced sexual activity: Not on file   Other Topics Concern    Not on file   Social History Narrative    Not on file         ALLERGIES: Codeine; Iodine; Levofloxacin; Metformin; Morphine; Pcn [penicillins]; and Sulfa (sulfonamide antibiotics)    Review of Systems   Constitutional: Positive for appetite change. HENT: Negative for sore throat and trouble swallowing. Eyes: Negative for visual disturbance. Respiratory: Negative for shortness of breath. Cardiovascular: Negative for chest pain. Gastrointestinal: Positive for abdominal pain. Genitourinary: Positive for flank pain. Negative for difficulty urinating. Musculoskeletal: Positive for gait problem. Patient uses a walker chronically. There is no acute gait disturbance   Skin: Negative for rash. Allergic/Immunologic: Negative for immunocompromised state. Neurological: Negative for syncope. Psychiatric/Behavioral: Positive for sleep disturbance.        Vitals:    10/17/19 2200 10/17/19 2300 10/18/19 0026 10/18/19 0030   BP:  169/72 162/67 161/66   Pulse:  69 67 65   Resp:  20 16 15   Temp:       SpO2: 99% 98% 98% 97% Weight:                Physical Exam   Constitutional: She is oriented to person, place, and time. She appears well-developed and well-nourished. Non-toxic appearance. She does not have a sickly appearance. She appears ill. She appears distressed. HENT:   Head: Normocephalic and atraumatic. Right Ear: External ear normal.   Left Ear: External ear normal.   Nose: Nose normal.   Mouth/Throat: Uvula is midline, oropharynx is clear and moist and mucous membranes are normal.   Eyes: Conjunctivae are normal. No scleral icterus. Neck: Neck supple. Cardiovascular: Normal rate, regular rhythm, normal heart sounds and intact distal pulses. Pulmonary/Chest: Effort normal and breath sounds normal.   Abdominal: Soft. There is no hepatosplenomegaly. There is tenderness in the left upper quadrant and left lower quadrant. There is no rigidity, no rebound, no guarding, no tenderness at McBurney's point and negative Ochoa's sign. Obese   Musculoskeletal: She exhibits no edema. Neurological: She is alert and oriented to person, place, and time. Gait normal.   Skin: Skin is warm and dry. Capillary refill takes less than 2 seconds. She is not diaphoretic. Psychiatric: Her behavior is normal.   Nursing note and vitals reviewed.        MDM       Procedures    Vitals:  Patient Vitals for the past 12 hrs:   Temp Pulse Resp BP SpO2   10/18/19 0030  65 15 161/66 97 %   10/18/19 0026  67 16 162/67 98 %   10/17/19 2300  69 20 169/72 98 %   10/17/19 2200     99 %   10/17/19 2112 99 °F (37.2 °C) 73 16 181/78 99 %         Medications ordered:   Medications   ondansetron (ZOFRAN) injection 4 mg (4 mg IntraVENous Given 10/17/19 2235)   famotidine (PF) (PEPCID) injection 20 mg (20 mg IntraVENous Given 10/17/19 2236)   acetaminophen (TYLENOL) tablet 1,000 mg (1,000 mg Oral Given 10/17/19 2237)   mylanta/viscous lidocaine (GI COCKTAIL) (40 mL Oral Given 10/17/19 2311)   ketorolac (TORADOL) injection 15 mg (15 mg IntraVENous Given 10/17/19 6396)         Lab findings:  Recent Results (from the past 12 hour(s))   CBC WITH AUTOMATED DIFF    Collection Time: 10/17/19 10:21 PM   Result Value Ref Range    WBC 11.8 4.6 - 13.2 K/uL    RBC 4.00 (L) 4.20 - 5.30 M/uL    HGB 11.4 (L) 12.0 - 16.0 g/dL    HCT 36.3 35.0 - 45.0 %    MCV 90.8 74.0 - 97.0 FL    MCH 28.5 24.0 - 34.0 PG    MCHC 31.4 31.0 - 37.0 g/dL    RDW 13.1 11.6 - 14.5 %    PLATELET 672 330 - 245 K/uL    MPV 12.2 (H) 9.2 - 11.8 FL    NEUTROPHILS 72 40 - 73 %    LYMPHOCYTES 20 (L) 21 - 52 %    MONOCYTES 7 3 - 10 %    EOSINOPHILS 1 0 - 5 %    BASOPHILS 0 0 - 2 %    ABS. NEUTROPHILS 8.4 (H) 1.8 - 8.0 K/UL    ABS. LYMPHOCYTES 2.4 0.9 - 3.6 K/UL    ABS. MONOCYTES 0.8 0.05 - 1.2 K/UL    ABS. EOSINOPHILS 0.2 0.0 - 0.4 K/UL    ABS. BASOPHILS 0.0 0.0 - 0.1 K/UL    DF AUTOMATED     METABOLIC PANEL, COMPREHENSIVE    Collection Time: 10/17/19 10:21 PM   Result Value Ref Range    Sodium 140 136 - 145 mmol/L    Potassium 3.7 3.5 - 5.5 mmol/L    Chloride 105 100 - 111 mmol/L    CO2 28 21 - 32 mmol/L    Anion gap 7 3.0 - 18 mmol/L    Glucose 207 (H) 74 - 99 mg/dL    BUN 14 7.0 - 18 MG/DL    Creatinine 0.76 0.6 - 1.3 MG/DL    BUN/Creatinine ratio 18 12 - 20      GFR est AA >60 >60 ml/min/1.73m2    GFR est non-AA >60 >60 ml/min/1.73m2    Calcium 9.4 8.5 - 10.1 MG/DL    Bilirubin, total 0.6 0.2 - 1.0 MG/DL    ALT (SGPT) 14 13 - 56 U/L    AST (SGOT) 13 10 - 38 U/L    Alk.  phosphatase 130 (H) 45 - 117 U/L    Protein, total 7.5 6.4 - 8.2 g/dL    Albumin 4.0 3.4 - 5.0 g/dL    Globulin 3.5 2.0 - 4.0 g/dL    A-G Ratio 1.1 0.8 - 1.7     LIPASE    Collection Time: 10/17/19 10:21 PM   Result Value Ref Range    Lipase 32 (L) 73 - 393 U/L   URINALYSIS W/ RFLX MICROSCOPIC    Collection Time: 10/17/19 10:22 PM   Result Value Ref Range    Color YELLOW      Appearance CLEAR      Specific gravity 1.012 1.005 - 1.030      pH (UA) 7.5 5.0 - 8.0      Protein NEGATIVE  NEG mg/dL    Glucose NEGATIVE  NEG mg/dL Ketone NEGATIVE  NEG mg/dL    Bilirubin NEGATIVE  NEG      Blood NEGATIVE  NEG      Urobilinogen 0.2 0.2 - 1.0 EU/dL    Nitrites NEGATIVE  NEG      Leukocyte Esterase TRACE (A) NEG     URINE MICROSCOPIC ONLY    Collection Time: 10/17/19 10:22 PM   Result Value Ref Range    WBC 3 to 5 0 - 4 /hpf    RBC 0 to 2 0 - 5 /hpf    Epithelial cells 1+ 0 - 5 /lpf    Bacteria FEW (A) NEG /hpf       EKG interpretation by ED Physician:      X-Ray, CT or other radiology findings or impressions:  CT ABD PELV WO CONT    (Results Pending)   No acute findings. No hydronephrosis or nephrourethrolithiasis. Similar appearance of bowel loops plastered against ventral abdominal wall hernia mesh, without definite evidence of bowel obstruction. Linear areas of dependent and bibasilar atelectasis without discrete alveolar consolidation or findings to suggest pulmonary edema. Scattered colonic diverticulosis without acute diverticulitis. Spinal fusion hardware with pranav-screw lucencies around the S1 screws, highly suggestive of loosening. Progress notes, Consult notes or additional Procedure notes: The need for any other work-up here. There is no obvious infection. Abdomen is not significantly tender. I have discussed with patient and/or family/sig other the results, interpretation of any imaging if performed, suspected diagnosis and treatment plan to include instructions regarding the diagnoses listed to which understanding was expressed with all questions answered      Reevaluation of patient:   stable    Disposition:  Diagnosis:   1. Acute abdominal pain in left flank    2.  Nausea without vomiting        Disposition: home      Follow-up Information     Follow up With Specialties Details Why Attila Galindo MD Internal Medicine Schedule an appointment as soon as possible for a visit  [address] EMERGENCY DEPT Emergency Medicine  If symptoms worsen [address] [facility] [address]  [phone]            Patient's Medications   Start Taking    ONDANSETRON (ZOFRAN ODT) 4 MG DISINTEGRATING TABLET    Take 1 Tab by mouth every eight (8) hours as needed for Nausea. Continue Taking    ALBUTEROL (PROAIR HFA) 90 MCG/ACTUATION INHALER    Take  by inhalation. AMLODIPINE (NORVASC) 10 MG TABLET    10 mg. ASPIRIN (ASPIRIN) 325 MG TABLET    Take 1 Tab by mouth daily. ATORVASTATIN (LIPITOR) 80 MG TABLET    Take 80 mg by mouth daily. BETAMETHASONE DIPROPIONATE (DIPROSONE) 0.05 % TOPICAL CREAM    Apply  to affected area daily as needed for Skin Irritation. BREO ELLIPTA 100-25 MCG/DOSE INHALER    TAKE 1 PUFF BY MOUTH EVERY DAY    CHOLECALCIFEROL (VITAMIN D3) 1,000 UNIT CAP    Take 1,000 Units by mouth daily. CLONIDINE HCL (CATAPRES) 0.1 MG TABLET    Take 0.05 mg by mouth. CLOTRIMAZOLE (MYCELEX EX)    by Apply Externally route. CONJUGATED ESTROGENS (PREMARIN) 0.625 MG/GRAM VAGINAL CREAM    Apply small amount to introitus MWF as directed. CYANOCOBALAMIN (VITAMIN B-12) 1,000 MCG TABLET    Take 1,000 mcg by mouth daily. ERGOCALCIFEROL (ERGOCALCIFEROL) 50,000 UNIT CAPSULE    TAKE ONE CAPSULE BY MOUTH ONCE A WEEK FOR VIT D    FENTANYL (DURAGESIC) 25 MCG/HR PATCH    1 Patch by TransDERmal route every seventy-two (72) hours. FUROSEMIDE (LASIX) 40 MG TABLET    Take 40 mg by mouth as needed. GABAPENTIN (NEURONTIN) 300 MG CAPSULE    Take 300 mg by mouth three (3) times daily. GLIMEPIRIDE (AMARYL) 1 MG TABLET    Take 4 mg by mouth two (2) times a day. HYDROXYZINE PAMOATE (VISTARIL) 25 MG CAPSULE    TAKE 1 CAPSULE BY MOUTH TWICE A DAY AS NEEDED    IBUPROFEN (MOTRIN) 200 MG TABLET    Take 400 mg by mouth every eight (8) hours as needed for Pain. LEVOTHYROXINE (SYNTHROID) 50 MCG TABLET    Take 50 mcg by mouth Daily (before breakfast). LOSARTAN (COZAAR) 100 MG TABLET    Take 100 mg by mouth daily.     LUBIPROSTONE (AMITIZA) 8 MCG CAPSULE    Take 8 mcg by mouth two (2) times daily (with meals). METHYLPREDNISOLONE (MEDROL) 4 MG TAB    Take  by mouth daily (with breakfast). METOPROLOL SUCCINATE (TOPROL-XL) 100 MG TABLET    Take 100 mg by mouth daily. MIRTAZAPINE (REMERON) 15 MG TABLET    Take 15 mg by mouth nightly. NITROGLYCERIN (NITROSTAT) 0.4 MG SL TABLET    1 Tab by SubLINGual route every five (5) minutes as needed for Chest Pain. Up to 3 doses. OMEPRAZOLE (PRILOSEC) 40 MG CAPSULE    Take 40 mg by mouth daily. OXYCODONE-ACETAMINOPHEN (PERCOCET 10)  MG PER TABLET    Take 1 Tab by mouth every six (6) hours as needed for Pain. PITAVASTATIN CALCIUM (LIVALO) 2 MG TABLET    Take 1 Tab by mouth daily. POTASSIUM CHLORIDE SR (KLOR-CON 10) 10 MEQ TABLET    Take 10 mEq by mouth as needed. PROMETHAZINE (PHENERGAN) 25 MG TABLET    TAKE 1 TAB BY MOUTH TWICE DAILY AS NEEDED FOR NAUSEA    SIMVASTATIN (ZOCOR) 20 MG TABLET    Take 1 Tab by mouth nightly. TIZANIDINE (ZANAFLEX) 2 MG TABLET    Take 2 mg by mouth as needed. TROSPIUM (SANCTURA XL) 60 MG CAPSULE    Take 1 Cap by mouth Daily (before breakfast). These Medications have changed    No medications on file   Stop Taking    DOXYCYCLINE (MONODOX) 100 MG CAPSULE    Take 100 mg by mouth two (2) times a day.

## 2019-10-18 NOTE — DISCHARGE INSTRUCTIONS

## 2019-11-19 ENCOUNTER — APPOINTMENT (OUTPATIENT)
Dept: GENERAL RADIOLOGY | Age: 74
End: 2019-11-19
Attending: EMERGENCY MEDICINE
Payer: MEDICARE

## 2019-11-19 ENCOUNTER — HOSPITAL ENCOUNTER (EMERGENCY)
Age: 74
Discharge: HOME | End: 2019-11-19
Attending: EMERGENCY MEDICINE
Payer: MEDICARE

## 2019-11-19 VITALS
TEMPERATURE: 98.6 F | HEART RATE: 75 BPM | SYSTOLIC BLOOD PRESSURE: 159 MMHG | HEIGHT: 65 IN | WEIGHT: 175 LBS | BODY MASS INDEX: 29.16 KG/M2 | OXYGEN SATURATION: 98 % | RESPIRATION RATE: 18 BRPM | DIASTOLIC BLOOD PRESSURE: 87 MMHG

## 2019-11-19 DIAGNOSIS — R11.2 NON-INTRACTABLE VOMITING WITH NAUSEA, UNSPECIFIED VOMITING TYPE: ICD-10-CM

## 2019-11-19 DIAGNOSIS — K21.9 GASTROESOPHAGEAL REFLUX DISEASE, ESOPHAGITIS PRESENCE NOT SPECIFIED: ICD-10-CM

## 2019-11-19 DIAGNOSIS — R10.13 ABDOMINAL PAIN, EPIGASTRIC: Primary | ICD-10-CM

## 2019-11-19 LAB
ALBUMIN SERPL-MCNC: 3.9 G/DL (ref 3.4–5)
ALBUMIN/GLOB SERPL: 0.9 {RATIO} (ref 0.8–1.7)
ALP SERPL-CCNC: 118 U/L (ref 45–117)
ALT SERPL-CCNC: 15 U/L (ref 13–56)
ANION GAP SERPL CALC-SCNC: 7 MMOL/L (ref 3–18)
APPEARANCE UR: CLEAR
AST SERPL-CCNC: 12 U/L (ref 10–38)
BACTERIA URNS QL MICRO: NEGATIVE /HPF
BASOPHILS # BLD: 0 K/UL (ref 0–0.1)
BASOPHILS NFR BLD: 0 % (ref 0–2)
BILIRUB SERPL-MCNC: 0.6 MG/DL (ref 0.2–1)
BILIRUB UR QL: NEGATIVE
BUN SERPL-MCNC: 13 MG/DL (ref 7–18)
BUN/CREAT SERPL: 15 (ref 12–20)
CALCIUM SERPL-MCNC: 9.4 MG/DL (ref 8.5–10.1)
CHLORIDE SERPL-SCNC: 103 MMOL/L (ref 100–111)
CO2 SERPL-SCNC: 29 MMOL/L (ref 21–32)
COLOR UR: YELLOW
CREAT SERPL-MCNC: 0.86 MG/DL (ref 0.6–1.3)
DIFFERENTIAL METHOD BLD: ABNORMAL
EOSINOPHIL # BLD: 0.2 K/UL (ref 0–0.4)
EOSINOPHIL NFR BLD: 1 % (ref 0–5)
EPITH CASTS URNS QL MICRO: NORMAL /LPF (ref 0–5)
ERYTHROCYTE [DISTWIDTH] IN BLOOD BY AUTOMATED COUNT: 12.9 % (ref 11.6–14.5)
GLOBULIN SER CALC-MCNC: 4.4 G/DL (ref 2–4)
GLUCOSE SERPL-MCNC: 222 MG/DL (ref 74–99)
GLUCOSE UR STRIP.AUTO-MCNC: NEGATIVE MG/DL
HCT VFR BLD AUTO: 36.8 % (ref 35–45)
HGB BLD-MCNC: 12.1 G/DL (ref 12–16)
HGB UR QL STRIP: NEGATIVE
KETONES UR QL STRIP.AUTO: NEGATIVE MG/DL
LEUKOCYTE ESTERASE UR QL STRIP.AUTO: NEGATIVE
LIPASE SERPL-CCNC: 29 U/L (ref 73–393)
LYMPHOCYTES # BLD: 2.3 K/UL (ref 0.9–3.6)
LYMPHOCYTES NFR BLD: 21 % (ref 21–52)
MCH RBC QN AUTO: 29.2 PG (ref 24–34)
MCHC RBC AUTO-ENTMCNC: 32.9 G/DL (ref 31–37)
MCV RBC AUTO: 88.9 FL (ref 74–97)
MONOCYTES # BLD: 0.6 K/UL (ref 0.05–1.2)
MONOCYTES NFR BLD: 6 % (ref 3–10)
NEUTS SEG # BLD: 7.8 K/UL (ref 1.8–8)
NEUTS SEG NFR BLD: 72 % (ref 40–73)
NITRITE UR QL STRIP.AUTO: NEGATIVE
PH UR STRIP: 7 [PH] (ref 5–8)
PLATELET # BLD AUTO: 252 K/UL (ref 135–420)
PMV BLD AUTO: 12 FL (ref 9.2–11.8)
POTASSIUM SERPL-SCNC: 3.3 MMOL/L (ref 3.5–5.5)
PROT SERPL-MCNC: 8.3 G/DL (ref 6.4–8.2)
PROT UR STRIP-MCNC: 30 MG/DL
RBC # BLD AUTO: 4.14 M/UL (ref 4.2–5.3)
RBC #/AREA URNS HPF: 0 /HPF (ref 0–5)
SODIUM SERPL-SCNC: 139 MMOL/L (ref 136–145)
SP GR UR REFRACTOMETRY: 1.01 (ref 1–1.03)
TROPONIN I SERPL-MCNC: <0.02 NG/ML (ref 0–0.04)
UROBILINOGEN UR QL STRIP.AUTO: 1 EU/DL (ref 0.2–1)
WBC # BLD AUTO: 10.9 K/UL (ref 4.6–13.2)
WBC URNS QL MICRO: NORMAL /HPF (ref 0–4)

## 2019-11-19 PROCEDURE — 74011000250 HC RX REV CODE- 250: Performed by: PHYSICIAN ASSISTANT

## 2019-11-19 PROCEDURE — 85025 COMPLETE CBC W/AUTO DIFF WBC: CPT

## 2019-11-19 PROCEDURE — 71045 X-RAY EXAM CHEST 1 VIEW: CPT

## 2019-11-19 PROCEDURE — 96375 TX/PRO/DX INJ NEW DRUG ADDON: CPT

## 2019-11-19 PROCEDURE — 99285 EMERGENCY DEPT VISIT HI MDM: CPT

## 2019-11-19 PROCEDURE — 96374 THER/PROPH/DIAG INJ IV PUSH: CPT

## 2019-11-19 PROCEDURE — 74011250636 HC RX REV CODE- 250/636

## 2019-11-19 PROCEDURE — 93005 ELECTROCARDIOGRAM TRACING: CPT

## 2019-11-19 PROCEDURE — 83690 ASSAY OF LIPASE: CPT

## 2019-11-19 PROCEDURE — 74011250636 HC RX REV CODE- 250/636: Performed by: PHYSICIAN ASSISTANT

## 2019-11-19 PROCEDURE — 80053 COMPREHEN METABOLIC PANEL: CPT

## 2019-11-19 PROCEDURE — 81001 URINALYSIS AUTO W/SCOPE: CPT

## 2019-11-19 PROCEDURE — 84484 ASSAY OF TROPONIN QUANT: CPT

## 2019-11-19 PROCEDURE — 74011250637 HC RX REV CODE- 250/637: Performed by: PHYSICIAN ASSISTANT

## 2019-11-19 PROCEDURE — 96376 TX/PRO/DX INJ SAME DRUG ADON: CPT

## 2019-11-19 RX ORDER — FAMOTIDINE 10 MG/ML
20 INJECTION INTRAVENOUS
Status: COMPLETED | OUTPATIENT
Start: 2019-11-19 | End: 2019-11-19

## 2019-11-19 RX ORDER — ONDANSETRON 2 MG/ML
INJECTION INTRAMUSCULAR; INTRAVENOUS
Status: COMPLETED
Start: 2019-11-19 | End: 2019-11-19

## 2019-11-19 RX ORDER — ONDANSETRON 2 MG/ML
4 INJECTION INTRAMUSCULAR; INTRAVENOUS
Status: COMPLETED | OUTPATIENT
Start: 2019-11-19 | End: 2019-11-19

## 2019-11-19 RX ORDER — LORAZEPAM 2 MG/ML
1 INJECTION INTRAMUSCULAR
Status: COMPLETED | OUTPATIENT
Start: 2019-11-19 | End: 2019-11-19

## 2019-11-19 RX ADMIN — ONDANSETRON 4 MG: 2 INJECTION INTRAMUSCULAR; INTRAVENOUS at 19:52

## 2019-11-19 RX ADMIN — LORAZEPAM 1 MG: 2 INJECTION, SOLUTION INTRAMUSCULAR; INTRAVENOUS at 18:51

## 2019-11-19 RX ADMIN — FAMOTIDINE 20 MG: 10 INJECTION, SOLUTION INTRAVENOUS at 18:20

## 2019-11-19 RX ADMIN — SODIUM CHLORIDE 1000 ML: 900 INJECTION, SOLUTION INTRAVENOUS at 18:22

## 2019-11-19 RX ADMIN — ONDANSETRON 4 MG: 2 INJECTION INTRAMUSCULAR; INTRAVENOUS at 18:21

## 2019-11-19 RX ADMIN — LIDOCAINE HYDROCHLORIDE 40 ML: 20 SOLUTION ORAL; TOPICAL at 18:23

## 2019-11-19 NOTE — ED PROVIDER NOTES
EMERGENCY DEPARTMENT HISTORY AND PHYSICAL EXAM    Date: 11/19/2019  Patient Name: Krzysztof Snell    History of Presenting Illness     Chief Complaint   Patient presents with    Abdominal Pain    Vomiting         History Provided By: Patient    Chief Complaint: abdominal pain  Duration: 3 Hours  Timing:  Acute  Location: epigastric pain  Quality: Burning  Severity: Moderate  Modifying Factors: none  Associated Symptoms: nausea, vomiting      HPI: Krzysztof Snell is a 66 y.o. female with a PMH of Hypertension, diabetes, depression, scoliosis, tardive dyskinesia, dysphasia, anxiety, gastroparesis, gastric ulcer, diverticulosis, spinal stenosis, anemia, stroke who presents to the ER via EMS complaining of epigastric pain, nausea and vomiting. Patient states her stomach feels as though it is on fire. She reports eating barbecue and color grains for lunch today and then around 4 PM had a burning sensation in her epigastric area. She took her Protonix as prescribed with no relief. Patient reports associated nausea and vomiting. She denied any associated fevers, chills, shortness of breath, chest pain and has no other symptoms or complaints. PCP: Kelvin Rodríguez MD    Current Outpatient Medications   Medication Sig Dispense Refill    ondansetron (ZOFRAN ODT) 4 mg disintegrating tablet Take 1 Tab by mouth every eight (8) hours as needed for Nausea. 12 Tab 0    atorvastatin (LIPITOR) 80 mg tablet Take 80 mg by mouth daily.  omeprazole (PRILOSEC) 40 mg capsule Take 40 mg by mouth daily.  lubiPROStone (AMITIZA) 8 mcg capsule Take 8 mcg by mouth two (2) times daily (with meals).  clotrimazole (MYCELEX EX) by Apply Externally route.  albuterol (PROAIR HFA) 90 mcg/actuation inhaler Take  by inhalation.  methylPREDNISolone (MEDROL) 4 mg tab Take  by mouth daily (with breakfast).  simvastatin (ZOCOR) 20 mg tablet Take 1 Tab by mouth nightly.  30 Tab 6    pitavastatin calcium (LIVALO) 2 mg tablet Take 1 Tab by mouth daily. 30 Tab 6    trospium (SANCTURA XL) 60 mg capsule Take 1 Cap by mouth Daily (before breakfast). 30 Cap 3    conjugated estrogens (PREMARIN) 0.625 mg/gram vaginal cream Apply small amount to introitus MWF as directed. 30 g 1    cloNIDine HCl (CATAPRES) 0.1 mg tablet Take 0.05 mg by mouth.  ergocalciferol (ERGOCALCIFEROL) 50,000 unit capsule TAKE ONE CAPSULE BY MOUTH ONCE A WEEK FOR VIT D  0    BREO ELLIPTA 100-25 mcg/dose inhaler TAKE 1 PUFF BY MOUTH EVERY DAY  3    hydrOXYzine pamoate (VISTARIL) 25 mg capsule TAKE 1 CAPSULE BY MOUTH TWICE A DAY AS NEEDED  2    promethazine (PHENERGAN) 25 mg tablet TAKE 1 TAB BY MOUTH TWICE DAILY AS NEEDED FOR NAUSEA  1    nitroglycerin (NITROSTAT) 0.4 mg SL tablet 1 Tab by SubLINGual route every five (5) minutes as needed for Chest Pain. Up to 3 doses. 25 Tab 3    betamethasone dipropionate (DIPROSONE) 0.05 % topical cream Apply  to affected area daily as needed for Skin Irritation.  furosemide (LASIX) 40 mg tablet Take 40 mg by mouth as needed.  gabapentin (NEURONTIN) 300 mg capsule Take 300 mg by mouth three (3) times daily.  potassium chloride SR (KLOR-CON 10) 10 mEq tablet Take 10 mEq by mouth as needed.  tiZANidine (ZANAFLEX) 2 mg tablet Take 2 mg by mouth as needed. 3    metoprolol succinate (TOPROL-XL) 100 mg tablet Take 100 mg by mouth daily. 6    aspirin (ASPIRIN) 325 mg tablet Take 1 Tab by mouth daily. 30 Tab 0    mirtazapine (REMERON) 15 mg tablet Take 15 mg by mouth nightly.  glimepiride (AMARYL) 1 mg tablet Take 4 mg by mouth two (2) times a day.  levothyroxine (SYNTHROID) 50 mcg tablet Take 50 mcg by mouth Daily (before breakfast).  fentaNYL (DURAGESIC) 25 mcg/hr PATCH 1 Patch by TransDERmal route every seventy-two (72) hours.  amLODIPine (NORVASC) 10 mg tablet 10 mg.      ibuprofen (MOTRIN) 200 mg tablet Take 400 mg by mouth every eight (8) hours as needed for Pain.       oxyCODONE-acetaminophen (PERCOCET 10)  mg per tablet Take 1 Tab by mouth every six (6) hours as needed for Pain.  cholecalciferol (VITAMIN D3) 1,000 unit cap Take 1,000 Units by mouth daily.  losartan (COZAAR) 100 mg tablet Take 100 mg by mouth daily.  cyanocobalamin (VITAMIN B-12) 1,000 mcg tablet Take 1,000 mcg by mouth daily. Past History     Past Medical History:  Past Medical History:   Diagnosis Date    Anxiety     Depression     Diabetes ([address]     Diverticulosis     Dysphagia     Esophageal reflux     Fatty liver     Gastric ulcer     Gastroparesis     H/O joint replacement 1991    left knee    Hypercholesteremia     Hypertension     Iron deficiency anemia     Neurogenic bladder     Recurrent UTI     Scoliosis     Spinal stenosis     Stenosis of lumbosacral spine     Stroke ([facility] [address] 04/2018    Tardive dyskinesia        Past Surgical History:  Past Surgical History:   Procedure Laterality Date    EGD  7/10/2018         HX APPENDECTOMY      HX BREAST LUMPECTOMY      HX CHOLECYSTECTOMY  1980    HX COLECTOMY  2012    HX COLONOSCOPY  10/29/2009    hyperplastic polyp, hemorrhoids    HX HERNIA REPAIR      HX HYSTERECTOMY  1970    HX KNEE ARTHROSCOPY      HX KNEE REPLACEMENT  1991    left knee    HX LUMBAR FUSION      x 3       Family History:  Family History   Problem Relation Age of Onset    Heart Disease Mother     Diabetes Mother     Heart Attack Mother     Cancer Father         lung CA    Cancer Brother     Cancer Sister        Social History:  Social History     Tobacco Use    Smoking status: Never Smoker    Smokeless tobacco: Never Used   Substance Use Topics    Alcohol use: No    Drug use: No       Allergies:   Allergies   Allergen Reactions    Codeine Rash and Itching    Iodine Hives and Rash    Levofloxacin Nausea and Vomiting    Metformin Other (comments)     diarrhea    Morphine Hives    Pcn [Penicillins] Other (comments)     Causes \"Heart swell\"    Sulfa (Sulfonamide Antibiotics) Nausea Only         Review of Systems   Review of Systems   Constitutional: Negative for chills, fatigue and fever. HENT: Negative. Negative for sore throat. Eyes: Negative. Respiratory: Negative for cough and shortness of breath. Cardiovascular: Negative for chest pain and palpitations. Gastrointestinal: Positive for abdominal pain, nausea and vomiting. Negative for constipation and diarrhea. Genitourinary: Negative for dysuria. Musculoskeletal: Negative. Skin: Negative. Neurological: Negative for dizziness, weakness, light-headedness and headaches. Psychiatric/Behavioral: Negative. All other systems reviewed and are negative. Physical Exam     Vitals:    11/19/19 1801   BP: 183/83   Pulse: 65   Resp: 18   Temp: 98.6 °F (37 °C)   SpO2: 98%   Weight: 79.4 kg (175 lb)   Height: 5' 5\" (1.651 m)     Physical Exam   Constitutional: She is oriented to person, place, and time. She appears well-developed and well-nourished. She appears distressed. HENT:   Head: Normocephalic and atraumatic. Mouth/Throat: Oropharynx is clear and moist.   Eyes: Conjunctivae are normal. No scleral icterus. Neck: Neck supple. No JVD present. No tracheal deviation present. Cardiovascular: Normal rate, regular rhythm and normal heart sounds. No murmur heard. Pulmonary/Chest: Effort normal and breath sounds normal. No respiratory distress. She has no wheezes. She has no rales. She exhibits no tenderness. Abdominal: Soft. Normal appearance and bowel sounds are normal. She exhibits no distension and no mass. There is tenderness in the epigastric area. There is guarding. There is no rigidity, no rebound, no CVA tenderness, no tenderness at McBurney's point and negative Ochoa's sign. No peritoneal signs   Musculoskeletal: She exhibits no edema or tenderness. Neurological: She is alert and oriented to person, place, and time. She has normal strength. GCS eye subscore is 4. GCS verbal subscore is 5. GCS motor subscore is 6. Skin: Skin is warm and dry. She is not diaphoretic. Psychiatric: She has a normal mood and affect. Nursing note and vitals reviewed. Diagnostic Study Results     Labs -     Recent Results (from the past 12 hour(s))   CBC WITH AUTOMATED DIFF    Collection Time: 11/19/19  6:12 PM   Result Value Ref Range    WBC 10.9 4.6 - 13.2 K/uL    RBC 4.14 (L) 4.20 - 5.30 M/uL    HGB 12.1 12.0 - 16.0 g/dL    HCT 36.8 35.0 - 45.0 %    MCV 88.9 74.0 - 97.0 FL    MCH 29.2 24.0 - 34.0 PG    MCHC 32.9 31.0 - 37.0 g/dL    RDW 12.9 11.6 - 14.5 %    PLATELET 784 171 - 321 K/uL    MPV 12.0 (H) 9.2 - 11.8 FL    NEUTROPHILS 72 40 - 73 %    LYMPHOCYTES 21 21 - 52 %    MONOCYTES 6 3 - 10 %    EOSINOPHILS 1 0 - 5 %    BASOPHILS 0 0 - 2 %    ABS. NEUTROPHILS 7.8 1.8 - 8.0 K/UL    ABS. LYMPHOCYTES 2.3 0.9 - 3.6 K/UL    ABS. MONOCYTES 0.6 0.05 - 1.2 K/UL    ABS. EOSINOPHILS 0.2 0.0 - 0.4 K/UL    ABS. BASOPHILS 0.0 0.0 - 0.1 K/UL    DF AUTOMATED     METABOLIC PANEL, COMPREHENSIVE    Collection Time: 11/19/19  6:12 PM   Result Value Ref Range    Sodium 139 136 - 145 mmol/L    Potassium 3.3 (L) 3.5 - 5.5 mmol/L    Chloride 103 100 - 111 mmol/L    CO2 29 21 - 32 mmol/L    Anion gap 7 3.0 - 18 mmol/L    Glucose 222 (H) 74 - 99 mg/dL    BUN 13 7.0 - 18 MG/DL    Creatinine 0.86 0.6 - 1.3 MG/DL    BUN/Creatinine ratio 15 12 - 20      GFR est AA >60 >60 ml/min/1.73m2    GFR est non-AA >60 >60 ml/min/1.73m2    Calcium 9.4 8.5 - 10.1 MG/DL    Bilirubin, total 0.6 0.2 - 1.0 MG/DL    ALT (SGPT) 15 13 - 56 U/L    AST (SGOT) 12 10 - 38 U/L    Alk.  phosphatase 118 (H) 45 - 117 U/L    Protein, total 8.3 (H) 6.4 - 8.2 g/dL    Albumin 3.9 3.4 - 5.0 g/dL    Globulin 4.4 (H) 2.0 - 4.0 g/dL    A-G Ratio 0.9 0.8 - 1.7     LIPASE    Collection Time: 11/19/19  6:12 PM   Result Value Ref Range    Lipase 29 (L) 73 - 393 U/L   URINALYSIS W/ RFLX MICROSCOPIC    Collection Time: 11/19/19 6:12 PM   Result Value Ref Range    Color YELLOW      Appearance CLEAR      Specific gravity 1.012 1.005 - 1.030      pH (UA) 7.0 5.0 - 8.0      Protein 30 (A) NEG mg/dL    Glucose NEGATIVE  NEG mg/dL    Ketone NEGATIVE  NEG mg/dL    Bilirubin NEGATIVE  NEG      Blood NEGATIVE  NEG      Urobilinogen 1.0 0.2 - 1.0 EU/dL    Nitrites NEGATIVE  NEG      Leukocyte Esterase NEGATIVE  NEG     URINE MICROSCOPIC ONLY    Collection Time: 11/19/19  6:12 PM   Result Value Ref Range    WBC 0 to 2 0 - 4 /hpf    RBC 0 0 - 5 /hpf    Epithelial cells FEW 0 - 5 /lpf    Bacteria NEGATIVE  NEG /hpf   TROPONIN I    Collection Time: 11/19/19  6:25 PM   Result Value Ref Range    Troponin-I, QT <0.02 0.0 - 0.045 NG/ML   EKG, 12 LEAD, INITIAL    Collection Time: 11/19/19  6:34 PM   Result Value Ref Range    Ventricular Rate 71 BPM    Atrial Rate 71 BPM    P-R Interval 198 ms    QRS Duration 70 ms    Q-T Interval 402 ms    QTC Calculation (Bezet) 436 ms    Calculated P Axis 51 degrees    Calculated R Axis 9 degrees    Calculated T Axis 47 degrees    Diagnosis       Sinus rhythm with premature supraventricular complexes  Nonspecific ST and T wave abnormality  Abnormal ECG  When compared with ECG of 19-SEP-2019 16:34,  No significant change was found         Radiologic Studies -   XR CHEST PORT    (Results Pending)     CT Results  (Last 48 hours)    None        CXR Results  (Last 48 hours)    None            Medical Decision Making   I am the first provider for this patient. I reviewed the vital signs, available nursing notes, past medical history, past surgical history, family history and social history. Vital Signs-Reviewed the patient's vital signs. Records Reviewed: Nursing Notes, Old Medical Records, Previous Radiology Studies and Previous Laboratory Studies     6:19 PM  67 y/o female who presents to the ER via EMS c/o epigastric pain and states her acid reflux is really bothering her.   Symptom onset 4pm today after eating barbecue and collards for lunch. Took protonix w/ no relief. Associated N/V w/ no fevers, chest pain, sob. Mild distress on exam w/ no peritoneal signs. Will plan on labs, meds for symptom relief. Debbi Ryder PA-C     6:43 PM  EKG normal sinus rhythm rate 71 with occasional PVC. No acute ST or T wave abnormalities. RN reports patient is flopping around the bed yelling in pain asking for medicine for her anxiety. We will plan on single dose of Ativan and continue to monitor at this time. Low clinical suspicion for true cardiac etiology as patient admits to eating barbecue and colic greens that may have set off her acid reflux and ulcer. Debbi Ryder PA-C    7:37 PM  Cardiac work-up negative. Patient reports significant improvement after medications given. Discussed all results with patient and family member at bedside. No clinical indication for further imaging or testing at this time. No abdominal pain noted on reexamination. Will have patient follow-up with her primary care provider. Had lengthy discussion regarding what foods to avoid to prevent symptoms like this in the future. Patient stable for discharge. All questions answered and patient in agreement with plan of care. Will plan for discharge. Debbi Ryder PA-C    Disposition:  discharged    DISCHARGE NOTE:       Care plan outlined and precautions discussed. Patient has no new complaints, changes, or physical findings. Results of labs, imaging were reviewed with the patient. All medications were reviewed with the patient; will d/c home with n/a. All of pt's questions and concerns were addressed. Patient was instructed and agrees to follow up with pcp, as well as to return to the ED upon further deterioration. Patient is ready to go home.     Follow-up Information     Follow up With Specialties Details Why [address] [facility] EMERGENCY DEPT Emergency Medicine  If symptoms worsen [address] [facility] Trixie Mitchell MD Internal Medicine Schedule an appointment as soon as possible for a visit in 1 day ER follow up for GERD and epigastric pain Elisabeth  [phone]            Current Discharge Medication List      CONTINUE these medications which have NOT CHANGED    Details   ondansetron (ZOFRAN ODT) 4 mg disintegrating tablet Take 1 Tab by mouth every eight (8) hours as needed for Nausea. Qty: 12 Tab, Refills: 0      atorvastatin (LIPITOR) 80 mg tablet Take 80 mg by mouth daily. omeprazole (PRILOSEC) 40 mg capsule Take 40 mg by mouth daily. lubiPROStone (AMITIZA) 8 mcg capsule Take 8 mcg by mouth two (2) times daily (with meals). clotrimazole (MYCELEX EX) by Apply Externally route. albuterol (PROAIR HFA) 90 mcg/actuation inhaler Take  by inhalation. methylPREDNISolone (MEDROL) 4 mg tab Take  by mouth daily (with breakfast). simvastatin (ZOCOR) 20 mg tablet Take 1 Tab by mouth nightly. Qty: 30 Tab, Refills: 6      pitavastatin calcium (LIVALO) 2 mg tablet Take 1 Tab by mouth daily. Qty: 30 Tab, Refills: 6      trospium (SANCTURA XL) 60 mg capsule Take 1 Cap by mouth Daily (before breakfast). Qty: 30 Cap, Refills: 3      conjugated estrogens (PREMARIN) 0.625 mg/gram vaginal cream Apply small amount to introitus MWF as directed.   Qty: 30 g, Refills: 1      cloNIDine HCl (CATAPRES) 0.1 mg tablet Take 0.05 mg by mouth.      ergocalciferol (ERGOCALCIFEROL) 50,000 unit capsule TAKE ONE CAPSULE BY MOUTH ONCE A WEEK FOR VIT D  Refills: 0      BREO ELLIPTA 100-25 mcg/dose inhaler TAKE 1 PUFF BY MOUTH EVERY DAY  Refills: 3      hydrOXYzine pamoate (VISTARIL) 25 mg capsule TAKE 1 CAPSULE BY MOUTH TWICE A DAY AS NEEDED  Refills: 2      promethazine (PHENERGAN) 25 mg tablet TAKE 1 TAB BY MOUTH TWICE DAILY AS NEEDED FOR NAUSEA  Refills: 1      nitroglycerin (NITROSTAT) 0.4 mg SL tablet 1 Tab by SubLINGual route every five (5) minutes as needed for Chest Pain. Up to 3 doses. Qty: 25 Tab, Refills: 3      betamethasone dipropionate (DIPROSONE) 0.05 % topical cream Apply  to affected area daily as needed for Skin Irritation. furosemide (LASIX) 40 mg tablet Take 40 mg by mouth as needed. gabapentin (NEURONTIN) 300 mg capsule Take 300 mg by mouth three (3) times daily. potassium chloride SR (KLOR-CON 10) 10 mEq tablet Take 10 mEq by mouth as needed. tiZANidine (ZANAFLEX) 2 mg tablet Take 2 mg by mouth as needed. Refills: 3      metoprolol succinate (TOPROL-XL) 100 mg tablet Take 100 mg by mouth daily. Refills: 6      aspirin (ASPIRIN) 325 mg tablet Take 1 Tab by mouth daily. Qty: 30 Tab, Refills: 0      mirtazapine (REMERON) 15 mg tablet Take 15 mg by mouth nightly. glimepiride (AMARYL) 1 mg tablet Take 4 mg by mouth two (2) times a day. levothyroxine (SYNTHROID) 50 mcg tablet Take 50 mcg by mouth Daily (before breakfast). fentaNYL (DURAGESIC) 25 mcg/hr PATCH 1 Patch by TransDERmal route every seventy-two (72) hours. amLODIPine (NORVASC) 10 mg tablet 10 mg.      ibuprofen (MOTRIN) 200 mg tablet Take 400 mg by mouth every eight (8) hours as needed for Pain. oxyCODONE-acetaminophen (PERCOCET 10)  mg per tablet Take 1 Tab by mouth every six (6) hours as needed for Pain. cholecalciferol (VITAMIN D3) 1,000 unit cap Take 1,000 Units by mouth daily. losartan (COZAAR) 100 mg tablet Take 100 mg by mouth daily. cyanocobalamin (VITAMIN B-12) 1,000 mcg tablet Take 1,000 mcg by mouth daily. Provider Notes (Medical Decision Making):     Procedures:  Procedures        Diagnosis     Clinical Impression:   1. Abdominal pain, epigastric    2. Non-intractable vomiting with nausea, unspecified vomiting type    3.  Gastroesophageal reflux disease, esophagitis presence not specified

## 2019-11-20 LAB
ATRIAL RATE: 71 BPM
CALCULATED P AXIS, ECG09: 51 DEGREES
CALCULATED R AXIS, ECG10: 9 DEGREES
CALCULATED T AXIS, ECG11: 47 DEGREES
DIAGNOSIS, 93000: NORMAL
P-R INTERVAL, ECG05: 198 MS
Q-T INTERVAL, ECG07: 402 MS
QRS DURATION, ECG06: 70 MS
QTC CALCULATION (BEZET), ECG08: 436 MS
VENTRICULAR RATE, ECG03: 71 BPM

## 2019-11-20 NOTE — ED NOTES
Patient urinated on herself. Patient then placed on bed pan and had a bowel movement. Patient assisted with toileting needs and dressing. I have reviewed discharge instructions with the patient. The patient verbalized understanding. Patient in stable condition at discharge. Patient signed paper discharge instructions.

## 2019-11-20 NOTE — DISCHARGE INSTRUCTIONS
Patient Education        Indigestion (Dyspepsia or Heartburn): Care Instructions  Your Care Instructions  Sometimes it can be hard to pinpoint the cause of indigestion. (It is also called dyspepsia or heartburn.) Most cases of an upset stomach with bloating, burning, burping, and nausea are minor and go away within several hours. Home treatment and over-the-counter medicine often are able to control symptoms. But if you take medicine to relieve your indigestion without making diet and lifestyle changes, your symptoms are likely to return again and again. If you get indigestion often, it may be a sign of a more serious medical problem. Be sure to follow up with your doctor, who may want to do tests to be sure of the cause of your indigestion. Follow-up care is a key part of your treatment and safety. Be sure to make and go to all appointments, and call your doctor if you are having problems. It's also a good idea to know your test results and keep a list of the medicines you take. How can you care for yourself at home? · Your doctor may recommend over-the-counter medicine. For mild or occasional indigestion, antacids such as Gaviscon, Mylanta, Maalox, or Tums, may help. Be safe with medicines. Be careful when you take over-the-counter antacid medicines. Many of these medicines have aspirin in them. Read the label to make sure that you are not taking more than the recommended dose. Too much aspirin can be harmful. · Your doctor also may recommend over-the-counter acid reducers, such as Pepcid AC, Tagamet HB, Zantac 75, or Prilosec. Read and follow all instructions on the label. If you use these medicines often, talk with your doctor. · Change your eating habits. ? It's best to eat several small meals instead of two or three large meals. ? After you eat, wait 2 to 3 hours before you lie down. ? Chocolate, mint, and alcohol can make GERD worse. ?  Spicy foods, foods that have a lot of acid (like tomatoes and oranges), and coffee can make GERD symptoms worse in some people. If your symptoms are worse after you eat a certain food, you may want to stop eating that food to see if your symptoms get better. · Do not smoke or chew tobacco. Smoking can make GERD worse. If you need help quitting, talk to your doctor about stop-smoking programs and medicines. These can increase your chances of quitting for good. · If you have GERD symptoms at night, raise the head of your bed 6 to 8 inches. You can do this by putting the frame on blocks or placing a foam wedge under the head of your mattress. (Adding extra pillows does not work.)  · Do not wear tight clothing around your middle. · Lose weight if you need to. Losing just 5 to 10 pounds can help. · Do not take anti-inflammatory medicines, such as aspirin, ibuprofen (Advil, Motrin), or naproxen (Aleve). These can irritate the stomach. If you need a pain medicine, try acetaminophen (Tylenol), which does not cause stomach upset. When should you call for help? Call your doctor now or seek immediate medical care if:    · You have new or worse belly pain.     · You are vomiting.    Watch closely for changes in your health, and be sure to contact your doctor if:    · You have new or worse symptoms of indigestion.     · You have trouble or pain swallowing.     · You are losing weight.     · You do not get better as expected. Where can you learn more? Go to http://shamika-chinyere.info/. Enter Q876 in the search box to learn more about \"Indigestion (Dyspepsia or Heartburn): Care Instructions. \"  Current as of: November 7, 2018  Content Version: 12.2  © 8592-0294 [employer]. Care instructions adapted under license by [employer] (which disclaims liability or warranty for this information).  If you have questions about a medical condition or this instruction, always ask your healthcare professional. [employer] any warranty or liability for your use of this information.

## 2020-01-06 ENCOUNTER — HOSPITAL ENCOUNTER (EMERGENCY)
Age: 75
Discharge: HOME | End: 2020-01-06
Attending: EMERGENCY MEDICINE
Payer: MEDICARE

## 2020-01-06 ENCOUNTER — APPOINTMENT (OUTPATIENT)
Dept: CT IMAGING | Age: 75
End: 2020-01-06
Attending: EMERGENCY MEDICINE
Payer: MEDICARE

## 2020-01-06 VITALS
RESPIRATION RATE: 18 BRPM | HEART RATE: 54 BPM | SYSTOLIC BLOOD PRESSURE: 121 MMHG | BODY MASS INDEX: 28.29 KG/M2 | WEIGHT: 170 LBS | TEMPERATURE: 98.3 F | OXYGEN SATURATION: 95 % | DIASTOLIC BLOOD PRESSURE: 47 MMHG

## 2020-01-06 DIAGNOSIS — R33.9 URINARY RETENTION: ICD-10-CM

## 2020-01-06 DIAGNOSIS — R10.2 ACUTE PELVIC PAIN, FEMALE: Primary | ICD-10-CM

## 2020-01-06 DIAGNOSIS — E83.42 HYPOMAGNESEMIA: ICD-10-CM

## 2020-01-06 DIAGNOSIS — R11.2 NON-INTRACTABLE VOMITING WITH NAUSEA, UNSPECIFIED VOMITING TYPE: ICD-10-CM

## 2020-01-06 LAB
ALBUMIN SERPL-MCNC: 4 G/DL (ref 3.4–5)
ALBUMIN/GLOB SERPL: 1.1 {RATIO} (ref 0.8–1.7)
ALP SERPL-CCNC: 129 U/L (ref 45–117)
ALT SERPL-CCNC: 11 U/L (ref 13–56)
ANION GAP SERPL CALC-SCNC: 7 MMOL/L (ref 3–18)
APPEARANCE UR: CLEAR
AST SERPL-CCNC: 11 U/L (ref 10–38)
BASOPHILS # BLD: 0 K/UL (ref 0–0.1)
BASOPHILS NFR BLD: 0 % (ref 0–2)
BILIRUB SERPL-MCNC: 0.3 MG/DL (ref 0.2–1)
BILIRUB UR QL: NEGATIVE
BUN SERPL-MCNC: 14 MG/DL (ref 7–18)
BUN/CREAT SERPL: 11 (ref 12–20)
CALCIUM SERPL-MCNC: 8.9 MG/DL (ref 8.5–10.1)
CHLORIDE SERPL-SCNC: 104 MMOL/L (ref 100–111)
CO2 SERPL-SCNC: 29 MMOL/L (ref 21–32)
COLOR UR: YELLOW
CREAT SERPL-MCNC: 1.25 MG/DL (ref 0.6–1.3)
DIFFERENTIAL METHOD BLD: ABNORMAL
EOSINOPHIL # BLD: 0.2 K/UL (ref 0–0.4)
EOSINOPHIL NFR BLD: 2 % (ref 0–5)
ERYTHROCYTE [DISTWIDTH] IN BLOOD BY AUTOMATED COUNT: 12.9 % (ref 11.6–14.5)
GLOBULIN SER CALC-MCNC: 3.7 G/DL (ref 2–4)
GLUCOSE SERPL-MCNC: 173 MG/DL (ref 74–99)
GLUCOSE UR STRIP.AUTO-MCNC: NEGATIVE MG/DL
HCT VFR BLD AUTO: 37.7 % (ref 35–45)
HGB BLD-MCNC: 12 G/DL (ref 12–16)
HGB UR QL STRIP: NEGATIVE
KETONES UR QL STRIP.AUTO: NEGATIVE MG/DL
LEUKOCYTE ESTERASE UR QL STRIP.AUTO: NEGATIVE
LYMPHOCYTES # BLD: 2.8 K/UL (ref 0.9–3.6)
LYMPHOCYTES NFR BLD: 27 % (ref 21–52)
MAGNESIUM SERPL-MCNC: 1.3 MG/DL (ref 1.6–2.6)
MCH RBC QN AUTO: 28.6 PG (ref 24–34)
MCHC RBC AUTO-ENTMCNC: 31.8 G/DL (ref 31–37)
MCV RBC AUTO: 89.8 FL (ref 74–97)
MONOCYTES # BLD: 0.9 K/UL (ref 0.05–1.2)
MONOCYTES NFR BLD: 9 % (ref 3–10)
NEUTS SEG # BLD: 6.4 K/UL (ref 1.8–8)
NEUTS SEG NFR BLD: 62 % (ref 40–73)
NITRITE UR QL STRIP.AUTO: NEGATIVE
PH UR STRIP: 6.5 [PH] (ref 5–8)
PLATELET # BLD AUTO: 274 K/UL (ref 135–420)
PMV BLD AUTO: 12.6 FL (ref 9.2–11.8)
POTASSIUM SERPL-SCNC: 3.4 MMOL/L (ref 3.5–5.5)
PROT SERPL-MCNC: 7.7 G/DL (ref 6.4–8.2)
PROT UR STRIP-MCNC: NEGATIVE MG/DL
RBC # BLD AUTO: 4.2 M/UL (ref 4.2–5.3)
SODIUM SERPL-SCNC: 140 MMOL/L (ref 136–145)
SP GR UR REFRACTOMETRY: 1.01 (ref 1–1.03)
TROPONIN I SERPL-MCNC: <0.02 NG/ML (ref 0–0.04)
UROBILINOGEN UR QL STRIP.AUTO: 0.2 EU/DL (ref 0.2–1)
WBC # BLD AUTO: 10.3 K/UL (ref 4.6–13.2)

## 2020-01-06 PROCEDURE — 99285 EMERGENCY DEPT VISIT HI MDM: CPT

## 2020-01-06 PROCEDURE — 93005 ELECTROCARDIOGRAM TRACING: CPT

## 2020-01-06 PROCEDURE — 74011250636 HC RX REV CODE- 250/636: Performed by: EMERGENCY MEDICINE

## 2020-01-06 PROCEDURE — 84484 ASSAY OF TROPONIN QUANT: CPT

## 2020-01-06 PROCEDURE — 96375 TX/PRO/DX INJ NEW DRUG ADDON: CPT

## 2020-01-06 PROCEDURE — 85025 COMPLETE CBC W/AUTO DIFF WBC: CPT

## 2020-01-06 PROCEDURE — 51702 INSERT TEMP BLADDER CATH: CPT

## 2020-01-06 PROCEDURE — 96361 HYDRATE IV INFUSION ADD-ON: CPT

## 2020-01-06 PROCEDURE — 87086 URINE CULTURE/COLONY COUNT: CPT

## 2020-01-06 PROCEDURE — 74176 CT ABD & PELVIS W/O CONTRAST: CPT

## 2020-01-06 PROCEDURE — 96374 THER/PROPH/DIAG INJ IV PUSH: CPT

## 2020-01-06 PROCEDURE — 83735 ASSAY OF MAGNESIUM: CPT

## 2020-01-06 PROCEDURE — 80053 COMPREHEN METABOLIC PANEL: CPT

## 2020-01-06 PROCEDURE — 81003 URINALYSIS AUTO W/O SCOPE: CPT

## 2020-01-06 RX ORDER — BACLOFEN 20 MG
500 TABLET ORAL DAILY
Qty: 30 TAB | Refills: 3 | Status: SHIPPED | OUTPATIENT
Start: 2020-01-06 | End: 2020-02-06

## 2020-01-06 RX ORDER — FENTANYL CITRATE 50 UG/ML
25 INJECTION, SOLUTION INTRAMUSCULAR; INTRAVENOUS
Status: COMPLETED | OUTPATIENT
Start: 2020-01-06 | End: 2020-01-06

## 2020-01-06 RX ORDER — ONDANSETRON 2 MG/ML
4 INJECTION INTRAMUSCULAR; INTRAVENOUS
Status: COMPLETED | OUTPATIENT
Start: 2020-01-06 | End: 2020-01-06

## 2020-01-06 RX ORDER — MAGNESIUM SULFATE HEPTAHYDRATE 40 MG/ML
2 INJECTION, SOLUTION INTRAVENOUS ONCE
Status: COMPLETED | OUTPATIENT
Start: 2020-01-06 | End: 2020-01-06

## 2020-01-06 RX ORDER — ONDANSETRON 4 MG/1
4 TABLET, ORALLY DISINTEGRATING ORAL
Qty: 12 TAB | Refills: 0 | Status: SHIPPED | OUTPATIENT
Start: 2020-01-06 | End: 2022-11-01 | Stop reason: ALTCHOICE

## 2020-01-06 RX ADMIN — MAGNESIUM SULFATE IN WATER 2 G: 40 INJECTION, SOLUTION INTRAVENOUS at 19:20

## 2020-01-06 RX ADMIN — SODIUM CHLORIDE 500 ML: 900 INJECTION, SOLUTION INTRAVENOUS at 17:42

## 2020-01-06 RX ADMIN — FENTANYL CITRATE 25 MCG: 50 INJECTION, SOLUTION INTRAMUSCULAR; INTRAVENOUS at 18:26

## 2020-01-06 RX ADMIN — ONDANSETRON 4 MG: 2 INJECTION INTRAMUSCULAR; INTRAVENOUS at 18:26

## 2020-01-06 NOTE — ED NOTES
Vitals:  Patient Vitals for the past 12 hrs:   Temp Pulse Resp BP SpO2   01/06/20 2015 -- (!) 54 -- 121/47 95 %   01/06/20 2000 -- (!) 57 -- 133/54 96 %   01/06/20 1945 -- (!) 57 -- 137/58 96 %   01/06/20 1930 -- 61 -- 124/69 98 %   01/06/20 1915 -- (!) 59 -- 142/64 97 %   01/06/20 1815 -- (!) 57 -- 121/51 98 %   01/06/20 1745 -- (!) 58 -- 146/56 100 %   01/06/20 1730 -- (!) 57 -- 136/57 100 %   01/06/20 1715 -- 60 -- 130/51 100 %   01/06/20 1700 -- (!) 59 -- 115/81 98 %   01/06/20 1651 -- (!) 59 18 157/53 99 %   01/06/20 1643 98.3 °F (36.8 °C) -- -- -- --         Medications ordered:   Medications   magnesium sulfate 2 g/50 ml IVPB (premix or compounded) (has no administration in time range)   sodium chloride 0.9 % bolus infusion 500 mL (0 mL IntraVENous IV Completed 1/6/20 1912)   ondansetron (ZOFRAN) injection 4 mg (4 mg IntraVENous Given 1/6/20 1826)   fentaNYL citrate (PF) injection 25 mcg (25 mcg IntraVENous Given 1/6/20 1826)         Lab findings:  Recent Results (from the past 12 hour(s))   EKG, 12 LEAD, INITIAL    Collection Time: 01/06/20  4:59 PM   Result Value Ref Range    Ventricular Rate 58 BPM    Atrial Rate 58 BPM    P-R Interval 226 ms    QRS Duration 74 ms    Q-T Interval 374 ms    QTC Calculation (Bezet) 367 ms    Calculated P Axis 49 degrees    Calculated R Axis -6 degrees    Calculated T Axis 66 degrees    Diagnosis       Sinus bradycardia with 1st degree AV block  Nonspecific T wave abnormality  Abnormal ECG  When compared with ECG of 19-NOV-2019 18:34,  premature supraventricular complexes are no longer present  Nonspecific T wave abnormality no longer evident in Inferior leads  QT has shortened     CBC WITH AUTOMATED DIFF    Collection Time: 01/06/20  5:15 PM   Result Value Ref Range    WBC 10.3 4.6 - 13.2 K/uL    RBC 4.20 4. 20 - 5.30 M/uL    HGB 12.0 12.0 - 16.0 g/dL    HCT 37.7 35.0 - 45.0 %    MCV 89.8 74.0 - 97.0 FL    MCH 28.6 24.0 - 34.0 PG    MCHC 31.8 31.0 - 37.0 g/dL    RDW 12.9 11.6 - 14.5 %    PLATELET 108 161 - 803 K/uL    MPV 12.6 (H) 9.2 - 11.8 FL    NEUTROPHILS 62 40 - 73 %    LYMPHOCYTES 27 21 - 52 %    MONOCYTES 9 3 - 10 %    EOSINOPHILS 2 0 - 5 %    BASOPHILS 0 0 - 2 %    ABS. NEUTROPHILS 6.4 1.8 - 8.0 K/UL    ABS. LYMPHOCYTES 2.8 0.9 - 3.6 K/UL    ABS. MONOCYTES 0.9 0.05 - 1.2 K/UL    ABS. EOSINOPHILS 0.2 0.0 - 0.4 K/UL    ABS. BASOPHILS 0.0 0.0 - 0.1 K/UL    DF AUTOMATED     METABOLIC PANEL, COMPREHENSIVE    Collection Time: 01/06/20  5:15 PM   Result Value Ref Range    Sodium 140 136 - 145 mmol/L    Potassium 3.4 (L) 3.5 - 5.5 mmol/L    Chloride 104 100 - 111 mmol/L    CO2 29 21 - 32 mmol/L    Anion gap 7 3.0 - 18 mmol/L    Glucose 173 (H) 74 - 99 mg/dL    BUN 14 7.0 - 18 MG/DL    Creatinine 1.25 0.6 - 1.3 MG/DL    BUN/Creatinine ratio 11 (L) 12 - 20      GFR est AA 51 (L) >60 ml/min/1.73m2    GFR est non-AA 42 (L) >60 ml/min/1.73m2    Calcium 8.9 8.5 - 10.1 MG/DL    Bilirubin, total 0.3 0.2 - 1.0 MG/DL    ALT (SGPT) 11 (L) 13 - 56 U/L    AST (SGOT) 11 10 - 38 U/L    Alk.  phosphatase 129 (H) 45 - 117 U/L    Protein, total 7.7 6.4 - 8.2 g/dL    Albumin 4.0 3.4 - 5.0 g/dL    Globulin 3.7 2.0 - 4.0 g/dL    A-G Ratio 1.1 0.8 - 1.7     TROPONIN I    Collection Time: 01/06/20  5:15 PM   Result Value Ref Range    Troponin-I, QT <0.02 0.0 - 0.045 NG/ML   URINALYSIS W/ RFLX MICROSCOPIC    Collection Time: 01/06/20  5:15 PM   Result Value Ref Range    Color YELLOW      Appearance CLEAR      Specific gravity 1.006 1.005 - 1.030      pH (UA) 6.5 5.0 - 8.0      Protein NEGATIVE  NEG mg/dL    Glucose NEGATIVE  NEG mg/dL    Ketone NEGATIVE  NEG mg/dL    Bilirubin NEGATIVE  NEG      Blood NEGATIVE  NEG      Urobilinogen 0.2 0.2 - 1.0 EU/dL    Nitrites NEGATIVE  NEG      Leukocyte Esterase NEGATIVE  NEG     MAGNESIUM    Collection Time: 01/06/20  5:15 PM   Result Value Ref Range    Magnesium 1.3 (L) 1.6 - 2.6 mg/dL       EKG interpretation by ED Physician:      X-Ray, CT or other radiology findings or impressions:  CT ABD PELV WO CONT    (Results Pending)   No acute inflammatory or obstructive process in the abdomen or pelvis. Small bowel loops abutting the ventral abdominal wall in area of prior hernia mesh repair with mild surrounding stranding. Progress notes, Consult notes or additional Procedure notes:   Turned over from Dr. Michelle Garcia to follow-up on labs and reassess with possible imaging. Exam shows patient with mild pelvic tenderness on exam.  Rest of abdomen soft. Patient states she has been nauseous and vomiting for the last week along with some lower abdominal pain and back pain. Patient more comfortable. No obvious infection. Patient with mild urinary retention. Will remove [place] and have patient follow-up with urology    I have discussed with patient and/or family/sig other the results, interpretation of any imaging if performed, suspected diagnosis and treatment plan to include instructions regarding the diagnoses listed to which understanding was expressed with all questions answered      Reevaluation of patient:   stable    Disposition:  Diagnosis:   1. Acute pelvic pain, female    2. Non-intractable vomiting with nausea, unspecified vomiting type    3. Hypomagnesemia    4. Urinary retention        Disposition: home      Follow-up Information     Follow up With Specialties Details Why Contact Info    Kirill Cabrales MD Internal Medicine Schedule an appointment as soon as possible for a visit this week for recheck in office [address] [address]  [phone]      [facility] EMERGENCY DEPT Emergency Medicine  If symptoms worsen [address] [place]  [phone]    [facility]  Schedule an appointment as soon as possible for a visit about your urinary retention [address] [place] [address]  [phone]            Patient's Medications   Start Taking    MAGNESIUM OXIDE 500 MG TAB    Take 500 mg by mouth daily.    Continue Taking ALBUTEROL (PROAIR HFA) 90 MCG/ACTUATION INHALER    Take  by inhalation. AMLODIPINE (NORVASC) 10 MG TABLET    10 mg. ASPIRIN (ASPIRIN) 325 MG TABLET    Take 1 Tab by mouth daily. ATORVASTATIN (LIPITOR) 80 MG TABLET    Take 80 mg by mouth daily. BETAMETHASONE DIPROPIONATE (DIPROSONE) 0.05 % TOPICAL CREAM    Apply  to affected area daily as needed for Skin Irritation. BREO ELLIPTA 100-25 MCG/DOSE INHALER    TAKE 1 PUFF BY MOUTH EVERY DAY    CHOLECALCIFEROL (VITAMIN D3) 1,000 UNIT CAP    Take 1,000 Units by mouth daily. CLONIDINE HCL (CATAPRES) 0.1 MG TABLET    Take 0.05 mg by mouth. CLOTRIMAZOLE (MYCELEX EX)    by Apply Externally route. CONJUGATED ESTROGENS (PREMARIN) 0.625 MG/GRAM VAGINAL CREAM    Apply small amount to introitus MWF as directed. CYANOCOBALAMIN (VITAMIN B-12) 1,000 MCG TABLET    Take 1,000 mcg by mouth daily. ERGOCALCIFEROL (ERGOCALCIFEROL) 50,000 UNIT CAPSULE    TAKE ONE CAPSULE BY MOUTH ONCE A WEEK FOR VIT D    FENTANYL (DURAGESIC) 25 MCG/HR PATCH    1 Patch by TransDERmal route every seventy-two (72) hours. FUROSEMIDE (LASIX) 40 MG TABLET    Take 40 mg by mouth as needed. GABAPENTIN (NEURONTIN) 300 MG CAPSULE    Take 300 mg by mouth three (3) times daily. GLIMEPIRIDE (AMARYL) 1 MG TABLET    Take 4 mg by mouth two (2) times a day. HYDROXYZINE PAMOATE (VISTARIL) 25 MG CAPSULE    TAKE 1 CAPSULE BY MOUTH TWICE A DAY AS NEEDED    IBUPROFEN (MOTRIN) 200 MG TABLET    Take 400 mg by mouth every eight (8) hours as needed for Pain. LEVOTHYROXINE (SYNTHROID) 50 MCG TABLET    Take 50 mcg by mouth Daily (before breakfast). LOSARTAN (COZAAR) 100 MG TABLET    Take 100 mg by mouth daily. LUBIPROSTONE (AMITIZA) 8 MCG CAPSULE    Take 8 mcg by mouth two (2) times daily (with meals). METHYLPREDNISOLONE (MEDROL) 4 MG TAB    Take  by mouth daily (with breakfast). METOPROLOL SUCCINATE (TOPROL-XL) 100 MG TABLET    Take 100 mg by mouth daily.     MIRTAZAPINE (REMERON) 15 MG TABLET    Take 15 mg by mouth nightly. NITROGLYCERIN (NITROSTAT) 0.4 MG SL TABLET    1 Tab by SubLINGual route every five (5) minutes as needed for Chest Pain. Up to 3 doses. OMEPRAZOLE (PRILOSEC) 40 MG CAPSULE    Take 40 mg by mouth daily. OXYCODONE-ACETAMINOPHEN (PERCOCET 10)  MG PER TABLET    Take 1 Tab by mouth every six (6) hours as needed for Pain. PITAVASTATIN CALCIUM (LIVALO) 2 MG TABLET    Take 1 Tab by mouth daily. POTASSIUM CHLORIDE SR (KLOR-CON 10) 10 MEQ TABLET    Take 10 mEq by mouth as needed. PROMETHAZINE (PHENERGAN) 25 MG TABLET    TAKE 1 TAB BY MOUTH TWICE DAILY AS NEEDED FOR NAUSEA    SIMVASTATIN (ZOCOR) 20 MG TABLET    Take 1 Tab by mouth nightly. TIZANIDINE (ZANAFLEX) 2 MG TABLET    Take 2 mg by mouth as needed. TROSPIUM (SANCTURA XL) 60 MG CAPSULE    Take 1 Cap by mouth Daily (before breakfast). These Medications have changed    Modified Medication Previous Medication    ONDANSETRON (ZOFRAN ODT) 4 MG DISINTEGRATING TABLET ondansetron (ZOFRAN ODT) 4 mg disintegrating tablet       Take 1 Tab by mouth every eight (8) hours as needed for Nausea. Take 1 Tab by mouth every eight (8) hours as needed for Nausea.    Stop Taking    No medications on file

## 2020-01-06 NOTE — ED PROVIDER NOTES
EMERGENCY DEPARTMENT HISTORY AND PHYSICAL EXAM    5:11 PM      Date: 1/6/2020  Patient Name: Britni Plunkett    History of Presenting Illness     Chief Complaint   Patient presents with    Fatigue    Nausea         History Provided By: Patient      Additional History (Context): Britni Plunkett is a 66 y.o. female who presents with any of increased fatigue suprapubic pain nausea for the past 2 or 3 days. Patient was diagnosed by her PCP with a UTI in December she states she has been taking antibiotics but does not feel like it is completely resolved she complains now of increased fatigue with suprapubic pain and episode of vomiting. She denies any fevers chills she is having some mild flank pain is also complaining of some right arm pain earlier today. PCP: Susan Sanderson MD      Current Facility-Administered Medications   Medication Dose Route Frequency Provider Last Rate Last Dose    sodium chloride 0.9 % bolus infusion 500 mL  500 mL IntraVENous ONCE Nette Quintero MD         Current Outpatient Medications   Medication Sig Dispense Refill    ondansetron (ZOFRAN ODT) 4 mg disintegrating tablet Take 1 Tab by mouth every eight (8) hours as needed for Nausea. 12 Tab 0    atorvastatin (LIPITOR) 80 mg tablet Take 80 mg by mouth daily.  omeprazole (PRILOSEC) 40 mg capsule Take 40 mg by mouth daily.  lubiPROStone (AMITIZA) 8 mcg capsule Take 8 mcg by mouth two (2) times daily (with meals).  clotrimazole (MYCELEX EX) by Apply Externally route.  albuterol (PROAIR HFA) 90 mcg/actuation inhaler Take  by inhalation.  methylPREDNISolone (MEDROL) 4 mg tab Take  by mouth daily (with breakfast).  simvastatin (ZOCOR) 20 mg tablet Take 1 Tab by mouth nightly. 30 Tab 6    pitavastatin calcium (LIVALO) 2 mg tablet Take 1 Tab by mouth daily. 30 Tab 6    trospium (SANCTURA XL) 60 mg capsule Take 1 Cap by mouth Daily (before breakfast).  30 Cap 3    conjugated estrogens (PREMARIN) 0.625 mg/gram vaginal cream Apply small amount to introitus MWF as directed. 30 g 1    cloNIDine HCl (CATAPRES) 0.1 mg tablet Take 0.05 mg by mouth.  ergocalciferol (ERGOCALCIFEROL) 50,000 unit capsule TAKE ONE CAPSULE BY MOUTH ONCE A WEEK FOR VIT D  0    BREO ELLIPTA 100-25 mcg/dose inhaler TAKE 1 PUFF BY MOUTH EVERY DAY  3    hydrOXYzine pamoate (VISTARIL) 25 mg capsule TAKE 1 CAPSULE BY MOUTH TWICE A DAY AS NEEDED  2    promethazine (PHENERGAN) 25 mg tablet TAKE 1 TAB BY MOUTH TWICE DAILY AS NEEDED FOR NAUSEA  1    nitroglycerin (NITROSTAT) 0.4 mg SL tablet 1 Tab by SubLINGual route every five (5) minutes as needed for Chest Pain. Up to 3 doses. 25 Tab 3    betamethasone dipropionate (DIPROSONE) 0.05 % topical cream Apply  to affected area daily as needed for Skin Irritation.  furosemide (LASIX) 40 mg tablet Take 40 mg by mouth as needed.  gabapentin (NEURONTIN) 300 mg capsule Take 300 mg by mouth three (3) times daily.  potassium chloride SR (KLOR-CON 10) 10 mEq tablet Take 10 mEq by mouth as needed.  tiZANidine (ZANAFLEX) 2 mg tablet Take 2 mg by mouth as needed. 3    metoprolol succinate (TOPROL-XL) 100 mg tablet Take 100 mg by mouth daily. 6    aspirin (ASPIRIN) 325 mg tablet Take 1 Tab by mouth daily. 30 Tab 0    mirtazapine (REMERON) 15 mg tablet Take 15 mg by mouth nightly.  glimepiride (AMARYL) 1 mg tablet Take 4 mg by mouth two (2) times a day.  levothyroxine (SYNTHROID) 50 mcg tablet Take 50 mcg by mouth Daily (before breakfast).  fentaNYL (DURAGESIC) 25 mcg/hr PATCH 1 Patch by TransDERmal route every seventy-two (72) hours.  amLODIPine (NORVASC) 10 mg tablet 10 mg.      ibuprofen (MOTRIN) 200 mg tablet Take 400 mg by mouth every eight (8) hours as needed for Pain.  oxyCODONE-acetaminophen (PERCOCET 10)  mg per tablet Take 1 Tab by mouth every six (6) hours as needed for Pain.       cholecalciferol (VITAMIN D3) 1,000 unit cap Take 1,000 Units by mouth daily.  losartan (COZAAR) 100 mg tablet Take 100 mg by mouth daily.  cyanocobalamin (VITAMIN B-12) 1,000 mcg tablet Take 1,000 mcg by mouth daily. Past History     Past Medical History:  Past Medical History:   Diagnosis Date    Anxiety     Depression     Diabetes [facility] [address]     Diverticulosis     Dysphagia     Esophageal reflux     Fatty liver     Gastric ulcer     Gastroparesis     H/O joint replacement 1991    left knee    Hypercholesteremia     Hypertension     Iron deficiency anemia     Neurogenic bladder     Recurrent UTI     Scoliosis     Spinal stenosis     Stenosis of lumbosacral spine     Stroke ([facility] [address] 04/2018    Tardive dyskinesia        Past Surgical History:  Past Surgical History:   Procedure Laterality Date    EGD  7/10/2018         HX APPENDECTOMY      HX BREAST LUMPECTOMY      HX CHOLECYSTECTOMY  1980    HX COLECTOMY  2012    HX COLONOSCOPY  10/29/2009    hyperplastic polyp, hemorrhoids    HX HERNIA REPAIR      HX HYSTERECTOMY  1970    HX KNEE ARTHROSCOPY      HX KNEE REPLACEMENT  1991    left knee    HX LUMBAR FUSION      x 3       Family History:  Family History   Problem Relation Age of Onset    Heart Disease Mother     Diabetes Mother     Heart Attack Mother     Cancer Father         lung CA    Cancer Brother     Cancer Sister        Social History:  Social History     Tobacco Use    Smoking status: Never Smoker    Smokeless tobacco: Never Used   Substance Use Topics    Alcohol use: No    Drug use: No       Allergies: Allergies   Allergen Reactions    Codeine Rash and Itching    Iodine Hives and Rash    Levofloxacin Nausea and Vomiting    Metformin Other (comments)     diarrhea    Morphine Hives    Pcn [Penicillins] Other (comments)     Causes \"Heart swell\"    Sulfa (Sulfonamide Antibiotics) Nausea Only         Review of Systems       Review of Systems   Constitutional: Negative for chills, diaphoresis and fever.    HENT: Negative for congestion. Eyes: Negative for visual disturbance. Respiratory: Negative for cough, chest tightness and shortness of breath. Cardiovascular: Negative for chest pain. Gastrointestinal: Negative for abdominal pain, diarrhea, nausea and vomiting. Musculoskeletal: Negative for back pain. Skin: Negative for rash. Neurological: Negative for dizziness, syncope and weakness. All other systems reviewed and are negative. Physical Exam     Visit Vitals  /53   Pulse (!) 59   Temp 98.3 °F (36.8 °C)   Resp 18   Wt 77.1 kg (170 lb)   SpO2 99%   BMI 28.29 kg/m²       Physical Exam  Vitals signs and nursing note reviewed. Constitutional:       General: She is not in acute distress. Appearance: She is well-developed. HENT:      Head: Normocephalic and atraumatic. Eyes:      General: No scleral icterus. Conjunctiva/sclera: Conjunctivae normal.      Pupils: Pupils are equal, round, and reactive to light. Neck:      Musculoskeletal: Normal range of motion and neck supple. Cardiovascular:      Rate and Rhythm: Normal rate and regular rhythm. Heart sounds: Normal heart sounds. No murmur. Pulmonary:      Effort: Pulmonary effort is normal. No respiratory distress. Breath sounds: Normal breath sounds. Abdominal:      General: Bowel sounds are normal. There is no distension. Palpations: Abdomen is soft. Tenderness: There is tenderness. Musculoskeletal:         General: Swelling present. Right lower leg: Edema present. Left lower leg: Edema present. Lymphadenopathy:      Cervical: No cervical adenopathy. Skin:     General: Skin is warm and dry. Findings: No rash. Neurological:      Mental Status: She is alert and oriented to person, place, and time. Motor: Weakness present.       Coordination: Coordination normal.      Comments: Patient has history of previous stroke with left-sided deficit and continues to have mild 3 out of 5 motor upper and lower on the left side per patient this is stable for her   Psychiatric:         Behavior: Behavior normal.           Diagnostic Study Results     Labs -  Recent Results (from the past 12 hour(s))   EKG, 12 LEAD, INITIAL    Collection Time: 01/06/20  4:59 PM   Result Value Ref Range    Ventricular Rate 58 BPM    Atrial Rate 58 BPM    P-R Interval 226 ms    QRS Duration 74 ms    Q-T Interval 374 ms    QTC Calculation (Bezet) 367 ms    Calculated P Axis 49 degrees    Calculated R Axis -6 degrees    Calculated T Axis 66 degrees    Diagnosis       Sinus bradycardia with 1st degree AV block  Nonspecific T wave abnormality  Abnormal ECG  When compared with ECG of 19-NOV-2019 18:34,  premature supraventricular complexes are no longer present  Nonspecific T wave abnormality no longer evident in Inferior leads  QT has shortened         Radiologic Studies -   No orders to display         Medical Decision Making   I am the first provider for this patient. I reviewed the vital signs, available nursing notes, past medical history, past surgical history, family history and social history. Vital Signs-Reviewed the patient's vital signs.       EKG: Sinus bradycardia at a rate of 58 nonspecific ST-T wave changes QTC is 367 read by me at 1702    Records Reviewed: Nursing Notes and Old Medical Records (Time of Review: 5:11 PM)    ED Course: Progress Notes, Reevaluation, and Consults:      Provider Notes (Medical Decision Making):   MDM  Number of Diagnoses or Management Options  Diagnosis management comments: Acute  malaise possible recurrent UTI    6:02 PM  Care transferred to Dr Manuelito Zapata and/or Complexity of Data Reviewed  Clinical lab tests: ordered  Tests in the radiology section of CPT®: ordered    Risk of Complications, Morbidity, and/or Mortality  Presenting problems: high  Diagnostic procedures: moderate  Management options: moderate            Critical Care Time:       Diagnosis     Clinical Impression: No diagnosis found. Disposition:     Follow-up Information    None          Patient's Medications   Start Taking    No medications on file   Continue Taking    ALBUTEROL (PROAIR HFA) 90 MCG/ACTUATION INHALER    Take  by inhalation. AMLODIPINE (NORVASC) 10 MG TABLET    10 mg. ASPIRIN (ASPIRIN) 325 MG TABLET    Take 1 Tab by mouth daily. ATORVASTATIN (LIPITOR) 80 MG TABLET    Take 80 mg by mouth daily. BETAMETHASONE DIPROPIONATE (DIPROSONE) 0.05 % TOPICAL CREAM    Apply  to affected area daily as needed for Skin Irritation. BREO ELLIPTA 100-25 MCG/DOSE INHALER    TAKE 1 PUFF BY MOUTH EVERY DAY    CHOLECALCIFEROL (VITAMIN D3) 1,000 UNIT CAP    Take 1,000 Units by mouth daily. CLONIDINE HCL (CATAPRES) 0.1 MG TABLET    Take 0.05 mg by mouth. CLOTRIMAZOLE (MYCELEX EX)    by Apply Externally route. CONJUGATED ESTROGENS (PREMARIN) 0.625 MG/GRAM VAGINAL CREAM    Apply small amount to introitus MWF as directed. CYANOCOBALAMIN (VITAMIN B-12) 1,000 MCG TABLET    Take 1,000 mcg by mouth daily. ERGOCALCIFEROL (ERGOCALCIFEROL) 50,000 UNIT CAPSULE    TAKE ONE CAPSULE BY MOUTH ONCE A WEEK FOR VIT D    FENTANYL (DURAGESIC) 25 MCG/HR PATCH    1 Patch by TransDERmal route every seventy-two (72) hours. FUROSEMIDE (LASIX) 40 MG TABLET    Take 40 mg by mouth as needed. GABAPENTIN (NEURONTIN) 300 MG CAPSULE    Take 300 mg by mouth three (3) times daily. GLIMEPIRIDE (AMARYL) 1 MG TABLET    Take 4 mg by mouth two (2) times a day. HYDROXYZINE PAMOATE (VISTARIL) 25 MG CAPSULE    TAKE 1 CAPSULE BY MOUTH TWICE A DAY AS NEEDED    IBUPROFEN (MOTRIN) 200 MG TABLET    Take 400 mg by mouth every eight (8) hours as needed for Pain. LEVOTHYROXINE (SYNTHROID) 50 MCG TABLET    Take 50 mcg by mouth Daily (before breakfast). LOSARTAN (COZAAR) 100 MG TABLET    Take 100 mg by mouth daily. LUBIPROSTONE (AMITIZA) 8 MCG CAPSULE    Take 8 mcg by mouth two (2) times daily (with meals).     METHYLPREDNISOLONE (MEDROL) 4 MG TAB    Take  by mouth daily (with breakfast). METOPROLOL SUCCINATE (TOPROL-XL) 100 MG TABLET    Take 100 mg by mouth daily. MIRTAZAPINE (REMERON) 15 MG TABLET    Take 15 mg by mouth nightly. NITROGLYCERIN (NITROSTAT) 0.4 MG SL TABLET    1 Tab by SubLINGual route every five (5) minutes as needed for Chest Pain. Up to 3 doses. OMEPRAZOLE (PRILOSEC) 40 MG CAPSULE    Take 40 mg by mouth daily. ONDANSETRON (ZOFRAN ODT) 4 MG DISINTEGRATING TABLET    Take 1 Tab by mouth every eight (8) hours as needed for Nausea. OXYCODONE-ACETAMINOPHEN (PERCOCET 10)  MG PER TABLET    Take 1 Tab by mouth every six (6) hours as needed for Pain. PITAVASTATIN CALCIUM (LIVALO) 2 MG TABLET    Take 1 Tab by mouth daily. POTASSIUM CHLORIDE SR (KLOR-CON 10) 10 MEQ TABLET    Take 10 mEq by mouth as needed. PROMETHAZINE (PHENERGAN) 25 MG TABLET    TAKE 1 TAB BY MOUTH TWICE DAILY AS NEEDED FOR NAUSEA    SIMVASTATIN (ZOCOR) 20 MG TABLET    Take 1 Tab by mouth nightly. TIZANIDINE (ZANAFLEX) 2 MG TABLET    Take 2 mg by mouth as needed. TROSPIUM (SANCTURA XL) 60 MG CAPSULE    Take 1 Cap by mouth Daily (before breakfast). These Medications have changed    No medications on file   Stop Taking    No medications on file     _______________________________    Please note that this dictation was completed with [employer], the computer voice recognition software. Quite often unanticipated grammatical, syntax, homophones, and other interpretive errors are inadvertently transcribed by the computer software. Please disregard these errors. Please excuse any errors that have escaped final proofreading.

## 2020-01-06 NOTE — ED TRIAGE NOTES
Pt arrived via EMS from home with a complaint of generalized weakness times 1 week along with nausea. Pt also states she vomited once today.

## 2020-01-07 LAB
ATRIAL RATE: 58 BPM
CALCULATED P AXIS, ECG09: 49 DEGREES
CALCULATED R AXIS, ECG10: -6 DEGREES
CALCULATED T AXIS, ECG11: 66 DEGREES
DIAGNOSIS, 93000: NORMAL
P-R INTERVAL, ECG05: 226 MS
Q-T INTERVAL, ECG07: 374 MS
QRS DURATION, ECG06: 74 MS
QTC CALCULATION (BEZET), ECG08: 367 MS
VENTRICULAR RATE, ECG03: 58 BPM

## 2020-01-07 NOTE — ED NOTES
Received report from Domonique Lawler, [facility].

## 2020-01-07 NOTE — DISCHARGE INSTRUCTIONS
Urinary Retention: Care Instructions  Your Care Instructions    Urinary retention means that you aren't able to urinate. In men, it is often caused by a blockage of the urinary tract from an enlarged prostate gland. In men and women, it can also be caused by an infection or nerve damage. Or it may be a side effect of a medicine. The doctor may have drained the urine from your bladder. If you still have problems passing urine, you may need to use a catheter at home. This is used to empty your bladder until the problem can be fixed. Your doctor may put a catheter in your bladder before you go home. If so, he or she will tell you when to come back to have the catheter removed. The doctor has checked you closely. But problems can develop later. If you notice any problems or new symptoms, get medical treatment right away. Follow-up care is a key part of your treatment and safety. Be sure to make and go to all appointments, and call your doctor if you are having problems. It's also a good idea to know your test results and keep a list of the medicines you take. How can you care for yourself at home? · Take your medicines exactly as prescribed. Call your doctor if you think you are having a problem with your medicine. You will get more details on the specific medicines your doctor prescribes. · Check with your doctor before you use any over-the-counter medicines. Many cold and allergy medicines, for example, can make this problem worse. Make sure your doctor knows all of the medicines, vitamins, supplements, and herbal remedies you take. · Spread out through the day the amount of fluid you drink. Do not drink a lot at bedtime. · Avoid alcohol and caffeine. · If you have been given a catheter, or if one is already in place, follow the instructions you were given. Always wash your hands before and after you handle the catheter. When should you call for help?   Call your doctor now or seek immediate medical care if:    · You cannot urinate at all, or it is getting harder to urinate.     · You have symptoms of a urinary tract infection. These may include:  ? Pain or burning when you urinate. ? A frequent need to urinate without being able to pass much urine. ? Pain in the flank, which is just below the rib cage and above the waist on either side of the back. ? Blood in your urine. ? A fever.    Watch closely for changes in your health, and be sure to contact your doctor if:    · You have any problems with your catheter.     · You do not get better as expected. Where can you learn more? Go to http://shamika-chinyere.info/. Enter M244 in the search box to learn more about \"Urinary Retention: Care Instructions. \"  Current as of: December 19, 2018  Content Version: 12.2  © 2106-6062 [employer]. Care instructions adapted under license by [employer] (which disclaims liability or warranty for this information). If you have questions about a medical condition or this instruction, always ask your healthcare professional. [place] [address] any warranty or liability for your use of this information. Patient Education        Nausea and Vomiting: Care Instructions  Your Care Instructions    When you are nauseated, you may feel weak and sweaty and notice a lot of saliva in your mouth. Nausea often leads to vomiting. Most of the time you do not need to worry about nausea and vomiting, but they can be signs of other illnesses. Two common causes of nausea and vomiting are stomach flu and food poisoning. Nausea and vomiting from viral stomach flu will usually start to improve within 24 hours. Nausea and vomiting from food poisoning may last from 12 to 48 hours. The doctor has checked you carefully, but problems can develop later. If you notice any problems or new symptoms, get medical treatment right away. Follow-up care is a key part of your treatment and safety.  Be sure to make and go to all appointments, and call your doctor if you are having problems. It's also a good idea to know your test results and keep a list of the medicines you take. How can you care for yourself at home? · To prevent dehydration, drink plenty of fluids, enough so that your urine is light yellow or clear like water. Choose water and other caffeine-free clear liquids until you feel better. If you have kidney, heart, or liver disease and have to limit fluids, talk with your doctor before you increase the amount of fluids you drink. · Rest in bed until you feel better. · When you are able to eat, try clear soups, mild foods, and liquids until all symptoms are gone for 12 to 48 hours. Other good choices include dry toast, crackers, cooked cereal, and gelatin dessert, such as Jell-O. When should you call for help? Call 911 anytime you think you may need emergency care. For example, call if:    · You passed out (lost consciousness).    Call your doctor now or seek immediate medical care if:    · You have symptoms of dehydration, such as:  ? Dry eyes and a dry mouth. ? Passing only a little dark urine. ? Feeling thirstier than usual.     · You have new or worsening belly pain.     · You have a new or higher fever.     · You vomit blood or what looks like coffee grounds.    Watch closely for changes in your health, and be sure to contact your doctor if:    · You have ongoing nausea and vomiting.     · Your vomiting is getting worse.     · Your vomiting lasts longer than 2 days.     · You are not getting better as expected. Where can you learn more? Go to http://shamika-chinyere.info/. Enter 25 [id number] in the search box to learn more about \"Nausea and Vomiting: Care Instructions. \"  Current as of: June 26, 2019  Content Version: 12.2  © 7145-1353 [employer].  Care instructions adapted under license by [employer] (which disclaims liability or warranty for this information). If you have questions about a medical condition or this instruction, always ask your healthcare professional. [address] any warranty or liability for your use of this information. Patient Education        Pelvic Pain: Care Instructions  Your Care Instructions    Pelvic pain, or pain in the lower belly, can have many causes. Often pelvic pain is not serious and gets better in a few days. If your pain continues or gets worse, you may need tests and treatment. Tell your doctor about any new symptoms. These may be signs of a serious problem. Follow-up care is a key part of your treatment and safety. Be sure to make and go to all appointments, and call your doctor if you are having problems. It's also a good idea to know your test results and keep a list of the medicines you take. How can you care for yourself at home? · Rest until you feel better. Lie down, and raise your legs by placing a pillow under your knees. · Drink plenty of fluids. You may find that small, frequent sips are easier on your stomach than if you drink a lot at once. Avoid drinks with carbonation or caffeine, such as soda pop, tea, or coffee. · Try eating several small meals instead of 2 or 3 large ones. Eat mild foods, such as rice, dry toast or crackers, bananas, and applesauce. Avoid fatty and spicy foods, other fruits, and alcohol until 48 hours after your symptoms have gone away. · Take an over-the-counter pain medicine, such as acetaminophen (Tylenol), ibuprofen (Advil, Motrin), or naproxen (Aleve). Read and follow all instructions on the label. · Do not take two or more pain medicines at the same time unless the doctor told you to. Many pain medicines have acetaminophen, which is Tylenol. Too much acetaminophen (Tylenol) can be harmful. · You can put a heating pad, a warm cloth, or moist heat on your belly to relieve pain. When should you call for help?   Call your doctor now or seek immediate medical care if:    · You have a new or higher fever.     · You have unusual vaginal bleeding.     · You have new or worse belly or pelvic pain.     · You have vaginal discharge that has increased in amount or smells bad.    Watch closely for changes in your health, and be sure to contact your doctor if:    · You do not get better as expected. Where can you learn more? Go to http://shamika-chinyere.info/. Enter [id number] in the search box to learn more about \"Pelvic Pain: Care Instructions. \"  Current as of: February 19, 2019  Content Version: 12.2  © 0941-1508 [employer]. Care instructions adapted under license by [employer] (which disclaims liability or warranty for this information). If you have questions about a medical condition or this instruction, always ask your healthcare professional. [address] any warranty or liability for your use of this information.

## 2020-01-08 LAB
BACTERIA SPEC CULT: NORMAL
SERVICE CMNT-IMP: NORMAL

## 2020-02-28 ENCOUNTER — HOSPITAL ENCOUNTER (OUTPATIENT)
Dept: VASCULAR SURGERY | Age: 75
Discharge: HOME | End: 2020-02-28
Attending: INTERNAL MEDICINE
Payer: MEDICARE

## 2020-02-28 DIAGNOSIS — R60.9 SWELLING: ICD-10-CM

## 2020-02-28 PROCEDURE — 93970 EXTREMITY STUDY: CPT

## 2020-05-15 ENCOUNTER — APPOINTMENT (OUTPATIENT)
Dept: CT IMAGING | Age: 75
End: 2020-05-15
Attending: PHYSICIAN ASSISTANT
Payer: MEDICARE

## 2020-05-15 ENCOUNTER — HOSPITAL ENCOUNTER (EMERGENCY)
Age: 75
Discharge: HOME | End: 2020-05-15
Attending: EMERGENCY MEDICINE
Payer: MEDICARE

## 2020-05-15 ENCOUNTER — APPOINTMENT (OUTPATIENT)
Dept: GENERAL RADIOLOGY | Age: 75
End: 2020-05-15
Attending: PHYSICIAN ASSISTANT
Payer: MEDICARE

## 2020-05-15 VITALS
RESPIRATION RATE: 18 BRPM | TEMPERATURE: 98.6 F | WEIGHT: 172 LBS | HEIGHT: 64 IN | OXYGEN SATURATION: 99 % | SYSTOLIC BLOOD PRESSURE: 152 MMHG | BODY MASS INDEX: 29.37 KG/M2 | DIASTOLIC BLOOD PRESSURE: 64 MMHG | HEART RATE: 76 BPM

## 2020-05-15 DIAGNOSIS — M54.9 ACUTE BILATERAL BACK PAIN, UNSPECIFIED BACK LOCATION: ICD-10-CM

## 2020-05-15 DIAGNOSIS — M54.2 NECK PAIN: ICD-10-CM
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
## 2020-05-15 LAB
APPEARANCE UR: CLEAR
BILIRUB UR QL: NEGATIVE
COLOR UR: YELLOW
GLUCOSE UR STRIP.AUTO-MCNC: NEGATIVE MG/DL
HGB UR QL STRIP: NEGATIVE
KETONES UR QL STRIP.AUTO: NEGATIVE MG/DL
LEUKOCYTE ESTERASE UR QL STRIP.AUTO: NEGATIVE
NITRITE UR QL STRIP.AUTO: NEGATIVE
PH UR STRIP: 7 [PH] (ref 5–8)
PROT UR STRIP-MCNC: NEGATIVE MG/DL
SP GR UR REFRACTOMETRY: 1.01 (ref 1–1.03)
UROBILINOGEN UR QL STRIP.AUTO: 0.2 EU/DL (ref 0.2–1)

## 2020-05-15 PROCEDURE — 81003 URINALYSIS AUTO W/O SCOPE: CPT

## 2020-05-15 PROCEDURE — 72070 X-RAY EXAM THORAC SPINE 2VWS: CPT

## 2020-05-15 PROCEDURE — 99284 EMERGENCY DEPT VISIT MOD MDM: CPT

## 2020-05-15 PROCEDURE — 70450 CT HEAD/BRAIN W/O DYE: CPT

## 2020-05-15 PROCEDURE — 71046 X-RAY EXAM CHEST 2 VIEWS: CPT

## 2020-05-15 PROCEDURE — 74011250637 HC RX REV CODE- 250/637: Performed by: PHYSICIAN ASSISTANT

## 2020-05-15 PROCEDURE — 72100 X-RAY EXAM L-S SPINE 2/3 VWS: CPT

## 2020-05-15 PROCEDURE — 72125 CT NECK SPINE W/O DYE: CPT

## 2020-05-15 RX ORDER — OXYCODONE AND ACETAMINOPHEN 5; 325 MG/1; MG/1
1 TABLET ORAL
Status: COMPLETED | OUTPATIENT
Start: 2020-05-15 | End: 2020-05-15

## 2020-05-15 RX ADMIN — OXYCODONE HYDROCHLORIDE AND ACETAMINOPHEN 1 TABLET: 5; 325 TABLET ORAL at 15:34

## 2020-05-15 NOTE — ED TRIAGE NOTES
Patient arrives via EMS c/o fall yesterday afternoon with increasing pain radiating to flanks bilaterally today w associated tingling.  Rates pain 4/10

## 2020-05-15 NOTE — ED PROVIDER NOTES
EMERGENCY DEPARTMENT HISTORY AND PHYSICAL EXAM    Date: 5/15/2020  Patient Name: Krish Weinstein    History of Presenting Illness     Chief Complaint   Patient presents with    Fall    Back Pain         History Provided By:patient     Chief Complaint: fall   Duration: yesterday   Timing: acute  Location: mid back, low back, bilateral flank, neck and head  Quality aching, tingling   Severity: moderate  Modifying Factors: worse with movement   Associated Symptoms: head pain, neck pain, back pain, flank pain       Additional History (Context): Krish Weinstein is a 66 y.o. female with PMH anxiety, depression, diabetes, esophageal reflux, COPD, gastroparesis, stroke, tardive dyskinesia, scoliosis, neurogenic bladder, hypertension, hypercholesterolemia, and fatty liver disease who presents by medic with c/o right-sided head pain, neck pain, mid back pain, low back pain, and bilateral flank pain after mechanical fall that happened yesterday. Patient reports using her walker to ambulate around her home when she reportedly tripped over a rug. Patient does not believe that she lost consciousness however she is a poor historian when recalling the events after the fall. Patient states she takes 325 mg of aspirin daily. She denies fever, chills, chest pain, and any known sick exposures. Patient states she has COPD and always has a chronic cough. No other complaints reported at this time. PCP: Beth Gonzales MD    Current Outpatient Medications   Medication Sig Dispense Refill    fentaNYL (DURAGESIC) 50 mcg/hr PATCH       pantoprazole (PROTONIX) 40 mg tablet       levothyroxine (SYNTHROID) 75 mcg tablet       glimepiride (AMARYL) 4 mg tablet Take 4 mg by mouth.       sertraline (ZOLOFT) 100 mg tablet       LANTUS SOLOSTAR U-100 INSULIN 100 unit/mL (3 mL) inpn       clotrimazole (MYCELEX) 10 mg chrissy       ondansetron (ZOFRAN ODT) 4 mg disintegrating tablet Take 1 Tab by mouth every eight (8) hours as needed for Nausea. 12 Tab 0    omeprazole (PRILOSEC) 40 mg capsule Take 40 mg by mouth daily.  lubiPROStone (AMITIZA) 8 mcg capsule Take 8 mcg by mouth two (2) times daily (with meals).  albuterol (PROAIR HFA) 90 mcg/actuation inhaler Take  by inhalation.  pitavastatin calcium (LIVALO) 2 mg tablet Take 1 Tab by mouth daily. 30 Tab 6    trospium (SANCTURA XL) 60 mg capsule Take 1 Cap by mouth Daily (before breakfast). 30 Cap 3    conjugated estrogens (PREMARIN) 0.625 mg/gram vaginal cream Apply small amount to introitus MWF as directed. 30 g 1    cloNIDine HCl (CATAPRES) 0.1 mg tablet Take 0.05 mg by mouth.  ergocalciferol (ERGOCALCIFEROL) 50,000 unit capsule TAKE ONE CAPSULE BY MOUTH ONCE A WEEK FOR VIT D  0    nitroglycerin (NITROSTAT) 0.4 mg SL tablet 1 Tab by SubLINGual route every five (5) minutes as needed for Chest Pain. Up to 3 doses. 25 Tab 3    betamethasone dipropionate (DIPROSONE) 0.05 % topical cream Apply  to affected area daily as needed for Skin Irritation.  furosemide (LASIX) 40 mg tablet Take 40 mg by mouth as needed.  gabapentin (NEURONTIN) 300 mg capsule Take 300 mg by mouth three (3) times daily.  potassium chloride SR (KLOR-CON 10) 10 mEq tablet Take 10 mEq by mouth as needed.  metoprolol succinate (TOPROL-XL) 100 mg tablet Take 100 mg by mouth daily. 6    aspirin (ASPIRIN) 325 mg tablet Take 1 Tab by mouth daily. 30 Tab 0    amLODIPine (NORVASC) 10 mg tablet 10 mg.      oxyCODONE-acetaminophen (PERCOCET 10)  mg per tablet Take 1 Tab by mouth every six (6) hours as needed for Pain.  cholecalciferol (VITAMIN D3) 1,000 unit cap Take 1,000 Units by mouth daily.  losartan (COZAAR) 100 mg tablet Take 100 mg by mouth daily.  cyanocobalamin (VITAMIN B-12) 1,000 mcg tablet Take 1,000 mcg by mouth daily.          Past History     Past Medical History:  Past Medical History:   Diagnosis Date    Anxiety     Atrophic vaginitis     Depression     Diabetes ([facility] [address]     Diverticulosis     Dysphagia     Dysuria     Esophageal reflux     Fatty liver     Gastric ulcer     Gastroparesis     H/O joint replacement 1991    left knee    Hypercholesteremia     Hypertension     Iron deficiency anemia     Neurogenic bladder     Recurrent UTI     Scoliosis     Spinal stenosis     Stenosis of lumbosacral spine     Stroke ([address] 04/2018    Tardive dyskinesia     Urgency-frequency syndrome        Past Surgical History:  Past Surgical History:   Procedure Laterality Date    EGD  7/10/2018         HX APPENDECTOMY      HX BREAST LUMPECTOMY      HX CHOLECYSTECTOMY  1980    HX COLECTOMY  2012    HX COLONOSCOPY  10/29/2009    hyperplastic polyp, hemorrhoids    HX HERNIA REPAIR      HX HYSTERECTOMY  1970    HX KNEE ARTHROSCOPY      HX KNEE REPLACEMENT  1991    left knee    HX LUMBAR FUSION      x 3       Family History:  Family History   Problem Relation Age of Onset    Heart Disease Mother     Diabetes Mother     Heart Attack Mother     Cancer Father         lung CA    Cancer Brother     Cancer Sister        Social History:  Social History     Tobacco Use    Smoking status: Never Smoker    Smokeless tobacco: Never Used   Substance Use Topics    Alcohol use: No    Drug use: No       Allergies: Allergies   Allergen Reactions    Codeine Rash and Itching    Iodine Hives and Rash    Levofloxacin Nausea and Vomiting    Metformin Other (comments)     diarrhea    Morphine Hives    Pcn [Penicillins] Other (comments)     Causes \"Heart swell\"    Sulfa (Sulfonamide Antibiotics) Nausea Only         Review of Systems   Review of Systems   Constitutional: Negative. Negative for chills and fever. HENT: Negative. Negative for congestion, ear pain and rhinorrhea. Eyes: Negative. Negative for pain and redness. Respiratory: Negative. Negative for cough, shortness of breath, wheezing and stridor. Cardiovascular: Negative.   Negative for chest pain and leg swelling. Gastrointestinal: Negative. Negative for abdominal pain, constipation, diarrhea, nausea and vomiting. Genitourinary: Positive for flank pain. Negative for dysuria and frequency. Musculoskeletal: Positive for back pain and neck pain. Skin: Negative. Negative for rash and wound. Neurological: Negative. Negative for dizziness, seizures, syncope and headaches. All other systems reviewed and are negative. All Other Systems Negative  Physical Exam     Vitals:    05/15/20 1310 05/15/20 1315 05/15/20 1330 05/15/20 1345   BP: 146/85 144/62 137/79 151/51   Pulse: 76      Resp: 18      Temp: 98.6 °F (37 °C)      SpO2: 100% 99% 97% 98%   Weight: 78 kg (172 lb)      Height: 5' 4\" (1.626 m)        Physical Exam  Vitals signs and nursing note reviewed. Constitutional:       General: She is not in acute distress. Appearance: She is well-developed. She is obese. She is not diaphoretic. HENT:      Head: Normocephalic and atraumatic. Eyes:      General: No scleral icterus. Right eye: No discharge. Left eye: No discharge. Conjunctiva/sclera: Conjunctivae normal.   Neck:      Musculoskeletal: Normal range of motion and neck supple. Muscular tenderness present. No neck rigidity. Comments: Diffuse midline TTP noted along the posterior cervical spine, ROM of the spine intact and does not elicit any form of radiculopathy. Cardiovascular:      Rate and Rhythm: Normal rate and regular rhythm. Heart sounds: Normal heart sounds. No murmur. No friction rub. No gallop. Pulmonary:      Effort: Pulmonary effort is normal. No respiratory distress. Breath sounds: Normal breath sounds. No stridor. No wheezing, rhonchi or rales. Chest:      Chest wall: No tenderness. Abdominal:      General: Bowel sounds are normal. There is no distension. Palpations: Abdomen is soft. There is no mass. Tenderness: There is no abdominal tenderness.  There is right CVA tenderness and left CVA tenderness. Comments: Mild bilateral CVA TTP, no abd TTP or guarding noted. Musculoskeletal: Normal range of motion. Skin:     General: Skin is warm and dry. Findings: No erythema or rash. Neurological:      Mental Status: She is alert and oriented to person, place, and time. Comments: No focal neurological deficit noted. No facial droop, slurred speech, or evidence of altered mentation noted on exam.     Psychiatric:         Behavior: Behavior normal.         Thought Content: Thought content normal.                Diagnostic Study Results     Labs -     Recent Results (from the past 12 hour(s))   URINALYSIS W/ RFLX MICROSCOPIC    Collection Time: 05/15/20  3:00 PM   Result Value Ref Range    Color YELLOW      Appearance CLEAR      Specific gravity 1.007 1.005 - 1.030      pH (UA) 7.0 5.0 - 8.0      Protein Negative NEG mg/dL    Glucose Negative NEG mg/dL    Ketone Negative NEG mg/dL    Bilirubin Negative NEG      Blood Negative NEG      Urobilinogen 0.2 0.2 - 1.0 EU/dL    Nitrites Negative NEG      Leukocyte Esterase Negative NEG         Radiologic Studies -   CT SPINE CERV WO CONT   Final Result   IMPRESSION:      1. No acute fracture or subluxation of the cervical spine. 2.  Multilevel degenerative changes, as described. Please note that this cervical spine CT was performed supine and does not   evaluate for ligamentous injury or instability. If the patient has persistent   symptoms or if otherwise clinically indicated, erect cervical spine plain films   are recommended. CT HEAD WO CONT   Final Result   IMPRESSION:      1. No CT evidence of an acute intracranial abnormality or other findings   related to recent trauma. 2.  Mild cerebral atrophy/volume loss and periventricular white matter changes,   most commonly seen with chronic microvascular disease.        XR SPINE THORAC 2 V   Final Result   IMPRESSION:      Limited study secondary to be habitus. Nondiagnostic lateral view. No obvious   displaced fracture. XR SPINE LUMB 2 OR 3 V   Final Result   IMPRESSION:      Limited study secondary to body habitus. Nondiagnostic lateral view. Posterior spinal fusion of T12-S1. No obvious displaced acute fracture. XR CHEST PA LAT   Final Result   IMPRESSION:      No acute findings in the chest.        CT Results  (Last 48 hours)               05/15/20 1508  CT SPINE CERV WO CONT Final result    Impression:  IMPRESSION:       1. No acute fracture or subluxation of the cervical spine. 2.  Multilevel degenerative changes, as described. Please note that this cervical spine CT was performed supine and does not   evaluate for ligamentous injury or instability. If the patient has persistent   symptoms or if otherwise clinically indicated, erect cervical spine plain films   are recommended. Narrative:  EXAM: CT SPINE CERV WO CONT       CLINICAL INDICATION/HISTORY: Prior fall yesterday afternoon with persistent neck   pain       COMPARISON: None. TECHNIQUE: CT of the cervical spine without intravenous contrast administration. Coronal and sagittal reformats were generated and reviewed. One or more dose   reduction techniques were used on this CT: automated exposure control,   adjustment of the mAs and/or kVp according to patient size, and iterative   reconstruction techniques. The specific techniques used on this CT exam have   been documented in the patient's electronic medical record. Digital Imaging and   Communications in Medicine (DICOM) format image data are available to   nonaffiliated external healthcare facilities or entities on a secure, media   free, reciprocally searchable basis with patient authorization for at least a   12-month period after this study. _______________   FINDINGS:       VERTEBRAE AND DISCS: Persistent lordotic curvature of the cervical spine,   without listhesis.   Multilevel degenerative disc space height loss and   discogenic endplate spurring, most prominent C4-C7. Vertebral body heights are   preserved. Mild degenerative changes of the atlanto-axial articulation. Prominent heterotopic ossification is seen within transverse and alar ligaments. The posterior elements are intact. No fracture or subluxation. Coronal   reformations show normal alignment of articular pillars. There are no   significant areas of bone lucency or sclerosis. SPINAL CANAL AND FORAMINA: No high-grade canal stenosis. Degenerative facet   arthropathy and uncovertebral proliferative changes with associated foraminal   stenoses: moderate on the left at C3-C4, moderate bilaterally at C4-C5,   moderately severe on the left at C5-C6, and severe on the left at C6-C7       PREVERTEBRAL SOFT TISSUES: Normal.       VISIBLE PORTIONS OF POSTERIOR FOSSA/BRAIN: Normal.       LUNG APICES: Clear. OTHER: None.       _______________           05/15/20 1508  CT HEAD WO CONT Final result    Impression:  IMPRESSION:       1. No CT evidence of an acute intracranial abnormality or other findings   related to recent trauma. 2.  Mild cerebral atrophy/volume loss and periventricular white matter changes,   most commonly seen with chronic microvascular disease. Narrative:  EXAM: CT HEAD, WITHOUT IV CONTRAST       INDICATION: Prior fall yesterday afternoon with persistent headache       COMPARISON: 9/19/2019       TECHNIQUE: Multiple axial CT images of the head were obtained extending from the   skull base through the vertex. Additional coronal and sagittal reformations were   also performed. One or more dose reduction techniques were used on this CT:   automated exposure control, adjustment of the mAs and/or kVp according to   patient size, and iterative reconstruction techniques. The specific techniques   used on this CT exam have been documented in the patient's electronic medical   record.   Digital Imaging and Communications in Medicine (DICOM) format image   data are available to nonaffiliated external healthcare facilities or entities   on a secure, media free, reciprocally searchable basis with patient   authorization for at least a 12-month period after this study. _______________       FINDINGS:       BRAIN AND POSTERIOR FOSSA: There is generalized prominence of the ventricular   system, associated with proportional widening of the cortical cerebral sulci,   compatible with generalized volume loss. Hazy hypoattenuation identified along   the periventricular white matter, a nonspecific finding which is most commonly   encountered in the setting of chronic microvascular disease. There is no   intracranial hemorrhage, mass effect, or midline shift. There are no   significant additional areas of abnormal parenchymal attenuation. EXTRA-AXIAL SPACES AND MENINGES: There are no abnormal extra-axial fluid   collections. CALVARIUM: No acute osseous abnormality. OTHER: The visualized portions of the paranasal sinuses and mastoid air cells   are clear.       _______________               CXR Results  (Last 48 hours)               05/15/20 1446  XR CHEST PA LAT Final result    Impression:  IMPRESSION:       No acute findings in the chest.       Narrative:  EXAM: XR CHEST PA LAT       CLINICAL INDICATION/HISTORY: fall rib pain   -Additional: None       COMPARISON: 11/19/2019       TECHNIQUE: PA and lateral views of the chest       _______________       FINDINGS:       HEART AND MEDIASTINUM: Cardiac size and mediastinal contours are within normal   limits       LUNGS AND PLEURAL SPACES: No focal pneumonic consolidation, pneumothorax or   pleural effusion       BONY THORAX AND SOFT TISSUES: Focal kyphosis in the lower thoracic spine with   thoracolumbar spinal fusion. _______________                   Medical Decision Making   I am the first provider for this patient.     I reviewed the vital signs, available nursing notes, past medical history, past surgical history, family history and social history. Vital Signs-Reviewed the patient's vital signs. Records Reviewed: Sue Amezcua PA-C   Procedures:  Procedures    Provider Notes (Medical Decision Making): Impression:  Fall, back pain, flank pain, neck pain    UA unremarkable. CT head and neck unremarkable  X-rays negative for definite acute fx  Percocet given PO in the ED for pain control. Clinical presentation suggestive of pain secondary to mechanical fall, will plan to d/c with recommendation eun her to continue her daily meds, with pcp follow-up. Pt agrees. Sue Amezcua PA-C      MED RECONCILIATION:  No current facility-administered medications for this encounter. Current Outpatient Medications   Medication Sig    fentaNYL (DURAGESIC) 50 mcg/hr PATCH     pantoprazole (PROTONIX) 40 mg tablet     levothyroxine (SYNTHROID) 75 mcg tablet     glimepiride (AMARYL) 4 mg tablet Take 4 mg by mouth.  sertraline (ZOLOFT) 100 mg tablet     LANTUS SOLOSTAR U-100 INSULIN 100 unit/mL (3 mL) inpn     clotrimazole (MYCELEX) 10 mg chrissy     ondansetron (ZOFRAN ODT) 4 mg disintegrating tablet Take 1 Tab by mouth every eight (8) hours as needed for Nausea.  omeprazole (PRILOSEC) 40 mg capsule Take 40 mg by mouth daily.  lubiPROStone (AMITIZA) 8 mcg capsule Take 8 mcg by mouth two (2) times daily (with meals).  albuterol (PROAIR HFA) 90 mcg/actuation inhaler Take  by inhalation.  pitavastatin calcium (LIVALO) 2 mg tablet Take 1 Tab by mouth daily.  trospium (SANCTURA XL) 60 mg capsule Take 1 Cap by mouth Daily (before breakfast).  conjugated estrogens (PREMARIN) 0.625 mg/gram vaginal cream Apply small amount to introitus MWF as directed.  cloNIDine HCl (CATAPRES) 0.1 mg tablet Take 0.05 mg by mouth.     ergocalciferol (ERGOCALCIFEROL) 50,000 unit capsule TAKE ONE CAPSULE BY MOUTH ONCE A WEEK FOR VIT D  nitroglycerin (NITROSTAT) 0.4 mg SL tablet 1 Tab by SubLINGual route every five (5) minutes as needed for Chest Pain. Up to 3 doses.  betamethasone dipropionate (DIPROSONE) 0.05 % topical cream Apply  to affected area daily as needed for Skin Irritation.  furosemide (LASIX) 40 mg tablet Take 40 mg by mouth as needed.  gabapentin (NEURONTIN) 300 mg capsule Take 300 mg by mouth three (3) times daily.  potassium chloride SR (KLOR-CON 10) 10 mEq tablet Take 10 mEq by mouth as needed.  metoprolol succinate (TOPROL-XL) 100 mg tablet Take 100 mg by mouth daily.  aspirin (ASPIRIN) 325 mg tablet Take 1 Tab by mouth daily.  amLODIPine (NORVASC) 10 mg tablet 10 mg.    oxyCODONE-acetaminophen (PERCOCET 10)  mg per tablet Take 1 Tab by mouth every six (6) hours as needed for Pain.  cholecalciferol (VITAMIN D3) 1,000 unit cap Take 1,000 Units by mouth daily.  losartan (COZAAR) 100 mg tablet Take 100 mg by mouth daily.  cyanocobalamin (VITAMIN B-12) 1,000 mcg tablet Take 1,000 mcg by mouth daily. Disposition:  D/c    DISCHARGE NOTE:   Patient is stable for discharge at this time. I have discussed all the findings from today's work up with the patient, including lab results and imaging. I have answered all questions. No new rx given. Rest and close follow-up with the PCP recommended this week. Return to the ED immediately for any new or worsening symptoms. April Abhishek Márquez PA-C     Follow-up Information     Follow up With Specialties Details Why Ever MD [facility] In 2 days  [address] [facility] [address] [phone]      [facility] EMERGENCY DEPT Emergency Medicine  As needed, If symptoms worsen [address] [place]  [phone]          Current Discharge Medication List              Diagnosis     Clinical Impression:   1. Fall, initial encounter    2. Neck pain    3.  Acute bilateral back pain, unspecified back location

## 2020-05-15 NOTE — DISCHARGE INSTRUCTIONS
[facility] Activation    Thank you for requesting access to [facility]. Please follow the instructions below to securely access and download your online medical record. [facility] allows you to send messages to your doctor, view your test results, renew your prescriptions, schedule appointments, and more. How Do I Sign Up? 1. In your internet browser, go to [email or web address]  2. Click on the First Time User? Click Here link in the Sign In box. You will be redirect to the New Member Sign Up page. 3. Enter your [facility] Access Code exactly as it appears below. You will not need to use this code after youve completed the sign-up process. If you do not sign up before the expiration date, you must request a new code. [facility] Access Code: [email or web address]  Expires: 2020  1:11 PM (This is the date your [facility] access code will )    4. Enter the last four digits of your Social Security Number (xxxx) and Date of Birth (mm/dd/yyyy) as indicated and click Submit. You will be taken to the next sign-up page. 5. Create a [facility] ID. This will be your [facility] login ID and cannot be changed, so think of one that is secure and easy to remember. 6. Create a [facility] password. You can change your password at any time. 7. Enter your Password Reset Question and Answer. This can be used at a later time if you forget your password. 8. Enter your e-mail address. You will receive e-mail notification when new information is available in [address]. 9. Click Sign Up. You can now view and download portions of your medical record. 10. Click the Download Summary menu link to download a portable copy of your medical information. Additional Information    If you have questions, please visit the Frequently Asked Questions section of the [facility] website at [email or web address] Remember, [facility] is NOT to be used for urgent needs. For medical emergencies, dial 911.

## 2020-05-16 ENCOUNTER — PATIENT OUTREACH (OUTPATIENT)
Dept: CASE MANAGEMENT | Age: 75
End: 2020-05-16

## 2020-05-16 NOTE — PROGRESS NOTES
Patient contacted regarding recent discharge and COVID-19 risk   Care Transition Nurse/ Ambulatory Care Manager contacted the family by telephone to perform post discharge assessment. Verified name and  with family as identifiers. Patient has following risk factors of: diabetes and age. CTN/ACM reviewed discharge instructions, medical action plan and red flags related to discharge diagnosis. Reviewed and educated them on any new and changed medications related to discharge diagnosis. Advised obtaining a 90-day supply of all daily and as-needed medications. Education provided regarding infection prevention, and signs and symptoms of COVID-19 and when to seek medical attention with family who verbalized understanding. Discussed exposure protocols and quarantine from [address] you at higher risk for severe illness  and given an opportunity for questions and concerns. The family agrees to contact the COVID-19 hotline [phone] or PCP office for questions related to their healthcare. CTN/ACM provided contact information for future reference. From CDC: Are you at higher risk for severe illness?  Wash your hands often.  Avoid close contact (6 feet, which is about two arm lengths) with people who are sick.  Put distance between yourself and other people if COVID-19 is spreading in your community.  Clean and disinfect frequently touched surfaces.  Avoid all cruise travel and non-essential air travel.  Call your healthcare professional if you have concerns about COVID-19 and your underlying condition or if you are sick. For more information on steps you can take to protect yourself, see CDC's How to Protect Yourself      Patient/family/caregiver given information for Terrell Mckee and agrees to enroll no      Plan for follow-up call in 7-14 days based on severity of symptoms and risk factors.

## 2020-06-04 ENCOUNTER — PATIENT OUTREACH (OUTPATIENT)
Dept: CASE MANAGEMENT | Age: 75
End: 2020-06-04

## 2020-06-04 NOTE — PROGRESS NOTES
Patient resolved from Transition of Care episode on 6/4/20. Discussed COVID-19 related testing which was not done at this time. Test results were not done. Patient informed of results, if available? N/A     Contacted family for follow up. No answer. Left message and provided the following resources and education related to COVID-19:                         Signs, symptoms and red flags related to COVID-19            CDC exposure and quarantine guidelines            [facility] exposure contact - [phone]            Contact for their local Department of Health                No further outreach scheduled with this CTN/ACM/LPN/HC/ MA. Episode of Care resolved. Patient has this CTN/ACM/LPN/HC/MA contact information if future needs arise.

## 2020-12-02 ENCOUNTER — TRANSCRIBE ORDER (OUTPATIENT)
Dept: SCHEDULING | Age: 75
End: 2020-12-02

## 2020-12-02 DIAGNOSIS — J47.1 BRONCHIECTASIS WITH ACUTE EXACERBATION (HCC): Primary | ICD-10-CM

## 2021-03-03 ENCOUNTER — TRANSCRIBE ORDER (OUTPATIENT)
Dept: SCHEDULING | Age: 76
End: 2021-03-03

## 2021-03-03 DIAGNOSIS — J84.10 FIBROSIS OF LUNG (HCC): Primary | ICD-10-CM

## 2021-09-03 ENCOUNTER — OFFICE VISIT (OUTPATIENT)
Dept: CARDIOLOGY CLINIC | Age: 76
End: 2021-09-03
Payer: MEDICARE

## 2021-09-03 VITALS
BODY MASS INDEX: 27.9 KG/M2 | OXYGEN SATURATION: 99 % | HEIGHT: 64 IN | DIASTOLIC BLOOD PRESSURE: 190 MMHG | HEART RATE: 72 BPM | WEIGHT: 163.4 LBS | TEMPERATURE: 98 F | SYSTOLIC BLOOD PRESSURE: 205 MMHG

## 2021-09-03 DIAGNOSIS — R00.2 PALPITATION: Primary | ICD-10-CM

## 2021-09-03 DIAGNOSIS — I51.9 HEART DISEASE: ICD-10-CM

## 2021-09-03 PROCEDURE — G8536 NO DOC ELDER MAL SCRN: HCPCS | Performed by: INTERNAL MEDICINE

## 2021-09-03 PROCEDURE — G8753 SYS BP > OR = 140: HCPCS | Performed by: INTERNAL MEDICINE

## 2021-09-03 PROCEDURE — G8428 CUR MEDS NOT DOCUMENT: HCPCS | Performed by: INTERNAL MEDICINE

## 2021-09-03 PROCEDURE — 93000 ELECTROCARDIOGRAM COMPLETE: CPT | Performed by: INTERNAL MEDICINE

## 2021-09-03 PROCEDURE — 1101F PT FALLS ASSESS-DOCD LE1/YR: CPT | Performed by: INTERNAL MEDICINE

## 2021-09-03 PROCEDURE — G8419 CALC BMI OUT NRM PARAM NOF/U: HCPCS | Performed by: INTERNAL MEDICINE

## 2021-09-03 PROCEDURE — 1090F PRES/ABSN URINE INCON ASSESS: CPT | Performed by: INTERNAL MEDICINE

## 2021-09-03 PROCEDURE — G9717 DOC PT DX DEP/BP F/U NT REQ: HCPCS | Performed by: INTERNAL MEDICINE

## 2021-09-03 PROCEDURE — G8400 PT W/DXA NO RESULTS DOC: HCPCS | Performed by: INTERNAL MEDICINE

## 2021-09-03 PROCEDURE — 99214 OFFICE O/P EST MOD 30 MIN: CPT | Performed by: INTERNAL MEDICINE

## 2021-09-03 PROCEDURE — G8754 DIAS BP LESS 90: HCPCS | Performed by: INTERNAL MEDICINE

## 2021-09-03 RX ORDER — CARVEDILOL 6.25 MG/1
6.25 TABLET ORAL 2 TIMES DAILY WITH MEALS
Qty: 60 TABLET | Refills: 5 | Status: SHIPPED | OUTPATIENT
Start: 2021-09-03 | End: 2021-09-03 | Stop reason: SDUPTHER

## 2021-09-03 RX ORDER — CARVEDILOL 6.25 MG/1
6.25 TABLET ORAL 2 TIMES DAILY WITH MEALS
Qty: 180 TABLET | Refills: 3 | Status: SHIPPED | OUTPATIENT
Start: 2021-09-03

## 2021-09-03 NOTE — LETTER
9/3/2021    Patient: Bart Mayorga   YOB: 1945   Date of Visit: 9/3/2021     Ana Cristina Rowley MD  [address] [address]  Via Fax: [phone]    Dear Ana Cristina Rowley MD,      Thank you for referring Ms. Bushra Meeks to [facility] for evaluation. My notes for this consultation are attached. If you have questions, please do not hesitate to call me. I look forward to following your patient along with you.       Sincerely,    Mulu Tyler MD

## 2021-09-03 NOTE — PROGRESS NOTES
[facility]    Ms. Ally Amezquita is a 64 y.o. female with history of hypertension, hyperlipidemia, diabetes, scoliosis and other multiple medical problem    Patient is here today for follow-up appointment. She denies any prior history of myocardial infarction or congestive heart failure. Lately at home her blood pressure is running high. She thinks that she is taking 3 different medication for blood pressure. She does not remember the names. She denies any chest pain or chest tightness. She has been experiencing some palpitation on and off this happens probably once a week. There is no associated presyncope or syncope. It resolved itself lasting for few minutes  Denies any nausea, vomiting, abdominal pain, fever, chills, sputum production. No hematuria or other bleeding complaints    Past Medical History:   Diagnosis Date    Anxiety     Atrophic vaginitis     Depression     Diabetes ([address]     Diverticulosis     Dysphagia     Dysuria     Esophageal reflux     Fatty liver     Gastric ulcer     Gastroparesis     H/O joint replacement 1991    left knee    Hypercholesteremia     Hypertension     Iron deficiency anemia     Neurogenic bladder     Recurrent UTI     Scoliosis     Spinal stenosis     Stenosis of lumbosacral spine     Stroke ([address] 04/2018    Tardive dyskinesia     Urgency-frequency syndrome          Past Surgical History:   Procedure Laterality Date    EGD  7/10/2018         HX APPENDECTOMY      HX BREAST LUMPECTOMY      HX CHOLECYSTECTOMY  1980    HX COLONOSCOPY  10/29/2009    hyperplastic polyp, hemorrhoids    HX HERNIA REPAIR      HX HYSTERECTOMY  1970    HX KNEE ARTHROSCOPY      HX KNEE REPLACEMENT  1991    left knee    HX LUMBAR FUSION      x 3    HX TOTAL COLECTOMY  2012       Current Outpatient Medications   Medication Sig    furosemide (LASIX) 40 mg tablet Take 40 mg by mouth as needed.     aspirin (ASPIRIN) 325 mg tablet Take 1 Tab by mouth daily.  amLODIPine (NORVASC) 10 mg tablet 10 mg.    losartan (COZAAR) 100 mg tablet Take 100 mg by mouth daily.  fentaNYL (DURAGESIC) 50 mcg/hr PATCH     pantoprazole (PROTONIX) 40 mg tablet     levothyroxine (SYNTHROID) 75 mcg tablet     glimepiride (AMARYL) 4 mg tablet Take 4 mg by mouth.  sertraline (ZOLOFT) 100 mg tablet     LANTUS SOLOSTAR U-100 INSULIN 100 unit/mL (3 mL) inpn     clotrimazole (MYCELEX) 10 mg chrissy     ondansetron (ZOFRAN ODT) 4 mg disintegrating tablet Take 1 Tab by mouth every eight (8) hours as needed for Nausea.  omeprazole (PRILOSEC) 40 mg capsule Take 40 mg by mouth daily.  lubiPROStone (AMITIZA) 8 mcg capsule Take 8 mcg by mouth two (2) times daily (with meals).  albuterol (PROAIR HFA) 90 mcg/actuation inhaler Take  by inhalation.  pitavastatin calcium (LIVALO) 2 mg tablet Take 1 Tab by mouth daily. (Patient not taking: Reported on 9/3/2021)    trospium (SANCTURA XL) 60 mg capsule Take 1 Cap by mouth Daily (before breakfast).  conjugated estrogens (PREMARIN) 0.625 mg/gram vaginal cream Apply small amount to introitus MWF as directed.  cloNIDine HCl (CATAPRES) 0.1 mg tablet Take 0.05 mg by mouth. (Patient not taking: Reported on 9/3/2021)    ergocalciferol (ERGOCALCIFEROL) 50,000 unit capsule TAKE ONE CAPSULE BY MOUTH ONCE A WEEK FOR VIT D    nitroglycerin (NITROSTAT) 0.4 mg SL tablet 1 Tab by SubLINGual route every five (5) minutes as needed for Chest Pain. Up to 3 doses. (Patient not taking: Reported on 9/3/2021)    betamethasone dipropionate (DIPROSONE) 0.05 % topical cream Apply  to affected area daily as needed for Skin Irritation.  gabapentin (NEURONTIN) 300 mg capsule Take 300 mg by mouth three (3) times daily.  potassium chloride SR (KLOR-CON 10) 10 mEq tablet Take 10 mEq by mouth as needed.  metoprolol succinate (TOPROL-XL) 100 mg tablet Take 100 mg by mouth daily.  (Patient not taking: Reported on 9/3/2021)    oxyCODONE-acetaminophen (PERCOCET 10)  mg per tablet Take 1 Tab by mouth every six (6) hours as needed for Pain.  cholecalciferol (VITAMIN D3) 1,000 unit cap Take 1,000 Units by mouth daily.  cyanocobalamin (VITAMIN B-12) 1,000 mcg tablet Take 1,000 mcg by mouth daily. No current facility-administered medications for this visit. Allergies and Sensitivities:  Allergies   Allergen Reactions    Codeine Rash and Itching    Iodine Hives and Rash    Levofloxacin Nausea and Vomiting    Metformin Other (comments)     diarrhea    Morphine Hives    Pcn [Penicillins] Other (comments)     Causes \"Heart swell\"    Sulfa (Sulfonamide Antibiotics) Nausea Only       Family History:  Family History   Problem Relation Age of Onset    Heart Disease Mother     Diabetes Mother     Heart Attack Mother     Cancer Father         lung CA    Cancer Brother     Cancer Sister        Social History:  Social History     Tobacco Use    Smoking status: Never Smoker    Smokeless tobacco: Never Used   Substance Use Topics    Alcohol use: No    Drug use: No     She  reports that she has never smoked. She has never used smokeless tobacco.  She  reports no history of alcohol use. Review of Systems:  Cardiac symptoms as noted above in HPI. All others negative. Physical Exam:  BP Readings from Last 3 Encounters:   09/03/21 (!) 187/83   05/15/20 152/64   02/06/20 122/70         Pulse Readings from Last 3 Encounters:   09/03/21 74   05/15/20 76   01/06/20 (!) 54          Wt Readings from Last 3 Encounters:   09/03/21 74.1 kg (163 lb 6.4 oz)   05/15/20 78 kg (172 lb)   02/24/20 77.1 kg (170 lb)       Constitutional: Oriented to person, place, and time. HENT: Head: Normocephalic and atraumatic. Neck: No JVD present. Cardiovascular: Regular rhythm. No murmur, gallop or rubs appreciated  Lung: Breath sounds normal. No respiratory distress.  No ronchi or rales appreciated  Abdominal: No tenderness. No rebound and no guarding. Musculoskeletal: There is no lower extremity edema. No cynosis    Review of Data  LABS:   Lab Results   Component Value Date/Time    Sodium 140 01/06/2020 05:15 PM    Potassium 3.4 (L) 01/06/2020 05:15 PM    Chloride 104 01/06/2020 05:15 PM    CO2 29 01/06/2020 05:15 PM    Glucose 173 (H) 01/06/2020 05:15 PM    BUN 14 01/06/2020 05:15 PM    Creatinine 1.25 01/06/2020 05:15 PM     Lipids Latest Ref Rng & Units 3/21/2018   Chol, Total <200 MG/(H)   HDL 40 - 60 MG/DL 72(H)   LDL 0 - 100 MG/. 4(H)   Trig <150 MG/DL 73   Chol/HDL Ratio 0 - 5.0   3.1   Some recent data might be hidden     Lab Results   Component Value Date/Time    ALT (SGPT) 11 (L) 01/06/2020 05:15 PM     Lab Results   Component Value Date/Time    Hemoglobin A1c 8.3 (H) 08/20/2018 03:15 AM       EKG  Sinus rhythm at 72 bpm.  Nonspecific T wave inversion diffusely    ECHO (02/19)  Left Ventricle Normal cavity size and systolic function (ejection fraction normal). Mild concentric hypertrophy observed. The estimated ejection fraction is 56 - 60%. Visually measured ejection fraction. There is age-appropriate left ventricular diastolic function. STRESS TEST (8/18)  · Baseline ECG: Normal sinus rhythm. · Negative stress electrocardiogram.  · Gated SPECT: Left ventricular function post-stress was normal. Calculated ejection fraction is 81%. · Left ventricular perfusion is normal.  · Stress test results correlate with a low risk of inducible myocardial ischemia. IMPRESSION & PLAN:  Ms. Ra Harris is 66-year-old female with multiple medical problem    Hypertension:  Her blood pressure today is 187/83. At home her blood pressure is running high. She is appear to be on losartan and amlodipine. She thinks that she may be on metoprolol however she is not sure. We will try to contact pharmacy to see what antihypertensives he is on.   Plan is to start Coreg 6.25 mg twice daily and repeat blood pressure. If she is on metoprolol we will change it to Coreg and if she is not on metoprolol then we will start Coreg. Hyperlipidemia:  Considering history of diabetes, she should be on lipid-lowering agent. In the past she used to be on A statin. She is not sure. We will try to obtain fasting because of her primary care provider. Recommend statin if not on that medication    Palpitation: We will place event monitor to rule out any arrhythmia. No presyncope or syncope  Echo to rule out any abnormal cardiac structure    This plan was discussed with patient who is in agreement. Thank you for allowing me to participate in patient care. Please feel free to call me if you have any question or concern. Leandro Day MD  Please note: This document has been produced using voice recognition software. Unrecognized errors in transcription may be present.

## 2021-09-03 NOTE — PROGRESS NOTES
America Jenniecurtis presents today for   Chief Complaint   Patient presents with    Follow-up       America Huff preferred language for health care discussion is english/other. Personal Protective Equipment:   Personal Protective Equipment was used including: mask-surgical and hands-gloves. Patient was placed on no precaution(s). Patient was masked. Precautions:   Patient currently on None  Patient currently roomed with door closed    Is someone accompanying this pt? no    Is the patient using any DME equipment during [address]? no    Depression Screening:  3 most recent PHQ Screens 3/24/2018   Little interest or pleasure in doing things Not at all   Feeling down, depressed, irritable, or hopeless Not at all   Total Score PHQ 2 0       Learning Assessment:  No flowsheet data found. Abuse Screening:  No flowsheet data found. Fall Risk  Fall Risk Assessment, last 12 mths 5/9/2019   Able to walk? Yes   Fall in past 12 months? No       Pt currently taking Anticoagulant therapy? no    Coordination of Care:  1. Have you been to the ER, urgent care clinic since your last visit? Hospitalized since your last visit? yes    2. Have you seen or consulted any other health care providers outside of the [address] [place] [address] since your last visit? Include any pap smears or colon screening.  no

## 2021-09-03 NOTE — PATIENT INSTRUCTIONS
Testing   Echo    Please call our office at [phone] to schedule testing after November 1, 2021. **call office 3-5 days after testing is completed for results**     New Medication/Medication Changes  Start Coreg 6.25 mg twice a day  Stop metoprolol  **please allow 24-48 hrs for medication to be escribed to pharmacy** If you need any refills on medications please contact your pharmacy so that the request can be escribed to the provider for review. Other Testing  [facility] - 30 day monitor-[facility]-will be calling you to confirm your address to send your Heart Monitor. Please allow 7-10 business days for monitor to arrive at your home.   Customer Service for [facility]:  [phone]      Other  2 week Bp check

## 2021-12-21 DIAGNOSIS — R00.2 PALPITATION: ICD-10-CM

## 2022-01-04 ENCOUNTER — OFFICE VISIT (OUTPATIENT)
Dept: CARDIOLOGY CLINIC | Age: 77
End: 2022-01-04
Payer: MEDICARE

## 2022-01-04 ENCOUNTER — HOSPITAL ENCOUNTER (OUTPATIENT)
Dept: LAB | Age: 77
Discharge: HOME | End: 2022-01-04
Payer: MEDICARE

## 2022-01-04 VITALS
BODY MASS INDEX: 28.85 KG/M2 | WEIGHT: 169 LBS | HEIGHT: 64 IN | DIASTOLIC BLOOD PRESSURE: 78 MMHG | HEART RATE: 63 BPM | SYSTOLIC BLOOD PRESSURE: 137 MMHG | OXYGEN SATURATION: 97 %

## 2022-01-04 DIAGNOSIS — E78.00 PURE HYPERCHOLESTEROLEMIA: ICD-10-CM

## 2022-01-04 DIAGNOSIS — I10 ESSENTIAL HYPERTENSION WITH GOAL BLOOD PRESSURE LESS THAN 140/90: Primary | ICD-10-CM

## 2022-01-04 LAB
ALBUMIN SERPL-MCNC: 3.8 G/DL (ref 3.4–5)
ALBUMIN/GLOB SERPL: 1.2 {RATIO} (ref 0.8–1.7)
ALP SERPL-CCNC: 133 U/L (ref 45–117)
ALT SERPL-CCNC: 22 U/L (ref 13–56)
ANION GAP SERPL CALC-SCNC: 9 MMOL/L (ref 3–18)
AST SERPL-CCNC: 15 U/L (ref 10–38)
BASOPHILS # BLD: 0.1 K/UL (ref 0–0.1)
BASOPHILS NFR BLD: 1 % (ref 0–2)
BILIRUB SERPL-MCNC: 0.6 MG/DL (ref 0.2–1)
BUN SERPL-MCNC: 13 MG/DL (ref 7–18)
BUN/CREAT SERPL: 18 (ref 12–20)
CALCIUM SERPL-MCNC: 9.4 MG/DL (ref 8.5–10.1)
CHLORIDE SERPL-SCNC: 101 MMOL/L (ref 100–111)
CO2 SERPL-SCNC: 29 MMOL/L (ref 21–32)
CREAT SERPL-MCNC: 0.72 MG/DL (ref 0.6–1.3)
DIFFERENTIAL METHOD BLD: ABNORMAL
EOSINOPHIL # BLD: 0.1 K/UL (ref 0–0.4)
EOSINOPHIL NFR BLD: 2 % (ref 0–5)
ERYTHROCYTE [DISTWIDTH] IN BLOOD BY AUTOMATED COUNT: 12.6 % (ref 11.6–14.5)
FOLATE SERPL-MCNC: >20 NG/ML (ref 3.1–17.5)
GLOBULIN SER CALC-MCNC: 3.2 G/DL (ref 2–4)
GLUCOSE SERPL-MCNC: 215 MG/DL (ref 74–99)
HBA1C MFR BLD: 8.6 % (ref 4.2–5.6)
HCT VFR BLD AUTO: 32.2 % (ref 35–45)
HGB BLD-MCNC: 10 G/DL (ref 12–16)
IMM GRANULOCYTES # BLD AUTO: 0 K/UL (ref 0–0.04)
IMM GRANULOCYTES NFR BLD AUTO: 0 % (ref 0–0.5)
LYMPHOCYTES # BLD: 2.8 K/UL (ref 0.9–3.6)
LYMPHOCYTES NFR BLD: 34 % (ref 21–52)
MCH RBC QN AUTO: 27.6 PG (ref 24–34)
MCHC RBC AUTO-ENTMCNC: 31.1 G/DL (ref 31–37)
MCV RBC AUTO: 89 FL (ref 78–100)
MONOCYTES # BLD: 0.6 K/UL (ref 0.05–1.2)
MONOCYTES NFR BLD: 7 % (ref 3–10)
NEUTS SEG # BLD: 4.7 K/UL (ref 1.8–8)
NEUTS SEG NFR BLD: 56 % (ref 40–73)
NRBC # BLD: 0 K/UL (ref 0–0.01)
NRBC BLD-RTO: 0 PER 100 WBC
PLATELET # BLD AUTO: 254 K/UL (ref 135–420)
PMV BLD AUTO: 12.3 FL (ref 9.2–11.8)
POTASSIUM SERPL-SCNC: 4.5 MMOL/L (ref 3.5–5.5)
PROT SERPL-MCNC: 7 G/DL (ref 6.4–8.2)
RBC # BLD AUTO: 3.62 M/UL (ref 4.2–5.3)
SODIUM SERPL-SCNC: 139 MMOL/L (ref 136–145)
TSH SERPL DL<=0.05 MIU/L-ACNC: 0.18 UIU/ML (ref 0.36–3.74)
VIT B12 SERPL-MCNC: 1586 PG/ML (ref 211–911)
WBC # BLD AUTO: 8.3 K/UL (ref 4.6–13.2)

## 2022-01-04 PROCEDURE — G8419 CALC BMI OUT NRM PARAM NOF/U: HCPCS | Performed by: INTERNAL MEDICINE

## 2022-01-04 PROCEDURE — 99213 OFFICE O/P EST LOW 20 MIN: CPT | Performed by: INTERNAL MEDICINE

## 2022-01-04 PROCEDURE — G8428 CUR MEDS NOT DOCUMENT: HCPCS | Performed by: INTERNAL MEDICINE

## 2022-01-04 PROCEDURE — G8400 PT W/DXA NO RESULTS DOC: HCPCS | Performed by: INTERNAL MEDICINE

## 2022-01-04 PROCEDURE — G8536 NO DOC ELDER MAL SCRN: HCPCS | Performed by: INTERNAL MEDICINE

## 2022-01-04 PROCEDURE — G8754 DIAS BP LESS 90: HCPCS | Performed by: INTERNAL MEDICINE

## 2022-01-04 PROCEDURE — G9717 DOC PT DX DEP/BP F/U NT REQ: HCPCS | Performed by: INTERNAL MEDICINE

## 2022-01-04 PROCEDURE — 85025 COMPLETE CBC W/AUTO DIFF WBC: CPT

## 2022-01-04 PROCEDURE — 83036 HEMOGLOBIN GLYCOSYLATED A1C: CPT

## 2022-01-04 PROCEDURE — 82607 VITAMIN B-12: CPT

## 2022-01-04 PROCEDURE — 1090F PRES/ABSN URINE INCON ASSESS: CPT | Performed by: INTERNAL MEDICINE

## 2022-01-04 PROCEDURE — 84443 ASSAY THYROID STIM HORMONE: CPT

## 2022-01-04 PROCEDURE — G8752 SYS BP LESS 140: HCPCS | Performed by: INTERNAL MEDICINE

## 2022-01-04 PROCEDURE — 80053 COMPREHEN METABOLIC PANEL: CPT

## 2022-01-04 PROCEDURE — 1101F PT FALLS ASSESS-DOCD LE1/YR: CPT | Performed by: INTERNAL MEDICINE

## 2022-01-04 PROCEDURE — 36415 COLL VENOUS BLD VENIPUNCTURE: CPT

## 2022-01-04 NOTE — PROGRESS NOTES
Ayana Amanda presents today for   Chief Complaint   Patient presents with    Follow-up     4 month       Ayana Amanda preferred language for health care discussion is english/other. Is someone accompanying this pt? yes    Is the patient using any DME equipment during OV? wheelchair    Depression Screening:  3 most recent PHQ Screens 3/24/2018   Little interest or pleasure in doing things Not at all   Feeling down, depressed, irritable, or hopeless Not at all   Total Score PHQ 2 0       Learning Assessment:  No flowsheet data found. Abuse Screening:  No flowsheet data found. Fall Risk  Fall Risk Assessment, last 12 mths 9/3/2021   Able to walk? No   Fall in past 12 months? -           Pt currently taking Anticoagulant therapy? no    Pt currently taking Antiplatelet therapy ? Aspirin 325 mg      Coordination of Care:  1. Have you been to the ER, urgent care clinic since your last visit? Hospitalized since your last visit? no    2. Have you seen or consulted any other health care providers outside of the [address] [place] [address] since your last visit? Include any pap smears or colon screening.  no

## 2022-01-04 NOTE — LETTER
1/4/2022    Patient: Ethel Avalos   YOB: 1945   Date of Visit: 1/4/2022     Latrice Leach NP  [address] [facility] [address]  Via Fax: [phone]    Dear Latrice Leach NP,      Thank you for referring Ms. Hal Herring to [facility] for evaluation. My notes for this consultation are attached. If you have questions, please do not hesitate to call me. I look forward to following your patient along with you.       Sincerely,    Etter Lundborg, MD

## 2022-01-04 NOTE — PROGRESS NOTES
Card[facility] Specialists    Ms. Annita Zapata is a 64 y.o. female with history of hypertension, hyperlipidemia, diabetes, scoliosis and other multiple medical problem    Patient is here today for follow-up appointment. She denies any prior history of myocardial infarction or congestive heart failure. Patient had a event monitor since last visit. She did not get her echocardiogram done because of scheduling. She denies any significant palpitations since last time. She denies presyncope or syncope. She does have occasional random episode of dyspnea however short-lived. No chest pain or chest tightness. She feels fatigue and tired all   Denies any nausea, vomiting, abdominal pain, fever, chills, sputum production. No hematuria or other bleeding complaints    Past Medical History:   Diagnosis Date    Anxiety     Atrophic vaginitis     Depression     Diabetes ([address]     Diverticulosis     Dysphagia     Dysuria     Esophageal reflux     Fatty liver     Gastric ulcer     Gastroparesis     H/O joint replacement 1991    left knee    Hypercholesteremia     Hypertension     Iron deficiency anemia     Neurogenic bladder     Recurrent UTI     Scoliosis     Spinal stenosis     Stenosis of lumbosacral spine     St[address] 04/2018    Tardive dyskinesia     Urgency-frequency syndrome          Past Surgical History:   Procedure Laterality Date    EGD  7/10/2018         HX APPENDECTOMY      HX BREAST LUMPECTOMY      HX CHOLECYSTECTOMY  1980    HX COLONOSCOPY  10/29/2009    hyperplastic polyp, hemorrhoids    HX HERNIA REPAIR      HX HYSTERECTOMY  1970    HX KNEE ARTHROSCOPY      HX KNEE REPLACEMENT  1991    left knee    HX LUMBAR FUSION      x 3    HX TOTAL COLECTOMY  2012       Current Outpatient Medications   Medication Sig    carvediloL (COREG) 6.25 mg tablet Take 1 Tablet by mouth two (2) times daily (with meals).     fentaNYL (DURAGESIC) 50 mcg/hr PATCH     pantoprazole (PROTONIX) 40 mg tablet     levothyroxine (SYNTHROID) 75 mcg tablet     glimepiride (AMARYL) 4 mg tablet Take 4 mg by mouth.  sertraline (ZOLOFT) 100 mg tablet     LANTUS SOLOSTAR U-100 INSULIN 100 unit/mL (3 mL) inpn     clotrimazole (MYCELEX) 10 mg chrissy     ondansetron (ZOFRAN ODT) 4 mg disintegrating tablet Take 1 Tab by mouth every eight (8) hours as needed for Nausea.  omeprazole (PRILOSEC) 40 mg capsule Take 40 mg by mouth daily.  lubiPROStone (AMITIZA) 8 mcg capsule Take 8 mcg by mouth two (2) times daily (with meals).  albuterol (PROAIR HFA) 90 mcg/actuation inhaler Take  by inhalation.  pitavastatin calcium (LIVALO) 2 mg tablet Take 1 Tab by mouth daily.  trospium (SANCTURA XL) 60 mg capsule Take 1 Cap by mouth Daily (before breakfast).  conjugated estrogens (PREMARIN) 0.625 mg/gram vaginal cream Apply small amount to introitus MWF as directed.  cloNIDine HCl (CATAPRES) 0.1 mg tablet Take 0.05 mg by mouth.  ergocalciferol (ERGOCALCIFEROL) 50,000 unit capsule TAKE ONE CAPSULE BY MOUTH ONCE A WEEK FOR VIT D    nitroglycerin (NITROSTAT) 0.4 mg SL tablet 1 Tab by SubLINGual route every five (5) minutes as needed for Chest Pain. Up to 3 doses.  betamethasone dipropionate (DIPROSONE) 0.05 % topical cream Apply  to affected area daily as needed for Skin Irritation.  furosemide (LASIX) 40 mg tablet Take 40 mg by mouth as needed.  gabapentin (NEURONTIN) 300 mg capsule Take 300 mg by mouth three (3) times daily.  potassium chloride SR (KLOR-CON 10) 10 mEq tablet Take 10 mEq by mouth as needed.  aspirin (ASPIRIN) 325 mg tablet Take 1 Tab by mouth daily.  oxyCODONE-acetaminophen (PERCOCET 10)  mg per tablet Take 1 Tab by mouth every six (6) hours as needed for Pain.  cholecalciferol (VITAMIN D3) 1,000 unit cap Take 1,000 Units by mouth daily.  losartan (COZAAR) 100 mg tablet Take 100 mg by mouth daily.     cyanocobalamin (VITAMIN B-12) 1,000 mcg tablet Take 1,000 mcg by mouth daily.  amLODIPine (NORVASC) 10 mg tablet 10 mg. (Patient not taking: Reported on 1/4/2022)     No current facility-administered medications for this visit. Allergies and Sensitivities:  Allergies   Allergen Reactions    Codeine Rash and Itching    Iodine Hives and Rash    Levofloxacin Nausea and Vomiting    Metformin Other (comments)     diarrhea    Morphine Hives    Pcn [Penicillins] Other (comments)     Causes \"Heart swell\"    Sulfa (Sulfonamide Antibiotics) Nausea Only       Family History:  Family History   Problem Relation Age of Onset    Heart Disease Mother     Diabetes Mother     Heart Attack Mother     Cancer Father         lung CA    Cancer Brother     Cancer Sister        Social History:  Social History     Tobacco Use    Smoking status: Never Smoker    Smokeless tobacco: Never Used   Substance Use Topics    Alcohol use: No    Drug use: No     She  reports that she has never smoked. She has never used smokeless tobacco.  She  reports no history of alcohol use. Review of Systems:  Cardiac symptoms as noted above in HPI. All others negative. Physical Exam:  BP Readings from Last 3 Encounters:   01/04/22 137/78   09/03/21 (!) 205/190   05/15/20 152/64         Pulse Readings from Last 3 Encounters:   01/04/22 63   09/03/21 72   05/15/20 76          Wt Readings from Last 3 Encounters:   01/04/22 76.7 kg (169 lb)   09/03/21 74.1 kg (163 lb 6.4 oz)   05/15/20 78 kg (172 lb)       Constitutional: Oriented to person, place, and time. HENT: Head: Normocephalic and atraumatic. Neck: No JVD present. Cardiovascular: Regular rhythm. No murmur, gallop or rubs appreciated  Lung: Breath sounds normal. No respiratory distress. No ronchi or rales appreciated  Abdominal: No tenderness. No rebound and no guarding. Musculoskeletal: There is no significant lower extremity edema.  No cynosis    Review of Data  LABS: Lab Results   Component Value Date/Time    Sodium 140 01/06/2020 05:15 PM    Potassium 3.4 (L) 01/06/2020 05:15 PM    Chloride 104 01/06/2020 05:15 PM    CO2 29 01/06/2020 05:15 PM    Glucose 173 (H) 01/06/2020 05:15 PM    BUN 14 01/06/2020 05:15 PM    Creatinine 1.25 01/06/2020 05:15 PM     Lipids Latest Ref Rng & Units 3/21/2018   Chol, Total <200 MG/(H)   HDL 40 - 60 MG/DL 72(H)   LDL 0 - 100 MG/. 4(H)   Trig <150 MG/DL 73   Chol/HDL Ratio 0 - 5.0   3.1   Some recent data might be hidden     Lab Results   Component Value Date/Time    ALT (SGPT) 11 (L) 01/06/2020 05:15 PM     Lab Results   Component Value Date/Time    Hemoglobin A1c 8.3 (H) 08/20/2018 03:15 AM       EKG  Sinus rhythm at 72 bpm.  Nonspecific T wave inversion diffusely  02/25/19    STRESS TEST (8/18)  · Baseline ECG: Normal sinus rhythm. · Negative stress electrocardiogram.  · Gated SPECT: Left ventricular function post-stress was normal. Calculated ejection fraction is 81%. · Left ventricular perfusion is normal.  · Stress test results correlate with a low risk of inducible myocardial ischemia. IMPRESSION & PLAN:  Ms. Ingrid Read is 64 y.o. female with multiple medical problem    Hypertension:  /78. Based on records, seems like she is on Coreg, clonidine patch, atorvastatin, amlodipine, losartan and Lasix. I have asked her to come in [place] blood pressure clinic with the medication bottles for reconciliation    Hyperlipidemia:  She is not sure if she is taking any cholesterol medication. She is supposed to be on A statin based on the record. Patient to bring all the medication for reconciliation    Palpitation:  Event monitor in 10/2021, low risk. Sinus rhythm. PVC burden less than 1%, no sustained arrhthymias. Echo to rule out any abnormal cardiac structure. This plan was discussed with patient who is in agreement. Thank you for allowing me to participate in patient care.  Please feel free to call me if you have any question or concern. Elder Holliday MD  Please note: This document has been produced using voice recognition software. Unrecognized errors in transcription may be present.

## 2022-03-18 PROBLEM — G45.9 TIA (TRANSIENT ISCHEMIC ATTACK): Status: ACTIVE | Noted: 2018-03-21

## 2022-03-19 PROBLEM — R07.9 CHEST PAIN: Status: ACTIVE | Noted: 2018-08-17

## 2022-03-19 PROBLEM — N31.9 NEUROGENIC BLADDER: Status: ACTIVE | Noted: 2018-02-21

## 2022-03-19 PROBLEM — R39.15 URGENCY OF URINATION: Status: ACTIVE | Noted: 2017-12-06

## 2022-03-19 PROBLEM — Z86.73 HISTORY OF STROKE: Status: ACTIVE | Noted: 2018-03-21

## 2022-03-19 PROBLEM — I16.1 HYPERTENSIVE EMERGENCY: Status: ACTIVE | Noted: 2018-08-17

## 2022-03-19 PROBLEM — K21.9 GASTROESOPHAGEAL REFLUX DISEASE WITHOUT ESOPHAGITIS: Status: ACTIVE | Noted: 2018-07-10

## 2022-03-19 PROBLEM — E11.9 DIABETES (HCC): Status: ACTIVE | Noted: 2018-08-17

## 2022-11-01 ENCOUNTER — OFFICE VISIT (OUTPATIENT)
Dept: CARDIOLOGY CLINIC | Age: 77
End: 2022-11-01
Payer: MEDICARE

## 2022-11-01 VITALS
BODY MASS INDEX: 26.26 KG/M2 | DIASTOLIC BLOOD PRESSURE: 62 MMHG | WEIGHT: 153 LBS | SYSTOLIC BLOOD PRESSURE: 112 MMHG | OXYGEN SATURATION: 98 % | TEMPERATURE: 97.4 F | HEART RATE: 62 BPM

## 2022-11-01 DIAGNOSIS — R60.9 EDEMA, UNSPECIFIED TYPE: Primary | ICD-10-CM

## 2022-11-01 DIAGNOSIS — R06.00 DYSPNEA, UNSPECIFIED TYPE: ICD-10-CM

## 2022-11-01 PROCEDURE — 1101F PT FALLS ASSESS-DOCD LE1/YR: CPT | Performed by: INTERNAL MEDICINE

## 2022-11-01 PROCEDURE — G9717 DOC PT DX DEP/BP F/U NT REQ: HCPCS | Performed by: INTERNAL MEDICINE

## 2022-11-01 PROCEDURE — 1090F PRES/ABSN URINE INCON ASSESS: CPT | Performed by: INTERNAL MEDICINE

## 2022-11-01 PROCEDURE — G8400 PT W/DXA NO RESULTS DOC: HCPCS | Performed by: INTERNAL MEDICINE

## 2022-11-01 PROCEDURE — 3074F SYST BP LT 130 MM HG: CPT | Performed by: INTERNAL MEDICINE

## 2022-11-01 PROCEDURE — G8753 SYS BP > OR = 140: HCPCS | Performed by: INTERNAL MEDICINE

## 2022-11-01 PROCEDURE — G8536 NO DOC ELDER MAL SCRN: HCPCS | Performed by: INTERNAL MEDICINE

## 2022-11-01 PROCEDURE — 3078F DIAST BP <80 MM HG: CPT | Performed by: INTERNAL MEDICINE

## 2022-11-01 PROCEDURE — 1123F ACP DISCUSS/DSCN MKR DOCD: CPT | Performed by: INTERNAL MEDICINE

## 2022-11-01 PROCEDURE — G8754 DIAS BP LESS 90: HCPCS | Performed by: INTERNAL MEDICINE

## 2022-11-01 PROCEDURE — G8428 CUR MEDS NOT DOCUMENT: HCPCS | Performed by: INTERNAL MEDICINE

## 2022-11-01 PROCEDURE — G8417 CALC BMI ABV UP PARAM F/U: HCPCS | Performed by: INTERNAL MEDICINE

## 2022-11-01 PROCEDURE — 99214 OFFICE O/P EST MOD 30 MIN: CPT | Performed by: INTERNAL MEDICINE

## 2022-11-01 RX ORDER — ATORVASTATIN CALCIUM 10 MG/1
1 TABLET, FILM COATED ORAL
COMMUNITY
Start: 2022-10-30

## 2022-11-01 RX ORDER — CLONIDINE 0.2 MG/24H
1 PATCH, EXTENDED RELEASE TRANSDERMAL
COMMUNITY
Start: 2022-09-10

## 2022-11-01 RX ORDER — ASPIRIN 81 MG/1
81 TABLET ORAL DAILY
COMMUNITY

## 2022-11-01 RX ORDER — BUSPIRONE HYDROCHLORIDE 10 MG/1
10 TABLET ORAL 2 TIMES DAILY
COMMUNITY

## 2022-11-01 RX ORDER — TRAZODONE HYDROCHLORIDE 50 MG/1
50 TABLET ORAL
COMMUNITY

## 2022-11-01 NOTE — PROGRESS NOTES
[facility]    Ms. Ting Holland is a 76 y.o. female with history of hypertension, hyperlipidemia, diabetes, scoliosis and other multiple medical problem    Patient is here today for follow-up appointment. She denies any prior history of myocardial infarction or congestive heart failure. Patient was admitted to the hospital couple times since last time she was here. 1 time she was admitted with depression and suicidal ideation. Patient also was admitted with some hyperglycemia and fall. Has a chronic pain including the back pain. Occasional palpitation and lower extremity swelling but no other specific cardiac complaint  Denies any nausea, vomiting, abdominal pain, fever, chills, sputum production. No hematuria or other bleeding complaints    Past Medical History:   Diagnosis Date    Anxiety     Atrophic vaginitis     Depression     Diabetes ([facility] [address]     Diverticulosis     Dysphagia     Dysuria     Esophageal reflux     Fatty liver     Gastric ulcer     Gastroparesis     H/O joint replacement 1991    left knee    Hypercholesteremia     Hypertension     Iron deficiency anemia     Neurogenic bladder     Recurrent UTI     Scoliosis     Spinal stenosis     Stenosis of lumbosacral spine     Stroke ([facility] [address] 04/2018    Tardive dyskinesia     Urgency-frequency syndrome          Past Surgical History:   Procedure Laterality Date    EGD  7/10/2018         HX APPENDECTOMY      HX BREAST LUMPECTOMY      HX CHOLECYSTECTOMY  1980    HX COLONOSCOPY  10/29/2009    hyperplastic polyp, hemorrhoids    HX HERNIA REPAIR      HX HYSTERECTOMY  1970    HX KNEE ARTHROSCOPY      HX KNEE REPLACEMENT  1991    left knee    HX LUMBAR FUSION      x 3    HX TOTAL COLECTOMY  2012       Current Outpatient Medications   Medication Sig    busPIRone (BUSPAR) 10 mg tablet Take 10 mg by mouth two (2) times a day. aspirin delayed-release 81 mg tablet Take 81 mg by mouth daily. atorvastatin (LIPITOR) 10 mg tablet Take 1 Tablet by mouth nightly. cloNIDine (CATAPRES) 0.2 mg/24 hr ptwk 1 Patch by TransDERmal route every seven (7) days. traZODone (DESYREL) 50 mg tablet Take 50 mg by mouth nightly. carvediloL (COREG) 6.25 mg tablet Take 1 Tablet by mouth two (2) times daily (with meals). furosemide (LASIX) 40 mg tablet Take 40 mg by mouth as needed. amLODIPine (NORVASC) 10 mg tablet 10 mg.    losartan (COZAAR) 25 mg tablet Take 25 mg by mouth daily. levothyroxine (SYNTHROID) 75 mcg tablet Take 75 mcg by mouth Daily (before breakfast). glimepiride (AMARYL) 4 mg tablet Take 4 mg by mouth. sertraline (ZOLOFT) 50 mg tablet Take 50 mg by mouth daily. LANTUS SOLOSTAR U-100 INSULIN 100 unit/mL (3 mL) inpn nightly. 5-10  units    clotrimazole (MYCELEX) 10 mg chrissy     omeprazole (PRILOSEC) 40 mg capsule Take 40 mg by mouth daily. lubiPROStone (AMITIZA) 8 mcg capsule Take 8 mcg by mouth two (2) times daily (with meals). albuterol (PROAIR HFA) 90 mcg/actuation inhaler Take  by inhalation. conjugated estrogens (PREMARIN) 0.625 mg/gram vaginal cream Apply small amount to introitus MWF as directed. ergocalciferol (ERGOCALCIFEROL) 50,000 unit capsule TAKE ONE CAPSULE BY MOUTH ONCE A WEEK FOR VIT D    nitroglycerin (NITROSTAT) 0.4 mg SL tablet 1 Tab by SubLINGual route every five (5) minutes as needed for Chest Pain. Up to 3 doses. betamethasone dipropionate (DIPROSONE) 0.05 % topical cream Apply  to affected area daily as needed for Skin Irritation. gabapentin (NEURONTIN) 300 mg capsule Take 300 mg by mouth three (3) times daily. potassium chloride SR (KLOR-CON 10) 10 mEq tablet Take 10 mEq by mouth as needed. cholecalciferol (VITAMIN D3) 1,000 unit cap Take 1,000 Units by mouth daily. cyanocobalamin (VITAMIN B-12) 1,000 mcg tablet Take 1,000 mcg by mouth daily. No current facility-administered medications for this visit.        Allergies and Sensitivities:  Allergies   Allergen Reactions    Codeine Rash and Itching    Iodine Hives and Rash    Levofloxacin Nausea and Vomiting    Metformin Other (comments)     diarrhea    Morphine Hives    Pcn [Penicillins] Other (comments)     Causes \"Heart swell\"    Sulfa (Sulfonamide Antibiotics) Nausea Only       Family History:  Family History   Problem Relation Age of Onset    Heart Disease Mother     Diabetes Mother     Heart Attack Mother     Cancer Father         lung CA    Cancer Brother     Cancer Sister        Social History:  Social History     Tobacco Use    Smoking status: Never    Smokeless tobacco: Never   Substance Use Topics    Alcohol use: No    Drug use: No     She  reports that she has never smoked. She has never used smokeless tobacco.  She  reports no history of alcohol use. Review of Systems:  Cardiac symptoms as noted above in HPI. All others negative. Physical Exam:  BP Readings from Last 3 Encounters:   11/01/22 (!) 146/62   01/04/22 137/78   09/03/21 (!) 205/190         Pulse Readings from Last 3 Encounters:   11/01/22 65   01/04/22 63   09/03/21 72          Wt Readings from Last 3 Encounters:   11/01/22 69.4 kg (153 lb)   01/04/22 76.7 kg (169 lb)   09/03/21 74.1 kg (163 lb 6.4 oz)       Constitutional: Oriented to person, place, and time. HENT: Head: Normocephalic and atraumatic. Neck: No JVD present. Cardiovascular: Regular rhythm. No murmur, gallop or rubs appreciated  Lung: Breath sounds normal. No respiratory distress. No ronchi or rales appreciated  Abdominal: No tenderness. No rebound and no guarding. Musculoskeletal: There is no significant lower extremity edema.  No cynosis    Review of Data  LABS:   Lab Results   Component Value Date/Time    Sodium 139 01/04/2022 03:19 PM    Potassium 4.5 01/04/2022 03:19 PM    Chloride 101 01/04/2022 03:19 PM    CO2 29 01/04/2022 03:19 PM    Glucose 215 (H) 01/04/2022 03:19 PM    BUN 13 01/04/2022 03:19 PM    Creatinine 0.72 01/04/2022 03:19 PM     No flowsheet data found. Lab Results   Component Value Date/Time    ALT (SGPT) 22 01/04/2022 03:19 PM     Lab Results   Component Value Date/Time    Hemoglobin A1c 8.6 (H) 01/04/2022 03:22 PM       EKG  Sinus rhythm at 72 bpm.  Nonspecific T wave inversion diffusely  02/25/19    STRESS TEST (8/18)  Baseline ECG: Normal sinus rhythm. Negative stress electrocardiogram.  Gated SPECT: Left ventricular function post-stress was normal. Calculated ejection fraction is 81%. Left ventricular perfusion is normal.  Stress test results correlate with a low risk of inducible myocardial ischemia. IMPRESSION & PLAN:  Ms. Evan Diane is 76 y.o. female with multiple medical problem    Hypertension: /78. Based on records, seems like she is on Coreg, clonidine patch, atorvastatin, amlodipine, losartan and Lasix. ECHO to rule out hypertensive CV disease with palpitations and occasional dyspne. Hyperlipidemia: On atorvastatin. PCP checks profile regularly. Defer to PCP    Palpitation:  Event monitor in 10/2021, low risk. Sinus rhythm. PVC burden less than 1%, no sustained arrhthymias. Echo to rule out any abnormal cardiac structure. This plan was discussed with patient who is in agreement. Thank you for allowing me to participate in patient care. Please feel free to call me if you have any question or concern. Renetta Jones MD  Please note: This document has been produced using voice recognition software. Unrecognized errors in transcription may be present.

## 2022-11-01 NOTE — PATIENT INSTRUCTIONS
Testing   Echo- At Dr. Meli Aguilar office    **call office 3-5 days after testing is completed for results**     Please call our office at 4 PM today to go over your medications

## 2022-11-01 NOTE — LETTER
11/1/2022    Patient: Les Shin   YOB: 1945   Date of Visit: 11/1/2022     Shandra Malone NP  [address] [facility] [address]  Via Fax: [phone]    Dear Shandra Malone NP,      Thank you for referring Ms. Lashay Mcgregor to [facility] for evaluation. My notes for this consultation are attached. If you have questions, please do not hesitate to call me. I look forward to following your patient along with you.       Sincerely,    Mireya Cotton MD

## 2022-11-01 NOTE — PROGRESS NOTES
Identified pt with two pt identifiers(name and ). Reviewed record in preparation for visit and have obtained necessary documentation. Alfonso Bindu presents today for   Chief Complaint   Patient presents with    Follow-up       Pt c/o fatigue. Alfonso Ruano preferred language for health care discussion is english/other. Personal Protective Equipment:   Personal Protective Equipment was used including: mask-surgical and hands-gloves. Patient was placed on no precaution(s). Patient was masked. Precautions:   Patient currently on None  Patient currently roomed with door closed. Is someone accompanying this pt? No     Is the patient using any DME equipment during OV? Yes. Wheelchair     Depression Screening:  3 most recent PHQ Screens 3/24/2018   Little interest or pleasure in doing things Not at all   Feeling down, depressed, irritable, or hopeless Not at all   Total Score PHQ 2 0       Learning Assessment:  No flowsheet data found. Abuse Screening:  No flowsheet data found. Fall Risk  Fall Risk Assessment, last 12 mths 2022   Able to walk? Yes   Fall in past 12 months? 1   Do you feel unsteady? 1   Are you worried about falling 1   Is the gait abnormal? 1   Number of falls in past 12 months 2   Fall with injury? 1       Pt currently taking Anticoagulant therapy? No   Pt currently taking Antiplatelet therapy? yes    Coordination of Care:  1. Have you been to the ER, urgent care clinic since your last visit? Hospitalized since your last visit? Yes.     2. Have you seen or consulted any other health care providers outside of the [address] [place] [address] since your last visit? Include any pap smears or colon screening. yes      Please see Red banners under Allergies and Med Rec to remove outside inquires. All correct information has been verified with patient and added to chart.      Medication's patient's would liked removed has been marked not taking to be removed per Verbal order and read back per Linwood Roman MD

## 2022-11-29 ENCOUNTER — TELEPHONE (OUTPATIENT)
Dept: CARDIOLOGY CLINIC | Age: 77
End: 2022-11-29

## 2022-11-29 NOTE — LETTER
11/30/2022 2:04 PM    [facility]  [address] [facility] [address]    Dear Ms. Gonzalez,    We have tried to contact you via telephone but have been unsuccessful. Our clinical staff has received your message regarding your palpitations and recent high blood pressure readings. Please give our office a call back at your earliest convenience so we may address your concerns. We would also like to update your contact information at that time, as we may have incorrect phone numbers in our system. We look forward to assisting you.         Sincerely,      Yamile Nguyen MD

## 2022-11-29 NOTE — TELEPHONE ENCOUNTER
Patient called 11- to say BP was 184/92 and monitor states her heart skip a couple of beats(store purchased monitor). Patient is having palpitations and is asking for a call back about this.

## 2022-11-29 NOTE — TELEPHONE ENCOUNTER
Attempted to contact pt at  number, no answer. m for pt to return call to office at [phone]. Will continue to try to contact pt.

## 2022-11-30 ENCOUNTER — TELEPHONE (OUTPATIENT)
Dept: CARDIOLOGY CLINIC | Age: 77
End: 2022-11-30

## 2022-11-30 NOTE — TELEPHONE ENCOUNTER
According to 11/30 telephone encounter note, pt phone numbers are incorrect. Letter has been sent to last known address.

## 2022-12-02 LAB — LEFT VENTRICULAR EJECTION FRACTION, EXTERNAL: NORMAL

## 2022-12-06 ENCOUNTER — TELEPHONE (OUTPATIENT)
Dept: CARDIOLOGY CLINIC | Age: 77
End: 2022-12-06

## 2022-12-06 NOTE — TELEPHONE ENCOUNTER
Patient calling to inform us that she had an echo done at [facility]. Says that she would reach out to them to get her results sent to us        Patient has been experiencing some palpitations and SOB. BP is fluctuating.  Patient would like a call back

## 2022-12-06 NOTE — TELEPHONE ENCOUNTER
Contacted pt god daughter due to attempting to try to reach pt # and unable to call out to PT number and unable to reach pt daughter. Will have PT to call the office back. Will set appt for pt need to confirm that its ok.

## 2022-12-07 NOTE — TELEPHONE ENCOUNTER
I have been unable to reach this patient by phone. Phone numbers listed are incorrect. Message has been left with [facility] notifying pt to call our office back and also provide contact information. A letter is being sent to the last known home address.

## 2023-01-03 ENCOUNTER — OFFICE VISIT (OUTPATIENT)
Dept: CARDIOLOGY CLINIC | Age: 78
End: 2023-01-03
Payer: MEDICARE

## 2023-01-03 VITALS
HEART RATE: 60 BPM | SYSTOLIC BLOOD PRESSURE: 119 MMHG | TEMPERATURE: 98.1 F | DIASTOLIC BLOOD PRESSURE: 70 MMHG | OXYGEN SATURATION: 98 %

## 2023-01-03 DIAGNOSIS — R06.00 DYSPNEA, UNSPECIFIED TYPE: ICD-10-CM

## 2023-01-03 DIAGNOSIS — R00.2 PALPITATION: Primary | ICD-10-CM

## 2023-01-03 PROCEDURE — G8428 CUR MEDS NOT DOCUMENT: HCPCS | Performed by: INTERNAL MEDICINE

## 2023-01-03 PROCEDURE — 1123F ACP DISCUSS/DSCN MKR DOCD: CPT | Performed by: INTERNAL MEDICINE

## 2023-01-03 PROCEDURE — 3074F SYST BP LT 130 MM HG: CPT | Performed by: INTERNAL MEDICINE

## 2023-01-03 PROCEDURE — G9717 DOC PT DX DEP/BP F/U NT REQ: HCPCS | Performed by: INTERNAL MEDICINE

## 2023-01-03 PROCEDURE — G8536 NO DOC ELDER MAL SCRN: HCPCS | Performed by: INTERNAL MEDICINE

## 2023-01-03 PROCEDURE — G8400 PT W/DXA NO RESULTS DOC: HCPCS | Performed by: INTERNAL MEDICINE

## 2023-01-03 PROCEDURE — G8417 CALC BMI ABV UP PARAM F/U: HCPCS | Performed by: INTERNAL MEDICINE

## 2023-01-03 PROCEDURE — 1090F PRES/ABSN URINE INCON ASSESS: CPT | Performed by: INTERNAL MEDICINE

## 2023-01-03 PROCEDURE — 3078F DIAST BP <80 MM HG: CPT | Performed by: INTERNAL MEDICINE

## 2023-01-03 PROCEDURE — 99214 OFFICE O/P EST MOD 30 MIN: CPT | Performed by: INTERNAL MEDICINE

## 2023-01-03 PROCEDURE — 1101F PT FALLS ASSESS-DOCD LE1/YR: CPT | Performed by: INTERNAL MEDICINE

## 2023-01-03 NOTE — PROGRESS NOTES
[facility]    Ms. Lady Santillan is a 76 y.o. female with history of hypertension, hyperlipidemia, diabetes, scoliosis and other multiple medical problem    Patient is here today for follow-up appointment. She denies any prior history of myocardial infarction or congestive heart failure. Patient made an add-on appointment today because of some palpitation. She has been having some dizziness. No presyncope or syncope. Denies any chest pain or chest tightness concerning for angina. Palpitation last for few minutes. No nausea vomiting or diaphoresis. Denies any nausea, vomiting, abdominal pain, fever, chills, sputum production. No hematuria or other bleeding complaints    Past Medical History:   Diagnosis Date    Anxiety     Atrophic vaginitis     Depression     Diabetes ([address]     Diverticulosis     Dysphagia     Dysuria     Esophageal reflux     Fatty liver     Gastric ulcer     Gastroparesis     H/O joint replacement 1991    left knee    Hypercholesteremia     Hypertension     Iron deficiency anemia     Neurogenic bladder     Recurrent UTI     Scoliosis     Spinal stenosis     Stenosis of lumbosacral spine     Stroke ([facility] [address] 04/2018    Tardive dyskinesia     Urgency-frequency syndrome          Past Surgical History:   Procedure Laterality Date    EGD  7/10/2018         HX APPENDECTOMY      HX BREAST LUMPECTOMY      HX CHOLECYSTECTOMY  1980    HX COLONOSCOPY  10/29/2009    hyperplastic polyp, hemorrhoids    HX HERNIA REPAIR      HX HYSTERECTOMY  1970    HX KNEE ARTHROSCOPY      HX KNEE REPLACEMENT  1991    left knee    HX LUMBAR FUSION      x 3    HX TOTAL COLECTOMY  2012       Current Outpatient Medications   Medication Sig    aspirin delayed-release 81 mg tablet Take 81 mg by mouth daily. atorvastatin (LIPITOR) 10 mg tablet Take 1 Tablet by mouth nightly.     cloNIDine (CATAPRES) 0.2 mg/24 hr ptwk 1 Patch by TransDERmal route every seven (7) days.    carvediloL (COREG) 6.25 mg tablet Take 1 Tablet by mouth two (2) times daily (with meals). omeprazole (PRILOSEC) 40 mg capsule Take 40 mg by mouth daily. nitroglycerin (NITROSTAT) 0.4 mg SL tablet 1 Tab by SubLINGual route every five (5) minutes as needed for Chest Pain. Up to 3 doses. furosemide (LASIX) 40 mg tablet Take 40 mg by mouth as needed. potassium chloride SR (KLOR-CON 10) 10 mEq tablet Take 10 mEq by mouth as needed. amLODIPine (NORVASC) 10 mg tablet 10 mg.    losartan (COZAAR) 25 mg tablet Take 25 mg by mouth daily. busPIRone (BUSPAR) 10 mg tablet Take 10 mg by mouth two (2) times a day. traZODone (DESYREL) 50 mg tablet Take 50 mg by mouth nightly. levothyroxine (SYNTHROID) 75 mcg tablet Take 75 mcg by mouth Daily (before breakfast). glimepiride (AMARYL) 4 mg tablet Take 4 mg by mouth. sertraline (ZOLOFT) 50 mg tablet Take 50 mg by mouth daily. LANTUS SOLOSTAR U-100 INSULIN 100 unit/mL (3 mL) inpn nightly. 5-10  units    clotrimazole (MYCELEX) 10 mg chrissy     lubiPROStone (AMITIZA) 8 mcg capsule Take 8 mcg by mouth two (2) times daily (with meals). albuterol (PROAIR HFA) 90 mcg/actuation inhaler Take  by inhalation. conjugated estrogens (PREMARIN) 0.625 mg/gram vaginal cream Apply small amount to introitus MWF as directed. ergocalciferol (ERGOCALCIFEROL) 50,000 unit capsule TAKE ONE CAPSULE BY MOUTH ONCE A WEEK FOR VIT D    betamethasone dipropionate (DIPROSONE) 0.05 % topical cream Apply  to affected area daily as needed for Skin Irritation. gabapentin (NEURONTIN) 300 mg capsule Take 300 mg by mouth three (3) times daily. cholecalciferol (VITAMIN D3) 1,000 unit cap Take 1,000 Units by mouth daily. cyanocobalamin (VITAMIN B-12) 1,000 mcg tablet Take 1,000 mcg by mouth daily. No current facility-administered medications for this visit.        Allergies and Sensitivities:  Allergies   Allergen Reactions    Codeine Rash and Itching    Iodine Hives and Rash    Levofloxacin Nausea and Vomiting    Metformin Other (comments)     diarrhea    Morphine Hives    Pcn [Penicillins] Other (comments)     Causes \"Heart swell\"    Sulfa (Sulfonamide Antibiotics) Nausea Only       Family History:  Family History   Problem Relation Age of Onset    Heart Disease Mother     Diabetes Mother     Heart Attack Mother     Cancer Father         lung CA    Cancer Brother     Cancer Sister        Social History:  Social History     Tobacco Use    Smoking status: Never    Smokeless tobacco: Never   Substance Use Topics    Alcohol use: No    Drug use: No     She  reports that she has never smoked. She has never used smokeless tobacco.  She  reports no history of alcohol use. Review of Systems:  Cardiac symptoms as noted above in HPI. All others negative. Physical Exam:  BP Readings from Last 3 Encounters:   01/03/23 119/70   11/01/22 112/62   01/04/22 137/78         Pulse Readings from Last 3 Encounters:   01/03/23 60   11/01/22 62   01/04/22 63          Wt Readings from Last 3 Encounters:   11/01/22 69.4 kg (153 lb)   01/04/22 76.7 kg (169 lb)   09/03/21 74.1 kg (163 lb 6.4 oz)       Constitutional: Oriented to person, place, and time. HENT: Head: Normocephalic and atraumatic. Neck: No JVD present. Cardiovascular: Regular rhythm. No murmur, gallop or rubs appreciated  Lung: Breath sounds normal. No respiratory distress. No ronchi or rales appreciated  Abdominal: No tenderness. No rebound and no guarding. Musculoskeletal: There is no significant lower extremity edema. No cynosis    Review of Data  LABS:   Lab Results   Component Value Date/Time    Sodium 139 01/04/2022 03:19 PM    Potassium 4.5 01/04/2022 03:19 PM    Chloride 101 01/04/2022 03:19 PM    CO2 29 01/04/2022 03:19 PM    Glucose 215 (H) 01/04/2022 03:19 PM    BUN 13 01/04/2022 03:19 PM    Creatinine 0.72 01/04/2022 03:19 PM     No flowsheet data found.     Lab Results   Component Value Date/Time    ALT (SGPT) 22 01/04/2022 03:19 PM     Lab Results   Component Value Date/Time    Hemoglobin A1c 8.6 (H) 01/04/2022 03:22 PM       EKG  Sinus rhythm at 72 bpm.  Nonspecific T wave inversion diffusely  02/25/19    STRESS TEST (8/18)  Baseline ECG: Normal sinus rhythm. Negative stress electrocardiogram.  Gated SPECT: Left ventricular function post-stress was normal. Calculated ejection fraction is 81%. Left ventricular perfusion is normal.  Stress test results correlate with a low risk of inducible myocardial ischemia. IMPRESSION & PLAN:  Ms. Dustin Galarza is 76 y.o. female with multiple medical problem    Hypertension: /73. Continue current medications salt restriction advised. Hyperlipidemia: On atorvastatin. PCP checks profile regularly. Defer to PCP    Palpitation:  Event monitor in 10/2021, low risk. Sinus rhythm. PVC burden less than 1%, no sustained arrhthymias. Patient has palpitation again happening almost on a daily basis for last couple days. Unable to appreciate any extra beats on exam  Will place event monitor  ECHO rule out any abnormal cardiac structure. This plan was discussed with patient who is in agreement. Thank you for allowing me to participate in patient care. Please feel free to call me if you have any question or concern. Conrado Wetzel MD  Please note: This document has been produced using voice recognition software. Unrecognized errors in transcription may be present.

## 2023-01-03 NOTE — PATIENT INSTRUCTIONS
Testing   [employer]-( 30 days)will be calling you to confirm your address to send your Heart Monitor. Please allow 7-10 business days for monitor to arrive at your home.   Customer Service for [employer]:  [phone]      Other Testing  Have [address] [facility] fax Echo report to [phone]    New Location Address- projected for the month of February 2023    [address] [facility] [address], [place] [address]

## 2023-01-03 NOTE — LETTER
1/3/2023    Patient: Peter Amor   YOB: 1945   Date of Visit: 1/3/2023     Tessa Gee MD  [address] [place] [id number]  Via Fax: [phone]    Dear Tessa Gee MD,      Thank you for referring Ms. Mickie Saleem to [facility] for evaluation. My notes for this consultation are attached. If you have questions, please do not hesitate to call me. I look forward to following your patient along with you.       Sincerely,    Vernon Neal MD

## 2023-01-03 NOTE — PROGRESS NOTES
Identified pt with two pt identifiers(name and ). Reviewed record in preparation for visit and have obtained necessary documentation. Blaine Stephens presents today for   Chief Complaint   Patient presents with    Follow-up     Sob and htn       Pt c/o DIZZINESS, SOB,LLE  SWELLING. Blaine Stephens preferred language for health care discussion is english/other. Personal Protective Equipment:   Personal Protective Equipment was used including: mask-surgical and hands-gloves. Patient was placed on no precaution(s). Patient was masked. Precautions:   Patient currently on None  Patient currently roomed with door closed. Is someone accompanying this pt? no    Is the patient using any DME equipment during [address]? WC    Depression Screening:  3 most recent PHQ Screens 1/3/2023   Little interest or pleasure in doing things Not at all   Feeling down, depressed, irritable, or hopeless Not at all   Total Score PHQ 2 0       Learning Assessment:  No flowsheet data found. Abuse Screening:  completed    Fall Risk  Fall Risk Assessment, last 12 mths 1/3/2023   Able to walk? No   Fall in past 12 months? -   Do you feel unsteady? -   Are you worried about falling -   Is the gait abnormal? -   Number of falls in past 12 months -   Fall with injury? -       Pt currently taking Anticoagulant therapy? no  Pt currently taking Antiplatelet therapy? yes    Coordination of Care:  1. Have you been to the ER, urgent care clinic since your last visit? Hospitalized since your last visit? no    2. Have you seen or consulted any other health care providers outside of the [address] [place] [address] since your last visit? Include any pap smears or colon screening. [facility]      Please see Red banners under Allergies and Med Rec to remove outside inquires. All correct information has been verified with patient and added to chart.      Medication's patient's would liked removed has been marked not taking to be removed per Verbal order and read back per Dinh Dasilva MD

## 2023-09-28 ENCOUNTER — OFFICE VISIT (OUTPATIENT)
Age: 78
End: 2023-09-28
Payer: MEDICARE

## 2023-09-28 VITALS
OXYGEN SATURATION: 95 % | WEIGHT: 157 LBS | SYSTOLIC BLOOD PRESSURE: 162 MMHG | HEART RATE: 68 BPM | DIASTOLIC BLOOD PRESSURE: 75 MMHG | BODY MASS INDEX: 26.95 KG/M2

## 2023-09-28 DIAGNOSIS — I10 ESSENTIAL HYPERTENSION WITH GOAL BLOOD PRESSURE LESS THAN 140/90: Primary | ICD-10-CM

## 2023-09-28 DIAGNOSIS — R00.2 PALPITATIONS: ICD-10-CM

## 2023-09-28 PROCEDURE — 1123F ACP DISCUSS/DSCN MKR DOCD: CPT | Performed by: INTERNAL MEDICINE

## 2023-09-28 PROCEDURE — 3077F SYST BP >= 140 MM HG: CPT | Performed by: INTERNAL MEDICINE

## 2023-09-28 PROCEDURE — 3078F DIAST BP <80 MM HG: CPT | Performed by: INTERNAL MEDICINE

## 2023-09-28 PROCEDURE — 99213 OFFICE O/P EST LOW 20 MIN: CPT | Performed by: INTERNAL MEDICINE

## 2023-09-28 NOTE — PROGRESS NOTES
Identified pt with two pt identifiers(name and ). Reviewed record in preparation for visit and have obtained necessary documentation. Kalpesh Allen presents today for No chief complaint on file. Pt c/o DIZZINESS, SOB, CHEST PAIN/ PRESSURE, FATIGUE/WEAKNESS, SWELLING. Kalpesh Allen preferred language for health care discussion is english/other. Personal Protective Equipment:   Personal Protective Equipment was used including: mask-surgical and hands-gloves. Patient was placed on no precaution(s). Patient was not masked. Precautions:   Patient currently on None  Patient currently roomed with door closed. Is someone accompanying this pt? daughter    Is the patient using any DME equipment during OV? Wheel chair    Depression Screenin/3/2023     3:10 PM   PHQ-9 Questionaire   Little interest or pleasure in doing things 0   Feeling down, depressed, or hopeless 0   PHQ-9 Total Score 0        Learning Assessment:  No question data found. Abuse Screenin/28/2023    11:00 AM   AMB Abuse Screening   Do you ever feel afraid of your partner? N   Are you in a relationship with someone who physically or mentally threatens you? N   Is it safe for you to go home? Y          Fall Risk      2023    11:31 AM   Fall Risk   2 or more falls in past year? no   Fall with injury in past year? no         Pt currently taking Anticoagulant therapy? no  Pt currently taking Antiplatelet therapy? Aspirni 81    Coordination of Care:  1. Have you been to the ER, urgent care clinic since your last visit? Yes, [facility] 23 Hospitalized since your last visit? no    2. Have you seen or consulted any other health care providers outside of the [address] [place] [address] since your last visit? Include any pap smears or colon screening. no      Please see Red banners under Allergies and Med Rec to remove outside inquires. All correct information has been verified with patient and added to chart.
significant PVC burden  Currently asymptomatic. Currently patient does not appear to have any symptom that is concerning for angina or heart failure. This plan was discussed with patient who is in agreement. Thank you for allowing me to participate in patient care. Please feel free to call me if you have any question or concern. Cadence Head MD  Please note: This document has been produced using voice recognition software. Unrecognized errors in transcription may be present.

## 2023-09-29 RX ORDER — NITROGLYCERIN 0.4 MG/1
0.4 TABLET SUBLINGUAL EVERY 5 MIN PRN
Qty: 25 TABLET | Refills: 5 | OUTPATIENT
Start: 2023-09-29

## 2023-09-29 NOTE — PROGRESS NOTES
NEUROLOGY CONSULT NOTE    ADMISSION DATE:  9/29/2023  DATE:  9/29/2023  ATTENDING PHYSICIAN:  Higinio Odonnell MD;Mollyory*  REASON FOR CONSULT: \"Right vertebral artery dissection\"    HISTORY OF PRESENT ILLNESS:    Cecily Davis is a 32 year old female with a past medical history of migraine headaches who presented to the ER on 9/29/23 with complaints of neck pain and headache. Her symptoms began about 4 days prior to presentation. She reports about 5-6 days prior to presentation she developed a headache typical of her migraines which lasted for a day and then improved, but the next day she awoke with R sided posterior neck/shoulder pain that radiated to the back R side and then R front part of her head. Her pain is constant and exacerbated by movement. Due to persistent symptoms she presented to the ER for further evaluation and a CTA head/neck revealed R vertebral artery dissection. The patient denies any recent notable neck trauma/injury or manipulation. She denies dizziness, vision changes, slurred speech, facial droop, focal numbness or weakness of the extremities or any coordination problems.     HOME MEDICATIONS:  (Not in a hospital admission)      CURRENT MEDICATIONS:    No current facility-administered medications for this encounter.     No current outpatient medications on file.       PAST MEDICAL HISTORY:   has no past medical history on file.    PAST SURGICAL HISTORY:   has no past surgical history on file.    PAST SOCIAL HISTORY:  Social History     Tobacco Use   • Smoking status: Never   • Smokeless tobacco: Never   Substance Use Topics   • Alcohol use: Yes   • Drug use: Not Currently       FAMILY HISTORY  No family history on file.    REVIEW OF SYSTEMS:  12 point review of systems completed and otherwise negative apart from symptoms noted in the HPI above    OBJECTIVE:    VITAL SIGNS:     Vital Last Value 24 Hour Range   Temperature 97.4 °F (36.3 °C) (09/29/23 0544) Temp  Min: 97.4 °F (36.3 °C)  Max:  Transport set for 1pm sat 3/24 with [employer]. [phone]. requested [facility]. To go to [facility]. Pcs completed envelope in nurses station. Notified pt. Notified Cindy tim np. Left message via email to nu mejia. spoke with jessica at [address] [facility] [employer] [facility], conf# [id number] 97.4 °F (36.3 °C)   Pulse 72 (09/29/23 0900) Pulse  Min: 64  Max: 86   Respiratory (!) 22 (09/29/23 0900) Resp  Min: 15  Max: 22   Non-Invasive  Blood Pressure (!) 124/90 (09/29/23 0900) BP  Min: 124/90  Max: 136/96   Pulse Oximetry 97 % (09/29/23 0900) SpO2  Min: 94 %  Max: 99 %     Vital Today Admitted   Weight       Height N/A     Body Mass Index N/A       PHYSICAL EXAM:    General appearance: Laying in bed, NAD     NEUROLOGIC:  Mental status: Awake, alert, oriented x3.  Language is fluent and comprehensive to naming and commands.  Follows commands appropriately.  Attention and insight appear normal.  Cranial nerves: PERRL, EOMI, visual fields are full to confrontation.  Face appears symmetric.  Tongue midline.  Facial sensation intact.  Shoulder shrug equal.  No dysarthria  Motor: 5/5 in all extremities.  No drift.  Normal tone.  No involuntary movements.  Sensation: Intact to fine touch in all extremities.  No extinction.    Cerebellum: Finger-to-nose testing is normal bilaterally.  Reflexes: 1+ and symmetric throughout.  Toes down-going bilaterally.    Gait: Deferred.    LABORATORY DATA:    Admission on 09/29/2023   Component Date Value Ref Range Status   • Sodium 09/29/2023 142  135 - 145 mmol/L Final   • Potassium 09/29/2023 3.8  3.4 - 5.1 mmol/L Final   • Chloride 09/29/2023 106  97 - 110 mmol/L Final   • Carbon Dioxide 09/29/2023 27  21 - 32 mmol/L Final   • Anion Gap 09/29/2023 13  7 - 19 mmol/L Final   • Glucose 09/29/2023 85  70 - 99 mg/dL Final   • BUN 09/29/2023 13  6 - 20 mg/dL Final   • Creatinine 09/29/2023 0.83  0.51 - 0.95 mg/dL Final   • Glomerular Filtration Rate 09/29/2023 >90  >=60 Final   • BUN/Cr 09/29/2023 16  7 - 25 Final   • Calcium 09/29/2023 8.7  8.4 - 10.2 mg/dL Final   • Bilirubin, Total 09/29/2023 0.5  0.2 - 1.0 mg/dL Final   • GOT/AST 09/29/2023 15  <=37 Units/L Final   • GPT/ALT 09/29/2023 19  <64 Units/L Final   • Alkaline Phosphatase 09/29/2023 54  45 - 117 Units/L Final   •  Albumin 09/29/2023 4.2  3.6 - 5.1 g/dL Final   • Protein, Total 09/29/2023 7.5  6.4 - 8.2 g/dL Final   • Globulin 09/29/2023 3.3  2.0 - 4.0 g/dL Final   • A/G Ratio 09/29/2023 1.3  1.0 - 2.4 Final   • Magnesium 09/29/2023 1.9  1.7 - 2.4 mg/dL Final   • WBC 09/29/2023 7.4  4.2 - 11.0 K/mcL Final   • RBC 09/29/2023 5.10  4.00 - 5.20 mil/mcL Final   • HGB 09/29/2023 15.7 (H)  12.0 - 15.5 g/dL Final   • HCT 09/29/2023 46.0  36.0 - 46.5 % Final   • MCV 09/29/2023 90.2  78.0 - 100.0 fl Final   • MCH 09/29/2023 30.8  26.0 - 34.0 pg Final   • MCHC 09/29/2023 34.1  32.0 - 36.5 g/dL Final   • RDW-CV 09/29/2023 11.1  11.0 - 15.0 % Final   • RDW-SD 09/29/2023 37.0 (L)  39.0 - 50.0 fL Final   • PLT 09/29/2023 301  140 - 450 K/mcL Final   • NRBC 09/29/2023 0  <=0 /100 WBC Final   • Neutrophil, Percent 09/29/2023 70  % Final   • Lymphocytes, Percent 09/29/2023 20  % Final   • Mono, Percent 09/29/2023 7  % Final   • Eosinophils, Percent 09/29/2023 2  % Final   • Basophils, Percent 09/29/2023 1  % Final   • Immature Granulocytes 09/29/2023 0  % Final   • Absolute Neutrophils 09/29/2023 5.3  1.8 - 7.7 K/mcL Final   • Absolute Lymphocytes 09/29/2023 1.5  1.0 - 4.8 K/mcL Final   • Absolute Monocytes 09/29/2023 0.5  0.3 - 0.9 K/mcL Final   • Absolute Eosinophils  09/29/2023 0.1  0.0 - 0.5 K/mcL Final   • Absolute Basophils 09/29/2023 0.0  0.0 - 0.3 K/mcL Final   • Absolute Immature Granulocytes 09/29/2023 0.0  0.0 - 0.2 K/mcL Final   • HCG, URINE - POINT OF CARE 09/29/2023 Negative  Negative Final      U HCG (9/29/23): negative    IMAGING STUDIES:    CT head w/o contrast, CTA head/neck (9/29/23):  IMPRESSION:  CTA findings compatible with right vertebral artery dissection causing  high-grade stenosis spanning C4-5 levels. No evidence of vertebral  occlusion. No evidence of intracerebral large vessel occlusion.  Unremarkable noncontrast CT head.    CT cervical spine w/o contrast (9/29/23):   IMPRESSION:  Three-D reformatted imaging of  the cervical spine was obtained from CTA of  the neck performed at the same setting. That dictation is dictated  separately which describes a right vertebral artery dissection.  Cervical straightening. No fracture, malalignment or erosive/destructive  change.  Disc spaces are preserved.                           ASSESSMENT:     Cecily Davis is a 32 year old female with a past medical history of migraine headaches who presented to the ER on 9/29/23 with complaints of neck pain and headache that began about 4 days prior to presentation. She denies any other neurologic complaints and has a nonfocal neurologic examination. Noncontrast CT head is negative. CTA head/neck reveals an extracranial (C4-C5) R vertebral artery dissection with high-grade stenosis. The patient denies any recent notable neck trauma/injury or manipulation so this appears to be a spontaneous dissection.    PLAN:    - Will admit to the hospital and observe for 24 hours  - MRI brain w/o contrast  - Start  mg daily. If MRI reveals any incidental strokes, will consider adding Plavix 75 mg x 21 days  - Start gabapentin 300 mg TID for headache/neck pain  - Can use Tylenol prn but avoid ibuprofen  - If patient remains stable and MRI is negative, she can be discharged home tomorrow AM to follow up in [facility] in 2-3 weeks as an outpatient     Discussed with Dr. Odonnell. Discussed with patient's father at the bedside    Dom Curtis MD    This was a prolonged inpatient visit requiring more than 40 minutes in addition 25 minutes of evaluation and management services; more than 50% of this time was spent in face to face interaction with patient and staff and with coordination of care.    Note to Patient: The 21st Century Cures Act makes medical notes like these available to patients in the interest of transparency. However, be advised this is a medical document. It is intended as peer to peer communication. It is written in  medical language and may contain abbreviations or verbiage that are unfamiliar. It may appear blunt or direct. Medical documents are intended to carry relevant information, facts as evident, and the clinical opinion of the practitioner.

## 2024-07-12 ENCOUNTER — HOSPITAL ENCOUNTER (OUTPATIENT)
Facility: HOSPITAL | Age: 79
Setting detail: RECURRING SERIES
Discharge: HOME | End: 2024-07-15
Payer: MEDICARE

## 2024-07-12 PROCEDURE — 97162 PT EVAL MOD COMPLEX 30 MIN: CPT

## 2024-07-12 NOTE — THERAPY EVALUATION
PT DAILY TREATMENT NOTE/CERVICAL [id number]      Patient Name: Peggy Lucio    Date: 2024    : 1945  Insurance: Payor: [facility] MEDICARE / Plan: JOHNY [facility] MEDICARE / Product Type: *No Product type* /      Patient  verified yes     Visit #   Current / Total 1 8-36   Time   In / Out 11:35 12:01   Pain   In / Out 5 5   Subjective Functional Status/Changes: Pt reports chronic lower back and B LE pain.  Pt also chronic neck pain.  Pt reports that neck pain has not changed, but back pain and B LE pain have gotten worse recently.  Pt reports that she gets injections for neck and back.  Pt reports that she had PT previously, reports that she has had multiple hospitalizations since last episode of outpatient PT.     PMH: Arthritis, asthma, depression, diabetes, heart disease (enlarged heart, heart palpitations), HTN, MS (diagnosed 40 years ago), scoliosis, CVA, thyroid disorder, L/S fusion x 4, left TKR, partial bowel resection due to obstruction, cholecystectomy, diabetic neuropathy     Treatment Area: Other low back pain [M54.59]  Neck pain [M54.2]    OBJECTIVE/EXAMINATION    Physical Therapy Evaluation - Cervical Spine      OBJECTIVE  Posture: [] WNL  Head Position: Protracted  Shoulder/Scapular Position: Protractedd  C-Kyphosis:  [] increased   [] decreased   C-Lordosis:   [] increased   [x] decreased  T-Kyphosis:  [x] increased   [] decreased  T-Lordosis:   [] increased   [] decreased   Scoliosis with right rib hump,  thoracic convexity to right/concavity to left    Cervical Retraction: [] WNL    [x] Abnormal: Decreased motion    Shoulder/Scapular Screen: [] WNL    [x] Abnormal: Decreased parascapular and RC muscle strength (grossly 4-/5)    C/S ROM: [] N/A   [] Too acute   [] Other:  ROM  AROM  PROM Comments:pain, area   Forward flexion 75     Extension 35     SB right 15     SB left  30     Rotation right 45     Rotation left 55       Thoracic Spine: [] N/A    [] WNL   [x] Other: Decreased 
Extension (L5) 4 4 []    Ankle Plantarflexion (S1) 4 4 []    Knee Flexion (S1,2) 4 4 []    Upper Abdominals     [x]    Lower Abdominals     [x]    Paraspinals     [x]    Back Rotators     [x]    Gluteus Ascencion     [x]    Other     []                                          Global Muscular Weakness:  Bridge = poor    Evaluation Complexity:  History:  HIGH Complexity :3+ comorbidities / personal factors will impact the outcome/ POC ; Examination:  HIGH Complexity : 4+ Standardized tests and measures addressing body structure, function, activity limitation and / or participation in recreation  ;Presentation:  MEDIUM Complexity : Evolving with changing characteristics  ;Clinical Decision Making: Oswestry Disability Index (VLAD) for Low Back Pain = 64 % ; (> 41% Severe Disability) = HIGH Complexity  Overall Complexity Rating: MEDIUM  Problem List: pain affecting function, decrease ROM, decrease strength, impaired gait/balance, decrease ADL/functional abilities, decrease activity tolerance, decrease flexibility/joint mobility, and decrease transfer abilities    Treatment Plan may include any combination of the followin Therapeutic Exercise, 21280 Neuromuscular Re-Education, 37087 Manual Therapy, 03926 Therapeutic Activity, 36008 Self Care/Home Management, 18920 Electrical Stim unattended, 41087 Electrical Stim attended, 90476 Gait Training, 51564 Ultrasound, 95433 Mechanical Traction, 21685 Orthotic Management and Training, and 62021 Orthotic Management and Training, Subsequent  Patient / Family readiness to learn indicated by: asking questions, trying to perform skills, and interest  Persons(s) to be included in education: patient (P)  Barriers to Learning/Limitations: none  Measures taken if barriers to learning present: NA  Patient Goal (s): \"Less pain, to be able to stand up a little better.\"  Patient Self Reported Health Status: fair  Rehabilitation Potential: good    Short Term Goals: To be accomplished in

## 2024-09-16 ENCOUNTER — HOSPITAL ENCOUNTER (OUTPATIENT)
Facility: HOSPITAL | Age: 79
Setting detail: RECURRING SERIES
Discharge: HOME | End: 2024-09-19
Payer: MEDICARE

## 2024-09-16 PROCEDURE — 97162 PT EVAL MOD COMPLEX 30 MIN: CPT

## 2024-09-25 ENCOUNTER — HOSPITAL ENCOUNTER (OUTPATIENT)
Facility: HOSPITAL | Age: 79
Setting detail: RECURRING SERIES
Discharge: HOME | End: 2024-09-28
Payer: MEDICARE

## 2024-09-25 PROCEDURE — 97112 NEUROMUSCULAR REEDUCATION: CPT

## 2024-09-25 PROCEDURE — 97530 THERAPEUTIC ACTIVITIES: CPT

## 2024-09-30 ENCOUNTER — APPOINTMENT (OUTPATIENT)
Facility: HOSPITAL | Age: 79
End: 2024-09-30
Payer: MEDICARE

## 2024-09-30 ENCOUNTER — HOSPITAL ENCOUNTER (OUTPATIENT)
Facility: HOSPITAL | Age: 79
Setting detail: RECURRING SERIES
Discharge: HOME | End: 2024-10-03
Payer: MEDICARE

## 2024-09-30 PROCEDURE — 97530 THERAPEUTIC ACTIVITIES: CPT

## 2024-09-30 PROCEDURE — 97110 THERAPEUTIC EXERCISES: CPT

## 2024-09-30 PROCEDURE — 97112 NEUROMUSCULAR REEDUCATION: CPT

## 2024-09-30 NOTE — PROGRESS NOTES
[place]   10/2/2024  1:20 PM Tanna Rodriguez PTA [facility]   10/3/2024  1:00 PM Mynor Powers MD [facility] AMB

## 2024-10-02 ENCOUNTER — APPOINTMENT (OUTPATIENT)
Facility: HOSPITAL | Age: 79
End: 2024-10-02
Payer: MEDICARE

## 2024-10-07 ENCOUNTER — HOSPITAL ENCOUNTER (OUTPATIENT)
Facility: HOSPITAL | Age: 79
Setting detail: RECURRING SERIES
Discharge: HOME | End: 2024-10-10
Payer: MEDICARE

## 2024-10-07 PROCEDURE — 97110 THERAPEUTIC EXERCISES: CPT

## 2024-10-07 PROCEDURE — 97530 THERAPEUTIC ACTIVITIES: CPT

## 2024-10-07 NOTE — PROGRESS NOTES
PHYSICAL  DAILY TREATMENT NOTE     Patient Name: Peggy Lucio    Date: 10/7/2024    : 1945  Insurance: Payor: [employer]IC[employer] / Plan: [facility] MEDICARE / Product Type: *No Product type* /      Patient  verified Yes     Visit #   Current / Total 4 36   Time   In / Out 125 206   Pain   In / Out -6/10 4/10   Subjective Functional Status/Changes: \"I feel ok today. I was able to cook yesterday. I am not going to over do it today like I did yesterday cooking\"       Next PN/ RC due 10/16/24  Auth due 12/15/24  13 V    TREATMENT AREA =  Other low back pain [M54.59]    OBJECTIVE    Modalities Rationale:     decrease pain to improve patient's ability to progress to PLOF and address remaining functional goals.    8 min  unbill []  Ice     [x]  Heat    location/position: Fowlers with wedge under B LEs     Skin assessment post-treatment:   Intact          Therapeutic Procedures:    Tx Min Billable or 1:1 Min (if diff from Tx Min) Procedure, Rationale, Specifics   13  05852 Therapeutic Exercise (timed):  increase ROM, strength, coordination, balance, and proprioception to improve patient's ability to progress to PLOF and address remaining functional goals. (see flow sheet as applicable)     Details if applicable:       10  69480 Therapeutic Activity (timed):  use of dynamic activities replicating functional movements to increase ROM, strength, coordination, balance, and proprioception in order to improve patient's ability to progress to PLOF and address remaining functional goals.  (see flow sheet as applicable)     Details if applicable:      [facility] Totals Reminder: bill using total billable min of TIMED therapeutic procedures (example: do not include dry needle or estim unattended, both untimed codes, in totals to left)  8-22 min = 1 unit; 23-37 min = 2 units; 38-52 min = 3 units; 53-67 min = 4 units; 68-82 min = 5 units   Total Total     [x]  Patient Education billed concurrently with other procedures

## 2024-10-24 ENCOUNTER — APPOINTMENT (OUTPATIENT)
Facility: HOSPITAL | Age: 79
End: 2024-10-24
Payer: MEDICARE

## 2024-11-04 ENCOUNTER — APPOINTMENT (OUTPATIENT)
Facility: HOSPITAL | Age: 79
End: 2024-11-04
Payer: MEDICARE

## 2024-11-13 ENCOUNTER — HOSPITAL ENCOUNTER (OUTPATIENT)
Facility: HOSPITAL | Age: 79
Setting detail: RECURRING SERIES
Discharge: HOME | End: 2024-11-16
Payer: MEDICARE

## 2024-11-13 PROCEDURE — 97110 THERAPEUTIC EXERCISES: CPT

## 2024-11-13 PROCEDURE — 97530 THERAPEUTIC ACTIVITIES: CPT

## 2024-11-13 NOTE — PROGRESS NOTES
[facility]  [address] [place] [address]   Phone: [phone] Fax: [phone]  PROGRESS NOTE  Patient Name: Peggy Lucio : 1945   Treatment/Medical Diagnosis: Other low back pain [M54.59]   Referral Source: Chintan Mcwilliams MD     Date of Initial Visit: 24 Attended Visits: 4 Missed Visits: 2     SUMMARY OF TREATMENT  Patient's POC has consisted of therex, therapeutic activities, manual therapy prn, modalities prn, pt. education, and a comprehensive HEP. Treatment strategies used to address functional mobility deficits, ROM deficits, strength deficits, analyze and address soft tissue restrictions, analyze and cue movement patterns, analyze and modify body mechanics/ergonomics, assess and modify postural abnormalities and instruct in home and community integration.   CURRENT STATUS  Peggy returns to physical therapy after a month lapse in case secondary to fall. She states that fell against a wall when trying to sit in her wheelchair. The wheelchair wasn't locked and it slipped from under her. After she felt pain in bilateral legs, arms and head. She went to the emergency room the week after when symptoms didn't improve. There she had imaging completing imaging of head and back but were umremarkable. She denies being admitted but was provided pain medication. Since then symptoms have improved. Prior to lapse in care she had only attended 3 follow up sessions and had missed 1 appointment. Her current pain rating is 5/10 and reports infrequent compliance with HEP. continues to progress towards goals in PT by increasing strength and improving functional mobility. Reassessment today revealed minor improvement since start of care. See below    Goal/Measure of Progress Goal Met?   1.  Pt will be compliant with HEP for symptom management at home.    Status at last Eval: NA Current Status: infrequent NOT MET   2.  Pt will be independent

## 2024-11-13 NOTE — PROGRESS NOTES
PHYSICAL / OCCUPATIONAL THERAPY - DAILY TREATMENT NOTE (updated )    Patient Name: Peggy Lucio    Date: 2024    : 1945  Insurance: Payor: [facility] MEDICAID / Plan: JOHNY [facility] [employer] PLUS / Product Type: *No Product type* /      Patient  verified YES    Visit #   Current / Total 5 36   Time   In / Out 120 216   Pain   In / Out 5 5   Subjective Functional Status/Changes: See pN     TREATMENT AREA =  Other low back pain [M54.59]    Next PN/ RC due today  Auth due med nec    OBJECTIVE    Heat (UNBILLED):  location/position: L/S with upper body and LE elevated .    Min Rationale   10 decrease pain and increase tissue extensibility to improve patient's ability to progress to PLOF and address remaining functional goals.     Skin assessment post-treatment:   Intact     Therapeutic Procedures:  Tx Min Billable or 1:1 Min (if diff from Tx Min) Procedure, Rationale, Specifics   38  60953 Therapeutic Exercise (timed):  increase ROM, strength, coordination, balance, and proprioception to improve patient's ability to progress to PLOF and address remaining functional goals. (see flow sheet as applicable)     Details if applicable:    See flowsheet     8  16755 Therapeutic Activity (timed):  use of dynamic activities replicating functional movements to increase ROM, strength, coordination, balance, and proprioception in order to improve patient's ability to progress to PLOF and address remaining functional goals.  (see flow sheet as applicable)     Details if applicable:    See flow sheet     46  [facility] Totals Reminder: bill using total billable min of TIMED therapeutic procedures (example: do not include dry needle or estim unattended, both untimed codes, in totals to left)  8-22 min = 1 unit; 23-37 min = 2 units; 38-52 min = 3 units; 53-67 min = 4 units; 68-82 min = 5 units   Total Total     [x]  Patient Education billed concurrently with other procedures   [x] Review HEP    []

## 2024-12-02 ENCOUNTER — HOSPITAL ENCOUNTER (OUTPATIENT)
Facility: HOSPITAL | Age: 79
Setting detail: RECURRING SERIES
Discharge: HOME | End: 2024-12-05
Payer: MEDICARE

## 2024-12-02 PROCEDURE — 97110 THERAPEUTIC EXERCISES: CPT

## 2024-12-02 PROCEDURE — 97112 NEUROMUSCULAR REEDUCATION: CPT

## 2024-12-02 PROCEDURE — 97530 THERAPEUTIC ACTIVITIES: CPT

## 2024-12-02 NOTE — PROGRESS NOTES
PHYSICAL  DAILY TREATMENT NOTE     Patient Name: Peggy Lucio    Date: 2024    : 1945  Insurance: Payor: [employer] MEDICARE / Plan: [employer]PP[employer] MEDICARE / Product Type: *No Product type* /      Patient  verified Yes     Visit #   Current / Total 6 36   Time   In / Out 120 208   Pain   In / Out 3/10 0/10   Subjective Functional Status/Changes: \"I put this brace on when I am doing stuff around the house\"       Next PN/ RC due 24  Auth due 12/15/24  13 V    TREATMENT AREA =  Other low back pain [M54.59]    OBJECTIVE    Modalities Rationale:     decrease pain to improve patient's ability to progress to PLOF and address remaining functional goals.    10 min  unbill []  Ice     [x]  Heat    location/position: Fowlers with wedge under B LEs     Skin assessment post-treatment:   Intact          Therapeutic Procedures:    Tx Min Billable or 1:1 Min (if diff from Tx Min) Procedure, Rationale, Specifics   10  01560 Neuromuscular Re-Education (timed):  improve balance, coordination, kinesthetic sense, posture, core stability and proprioception to improve patient's ability to develop conscious control of individual muscles and awareness of position of extremities in order to progress to PLOF and address remaining functional goals. (see flow sheet as applicable)      18  98386 Therapeutic Exercise (timed):  increase ROM, strength, coordination, balance, and proprioception to improve patient's ability to progress to PLOF and address remaining functional goals. (see flow sheet as applicable)     Details if applicable:       10  39729 Therapeutic Activity (timed):  use of dynamic activities replicating functional movements to increase ROM, strength, coordination, balance, and proprioception in order to improve patient's ability to progress to PLOF and address remaining functional goals.  (see flow sheet as applicable)     Details if applicable:     38 38 [facility] Totals Reminder: bill using total billable min

## 2024-12-04 ENCOUNTER — HOSPITAL ENCOUNTER (OUTPATIENT)
Facility: HOSPITAL | Age: 79
Setting detail: RECURRING SERIES
Discharge: HOME | End: 2024-12-07
Payer: MEDICARE

## 2024-12-04 PROCEDURE — 97530 THERAPEUTIC ACTIVITIES: CPT

## 2024-12-04 PROCEDURE — 97110 THERAPEUTIC EXERCISES: CPT

## 2024-12-04 NOTE — PROGRESS NOTES
tolerated  []  Discharge due to :  []  Other:      Rachel Dunaway PTA    12/4/2024    12:49 PM    If an interpreting service was utilized for treatment of this patient, the contents of this document represent the material reviewed with the patient via the .    Future Appointments   Date Time Provider Department Center   12/9/2024 12:40 PM Rachel Dunaway PTA [facility]   12/11/2024 12:40 PM Haily Rodriguez PT [facility]

## 2024-12-09 ENCOUNTER — APPOINTMENT (OUTPATIENT)
Facility: HOSPITAL | Age: 79
End: 2024-12-09
Payer: MEDICARE

## 2024-12-11 ENCOUNTER — HOSPITAL ENCOUNTER (OUTPATIENT)
Facility: HOSPITAL | Age: 79
Setting detail: RECURRING SERIES
Discharge: HOME | End: 2024-12-14
Payer: MEDICARE

## 2024-12-11 PROCEDURE — 97530 THERAPEUTIC ACTIVITIES: CPT

## 2024-12-11 NOTE — PROGRESS NOTES
[facility] - [facility] at \[facility]\""  [address], [place], [address] Phone , fax    CONTINUED PLAN OF CARE/RECERTIFICATION FOR PHYSICAL THERAPY          Patient Name: Peggy Lucio : 1945   Treatment/Medical Diagnosis: M54.59  OTHER LOWER BACK PAIN     Onset Date: Chronic    Referral Source: Chintan Mcwilliams MD Start of Care (SOC): 2024   Prior Hospitalization: See Medical History Provider #: [id number]   Prior Level of Function: Independent with ADLs, ambulating short distances in home with walker, using scooter in community   Comorbidities: Respiratory disorders, Diabetes mellitus, Musculoskeletal disorders, Neurologic condition, Social determinants of health: Depression, and Other:      Arthritis, asthma, depression, diabetes, heart disease (enlarged heart, heart palpitations), HTN, MS (diagnosed 40 years ago), scoliosis, CVA, thyroid disorder, L/S fusion x 4, left TKR, partial bowel resection due to obstruction, cholecystectomy, diabetic neuropathy       Visits from SOC: 8 Missed Visits: 2     Progress to Goals:  1. Pt will be compliant with HEP for symptom management at home.   Status at last eval: Infrequent compliance  Current Status: Compliant, MET  2. Pt will be independent with HEP at D/C for self management.   Status at last eval: Cueing needed  Current Status: Compliant, progressing  3. Pt will demonstrate B hip flexion strength of at least 5/5 to engage in age appropriate activities.  Status at last eval: 2/5  Current Status: 4-/5 B, progressing  4. Pt will be able to ambulate 300 feet with supervision to improve independence with ADLs.  Status at last eval: SBA 80 feet  Current Status: 100 feet with rollator walker, progressing  5. Pt will be able to sit<>stand from 18 inch chair without UE support to decrease fall risk.   Status at last eval: 19\"  Current Status: able to stand from 18\" chair without UE support but

## 2024-12-11 NOTE — PROGRESS NOTES
PHYSICAL / OCCUPATIONAL THERAPY - DAILY TREATMENT NOTE (updated )    Patient Name: Peggy Lucio    Date: 2024    : 1945  Insurance: Payor: [employer] MEDICARE / Plan: [employer] CHOICE-PP[employer] MEDICARE / Product Type: *No Product type* /      Patient  verified Yes     Visit #   Current / Total 8 36   Time   In / Out 12:42 1:20   Pain   In / Out 5 3   Subjective Functional Status/Changes: Pt states that her stomach is upset, she think that it is because she drank Glucerna, states that she is not sick and wants to continue with PT.  Pt states that she thinks that PT is helping.  Pt reports compliance with HEP.     Next PN/ RC due PN/RC 2025   Auth due 13 visits, exp 12/15/2024 (+4 visits, 2024-1/10/2025)    TREATMENT AREA =  Other low back pain [M54.59]    OBJECTIVE    Therapeutic Procedures:    Tx Min Billable or 1:1 Min (if diff from Tx Min) Procedure, Rationale, Specifics             38  [id number] Therapeutic Activity (timed):  use of dynamic activities replicating functional movements to increase ROM, strength, coordination, balance, and proprioception in order to improve patient's ability to progress to PLOF and address remaining functional goals.  (see flow sheet as applicable)     Details if applicable:  Oswestry, transfers, gait, MMT             38  [facility] Totals Reminder: bill using total billable min of TIMED therapeutic procedures (example: do not include dry needle or estim unattended, both untimed codes, in totals to left)  8-22 min = 1 unit; 23-37 min = 2 units; 38-52 min = 3 units; 53-67 min = 4 units; 68-82 min = 5 units   Total Total     [x]  Patient Education billed concurrently with other procedures   [x] Review HEP    [] Progressed/Changed HEP, detail:    [] Other detail:       Objective Information/Functional Measures/Assessment    SpO2= 98%, HR = 80 bpm, BP = 144/70 mmHG    Oswestry: 54% (declined from 52% at initial evaluation)    Posture:  Kyphosis:        [x] Increased   []

## 2024-12-30 ENCOUNTER — APPOINTMENT (OUTPATIENT)
Facility: HOSPITAL | Age: 79
End: 2024-12-30
Payer: MEDICARE

## 2025-01-02 ENCOUNTER — HOSPITAL ENCOUNTER (OUTPATIENT)
Facility: HOSPITAL | Age: 80
Setting detail: RECURRING SERIES
End: 2025-01-02
Payer: MEDICARE

## 2025-01-06 ENCOUNTER — APPOINTMENT (OUTPATIENT)
Facility: HOSPITAL | Age: 80
End: 2025-01-06
Payer: MEDICARE

## 2025-01-08 ENCOUNTER — HOSPITAL ENCOUNTER (OUTPATIENT)
Facility: HOSPITAL | Age: 80
Setting detail: RECURRING SERIES
Discharge: HOME | End: 2025-01-11
Payer: MEDICARE

## 2025-01-08 NOTE — PROGRESS NOTES
PHYSICAL / OCCUPATIONAL THERAPY - DAILY TREATMENT NOTE (updated )    Patient Name: Peggy Lucio    Date: 2025    : 1945  Insurance: Payor: [facility] MEDICARE / Plan: [employer] FULL DUAL ADVANTAGE 2 / Product Type: *No Product type* /      Patient  verified yes     Visit #   Current / Total N/a 12   Time   In / Out 1250 100   Pain   In / Out 5 5   Subjective Functional Status/Changes: I've been sick and I couldn't get here because the weather makes it hard to move my WC.   I'm sorry I'm late, I was over at [employer] and didn't realize my time. And I just realized my bus is picking me up between 110 and 140. I'll call them now to see how far they are.      TREATMENT AREA =  Other low back pain [M54.59]    Next PN/ RC due 25  Auth due 25  [x]  Patient Education billed concurrently with other procedures   [x] Review HEP    [] Progressed/Changed HEP  [] Other:    Objective Information/Functional Measures/Assessment    /78  Patient preferred to contact her transportation before treatment/after taking BP. Patient's transportation informed her that they were on their way now to pick her up.. Patient agreed to cx today's session and r/s to tomorrow and Friday. Patient verbalized understanding that her insurance authorized only 4 sessions and expires on 25.     Patient will continue to benefit from skilled PT services to instruct in home and community integration to address functional deficits and attain remaining goals.    Progress toward goals / Updated goals:  []  See Progress Note/Recertification    1st session since progress note. No significant progress observed        PLAN  yes Continue plan of care  []  Upgrade activities as tolerated  []  Discharge: See DC Note  []  Other:    Alfonso \"BJ\" Maria L, DPT, Cert. MDT, Cert. DN, Cert. SMT, Dip. Osteopractic    2025    12:57 PM  If an interpreting service was utilized for treatment of this patient, the contents of this document represent the 
No

## 2025-01-09 ENCOUNTER — HOSPITAL ENCOUNTER (OUTPATIENT)
Facility: HOSPITAL | Age: 80
Setting detail: RECURRING SERIES
Discharge: HOME | End: 2025-01-12
Payer: MEDICARE

## 2025-01-09 PROCEDURE — 97112 NEUROMUSCULAR REEDUCATION: CPT

## 2025-01-09 PROCEDURE — 97110 THERAPEUTIC EXERCISES: CPT

## 2025-01-09 PROCEDURE — 97530 THERAPEUTIC ACTIVITIES: CPT

## 2025-01-09 NOTE — PROGRESS NOTES
PHYSICAL THERAPY - DAILY TREATMENT NOTE (updated )    Patient Name: Peggy Lucio    Date: 2025    : 1945  Insurance: Payor: [facility] MEDICARE / Plan: JOHNY FULL DUAL ADVANTAGE 2 / Product Type: *No Product type* /      Patient  verified yes     Visit #   Current / Total 9 12   Time   In / Out 12:55 1:35   Pain   In / Out 4/10 4/10   Subjective Functional Status/Changes: \"I am so frustrated. I have been at my doctor's office trying to get paperwork done for a power WC. My doctor was in the room with me and told me he would be back in 10 minutes and never came back.\"     TREATMENT AREA =  Other low back pain [M54.59]    Next PN/ RC due 25  Auth due 25    OBJECTIVE    Therapeutic Procedures:  Tx Min Billable or 1:1 Min (if diff from Tx Min) Procedure, Rationale, Specifics   20  76586 Therapeutic Exercise (timed):  increase ROM, strength, coordination, balance, and proprioception to improve patient's ability to progress to PLOF and address remaining functional goals. (see flow sheet as applicable)     Details if applicable: LTR, seated march, stand HR/TR, seated knee flexion with theraband, vitals     10  44515 Therapeutic Activity (timed):  use of dynamic activities replicating functional movements to increase ROM, strength, coordination, balance, and proprioception in order to improve patient's ability to progress to PLOF and address remaining functional goals.  (see flow sheet as applicable)     Details if applicable:  sit to stand, ambulation throughout clinic   10  51722 Neuromuscular Re-Education (timed):  improve balance, coordination, kinesthetic sense, posture, core stability and proprioception to improve patient's ability to develop conscious control of individual muscles and awareness of position of extremities in order to progress to PLOF and address remaining functional goals. (see flow sheet as applicable)     Details if applicable:  LAQ, GS   40  [facility] BC Totals Reminder: bill

## 2025-01-10 ENCOUNTER — HOSPITAL ENCOUNTER (OUTPATIENT)
Facility: HOSPITAL | Age: 80
Setting detail: RECURRING SERIES
Discharge: HOME | End: 2025-01-13
Payer: MEDICARE

## 2025-01-10 PROCEDURE — 97530 THERAPEUTIC ACTIVITIES: CPT

## 2025-01-10 PROCEDURE — 97112 NEUROMUSCULAR REEDUCATION: CPT

## 2025-01-10 PROCEDURE — 97110 THERAPEUTIC EXERCISES: CPT

## 2025-01-10 NOTE — PROGRESS NOTES
[facility] - [facility]  [address] [place] [address]  Phone: [phone] Fax [phone]  DISCHARGE SUMMARY  Patient Name: Peggy Lucio : 1945   Treatment/Medical Diagnosis: Other low back pain [M54.59]   Referral Source: Chintan Mcwilliams MD     Date of Initial Visit: 24 Attended Visits: 10 Missed Visits: 3     SUMMARY OF TREATMENT  Patient's treatment consisted of LE and core strengthening exercises,  pt education, and instruction in home exercise program.    CURRENT STATUS:  1. Pt will be independent with HEP at D/C for self management.   Current: GOAL MET-pt I with D/C HEP.  2. Pt will demonstrate B hip flexion strength of at least 5/5 to engage in age appropriate activities.  Current: GOAL NOT MET =4-/5 B  3. Pt will be able to ambulate 300 feet with supervision to improve independence with ADLs.  Current: GOAL NOT MET-80' with S to SBA  4. Pt will be able to sit<>stand from 18 inch chair without UE support to decrease fall risk.   Current: GOAL NOT MET -requires 1 UE A    Pt progressing slowly with exercises in Physical Therapy. Pt had inconsistent attendance with therapy due to transportation and illness. Pt reports having a recent set back due to her asthma and getting a bad cold. Pt also reports being out of her pain medication for 1 week and noticing increased back pain with walking and standing activity. Pt continues to demo flexed posture during standing and walking activity. Her walking tolerance is limited to household distances currently.  Pt is utilizing a W/C for community distance. Pt reports in the process of trying to obtain a rollator and electric chair. Pt initially had poor compliance with home exercise program, but has recently initiated her home exercise program without increased pain.        RECOMMENDATIONS  Pt is independent with current home exercise program and is being discharged at this time with instruction 
proprioception to improve patient's ability to progress to PLOF and address remaining functional goals. (see flow sheet as applicable)     Details if applicable:       14  17762 Therapeutic Activity (timed):  use of dynamic activities replicating functional movements to increase ROM, strength, coordination, balance, and proprioception in order to improve patient's ability to progress to PLOF and address remaining functional goals.  (see flow sheet as applicable)     Details if applicable:     10  53073 Neuromuscular Re-Education (timed):  improve balance, coordination, kinesthetic sense, posture, core stability and proprioception to improve patient's ability to develop conscious control of individual muscles and awareness of position of extremities in order to progress to PLOF and address remaining functional goals. (see flow sheet as applicable)     Details if applicable:     39  [facility] Totals Reminder: bill using total billable min of TIMED therapeutic procedures (example: do not include dry needle or estim unattended, both untimed codes, in totals to left)  8-22 min = 1 unit; 23-37 min = 2 units; 38-52 min = 3 units; 53-67 min = 4 units; 68-82 min = 5 units   Total Total     [x]  Patient Education billed concurrently with other procedures   [x] Review HEP  Pt reports no questions regarding updated HEP for DC . Provided pt discharge instruction for HEP.   [] Progressed/Changed HEP, detail:    [] Other detail:       Objective Information/Functional Measures/Assessment    Strength:  MMT hip flexion 4-/5 B     Pt requires 1 UE support for sit<>stand from 18\" surface.      Patient will continue to benefit from skilled PT services to [facility]-See DC Summary      Progress toward goals / Updated goals:  [x]  See Progress Note/Recertification    1. Pt will be independent with HEP at D/C for self management.   Current: GOAL MET-pt I with D/C HEP.  2. Pt will demonstrate B hip flexion strength of at least 5/5 to engage in age

## (undated) DEVICE — FLEX ADVANTAGE 1500CC: Brand: FLEX ADVANTAGE

## (undated) DEVICE — BITE BLK ENDOSCP AD 54FR GRN POLYETH ENDOSCP W STRP SLD

## (undated) DEVICE — KIT COLON W/ 1.1OZ LUB AND 2 END

## (undated) DEVICE — MEDI-VAC NON-CONDUCTIVE SUCTION TUBING: Brand: CARDINAL HEALTH

## (undated) DEVICE — KENDALL 500 SERIES DIAPHORETIC FOAM MONITORING ELECTRODE - TEAR DROP SHAPE ( 30/PK): Brand: KENDALL

## (undated) DEVICE — BASIN EMESIS 500CC ROSE 250/CS 60/PLT: Brand: MEDEGEN MEDICAL PRODUCTS, LLC

## (undated) DEVICE — FORCEPS BX L240CM JAW DIA2.8MM L CAP W/ NDL MIC MESH TOOTH

## (undated) DEVICE — CONTAINER PREFIL FRMLN 15ML --

## (undated) DEVICE — SYR 50ML SLIP TIP NSAF LF STRL --